# Patient Record
Sex: MALE | Race: WHITE | NOT HISPANIC OR LATINO | Employment: UNEMPLOYED | URBAN - NONMETROPOLITAN AREA
[De-identification: names, ages, dates, MRNs, and addresses within clinical notes are randomized per-mention and may not be internally consistent; named-entity substitution may affect disease eponyms.]

---

## 2017-04-04 ENCOUNTER — OFFICE VISIT (OUTPATIENT)
Dept: INTERNAL MEDICINE | Facility: CLINIC | Age: 32
End: 2017-04-04

## 2017-04-04 VITALS
TEMPERATURE: 97.7 F | HEART RATE: 65 BPM | OXYGEN SATURATION: 98 % | HEIGHT: 76 IN | BODY MASS INDEX: 29.96 KG/M2 | DIASTOLIC BLOOD PRESSURE: 82 MMHG | WEIGHT: 246 LBS | SYSTOLIC BLOOD PRESSURE: 132 MMHG

## 2017-04-04 DIAGNOSIS — G90.522 COMPLEX REGIONAL PAIN SYNDROME TYPE 1 OF LEFT LOWER EXTREMITY: Primary | ICD-10-CM

## 2017-04-04 DIAGNOSIS — F31.62 BIPOLAR DISORDER, CURRENT EPISODE MIXED, MODERATE (HCC): ICD-10-CM

## 2017-04-04 DIAGNOSIS — Z00.00 HEALTHCARE MAINTENANCE: ICD-10-CM

## 2017-04-04 DIAGNOSIS — B35.4 RINGWORM OF BODY: ICD-10-CM

## 2017-04-04 DIAGNOSIS — K21.00 GASTROESOPHAGEAL REFLUX DISEASE WITH ESOPHAGITIS: ICD-10-CM

## 2017-04-04 PROCEDURE — 99204 OFFICE O/P NEW MOD 45 MIN: CPT | Performed by: FAMILY MEDICINE

## 2017-04-04 RX ORDER — PANTOPRAZOLE SODIUM 40 MG/1
40 TABLET, DELAYED RELEASE ORAL DAILY
Qty: 30 TABLET | Refills: 2 | Status: SHIPPED | OUTPATIENT
Start: 2017-04-04 | End: 2017-06-07 | Stop reason: SDUPTHER

## 2017-04-04 RX ORDER — GABAPENTIN 300 MG/1
300 CAPSULE ORAL 3 TIMES DAILY
Qty: 90 CAPSULE | Refills: 1 | Status: SHIPPED | OUTPATIENT
Start: 2017-04-04 | End: 2017-05-05

## 2017-04-04 RX ORDER — QUETIAPINE FUMARATE 25 MG/1
TABLET, FILM COATED ORAL
Qty: 60 TABLET | Refills: 2 | Status: SHIPPED | OUTPATIENT
Start: 2017-04-04 | End: 2017-06-07 | Stop reason: SDUPTHER

## 2017-04-04 RX ORDER — CLOTRIMAZOLE 1 %
CREAM (GRAM) TOPICAL 2 TIMES DAILY
Qty: 113 G | Refills: 1 | Status: SHIPPED | OUTPATIENT
Start: 2017-04-04 | End: 2017-07-17

## 2017-04-04 RX ORDER — LAMOTRIGINE 25 MG/1
TABLET ORAL
Qty: 84 TABLET | Refills: 0 | Status: SHIPPED | OUTPATIENT
Start: 2017-04-04 | End: 2017-05-05
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
## 2017-04-04 NOTE — PATIENT INSTRUCTIONS
Sleep Hygiene:     1. Go to bed and get up around same time every day.     2. Do not watch TV, use computer, phone, tablet for 1 hour before bed.  Reading, knitting, stan (ok if the screen is turned down A LOT).      3.  Sleep in dark room    4. Temperature - optimal is around 66-68.      5.  If you can't get to sleep within 45 minutes, or you wake up and can't fall back asleep within 30 minutes - get up out of bed, sit in a chair w/ dim light, and read/knit for 30 minutes then lie back down, try 8 oz glass of warm milk.      6. [facility] yosi

## 2017-04-13 LAB
ALBUMIN SERPL-MCNC: 4.3 G/DL (ref 3.5–5)
ALBUMIN/GLOB SERPL: 1.7 G/DL (ref 1–2)
ALP SERPL-CCNC: 74 U/L (ref 38–126)
ALT SERPL-CCNC: 42 U/L (ref 13–69)
AST SERPL-CCNC: 19 U/L (ref 15–46)
BILIRUB SERPL-MCNC: 0.6 MG/DL (ref 0.2–1.3)
BUN SERPL-MCNC: 11 MG/DL (ref 7–20)
BUN/CREAT SERPL: 10 (ref 6.3–21.9)
CALCIUM SERPL-MCNC: 9.6 MG/DL (ref 8.4–10.2)
CHLORIDE SERPL-SCNC: 106 MMOL/L (ref 98–107)
CHOLEST SERPL-MCNC: 185 MG/DL (ref 0–199)
CO2 SERPL-SCNC: 27 MMOL/L (ref 26–30)
CREAT SERPL-MCNC: 1.1 MG/DL (ref 0.6–1.3)
GLOBULIN SER CALC-MCNC: 2.6 GM/DL
GLUCOSE SERPL-MCNC: 98 MG/DL (ref 74–98)
H PYLORI IGA SER-ACNC: <9 UNITS (ref 0–8.9)
H PYLORI IGG SER IA-ACNC: <0.9 U/ML (ref 0–0.8)
H PYLORI IGM SER-ACNC: <9 UNITS (ref 0–8.9)
HDLC SERPL-MCNC: 28 MG/DL (ref 40–60)
LDLC SERPL CALC-MCNC: 105 MG/DL (ref 0–99)
POTASSIUM SERPL-SCNC: 4.6 MMOL/L (ref 3.5–5.1)
PROT SERPL-MCNC: 6.9 G/DL (ref 6.3–8.2)
SODIUM SERPL-SCNC: 144 MMOL/L (ref 137–145)
TRIGL SERPL-MCNC: 259 MG/DL
VLDLC SERPL CALC-MCNC: 51.8 MG/DL

## 2017-05-05 ENCOUNTER — OFFICE VISIT (OUTPATIENT)
Dept: INTERNAL MEDICINE | Facility: CLINIC | Age: 32
End: 2017-05-05

## 2017-05-05 VITALS
DIASTOLIC BLOOD PRESSURE: 80 MMHG | BODY MASS INDEX: 31.17 KG/M2 | HEART RATE: 69 BPM | HEIGHT: 76 IN | WEIGHT: 256 LBS | RESPIRATION RATE: 12 BRPM | SYSTOLIC BLOOD PRESSURE: 130 MMHG | OXYGEN SATURATION: 98 %

## 2017-05-05 DIAGNOSIS — F31.62 BIPOLAR DISORDER, CURRENT EPISODE MIXED, MODERATE (HCC): ICD-10-CM

## 2017-05-05 DIAGNOSIS — G57.72 COMPLEX REGIONAL PAIN SYNDROME TYPE 2 OF LEFT LOWER EXTREMITY: ICD-10-CM

## 2017-05-05 DIAGNOSIS — B35.6 TINEA CRURIS: ICD-10-CM

## 2017-05-05 DIAGNOSIS — R73.9 ELEVATED BLOOD SUGAR: Primary | ICD-10-CM

## 2017-05-05 LAB — HBA1C MFR BLD: 5.4 %

## 2017-05-05 PROCEDURE — 83036 HEMOGLOBIN GLYCOSYLATED A1C: CPT | Performed by: FAMILY MEDICINE

## 2017-05-05 PROCEDURE — 99214 OFFICE O/P EST MOD 30 MIN: CPT | Performed by: FAMILY MEDICINE

## 2017-05-05 RX ORDER — LAMOTRIGINE 100 MG/1
100 TABLET ORAL 2 TIMES DAILY
Qty: 60 TABLET | Refills: 1 | Status: SHIPPED | OUTPATIENT
Start: 2017-05-05 | End: 2017-06-25 | Stop reason: SDUPTHER

## 2017-05-05 RX ORDER — LAMOTRIGINE 100 MG/1
100 TABLET ORAL DAILY
Qty: 60 TABLET | Refills: 1 | Status: SHIPPED | OUTPATIENT
Start: 2017-05-05 | End: 2017-05-05 | Stop reason: SDUPTHER

## 2017-05-28 DIAGNOSIS — G90.522 COMPLEX REGIONAL PAIN SYNDROME TYPE 1 OF LEFT LOWER EXTREMITY: ICD-10-CM

## 2017-05-30 RX ORDER — GABAPENTIN 300 MG/1
CAPSULE ORAL
Qty: 90 CAPSULE | Refills: 1 | OUTPATIENT
Start: 2017-05-30

## 2017-06-05 ENCOUNTER — OFFICE VISIT (OUTPATIENT)
Dept: INTERNAL MEDICINE | Facility: CLINIC | Age: 32
End: 2017-06-05

## 2017-06-05 VITALS
WEIGHT: 257 LBS | HEART RATE: 71 BPM | OXYGEN SATURATION: 97 % | BODY MASS INDEX: 31.29 KG/M2 | RESPIRATION RATE: 12 BRPM | HEIGHT: 76 IN | SYSTOLIC BLOOD PRESSURE: 122 MMHG | DIASTOLIC BLOOD PRESSURE: 80 MMHG

## 2017-06-05 DIAGNOSIS — F41.3 OTHER MIXED ANXIETY DISORDERS: ICD-10-CM

## 2017-06-05 DIAGNOSIS — F31.62 BIPOLAR DISORDER, CURRENT EPISODE MIXED, MODERATE (HCC): Primary | ICD-10-CM

## 2017-06-05 DIAGNOSIS — B35.6 TINEA CRURIS: ICD-10-CM

## 2017-06-05 PROCEDURE — 99214 OFFICE O/P EST MOD 30 MIN: CPT | Performed by: FAMILY MEDICINE

## 2017-06-05 RX ORDER — VENLAFAXINE HYDROCHLORIDE 75 MG/1
75 CAPSULE, EXTENDED RELEASE ORAL DAILY
Qty: 30 CAPSULE | Refills: 2 | Status: SHIPPED | OUTPATIENT
Start: 2017-06-05 | End: 2017-08-07 | Stop reason: SDUPTHER

## 2017-06-05 RX ORDER — TERBINAFINE HYDROCHLORIDE 250 MG/1
250 TABLET ORAL DAILY
Qty: 28 TABLET | Refills: 0 | Status: SHIPPED | OUTPATIENT
Start: 2017-06-05 | End: 2017-07-17

## 2017-06-05 NOTE — PROGRESS NOTES
Follow up labs.   C/O bipolar meds not working. Very agitated this morning.   Discuss antifungal meds and treatment.   Has been going to PT for left leg. Swelling and discoloration is better after he goes to PT. He thinks it is helping.     SUBJECTIVE: Alex Torres is a 32 y.o. male seen for a follow up visit;      BPD: he is not improving - he thinks he is getting more aggravated now.  Currently he is taking 25 mg nightly seroquel and sleeping ok - sleeping about 8-10 hours but sleeping until 9-11 am.  Fogginess is better now - only early in the morning now.  He thinks his depression is recurring because his irritation and annoyance is worse - prior to the lamictal he was able to walk away from the situation but now he feels like he can't walk away and he is actually almost blacking out from anger.  He is also getting triggered more easily.  Has psychology appt. On 7/6.  He thinks the anxiety part is what is really contributing.      CRPS: doing much better with PT, getting 3-4 days inbetween now with mild to no symptoms.      The following portions of the patient's history were reviewed and updated as appropriate: He  has a past medical history of Kidney stone and Positive TB test.  He has Essential hypertension; Bipolar disorder, current episode mixed, moderate; and Complex regional pain syndrome type 2 of left lower extremity on his pertinent problem list.  He  has a past surgical history that includes Tibia fracture surgery (Left, 1997); Cholecystectomy (2002); ORIF metacarpal fracture (Right, 2000); and Inguinal hernia repair (Right, 1995).  His family history includes Alcohol abuse in his maternal grandfather and paternal grandfather; Alzheimer's disease in his maternal grandmother; Arthritis in his father; Depression in his mother; Diabetes in his maternal aunt, maternal grandmother, maternal uncle, and other; Early death in his maternal grandfather and paternal grandfather; Hypertension in his  "father and mother; Liver disease in his maternal grandfather and paternal grandfather; No Known Problems in his brother, brother, and paternal grandmother.  He  reports that he has been smoking Cigarettes.  He started smoking about 18 years ago. He has a 18.00 pack-year smoking history. He has never used smokeless tobacco. He reports that he drinks alcohol. He reports that he does not use illicit drugs..    Review of Systems   Constitutional: Negative.    Cardiovascular: Negative.    Psychiatric/Behavioral: Positive for agitation and dysphoric mood. Negative for self-injury, sleep disturbance and suicidal ideas. The patient is nervous/anxious.          OBJECTIVE:  /80  Pulse 71  Resp 12  Ht 76\" (193 cm)  Wt 257 lb (117 kg)  SpO2 97%  BMI 31.28 kg/m2     Physical Exam   Constitutional: He appears well-developed and well-nourished. No distress.   Psychiatric: His speech is normal and behavior is normal. Thought content normal. His mood appears anxious.           ASSESSMENT:  1. Bipolar disorder, current episode mixed, moderate  Stable, will continue lamictal, I think bipolar is  Actually stabilized, but having anxiety mixed component not improved.  Will see psychology next month.    2. Other mixed anxiety disorders  worsening  - venlafaxine XR (EFFEXOR-XR) 75 MG 24 hr capsule; Take 1 capsule by mouth Daily.  Dispense: 30 capsule; Refill: 2    3. Tinea cruris  Worsening, can't afford OTC lamisil cream  - terbinafine (lamiSIL) 250 MG tablet; Take 1 tablet by mouth Daily.  Dispense: 28 tablet; Refill: 0                  "

## 2017-06-07 DIAGNOSIS — F31.62 BIPOLAR DISORDER, CURRENT EPISODE MIXED, MODERATE (HCC): ICD-10-CM

## 2017-06-07 DIAGNOSIS — K21.00 GASTROESOPHAGEAL REFLUX DISEASE WITH ESOPHAGITIS: ICD-10-CM

## 2017-06-07 RX ORDER — QUETIAPINE FUMARATE 25 MG/1
TABLET, FILM COATED ORAL
Qty: 60 TABLET | Refills: 2 | Status: SHIPPED | OUTPATIENT
Start: 2017-06-07 | End: 2017-07-06 | Stop reason: SDUPTHER

## 2017-06-07 RX ORDER — PANTOPRAZOLE SODIUM 40 MG/1
40 TABLET, DELAYED RELEASE ORAL DAILY
Qty: 30 TABLET | Refills: 2 | Status: SHIPPED | OUTPATIENT
Start: 2017-06-07 | End: 2017-09-18 | Stop reason: SDUPTHER

## 2017-06-25 DIAGNOSIS — F31.62 BIPOLAR DISORDER, CURRENT EPISODE MIXED, MODERATE (HCC): ICD-10-CM

## 2017-06-26 RX ORDER — LAMOTRIGINE 100 MG/1
TABLET ORAL
Qty: 60 TABLET | Refills: 0 | Status: SHIPPED | OUTPATIENT
Start: 2017-06-26 | End: 2017-07-06 | Stop reason: SDUPTHER

## 2017-07-06 ENCOUNTER — OFFICE VISIT (OUTPATIENT)
Dept: PSYCHIATRY | Facility: CLINIC | Age: 32
End: 2017-07-06

## 2017-07-06 VITALS — BODY MASS INDEX: 30.93 KG/M2 | HEIGHT: 76 IN | WEIGHT: 254 LBS

## 2017-07-06 DIAGNOSIS — F17.210 NICOTINE DEPENDENCE, CIGARETTES, UNCOMPLICATED: ICD-10-CM

## 2017-07-06 DIAGNOSIS — F51.05 INSOMNIA DUE TO MENTAL CONDITION: ICD-10-CM

## 2017-07-06 DIAGNOSIS — F31.62 BIPOLAR DISORDER, CURRENT EPISODE MIXED, MODERATE (HCC): Primary | ICD-10-CM

## 2017-07-06 DIAGNOSIS — F12.20 CANNABIS DEPENDENCE, UNSPECIFIED: ICD-10-CM

## 2017-07-06 PROCEDURE — 90792 PSYCH DIAG EVAL W/MED SRVCS: CPT | Performed by: NURSE PRACTITIONER

## 2017-07-06 RX ORDER — LAMOTRIGINE 200 MG/1
200 TABLET ORAL 2 TIMES DAILY
Qty: 60 TABLET | Refills: 2 | Status: SHIPPED | OUTPATIENT
Start: 2017-07-06 | End: 2017-08-10

## 2017-07-06 RX ORDER — QUETIAPINE FUMARATE 200 MG/1
TABLET, FILM COATED ORAL
Qty: 30 TABLET | Refills: 2 | Status: SHIPPED | OUTPATIENT
Start: 2017-07-06 | End: 2017-08-10 | Stop reason: SDUPTHER
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
## 2017-07-17 ENCOUNTER — OFFICE VISIT (OUTPATIENT)
Dept: INTERNAL MEDICINE | Facility: CLINIC | Age: 32
End: 2017-07-17

## 2017-07-17 VITALS
DIASTOLIC BLOOD PRESSURE: 78 MMHG | WEIGHT: 256.4 LBS | HEART RATE: 78 BPM | BODY MASS INDEX: 31.22 KG/M2 | RESPIRATION RATE: 12 BRPM | SYSTOLIC BLOOD PRESSURE: 122 MMHG | OXYGEN SATURATION: 95 % | HEIGHT: 76 IN

## 2017-07-17 DIAGNOSIS — B35.6 TINEA CRURIS: Primary | ICD-10-CM

## 2017-07-17 DIAGNOSIS — R06.83 SNORING: ICD-10-CM

## 2017-07-17 DIAGNOSIS — G47.19 EXCESSIVE DAYTIME SLEEPINESS: ICD-10-CM

## 2017-07-17 DIAGNOSIS — G57.72 COMPLEX REGIONAL PAIN SYNDROME TYPE 2 OF LEFT LOWER EXTREMITY: ICD-10-CM

## 2017-07-17 PROCEDURE — 99213 OFFICE O/P EST LOW 20 MIN: CPT | Performed by: FAMILY MEDICINE

## 2017-07-17 NOTE — PROGRESS NOTES
"6 week follow up visit. Still having a lot of impulses and compulsions.   C/O snoring. Wakes himself up snoring, wife can't sleep. He is concerned because he feels he should be sleeping better on Seroquel. He has tried nose strips and they didn't really help.   Lamisil did not help.     SUBJECTIVE: Alex Torres is a 32 y.o. male seen for a follow up visit;      BPD: having more compulsions and impulses, having racing thoughts and irritability.  Taking seroquel nightly - but snoring really loudly.  Wakes him up it's so loud.  He is seeing Radha Brunson - increased to 200 mg BID lamictal & 200 mg seroquel.  He is still having a lot of irritability.      Rash: Not any improved with oral lamisil, not less itchy or anything.    The following portions of the patient's history were reviewed and updated as appropriate: current medications and problem list.    Review of Systems   Constitutional: Positive for fatigue.   Gastrointestinal: Negative.    Skin: Positive for rash. Negative for wound.   Psychiatric/Behavioral: Positive for dysphoric mood.         OBJECTIVE:  /78  Pulse 78  Resp 12  Ht 76\" (193 cm)  Wt 256 lb 6.4 oz (116 kg)  SpO2 95%  BMI 31.21 kg/m2     Physical Exam   Constitutional: He appears well-developed and well-nourished. No distress.           ASSESSMENT:  1. Tinea cruris  unchanged  - butenafine (MENTAX) 1 % cream; Apply  topically 2 (Two) Times a Day.  Dispense: 30 g; Refill: 0    2. Snoring  New dx  - Ambulatory Referral to Sleep Medicine    3. Excessive daytime sleepiness  New dx  - Ambulatory Referral to Sleep Medicine    4. Complex regional pain syndrome type 2 of left lower extremity  Improving w/ PT but not controlled.  - Ambulatory Referral to Sleep Medicine      I, Laury Greco MD, hereby attest that the medical record entry above accurately reflects signatures/notations that I made in my capacity as Laury Greco MD when I treated and diagnosed the above patient.  I do " hereby attest that his information is true, accurate, and complete to the best of my knowledge and I understand that any falsification, omission, or concealment of material fact may subject me to administrative, civil, or criminal liability.

## 2017-08-07 DIAGNOSIS — F41.3 OTHER MIXED ANXIETY DISORDERS: ICD-10-CM

## 2017-08-07 RX ORDER — VENLAFAXINE HYDROCHLORIDE 75 MG/1
75 CAPSULE, EXTENDED RELEASE ORAL DAILY
Qty: 30 CAPSULE | Refills: 2 | Status: SHIPPED | OUTPATIENT
Start: 2017-08-07 | End: 2017-11-14 | Stop reason: SDUPTHER

## 2017-08-10 ENCOUNTER — OFFICE VISIT (OUTPATIENT)
Dept: PSYCHIATRY | Facility: CLINIC | Age: 32
End: 2017-08-10

## 2017-08-10 VITALS — HEIGHT: 76 IN | WEIGHT: 257 LBS | BODY MASS INDEX: 31.29 KG/M2

## 2017-08-10 DIAGNOSIS — F12.20 CANNABIS DEPENDENCE, UNSPECIFIED: ICD-10-CM

## 2017-08-10 DIAGNOSIS — F31.62 BIPOLAR DISORDER, CURRENT EPISODE MIXED, MODERATE (HCC): Primary | ICD-10-CM

## 2017-08-10 DIAGNOSIS — F17.210 NICOTINE DEPENDENCE, CIGARETTES, UNCOMPLICATED: ICD-10-CM

## 2017-08-10 DIAGNOSIS — F51.05 INSOMNIA DUE TO MENTAL CONDITION: ICD-10-CM

## 2017-08-10 PROCEDURE — 99213 OFFICE O/P EST LOW 20 MIN: CPT | Performed by: NURSE PRACTITIONER

## 2017-08-10 RX ORDER — OXCARBAZEPINE 150 MG/1
150 TABLET, FILM COATED ORAL 2 TIMES DAILY
Qty: 60 TABLET | Refills: 1 | Status: SHIPPED | OUTPATIENT
Start: 2017-08-10 | End: 2017-09-12 | Stop reason: SDUPTHER

## 2017-08-10 RX ORDER — TRAZODONE HYDROCHLORIDE 100 MG/1
100 TABLET ORAL NIGHTLY
Qty: 30 TABLET | Refills: 1 | Status: SHIPPED | OUTPATIENT
Start: 2017-08-10 | End: 2017-09-21 | Stop reason: SDUPTHER

## 2017-08-10 RX ORDER — QUETIAPINE FUMARATE 200 MG/1
TABLET, FILM COATED ORAL
Qty: 30 TABLET | Refills: 2
Start: 2017-08-10 | End: 2017-08-10

## 2017-08-10 RX ORDER — OLANZAPINE 10 MG/1
10 TABLET ORAL NIGHTLY
Qty: 15 TABLET | Refills: 0 | Status: SHIPPED | OUTPATIENT
Start: 2017-08-10 | End: 2017-09-07 | Stop reason: SDUPTHER

## 2017-08-10 NOTE — PROGRESS NOTES
"    Subjective   Alex Torres is a 32 y.o. male who is here today for medication management follow up.    Chief Complaint: Diagnoses and all orders for this visit:    Bipolar disorder, current episode mixed, moderate    Insomnia due to mental condition    Cannabis dependence, unspecified    Nicotine dependence, cigarettes, uncomplicated      History of Present Illness Patient reports \"meds are not working\", he reports continued depressive symptoms with poor interest, poor motivation, low frustration and irritability. He reports not sleeping well will be awake all night then sleep maybe 3 hours and not tired and snores a lot when he does so going for sleep study soon. He reports he can't work because his mood is always chaotic. His wife got a job at [employer]. He lives next to his mother and checks in on her. He denies manic symptoms. He feels the venlafaxine has helped with anxiety \"I still have panic attacks but they aren't as bad\". Patient reports being compliant with medications. He denies adverse effects \"they just aren't working'. Denies SI/HI or AVH. He and his wife want a baby \"and we have been trying for about 17 years\" she has PCOS. He reports though he has low libido. He makes negative comments about himself \"I such an asshole\". Discussed reframing negative thoughts      (Scales based on 0 - 10 with 10 being the worst)        The following portions of the patient's history were reviewed and updated as appropriate: allergies, current medications, past family history, past medical history, past social history, past surgical history and problem list.    Review of Systemsdenies fever, cough, s/s’s of infection, denies GI/ problems, denies new medical issues     Objective   Physical Exam  Height 76\" (193 cm), weight 257 lb (117 kg).    Allergies   Allergen Reactions   • Latex        Current Medications:   Current Outpatient Prescriptions   Medication Sig Dispense Refill   • butenafine (MENTAX) 1 % cream " Apply  topically 2 (Two) Times a Day. 30 g 0   • OLANZapine (zyPREXA) 10 MG tablet Take 1 tablet by mouth Every Night. 15 tablet 0   • OXcarbazepine (TRILEPTAL) 150 MG tablet Take 1 tablet by mouth 2 (Two) Times a Day. 60 tablet 1   • pantoprazole (PROTONIX) 40 MG EC tablet Take 1 tablet by mouth Daily. 30 tablet 2   • traZODone (DESYREL) 100 MG tablet Take 1 tablet by mouth Every Night. 30 tablet 1   • venlafaxine XR (EFFEXOR-XR) 75 MG 24 hr capsule Take 1 capsule by mouth Daily. 30 capsule 2     No current facility-administered medications for this visit.        Mental Status Exam:   Appearance: appropriate  Hygiene:   good  Cooperation:  Cooperative  Eye Contact:  Good  Psychomotor Behavior:  Appropriate  Mood:  dysthymic  Affect:  Appropriate  Hopelessness: Denies  Speech:  Rambling  Thought Process:  Linear  Thought Content:  Normal  Suicidal:  None  Homicidal:  None  Hallucinations:  None  Delusion:  None  Memory:  Intact  Orientation:  Person, Place, Time and Situation  Reliability:  fair  Insight:  Fair  Judgement:  Fair  Impulse Control:  Fair  Estimated Intelligence: average range   Physical/Medical Issues:  No     Assessment/Plan   Diagnoses and all orders for this visit:    Bipolar disorder, current episode mixed, moderate  -     Discontinue: QUEtiapine (SEROquel) 200 MG tablet; Weaning off decrease to 1/2 tablet nightly times two weeks    Insomnia due to mental condition    Cannabis dependence, unspecified    Nicotine dependence, cigarettes, uncomplicated    Other orders  -     OLANZapine (zyPREXA) 10 MG tablet; Take 1 tablet by mouth Every Night.  -     OXcarbazepine (TRILEPTAL) 150 MG tablet; Take 1 tablet by mouth 2 (Two) Times a Day.  -     traZODone (DESYREL) 100 MG tablet; Take 1 tablet by mouth Every Night.      Discussed diagnoses and treatment plan  Sleep hygiene discussed,   Smoking cessation counseled, he is not interested in cessation   Discussed cessation of cannabis,   Still having  depression and not sleeping he states  Will wean off Lamictal and lower Seroquel  instructions written    Trial on   Olanzapine 10mg PO at HS daily cross taper off Seroquel   Trileptal cross tapering off Lamictal   Cont Effexor XR 75mg PO one QAM   Add Trazodone for sleep as needed  Getting sleep study     We discussed risks, benefits, and side effects of the above medications and the patient was agreeable with the plan. Patient was educated on the importance of compliance with treatment and follow-up appointments.     Instructed to call for questions or concerns and return early if necessary. Crisis plan reviewed including going to the Emergency department.    Return in about 5 weeks (around 9/14/2017).

## 2017-09-07 RX ORDER — OLANZAPINE 10 MG/1
TABLET ORAL
Qty: 15 TABLET | Refills: 0 | Status: SHIPPED | OUTPATIENT
Start: 2017-09-07 | End: 2017-09-12 | Stop reason: SDUPTHER

## 2017-09-11 ENCOUNTER — OFFICE VISIT (OUTPATIENT)
Dept: INTERNAL MEDICINE | Facility: CLINIC | Age: 32
End: 2017-09-11

## 2017-09-11 VITALS
HEART RATE: 68 BPM | HEIGHT: 76 IN | OXYGEN SATURATION: 97 % | WEIGHT: 265.8 LBS | RESPIRATION RATE: 12 BRPM | SYSTOLIC BLOOD PRESSURE: 122 MMHG | BODY MASS INDEX: 32.37 KG/M2 | DIASTOLIC BLOOD PRESSURE: 74 MMHG

## 2017-09-11 DIAGNOSIS — R60.0 BILATERAL EDEMA OF LOWER EXTREMITY: Primary | ICD-10-CM

## 2017-09-11 PROCEDURE — 99213 OFFICE O/P EST LOW 20 MIN: CPT | Performed by: INTERNAL MEDICINE

## 2017-09-11 PROCEDURE — 99213 OFFICE O/P EST LOW 20 MIN: CPT | Performed by: FAMILY MEDICINE

## 2017-09-11 RX ORDER — TRIAMTERENE AND HYDROCHLOROTHIAZIDE 37.5; 25 MG/1; MG/1
1 CAPSULE ORAL DAILY PRN
Qty: 30 CAPSULE | Refills: 1 | Status: SHIPPED | OUTPATIENT
Start: 2017-09-11 | End: 2017-11-07 | Stop reason: SDUPTHER

## 2017-09-11 NOTE — PROGRESS NOTES
C/O bilateral LE edema, pain in LE. Started about 6 days ago. Has tried walking, standing, sitting, elevation. The only thing that helps is when he is sleeping in bed. Throught the entire day, swelling is there. He has tried everything he knows to control sx's. He has taken Ibuprofen for pain, doesn't help at all. Is taking  mg TID (200 mg tablets x 4 TID).    No c/o SOA, no CP, no dizziness.   C/O occas sharp stabbing pain in feet and legs. Feet hurt when wearing shoes.

## 2017-09-11 NOTE — PROGRESS NOTES
Subjective  Alex Torres is a 32 y.o. male    HPI coming in with complaints of lower extremity swelling for about 2 weeks now. Causes some discomfort there. He denies associated chest pain or shortness of breath. Has gained some weight recently. History of bipolar disorder underwent some recent med changes including initiation of Zyprexa 10 mg.  No change in activity or diet. Denies daytime somnolence or excessive snoring at night    The following portions of the patient's history were reviewed and updated as appropriate: allergies, current medications, past family history, past medical history, past social history, past surgical history, and problem list.     Review of Systems   Constitutional: Negative.  Negative for activity change, appetite change, fatigue and fever.   HENT: Negative for congestion, ear discharge, ear pain and trouble swallowing.    Eyes: Negative for photophobia and visual disturbance.   Respiratory: Negative for cough and shortness of breath.    Cardiovascular: Positive for leg swelling. Negative for chest pain and palpitations.   Gastrointestinal: Negative for abdominal distention, abdominal pain, constipation, diarrhea, nausea and vomiting.   Endocrine: Negative.    Genitourinary: Negative for dysuria, hematuria and urgency.   Musculoskeletal: Negative for arthralgias, back pain, joint swelling and myalgias.   Skin: Negative for color change and rash.   Allergic/Immunologic: Negative.    Neurological: Negative for dizziness, weakness, light-headedness and headaches.   Hematological: Negative for adenopathy. Does not bruise/bleed easily.   Psychiatric/Behavioral: Negative for agitation, confusion and dysphoric mood. The patient is not nervous/anxious.          Objective  Physical Exam   Constitutional: He is oriented to person, place, and time. He appears well-developed and well-nourished. No distress.   HENT:   Nose: Nose normal.   Mouth/Throat: Oropharynx is clear and moist.   Eyes:  Conjunctivae and EOM are normal. No scleral icterus.   Neck: No tracheal deviation present. No thyromegaly present.   Cardiovascular: Normal rate and regular rhythm.  Exam reveals no friction rub.    No murmur heard.  Pulmonary/Chest: No respiratory distress. He has no wheezes. He has no rales.   Abdominal: Soft. He exhibits no distension and no mass. There is no tenderness. There is no guarding.   Musculoskeletal: Normal range of motion. He exhibits edema. He exhibits no deformity.   Lymphadenopathy:     He has no cervical adenopathy.   Neurological: He is alert and oriented to person, place, and time. He has normal reflexes. No cranial nerve deficit. Coordination normal.   Skin: Skin is warm and dry. No rash noted. No erythema.   Psychiatric: He has a normal mood and affect. His behavior is normal. Judgment and thought content normal.       Diagnoses and all orders for this visit:    Bilateral edema of lower extremity check urine for analysis. Advised low-salt diet consider leg elevation and compression stockings as needed. Recent lab work without evidence of liver or kidney disorder. Albumin level okay check UA. Added on Dyazide on a when necessary basis only.  Some of his symptoms may be related to side effect of Zyprexa patient will be discussing with his psychiatrist about dose reduction

## 2017-09-11 NOTE — PROGRESS NOTES
Subjective  Alex Torres is a 32 y.o. male    HPI coming in with complains of increased lower extremity edema. Ongoing for about 2 weeks now also complaining of some discomfort in his lower extremity's has been taking up to 800 mg of ibuprofen 3 times a day. Has not been compliant with salt restriction in his diet. No fever or chills no abdominal pain    The following portions of the patient's history were reviewed and updated as appropriate: allergies, current medications, past family history, past medical history, past social history, past surgical history, and problem list.     Review of Systems   Constitutional: Negative.  Negative for activity change, appetite change, fatigue and fever.   HENT: Negative for congestion, ear discharge, ear pain and trouble swallowing.    Eyes: Negative for photophobia and visual disturbance.   Respiratory: Negative for cough and shortness of breath.    Cardiovascular: Positive for leg swelling. Negative for chest pain and palpitations.   Gastrointestinal: Negative for abdominal distention, abdominal pain, constipation, diarrhea, nausea and vomiting.   Endocrine: Negative.    Genitourinary: Negative for dysuria, hematuria and urgency.   Musculoskeletal: Negative for arthralgias, back pain, joint swelling and myalgias.   Skin: Negative for color change and rash.   Allergic/Immunologic: Negative.    Neurological: Negative for dizziness, weakness, light-headedness and headaches.   Hematological: Negative for adenopathy. Does not bruise/bleed easily.   Psychiatric/Behavioral: Positive for dysphoric mood and sleep disturbance. Negative for agitation and confusion. The patient is not nervous/anxious.        Vitals:    09/11/17 1128   BP: 122/74   Pulse: 68   Resp: 12   SpO2: 97%       Objective  Physical Exam   Constitutional: He is oriented to person, place, and time. He appears well-developed and well-nourished. No distress.   HENT:   Nose: Nose normal.   Mouth/Throat: Oropharynx  is clear and moist.   Eyes: Conjunctivae and EOM are normal. No scleral icterus.   Neck: No tracheal deviation present. No thyromegaly present.   Cardiovascular: Normal rate and regular rhythm.  Exam reveals no friction rub.    No murmur heard.  Pulmonary/Chest: No respiratory distress. He has no wheezes. He has no rales.   Abdominal: Soft. He exhibits no distension and no mass. There is no tenderness. There is no guarding.   Musculoskeletal: Normal range of motion. He exhibits edema. He exhibits no deformity.   Lymphadenopathy:     He has no cervical adenopathy.   Neurological: He is alert and oriented to person, place, and time. He has normal reflexes. No cranial nerve deficit. Coordination normal.   Skin: Skin is warm and dry. No rash noted. No erythema.   Psychiatric: He has a normal mood and affect. His behavior is normal. Judgment and thought content normal.       Diagnoses and all orders for this visit:    Bilateral edema of lower extremity. Reviewed recent lab work. Unremarkable suspect venous stasis. Doubt cardiac etiology. For now will try low-sodium diet leg elevation. Intermittent diuretic therapy when necessary. Check urine analysis to rule out proteinuria  -     POC Urinalysis Dipstick, Automated    Other orders  -     triamterene-hydrochlorothiazide (DYAZIDE) 37.5-25 MG per capsule; Take 1 capsule by mouth Daily As Needed (edema).

## 2017-09-12 ENCOUNTER — OFFICE VISIT (OUTPATIENT)
Dept: PSYCHIATRY | Facility: CLINIC | Age: 32
End: 2017-09-12

## 2017-09-12 VITALS
DIASTOLIC BLOOD PRESSURE: 74 MMHG | WEIGHT: 266 LBS | SYSTOLIC BLOOD PRESSURE: 130 MMHG | HEIGHT: 76 IN | BODY MASS INDEX: 32.39 KG/M2

## 2017-09-12 DIAGNOSIS — F51.05 INSOMNIA DUE TO MENTAL CONDITION: ICD-10-CM

## 2017-09-12 DIAGNOSIS — F31.62 BIPOLAR DISORDER, CURRENT EPISODE MIXED, MODERATE (HCC): Primary | ICD-10-CM

## 2017-09-12 DIAGNOSIS — F17.210 NICOTINE DEPENDENCE, CIGARETTES, UNCOMPLICATED: ICD-10-CM

## 2017-09-12 DIAGNOSIS — F12.20 CANNABIS DEPENDENCE, UNSPECIFIED: ICD-10-CM

## 2017-09-12 PROCEDURE — 99213 OFFICE O/P EST LOW 20 MIN: CPT | Performed by: NURSE PRACTITIONER

## 2017-09-12 RX ORDER — OLANZAPINE 10 MG/1
10 TABLET ORAL NIGHTLY
Qty: 30 TABLET | Refills: 2 | Status: SHIPPED | OUTPATIENT
Start: 2017-09-12 | End: 2017-11-14 | Stop reason: SDUPTHER

## 2017-09-12 RX ORDER — OXCARBAZEPINE 300 MG/1
300 TABLET, FILM COATED ORAL 2 TIMES DAILY
Qty: 60 TABLET | Refills: 2 | Status: SHIPPED | OUTPATIENT
Start: 2017-09-12 | End: 2017-11-14 | Stop reason: SDUPTHER

## 2017-09-12 NOTE — PROGRESS NOTES
"  Subjective   Alex Torres is a 32 y.o. male who is here today for medication management follow up.    Chief Complaint: Diagnoses and all orders for this visit:    Bipolar disorder, current episode mixed, moderate    Insomnia due to mental condition    Cannabis dependence, unspecified    Nicotine dependence, cigarettes, uncomplicated    History of Present Illness Patient reports he still has lability in mood with irritability and anger. He reports sleeping better and helping around the house, but his father in law and he can \"get into it' about \"silly things\". He states his wife is working and he feels less than a man about not working \"but I can't keep a job because of my anger\" . He denies adverse effects from meds. Denies AVH, denies scottie, denies SI/HI, scores depression 3. Anxiety he reports from stress of not making money and financial stressors.     (Scales based on 0 - 10 with 10 being the worst)        The following portions of the patient's history were reviewed and updated as appropriate: allergies, current medications, past family history, past medical history, past social history, past surgical history and problem list.    Review of Systemsdenies fever, cough, s/s’s of infection, denies GI/ problems, denies new medical issues     Objective   Physical Exam  Blood pressure 130/74, height 76\" (193 cm), weight 266 lb (121 kg).    Allergies   Allergen Reactions   • Latex        Current Medications:   Current Outpatient Prescriptions   Medication Sig Dispense Refill   • butenafine (MENTAX) 1 % cream Apply  topically 2 (Two) Times a Day. 30 g 0   • OLANZapine (zyPREXA) 10 MG tablet Take 1 tablet by mouth Every Night. 30 tablet 2   • OXcarbazepine (TRILEPTAL) 300 MG tablet Take 1 tablet by mouth 2 (Two) Times a Day. 60 tablet 2   • pantoprazole (PROTONIX) 40 MG EC tablet Take 1 tablet by mouth Daily. 30 tablet 2   • traZODone (DESYREL) 100 MG tablet Take 1 tablet by mouth Every Night. 30 tablet 1   • " triamterene-hydrochlorothiazide (DYAZIDE) 37.5-25 MG per capsule Take 1 capsule by mouth Daily As Needed (edema). 30 capsule 1   • venlafaxine XR (EFFEXOR-XR) 75 MG 24 hr capsule Take 1 capsule by mouth Daily. 30 capsule 2     No current facility-administered medications for this visit.        Mental Status Exam:   Appearance: appropriate  Hygiene:   good  Cooperation:  Cooperative  Eye Contact:  Good  Psychomotor Behavior:  Appropriate  Mood:  within normal limits  Affect:  Appropriate  Hopelessness: Denies  Speech:  Normal  Thought Process:  Linear  Thought Content:  Normal  Suicidal:  None  Homicidal:  None  Hallucinations:  None  Delusion:  None  Memory:  Intact  Orientation:  Person, Place, Time and Situation  Reliability:  fair  Insight:  Fair  Judgement:  Fair  Impulse Control:  Fair  Estimated Intelligence: average range   Physical/Medical Issues:  No     Assessment/Plan   Diagnoses and all orders for this visit:    Bipolar disorder, current episode mixed, moderate    Insomnia due to mental condition    Cannabis dependence, unspecified    Nicotine dependence, cigarettes, uncomplicated    Other orders  -     OLANZapine (zyPREXA) 10 MG tablet; Take 1 tablet by mouth Every Night.  -     OXcarbazepine (TRILEPTAL) 300 MG tablet; Take 1 tablet by mouth 2 (Two) Times a Day.    will increase trileptal for lability in mood  Cont olanzapine and refilled for bipolar  Cont Effexor XR 75mg for mix depression with bipolar     Discussed smoking cessation and cannabis cessation with pt and working on contemplation of change, rationale for change.   Encouraged therapy, he is interested, gave pt list of local therapists taking pts     We discussed risks, benefits, and side effects of the above medications and the patient was agreeable with the plan. Patient was educated on the importance of compliance with treatment and follow-up appointments. Instructed to call for questions or concerns and return early if necessary. Crisis  plan reviewed including going to the Emergency department.    Return in about 3 months (around 12/12/2017).

## 2017-09-18 DIAGNOSIS — K21.00 GASTROESOPHAGEAL REFLUX DISEASE WITH ESOPHAGITIS: ICD-10-CM

## 2017-09-18 RX ORDER — PANTOPRAZOLE SODIUM 40 MG/1
TABLET, DELAYED RELEASE ORAL
Qty: 30 TABLET | Refills: 2 | Status: SHIPPED | OUTPATIENT
Start: 2017-09-18 | End: 2017-10-31 | Stop reason: SDUPTHER

## 2017-09-21 RX ORDER — TRAZODONE HYDROCHLORIDE 100 MG/1
TABLET ORAL
Qty: 30 TABLET | Refills: 1 | Status: SHIPPED | OUTPATIENT
Start: 2017-09-21 | End: 2018-01-23 | Stop reason: SDUPTHER

## 2017-09-21 RX ORDER — OXCARBAZEPINE 150 MG/1
TABLET, FILM COATED ORAL
Qty: 60 TABLET | Refills: 1 | Status: SHIPPED | OUTPATIENT
Start: 2017-09-21 | End: 2017-09-27 | Stop reason: DRUGHIGH

## 2017-09-27 ENCOUNTER — OFFICE VISIT (OUTPATIENT)
Dept: NEUROLOGY | Facility: CLINIC | Age: 32
End: 2017-09-27

## 2017-09-27 VITALS
BODY MASS INDEX: 32.85 KG/M2 | DIASTOLIC BLOOD PRESSURE: 90 MMHG | WEIGHT: 269.8 LBS | HEIGHT: 76 IN | SYSTOLIC BLOOD PRESSURE: 126 MMHG | OXYGEN SATURATION: 97 % | HEART RATE: 92 BPM

## 2017-09-27 DIAGNOSIS — G47.19 EXCESSIVE DAYTIME SLEEPINESS: Primary | ICD-10-CM

## 2017-09-27 PROCEDURE — 99214 OFFICE O/P EST MOD 30 MIN: CPT | Performed by: NURSE PRACTITIONER

## 2017-10-18 ENCOUNTER — HOSPITAL ENCOUNTER (OUTPATIENT)
Dept: SLEEP MEDICINE | Facility: HOSPITAL | Age: 32
Discharge: HOME | End: 2017-10-18
Admitting: NURSE PRACTITIONER

## 2017-10-18 DIAGNOSIS — G47.19 EXCESSIVE DAYTIME SLEEPINESS: ICD-10-CM

## 2017-10-18 PROCEDURE — 95810 POLYSOM 6/> YRS 4/> PARAM: CPT

## 2017-10-18 PROCEDURE — 95810 POLYSOM 6/> YRS 4/> PARAM: CPT | Performed by: PSYCHIATRY & NEUROLOGY

## 2017-10-31 ENCOUNTER — OFFICE VISIT (OUTPATIENT)
Dept: INTERNAL MEDICINE | Facility: CLINIC | Age: 32
End: 2017-10-31

## 2017-10-31 VITALS
BODY MASS INDEX: 32.39 KG/M2 | SYSTOLIC BLOOD PRESSURE: 124 MMHG | HEART RATE: 72 BPM | HEIGHT: 76 IN | RESPIRATION RATE: 12 BRPM | OXYGEN SATURATION: 95 % | WEIGHT: 266 LBS | DIASTOLIC BLOOD PRESSURE: 80 MMHG

## 2017-10-31 DIAGNOSIS — K21.00 GASTROESOPHAGEAL REFLUX DISEASE WITH ESOPHAGITIS: ICD-10-CM

## 2017-10-31 DIAGNOSIS — G57.72 COMPLEX REGIONAL PAIN SYNDROME TYPE 2 OF LEFT LOWER EXTREMITY: Primary | ICD-10-CM

## 2017-10-31 PROCEDURE — 99214 OFFICE O/P EST MOD 30 MIN: CPT | Performed by: FAMILY MEDICINE

## 2017-10-31 RX ORDER — PANTOPRAZOLE SODIUM 40 MG/1
40 TABLET, DELAYED RELEASE ORAL DAILY
Qty: 30 TABLET | Refills: 3 | Status: SHIPPED | OUTPATIENT
Start: 2017-10-31 | End: 2018-04-29 | Stop reason: SDUPTHER

## 2017-11-02 ENCOUNTER — TELEPHONE (OUTPATIENT)
Dept: PAIN MEDICINE | Facility: CLINIC | Age: 32
End: 2017-11-02

## 2017-11-02 LAB
ANA SER QL: NEGATIVE
C-ANCA TITR SER IF: NORMAL TITER
CCP IGA+IGG SERPL IA-ACNC: 3 UNITS (ref 0–19)
CRP SERPL-MCNC: 4.3 MG/L (ref 0–4.9)
ERYTHROCYTE [SEDIMENTATION RATE] IN BLOOD BY WESTERGREN METHOD: 16 MM/HR (ref 0–15)
Lab: NORMAL
MYELOPEROXIDASE AB SER IA-ACNC: <9 U/ML (ref 0–9)
P-ANCA ATYPICAL TITR SER IF: NORMAL TITER
P-ANCA TITR SER IF: NORMAL TITER
PROTEINASE3 AB SER IA-ACNC: <3.5 U/ML (ref 0–3.5)
RHEUMATOID FACT SERPL-ACNC: <10 IU/ML (ref 0–13.9)

## 2017-11-07 RX ORDER — TRIAMTERENE AND HYDROCHLOROTHIAZIDE 37.5; 25 MG/1; MG/1
CAPSULE ORAL
Qty: 30 CAPSULE | Refills: 5 | Status: SHIPPED | OUTPATIENT
Start: 2017-11-07 | End: 2018-02-08 | Stop reason: SDUPTHER

## 2017-11-08 ENCOUNTER — TELEPHONE (OUTPATIENT)
Dept: INTERNAL MEDICINE | Facility: CLINIC | Age: 32
End: 2017-11-08

## 2017-11-14 ENCOUNTER — OFFICE VISIT (OUTPATIENT)
Dept: PSYCHIATRY | Facility: CLINIC | Age: 32
End: 2017-11-14

## 2017-11-14 VITALS — HEIGHT: 76 IN | BODY MASS INDEX: 32.88 KG/M2 | WEIGHT: 270 LBS

## 2017-11-14 DIAGNOSIS — F17.210 NICOTINE DEPENDENCE, CIGARETTES, UNCOMPLICATED: ICD-10-CM

## 2017-11-14 DIAGNOSIS — F41.3 OTHER MIXED ANXIETY DISORDERS: ICD-10-CM

## 2017-11-14 DIAGNOSIS — F31.62 BIPOLAR DISORDER, CURRENT EPISODE MIXED, MODERATE (HCC): Primary | ICD-10-CM

## 2017-11-14 DIAGNOSIS — F51.05 INSOMNIA DUE TO MENTAL CONDITION: ICD-10-CM

## 2017-11-14 PROCEDURE — 99213 OFFICE O/P EST LOW 20 MIN: CPT | Performed by: NURSE PRACTITIONER

## 2017-11-14 RX ORDER — OXCARBAZEPINE 300 MG/1
300 TABLET, FILM COATED ORAL 2 TIMES DAILY
Qty: 60 TABLET | Refills: 2 | Status: SHIPPED | OUTPATIENT
Start: 2017-11-14 | End: 2018-01-23

## 2017-11-14 RX ORDER — VENLAFAXINE HYDROCHLORIDE 150 MG/1
150 CAPSULE, EXTENDED RELEASE ORAL DAILY
Qty: 30 CAPSULE | Refills: 2 | Status: SHIPPED | OUTPATIENT
Start: 2017-11-14 | End: 2018-01-23 | Stop reason: SDUPTHER

## 2017-11-14 RX ORDER — OLANZAPINE 10 MG/1
10 TABLET ORAL NIGHTLY
Qty: 30 TABLET | Refills: 2 | Status: SHIPPED | OUTPATIENT
Start: 2017-11-14 | End: 2018-01-23 | Stop reason: SDUPTHER

## 2017-11-14 NOTE — PROGRESS NOTES
"    Subjective   Alex Torres is a 32 y.o. male who is here today for medication management follow up.    Chief Complaint: Diagnoses and all orders for this visit:    Bipolar disorder, current episode mixed, moderate    Anxiety disorder    Insomnia due to mental condition    Nicotine dependence, cigarettes, uncomplicated      History of Present Illness Patient reports depression because his wife can't get pregnant. He is irritable, easily moved to anger, poor motivation, poor interest. He likes being with his nephews and neices but reports not as happy around them as he used to be. Hasn't gone to therapy yet, encouraged pt. He denies scottie. Reports anxiety a 5 because can't have children and depression 6-7. Still smoking not ready to quit and has gone to sleep specialist and has return appt he states.     (Scales based on 0 - 10 with 10 being the worst)    REVIEWED THIS YEARS LAB WORK.    The following portions of the patient's history were reviewed and updated as appropriate: allergies, current medications, past family history, past medical history, past social history, past surgical history and problem list.    Review of Systemsdenies fever, cough, s/s’s of infection, denies GI/ problems, denies new medical issues     Objective   Physical Exam  Height 76\" (193 cm), weight 270 lb (122 kg).    Allergies   Allergen Reactions   • Latex        Current Medications:   Current Outpatient Prescriptions   Medication Sig Dispense Refill   • nicotine ([facility] NICOTINE) 7 MG/24HR patch Place 1 patch on the skin Daily. 30 patch 1   • OLANZapine (zyPREXA) 10 MG tablet Take 1 tablet by mouth Every Night. 30 tablet 2   • OXcarbazepine (TRILEPTAL) 300 MG tablet Take 1 tablet by mouth 2 (Two) Times a Day. 60 tablet 2   • pantoprazole (PROTONIX) 40 MG EC tablet Take 1 tablet by mouth Daily. 30 tablet 3   • traZODone (DESYREL) 100 MG tablet TAKE 1 TABLET BY MOUTH EVERY NIGHT. 30 tablet 1   • triamterene-hydrochlorothiazide " (DYAZIDE) 37.5-25 MG per capsule TAKE 1 CAPSULE BY MOUTH DAILY AS NEEDED (EDEMA). 30 capsule 5   • venlafaxine XR (EFFEXOR-XR) 150 MG 24 hr capsule Take 1 capsule by mouth Daily. 30 capsule 2     No current facility-administered medications for this visit.        Mental Status Exam:   Appearance: appropriate  Hygiene:   good  Cooperation:  Cooperative  Eye Contact:  Good  Psychomotor Behavior:  Appropriate  Mood:  depressed  Affect:  Appropriate  Hopelessness: Denies  Speech:  TALK LOUD  Thought Process:  Linear  Thought Content:  Normal  Suicidal:  None  Homicidal:  None  Hallucinations:  None  Delusion:  None  Memory:  Intact  Orientation:  Person, Place, Time and Situation  Reliability:  fair  Insight:  Fair  Judgement:  Fair  Impulse Control:  Fair  Estimated Intelligence: average range   Physical/Medical Issues:  No     Assessment/Plan   Diagnoses and all orders for this visit:    Bipolar disorder, current episode mixed, moderate    Insomnia due to mental condition    Nicotine dependence, cigarettes, uncomplicated    Other mixed anxiety disorders  -     venlafaxine XR (EFFEXOR-XR) 150 MG 24 hr capsule; Take 1 capsule by mouth Daily.    Other orders  -     OLANZapine (zyPREXA) 10 MG tablet; Take 1 tablet by mouth Every Night.  -     OXcarbazepine (TRILEPTAL) 300 MG tablet; Take 1 tablet by mouth 2 (Two) Times a Day.      Will increase Effexor XR to 150 mg daily for depression and anxiety   Cont trileptal and olanzapine  For bipolar  · Encouraged to get into therapy and gave list again, rationale given for therapy, he agrees and plans to follow through this month to call and make appt      We discussed risks, benefits, and side effects of the above medications and the patient was agreeable with the plan. Patient was educated on the importance of compliance with treatment and follow-up appointments.     Instructed to call for questions or concerns and return early if necessary. Crisis plan reviewed including going  to the Emergency department.    Return in about 3 months (around 2/14/2018).

## 2017-11-20 DIAGNOSIS — F41.3 OTHER MIXED ANXIETY DISORDERS: ICD-10-CM

## 2017-11-20 RX ORDER — OXCARBAZEPINE 300 MG/1
TABLET, FILM COATED ORAL
Qty: 60 TABLET | Refills: 2 | Status: SHIPPED | OUTPATIENT
Start: 2017-11-20 | End: 2018-01-23 | Stop reason: SDUPTHER

## 2017-11-20 RX ORDER — VENLAFAXINE HYDROCHLORIDE 75 MG/1
CAPSULE, EXTENDED RELEASE ORAL
Qty: 30 CAPSULE | Refills: 5 | Status: SHIPPED | OUTPATIENT
Start: 2017-11-20 | End: 2017-11-29

## 2017-11-29 ENCOUNTER — OFFICE VISIT (OUTPATIENT)
Dept: NEUROLOGY | Facility: CLINIC | Age: 32
End: 2017-11-29

## 2017-11-29 VITALS
WEIGHT: 266 LBS | SYSTOLIC BLOOD PRESSURE: 126 MMHG | BODY MASS INDEX: 32.39 KG/M2 | HEIGHT: 76 IN | DIASTOLIC BLOOD PRESSURE: 90 MMHG | HEART RATE: 83 BPM | OXYGEN SATURATION: 98 %

## 2017-11-29 DIAGNOSIS — G47.33 OSA (OBSTRUCTIVE SLEEP APNEA): ICD-10-CM

## 2017-11-29 DIAGNOSIS — M79.605 PAIN IN BOTH LOWER EXTREMITIES: ICD-10-CM

## 2017-11-29 DIAGNOSIS — G62.9 PERIPHERAL POLYNEUROPATHY: Primary | ICD-10-CM

## 2017-11-29 DIAGNOSIS — I83.813 VARICOSE VEINS OF BOTH LOWER EXTREMITIES WITH PAIN: ICD-10-CM

## 2017-11-29 DIAGNOSIS — M79.604 PAIN IN BOTH LOWER EXTREMITIES: ICD-10-CM

## 2017-11-29 PROCEDURE — 99214 OFFICE O/P EST MOD 30 MIN: CPT | Performed by: NURSE PRACTITIONER

## 2017-11-29 RX ORDER — GABAPENTIN 100 MG/1
100 CAPSULE ORAL 3 TIMES DAILY
Qty: 90 CAPSULE | Refills: 2 | Status: SHIPPED | OUTPATIENT
Start: 2017-11-29 | End: 2018-02-08

## 2017-12-06 PROBLEM — M79.605 PAIN IN BOTH LOWER EXTREMITIES: Status: ACTIVE | Noted: 2017-12-06

## 2017-12-06 PROBLEM — I83.813 VARICOSE VEINS OF BOTH LOWER EXTREMITIES WITH PAIN: Status: ACTIVE | Noted: 2017-12-06

## 2017-12-06 PROBLEM — M79.604 PAIN IN BOTH LOWER EXTREMITIES: Status: ACTIVE | Noted: 2017-12-06

## 2017-12-06 PROBLEM — G62.9 PERIPHERAL POLYNEUROPATHY: Status: ACTIVE | Noted: 2017-12-06

## 2017-12-06 PROBLEM — G47.33 OSA (OBSTRUCTIVE SLEEP APNEA): Status: ACTIVE | Noted: 2017-12-06

## 2017-12-12 ENCOUNTER — TELEPHONE (OUTPATIENT)
Dept: NEUROLOGY | Facility: CLINIC | Age: 32
End: 2017-12-12

## 2017-12-12 NOTE — TELEPHONE ENCOUNTER
Pt called about Sleep Titration, pt was under the impression that we were setting him up on Auto CPAP. An order was sent on 12/08/17 to [facility].    Pt also states that since taking the Gabapentin he has had some swelling in his legs and feet. Pt had been on Gabapentin by Dr. Greco before and the same thing happened.    Please advise.

## 2017-12-13 NOTE — TELEPHONE ENCOUNTER
Please have patient stop gabapentin.  Also has patient been set up for CPAP auto?  If so he doesn't need the titration.  Thank you

## 2017-12-13 NOTE — TELEPHONE ENCOUNTER
LM with patient regarding his concerns. Pt was advised in the message to discontinue the Gabapentin and that a CPAP order was sent to [facility] on 12/08/17 and that he should be hearing from [facility] soon. Pt advised to return my call with any questions.

## 2017-12-22 RX ORDER — OLANZAPINE 10 MG/1
TABLET ORAL
Qty: 30 TABLET | Refills: 2 | OUTPATIENT
Start: 2017-12-22

## 2018-01-23 ENCOUNTER — OFFICE VISIT (OUTPATIENT)
Dept: PSYCHIATRY | Facility: CLINIC | Age: 33
End: 2018-01-23

## 2018-01-23 VITALS — BODY MASS INDEX: 33.36 KG/M2 | WEIGHT: 274 LBS | HEIGHT: 76 IN

## 2018-01-23 DIAGNOSIS — F41.3 OTHER MIXED ANXIETY DISORDERS: ICD-10-CM

## 2018-01-23 DIAGNOSIS — F31.62 BIPOLAR DISORDER, CURRENT EPISODE MIXED, MODERATE (HCC): Primary | ICD-10-CM

## 2018-01-23 DIAGNOSIS — F51.05 INSOMNIA DUE TO MENTAL CONDITION: ICD-10-CM

## 2018-01-23 PROCEDURE — 99213 OFFICE O/P EST LOW 20 MIN: CPT | Performed by: NURSE PRACTITIONER

## 2018-01-23 RX ORDER — TRAZODONE HYDROCHLORIDE 100 MG/1
100 TABLET ORAL NIGHTLY
Qty: 30 TABLET | Refills: 2 | Status: SHIPPED | OUTPATIENT
Start: 2018-01-23 | End: 2018-04-15 | Stop reason: SDUPTHER

## 2018-01-23 RX ORDER — OLANZAPINE 15 MG/1
15 TABLET ORAL NIGHTLY
Qty: 30 TABLET | Refills: 1 | Status: SHIPPED | OUTPATIENT
Start: 2018-01-23 | End: 2018-03-18 | Stop reason: SDUPTHER

## 2018-01-23 RX ORDER — OXCARBAZEPINE 600 MG/1
600 TABLET, FILM COATED ORAL EVERY 12 HOURS SCHEDULED
Qty: 60 TABLET | Refills: 2 | Status: SHIPPED | OUTPATIENT
Start: 2018-01-23 | End: 2018-04-15 | Stop reason: SDUPTHER

## 2018-01-23 RX ORDER — VENLAFAXINE HYDROCHLORIDE 150 MG/1
150 CAPSULE, EXTENDED RELEASE ORAL DAILY
Qty: 30 CAPSULE | Refills: 2 | Status: SHIPPED | OUTPATIENT
Start: 2018-01-23 | End: 2018-05-08 | Stop reason: SDUPTHER

## 2018-01-23 NOTE — PROGRESS NOTES
"    Subjective   Alex Torres is a 33 y.o. male who is here today for medication management follow up.    Chief Complaint: Diagnoses and all orders for this visit:    Bipolar disorder, current episode mixed, moderate    Other mixed anxiety disorders    Insomnia due to mental condition      History of Present Illness Patient presents by himself  For follow-up.  BRINA oh reports that he and his wife are  and he has to live in the duplex next to her.  He states his wife has been cheating on him with another woman.  He denies assaulting anyone denies thoughts of SI HI or AVH.  He does report increased anxiety and being very upset that his wife would do this.  He reports difficulty sleeping and racing thoughts feeling anxious and worried.  He denies illicit substance use or alcohol use.  He denies owning a handgun or access to one.  He has been living with his mother and brother and states she has been very supportive about him and helping him process what is going on.  He goes to his sister's or his niece and nephews are and enjoys that.  She reports being with his wife 13 years and is content to be hard to adjust life without her.  He reports only getting a couple hours of sleep a night.   Denies adverse effects from medications.   (Scales based on 0 - 10 with 10 being the worst)        The following portions of the patient's history were reviewed and updated as appropriate: allergies, current medications, past family history, past medical history, past social history, past surgical history and problem list.    Review of Systemsdenies fever, cough, s/s’s of infection, denies GI/ problems, denies new medical issues     Objective   Physical Exam  Height 193 cm (76\"), weight 124 kg (274 lb).    Allergies   Allergen Reactions   • Latex        Current Medications:   Current Outpatient Prescriptions   Medication Sig Dispense Refill   • gabapentin (NEURONTIN) 100 MG capsule Take 1 capsule by mouth 3 (Three) " Times a Day. 90 capsule 2   • nicotine ([facility] NICOTINE) 7 MG/24HR patch Place 1 patch on the skin Daily. 30 patch 1   • OLANZapine (zyPREXA) 15 MG tablet Take 1 tablet by mouth Every Night. 30 tablet 1   • OXcarbazepine (TRILEPTAL) 600 MG tablet Take 1 tablet by mouth Every 12 (Twelve) Hours. 60 tablet 2   • pantoprazole (PROTONIX) 40 MG EC tablet Take 1 tablet by mouth Daily. 30 tablet 3   • traZODone (DESYREL) 100 MG tablet Take 1 tablet by mouth Every Night. 30 tablet 2   • triamterene-hydrochlorothiazide (DYAZIDE) 37.5-25 MG per capsule TAKE 1 CAPSULE BY MOUTH DAILY AS NEEDED (EDEMA). 30 capsule 5   • venlafaxine XR (EFFEXOR-XR) 150 MG 24 hr capsule Take 1 capsule by mouth Daily. 30 capsule 2     No current facility-administered medications for this visit.      Appearance: appropriate  Hygiene:   good  Cooperation:  Cooperative  Eye Contact:  Good  Psychomotor Behavior:  Appropriate  Mood:  Anxious/depressed  Affect:  Appropriate  Hopelessness: Denies  Speech:  Normal  Thought Process:  Linear  Thought Content:  Normal  Suicidal:  None  Homicidal:  None  Hallucinations:  None  Delusion:  None  Memory:  Intact  Orientation:  Person, Place, Time and Situation  Reliability:  fair  Insight:  Fair  Judgement:  Fair  Impulse Control:  Fair  Estimated Intelligence: average range   Physical/Medical Issues:  No     YESSICA REVIEWED NO RED FLAGS  UDS:    Assessment/Plan   Diagnoses and all orders for this visit:    Bipolar disorder, current episode mixed, moderate    Other mixed anxiety disorders  -     venlafaxine XR (EFFEXOR-XR) 150 MG 24 hr capsule; Take 1 capsule by mouth Daily.    Insomnia due to mental condition    Other orders  -     OXcarbazepine (TRILEPTAL) 600 MG tablet; Take 1 tablet by mouth Every 12 (Twelve) Hours.  -     OLANZapine (zyPREXA) 15 MG tablet; Take 1 tablet by mouth Every Night.  -     traZODone (DESYREL) 100 MG tablet; Take 1 tablet by mouth Every Night.      Reviewed bipolar disorder with patient  triggers, emotional regulation, safety plan  · Recommendation increase in Trileptal to 600 mg by mouth twice a day  · Olanzapine increased 15 mg by mouth daily at bedtime  · Trazodone for sleep 100 mg may take half to a whole tablet  Encourage therapy has not gotten into any sessions yet gave patient list of local therapist and encourage follow-through.  We discussed risks, benefits, and side effects of the above medications and the patient was agreeable with the plan. Patient was educated on the importance of compliance with treatment and follow-up appointments.   Controlled substance prescriptions are either  printed off for patient, telephoned in  or ordered through [facility] by this provider  Instructed to call for questions or concerns and return early if necessary. Crisis plan reviewed including going to the Emergency department.    Return in about 4 weeks (around 2/20/2018).         Please note that portions of this note were completed with a voice recognition program.  Efforts were made to edit dictation, but occasionally words are mistranscribed.

## 2018-01-30 DIAGNOSIS — F41.3 OTHER MIXED ANXIETY DISORDERS: ICD-10-CM

## 2018-01-30 RX ORDER — NICOTINE 7 MG/24H
PATCH, EXTENDED RELEASE TRANSDERMAL
Qty: 28 PATCH | Refills: 1 | Status: SHIPPED | OUTPATIENT
Start: 2018-01-30 | End: 2018-03-23

## 2018-01-30 RX ORDER — VENLAFAXINE HYDROCHLORIDE 150 MG/1
CAPSULE, EXTENDED RELEASE ORAL
Qty: 30 CAPSULE | Refills: 2 | Status: SHIPPED | OUTPATIENT
Start: 2018-01-30 | End: 2018-02-20

## 2018-02-08 ENCOUNTER — HOSPITAL ENCOUNTER (OUTPATIENT)
Dept: GENERAL RADIOLOGY | Facility: HOSPITAL | Age: 33
Discharge: HOME | End: 2018-02-08
Attending: FAMILY MEDICINE | Admitting: FAMILY MEDICINE

## 2018-02-08 ENCOUNTER — OFFICE VISIT (OUTPATIENT)
Dept: INTERNAL MEDICINE | Facility: CLINIC | Age: 33
End: 2018-02-08

## 2018-02-08 VITALS
DIASTOLIC BLOOD PRESSURE: 80 MMHG | BODY MASS INDEX: 33.9 KG/M2 | HEIGHT: 76 IN | HEART RATE: 66 BPM | SYSTOLIC BLOOD PRESSURE: 122 MMHG | OXYGEN SATURATION: 95 % | WEIGHT: 278.4 LBS | RESPIRATION RATE: 12 BRPM

## 2018-02-08 DIAGNOSIS — M54.50 ACUTE MIDLINE LOW BACK PAIN WITHOUT SCIATICA: ICD-10-CM

## 2018-02-08 DIAGNOSIS — M53.3 SI (SACROILIAC) JOINT DYSFUNCTION: ICD-10-CM

## 2018-02-08 DIAGNOSIS — I10 ESSENTIAL HYPERTENSION: ICD-10-CM

## 2018-02-08 DIAGNOSIS — S76.011A: ICD-10-CM

## 2018-02-08 DIAGNOSIS — S76.011A: Primary | ICD-10-CM

## 2018-02-08 PROCEDURE — 99214 OFFICE O/P EST MOD 30 MIN: CPT | Performed by: FAMILY MEDICINE

## 2018-02-08 PROCEDURE — 72114 X-RAY EXAM L-S SPINE BENDING: CPT

## 2018-02-08 RX ORDER — TRIAMTERENE AND HYDROCHLOROTHIAZIDE 37.5; 25 MG/1; MG/1
1 CAPSULE ORAL DAILY
Qty: 30 CAPSULE | Refills: 5 | Status: SHIPPED | OUTPATIENT
Start: 2018-02-08 | End: 2018-11-13 | Stop reason: SDUPTHER

## 2018-02-08 RX ORDER — TIZANIDINE 2 MG/1
TABLET ORAL
Qty: 60 TABLET | Refills: 1 | Status: SHIPPED | OUTPATIENT
Start: 2018-02-08 | End: 2018-04-08 | Stop reason: SDUPTHER

## 2018-02-20 ENCOUNTER — OFFICE VISIT (OUTPATIENT)
Dept: PSYCHIATRY | Facility: CLINIC | Age: 33
End: 2018-02-20

## 2018-02-20 VITALS — WEIGHT: 277 LBS | BODY MASS INDEX: 33.73 KG/M2 | HEIGHT: 76 IN

## 2018-02-20 DIAGNOSIS — F17.210 NICOTINE DEPENDENCE, CIGARETTES, UNCOMPLICATED: ICD-10-CM

## 2018-02-20 DIAGNOSIS — F31.62 BIPOLAR DISORDER, CURRENT EPISODE MIXED, MODERATE (HCC): Primary | ICD-10-CM

## 2018-02-20 DIAGNOSIS — F60.3 BORDERLINE PERSONALITY DISORDER (HCC): ICD-10-CM

## 2018-02-20 DIAGNOSIS — F51.05 INSOMNIA DUE TO MENTAL CONDITION: ICD-10-CM

## 2018-02-20 PROCEDURE — 99213 OFFICE O/P EST LOW 20 MIN: CPT | Performed by: NURSE PRACTITIONER

## 2018-02-20 NOTE — PROGRESS NOTES
"    Subjective   Alex Torres is a 33 y.o. male who is here today for medication management follow up.    Chief Complaint: Diagnoses and all orders for this visit:    Bipolar disorder, current episode mixed, moderate    Insomnia due to mental condition    Nicotine dependence, cigarettes, uncomplicated    Borderline personality disorder      History of Present Illness Patient presents by himself and reports tolerating medications well. He is sleeping much better getting 7 hours a night. He denies AVH SI/HI. He did cut himself on arm with a knife but no intention of killing himself \"I just didn't want to break something in my mom's home'. He reports talking to a girl for past month \"just talking taking it slow\". He and wife are  just a month ago and he reports it being a shock, he reports \"have to move on\". Patient has ongoing symptoms of borderline personality disorder including affective instability , inappropriate anger, impulsivity, unstable relationships, feelings of emptiness, abandonment fears, and  self-injury. He has calmer demeanor and denies thoughts of harming others. He reports having anger episodes but controlled. No legal issues. He has appointment for therapy at [facility] in a couple days.     Denies adverse effects from medications.   (Scales based on 0 - 10 with 10 being the worst)        The following portions of the patient's history were reviewed and updated as appropriate: allergies, current medications, past family history, past medical history, past social history, past surgical history and problem list.    Review of Systemsdenies fever, cough, s/s’s of infection, denies GI/ problems, denies new medical issues     Objective   Physical Exam  Height 193 cm (76\"), weight 126 kg (277 lb).    Allergies   Allergen Reactions   • Latex        Current Medications:   Current Outpatient Prescriptions   Medication Sig Dispense Refill   • [facility] NICOTINE 7 MG/24HR patch PLACE 1 PATCH ON THE SKIN " DAILY. 28 patch 1   • OLANZapine (zyPREXA) 15 MG tablet Take 1 tablet by mouth Every Night. 30 tablet 1   • OXcarbazepine (TRILEPTAL) 600 MG tablet Take 1 tablet by mouth Every 12 (Twelve) Hours. 60 tablet 2   • pantoprazole (PROTONIX) 40 MG EC tablet Take 1 tablet by mouth Daily. 30 tablet 3   • tiZANidine (ZANAFLEX) 2 MG tablet Take 1-2 tablets nightly as needed for muscle spasms 60 tablet 1   • traZODone (DESYREL) 100 MG tablet Take 1 tablet by mouth Every Night. 30 tablet 2   • triamterene-hydrochlorothiazide (DYAZIDE) 37.5-25 MG per capsule Take 1 capsule by mouth Daily. 30 capsule 5   • venlafaxine XR (EFFEXOR-XR) 150 MG 24 hr capsule Take 1 capsule by mouth Daily. 30 capsule 2   • venlafaxine XR (EFFEXOR-XR) 150 MG 24 hr capsule TAKE 1 CAPSULE BY MOUTH ONCE DAILY 30 capsule 2     No current facility-administered medications for this visit.        CONTROLLED MEDICATIONS:     Appearance: appropriate, 2 inch cut right forearm outer side, no Sign or Symptoms of infection, healing   Hygiene:   good  Cooperation:  Cooperative  Eye Contact:  Good  Psychomotor Behavior:  Appropriate  Mood:  within normal limits  Affect:  Appropriate  Hopelessness: Denies  Speech:  Normal  Thought Process:  Linear  Thought Content:  Normal  Suicidal:  None  Homicidal:  None  Hallucinations:  None  Delusion:  None  Memory:  Intact  Orientation:  Person, Place, Time and Situation  Reliability:  fair  Insight:  Fair  Judgement:  Fair  Impulse Control:  Fair  Estimated Intelligence: average range         Assessment/Plan   Diagnoses and all orders for this visit:    Bipolar disorder, current episode mixed, moderate    Insomnia due to mental condition    Nicotine dependence, cigarettes, uncomplicated    Borderline personality disorder      Tolerating medications, reports compliance, will cont   Trileptal 600mg BID mood stabilizer  Olanzapine 15mg PO QHS  Trazodone 100mg PO QHS  Effexor XR 150mg for depression   Therapy with [facility]  We  discussed risks, benefits, and side effects of the above medications and the patient was agreeable with the plan. Patient was educated on the importance of compliance with treatment and follow-up appointments.   Controlled substance prescriptions are either  printed off for patient, telephoned in  or ordered through [facility] by this provider  Instructed to call for questions or concerns and return early if necessary. Crisis plan reviewed including going to the Emergency department.    Return in about 4 weeks (around 3/20/2018).         Please note that portions of this note were completed with a voice recognition program.  Efforts were made to edit dictation, but occasionally words are mistranscribed.

## 2018-03-19 RX ORDER — OLANZAPINE 15 MG/1
15 TABLET ORAL NIGHTLY
Qty: 30 TABLET | Refills: 1 | Status: SHIPPED | OUTPATIENT
Start: 2018-03-19 | End: 2018-05-07

## 2018-03-20 ENCOUNTER — OFFICE VISIT (OUTPATIENT)
Dept: PSYCHIATRY | Facility: CLINIC | Age: 33
End: 2018-03-20

## 2018-03-20 VITALS — BODY MASS INDEX: 33.73 KG/M2 | HEIGHT: 76 IN | WEIGHT: 277 LBS

## 2018-03-20 DIAGNOSIS — F51.05 INSOMNIA DUE TO MENTAL CONDITION: ICD-10-CM

## 2018-03-20 DIAGNOSIS — F60.3 BORDERLINE PERSONALITY DISORDER (HCC): ICD-10-CM

## 2018-03-20 DIAGNOSIS — F31.62 BIPOLAR DISORDER, CURRENT EPISODE MIXED, MODERATE (HCC): Primary | ICD-10-CM

## 2018-03-20 DIAGNOSIS — F17.210 NICOTINE DEPENDENCE, CIGARETTES, UNCOMPLICATED: ICD-10-CM

## 2018-03-20 PROCEDURE — 99214 OFFICE O/P EST MOD 30 MIN: CPT | Performed by: NURSE PRACTITIONER

## 2018-03-20 RX ORDER — LURASIDONE HYDROCHLORIDE 40 MG/1
40 TABLET, FILM COATED ORAL DAILY
Qty: 30 TABLET | Refills: 0 | Status: SHIPPED | OUTPATIENT
Start: 2018-03-20 | End: 2018-04-30 | Stop reason: SDUPTHER

## 2018-03-20 NOTE — PROGRESS NOTES
Subjective   Alex Torres is a 33 y.o. male who is here today for medication management follow up.    Chief Complaint:     Bipolar disorder, current episode mixed, moderate    Insomnia due to mental condition    Nicotine dependence, cigarettes, uncomplicated    Borderline personality disorder      History of Present Illness Patient presents by himself for f/u . Patient reports he is depressed has poor motivation interest has poor self-esteem self worth.  He reports he can't contribute to his mother and brothers household monetarily and this makes him feel very dependent and a burden.  He denies suicidal homicidal ideations or AVH.  He reports he feels stressed and anxious scoring it a 6 on most days.  He doesn't feel the olanzapine is helpful for his mood and hasn't for some time area and he understands he is going through the breakup of his wife and impending divorce that was unexpected.  He is going to therapy but reports he doesn't feel like he and the therapist on the same page.  He states she feels like its mind over matter and it just doesn't.  Patient reports low energy sitting around most days and he states he does himself to sleep at night otherwise he could stay up he states.  But he is sleeping her in the night.  He reports weight gain effects from medications.  Denies aggression verbally or physically towards anyone.  His depression approximately an 8 denies any thoughts of death.  Denies alcohol intake or illicit drug use. Patient states he was climbed from disability and that triggered his feelings of dependence on others     (Scales based on 0 - 10 with 10 being the worst)  The following portions of the patient's history were reviewed and updated as appropriate: allergies, current medications, past family history, past medical history, past social history, past surgical history and problem list.    Review of Systemsdenies fever, cough, s/s’s of infection, denies GI/ problems, denies new  "medical issues     Objective   Physical Exam  Height 193 cm (76\"), weight 126 kg (277 lb). Body mass index is 33.72 kg/m². discussed weight, healthy eating, activity     Allergies   Allergen Reactions   • Latex        Current Medications:   Current Outpatient Prescriptions   Medication Sig Dispense Refill   • [facility]S NICOTINE 7 MG/24HR patch PLACE 1 PATCH ON THE SKIN DAILY. 28 patch 1   • lurasidone (LATUDA) 40 MG tablet tablet Take 1 tablet by mouth Daily. 30 tablet 0   • OLANZapine (zyPREXA) 15 MG tablet TAKE 1 TABLET BY MOUTH EVERY NIGHT. 30 tablet 1   • OXcarbazepine (TRILEPTAL) 600 MG tablet Take 1 tablet by mouth Every 12 (Twelve) Hours. 60 tablet 2   • pantoprazole (PROTONIX) 40 MG EC tablet Take 1 tablet by mouth Daily. 30 tablet 3   • tiZANidine (ZANAFLEX) 2 MG tablet Take 1-2 tablets nightly as needed for muscle spasms 60 tablet 1   • traZODone (DESYREL) 100 MG tablet Take 1 tablet by mouth Every Night. 30 tablet 2   • triamterene-hydrochlorothiazide (DYAZIDE) 37.5-25 MG per capsule Take 1 capsule by mouth Daily. 30 capsule 5   • venlafaxine XR (EFFEXOR-XR) 150 MG 24 hr capsule Take 1 capsule by mouth Daily. 30 capsule 2     No current facility-administered medications for this visit.         Appearance: appropriate  Hygiene:   good  Cooperation:  Cooperative  Eye Contact:  Good  Psychomotor Behavior:  Appropriate  Mood:  Depressed  Affect:  Appropriate  Hopelessness: Denies  Speech:  Normal  Thought Process:  Linear  Thought Content:  Normal  Suicidal:  None  Homicidal:  None  Hallucinations:  None  Delusion:  None  Memory:  Intact  Orientation:  Person, Place, Time and Situation  Reliability:  fair  Insight:  Fair  Judgement:  Fair  Impulse Control:  Fair  Estimated Intelligence: average range   Physical/Medical Issues:  No       Assessment/Plan   Diagnoses and all orders for this visit:    Bipolar disorder, current episode mixed, moderate    Insomnia due to mental condition    Nicotine dependence, " cigarettes, uncomplicated    Borderline personality disorder    Other orders  -     lurasidone (LATUDA) 40 MG tablet tablet; Take 1 tablet by mouth Daily.    25 minutes reviewing symptoms,  discussing medication management changes.  Need for lab work however patient going to his primary care at the end of the week so I gave him a list of work I would like drawn including a sodium because he is on Trileptal lipid panel, TSH, CMP, CBC  this was written down for patient as well as medication changes to give to Dr. Elliott.    Discussed weaning off olanzapine onto Latuda.  I probably will have to do a prior authorization.  But I believe the medication would be a good choice for patient because he has significant depressive symptoms.  I don't want to increase antidepressants because potential scottie risk light of his stressors.    We discussed risks, benefits, and side effects of the above medications and the patient was agreeable with the plan. Patient was educated on the importance of compliance with treatment and follow-up appointments.     Sleep hygiene reviewed, encouraged more whole foods, less processed foods, fruits   Coping skills reviewed and encouraged positive framing of thoughts    Assisted patient in processing above session content; acknowledged and normalized patient’s thoughts, feelings, and concerns.  Applied  positive coping skills and behavior management in session.  Allowed patient to freely discuss issues without interruption or judgment. Provided safe, confidential environment to facilitate the development of positive therapeutic relationship and encourage open, honest communication. Assisted patient in identifying risk factors which would indicate the need for higher level of care including thoughts to harm self or others and/or self-harming behavior and encouraged patient to contact this office, call 911, or present to the nearest emergency room should any of these events occur. Discussed crisis  intervention services and means to access.  Patient adamantly and convincingly denies current suicidal or homicidal ideation or perceptual disturbance.    Instructed to call for questions or concerns and return early if necessary. Crisis plan reviewed including going to the Emergency department.    Return in about 4 weeks (around 4/17/2018).         Please note that portions of this note were completed with a voice recognition program.  Efforts were made to edit dictation, but occasionally words are mistranscribed.

## 2018-03-23 ENCOUNTER — OFFICE VISIT (OUTPATIENT)
Dept: INTERNAL MEDICINE | Facility: CLINIC | Age: 33
End: 2018-03-23

## 2018-03-23 VITALS
SYSTOLIC BLOOD PRESSURE: 122 MMHG | HEART RATE: 70 BPM | HEIGHT: 76 IN | DIASTOLIC BLOOD PRESSURE: 74 MMHG | WEIGHT: 280.8 LBS | OXYGEN SATURATION: 96 % | RESPIRATION RATE: 12 BRPM | BODY MASS INDEX: 34.19 KG/M2

## 2018-03-23 DIAGNOSIS — F41.0 PANIC ATTACKS: ICD-10-CM

## 2018-03-23 DIAGNOSIS — L28.2 PRURITIC RASH: Primary | ICD-10-CM

## 2018-03-23 DIAGNOSIS — R73.03 PREDIABETES: ICD-10-CM

## 2018-03-23 DIAGNOSIS — I10 ESSENTIAL HYPERTENSION: ICD-10-CM

## 2018-03-23 PROCEDURE — 99213 OFFICE O/P EST LOW 20 MIN: CPT | Performed by: FAMILY MEDICINE

## 2018-04-08 DIAGNOSIS — S76.011A: ICD-10-CM

## 2018-04-08 DIAGNOSIS — M53.3 SI (SACROILIAC) JOINT DYSFUNCTION: ICD-10-CM

## 2018-04-08 DIAGNOSIS — M54.50 ACUTE MIDLINE LOW BACK PAIN WITHOUT SCIATICA: ICD-10-CM

## 2018-04-09 RX ORDER — TIZANIDINE 2 MG/1
TABLET ORAL
Qty: 60 TABLET | Refills: 2 | Status: SHIPPED | OUTPATIENT
Start: 2018-04-09 | End: 2018-07-01 | Stop reason: SDUPTHER

## 2018-04-16 RX ORDER — OXCARBAZEPINE 600 MG/1
600 TABLET, FILM COATED ORAL EVERY 12 HOURS SCHEDULED
Qty: 60 TABLET | Refills: 2 | Status: SHIPPED | OUTPATIENT
Start: 2018-04-16 | End: 2018-05-08 | Stop reason: SDUPTHER

## 2018-04-16 RX ORDER — LURASIDONE HYDROCHLORIDE 40 MG/1
TABLET, FILM COATED ORAL
Qty: 30 TABLET | Refills: 0 | OUTPATIENT
Start: 2018-04-16

## 2018-04-16 RX ORDER — TRAZODONE HYDROCHLORIDE 100 MG/1
100 TABLET ORAL NIGHTLY
Qty: 30 TABLET | Refills: 2 | Status: SHIPPED | OUTPATIENT
Start: 2018-04-16 | End: 2018-05-08 | Stop reason: SDUPTHER

## 2018-04-29 DIAGNOSIS — K21.00 GASTROESOPHAGEAL REFLUX DISEASE WITH ESOPHAGITIS: ICD-10-CM

## 2018-04-30 RX ORDER — PANTOPRAZOLE SODIUM 40 MG/1
40 TABLET, DELAYED RELEASE ORAL DAILY
Qty: 30 TABLET | Refills: 5 | Status: SHIPPED | OUTPATIENT
Start: 2018-04-30 | End: 2018-11-13 | Stop reason: SDUPTHER

## 2018-04-30 RX ORDER — LURASIDONE HYDROCHLORIDE 40 MG/1
TABLET, FILM COATED ORAL
Qty: 30 TABLET | Refills: 0 | Status: SHIPPED | OUTPATIENT
Start: 2018-04-30 | End: 2018-05-07

## 2018-05-07 ENCOUNTER — HOSPITAL ENCOUNTER (EMERGENCY)
Facility: HOSPITAL | Age: 33
Discharge: HOME | End: 2018-05-07
Attending: EMERGENCY MEDICINE | Admitting: EMERGENCY MEDICINE

## 2018-05-07 VITALS
TEMPERATURE: 98.7 F | RESPIRATION RATE: 18 BRPM | OXYGEN SATURATION: 99 % | HEIGHT: 76 IN | WEIGHT: 279.8 LBS | BODY MASS INDEX: 34.07 KG/M2 | DIASTOLIC BLOOD PRESSURE: 86 MMHG | SYSTOLIC BLOOD PRESSURE: 121 MMHG | HEART RATE: 74 BPM

## 2018-05-07 DIAGNOSIS — R45.851 SUICIDAL IDEATIONS: Primary | ICD-10-CM

## 2018-05-07 LAB
ALBUMIN SERPL-MCNC: 4.6 G/DL (ref 3.5–5)
ALBUMIN/GLOB SERPL: 1.6 G/DL (ref 1–2)
ALP SERPL-CCNC: 86 U/L (ref 38–126)
ALT SERPL W P-5'-P-CCNC: 60 U/L (ref 13–69)
AMPHET+METHAMPHET UR QL: NEGATIVE
AMPHETAMINES UR QL: NEGATIVE
ANION GAP SERPL CALCULATED.3IONS-SCNC: 18.8 MMOL/L (ref 10–20)
APAP SERPL-MCNC: <10 MCG/ML
AST SERPL-CCNC: 33 U/L (ref 15–46)
BARBITURATES UR QL SCN: NEGATIVE
BASOPHILS # BLD AUTO: 0.06 10*3/MM3 (ref 0–0.2)
BASOPHILS NFR BLD AUTO: 0.5 % (ref 0–2.5)
BENZODIAZ UR QL SCN: NEGATIVE
BILIRUB SERPL-MCNC: 0.4 MG/DL (ref 0.2–1.3)
BILIRUB UR QL STRIP: NEGATIVE
BUN BLD-MCNC: 15 MG/DL (ref 7–20)
BUN/CREAT SERPL: 12.5 (ref 6.3–21.9)
BUPRENORPHINE SERPL-MCNC: NEGATIVE NG/ML
CALCIUM SPEC-SCNC: 9.4 MG/DL (ref 8.4–10.2)
CANNABINOIDS SERPL QL: POSITIVE
CHLORIDE SERPL-SCNC: 100 MMOL/L (ref 98–107)
CLARITY UR: CLEAR
CO2 SERPL-SCNC: 28 MMOL/L (ref 26–30)
COCAINE UR QL: NEGATIVE
COLOR UR: YELLOW
CREAT BLD-MCNC: 1.2 MG/DL (ref 0.6–1.3)
DEPRECATED RDW RBC AUTO: 49.5 FL (ref 37–54)
EOSINOPHIL # BLD AUTO: 0.3 10*3/MM3 (ref 0–0.7)
EOSINOPHIL NFR BLD AUTO: 2.5 % (ref 0–7)
ERYTHROCYTE [DISTWIDTH] IN BLOOD BY AUTOMATED COUNT: 14.4 % (ref 11.5–14.5)
ETHANOL BLD-MCNC: <10 MG/DL
ETHANOL UR QL: <0.01 %
GFR SERPL CREATININE-BSD FRML MDRD: 69 ML/MIN/1.73
GLOBULIN UR ELPH-MCNC: 2.9 GM/DL
GLUCOSE BLD-MCNC: 105 MG/DL (ref 74–98)
GLUCOSE UR STRIP-MCNC: NEGATIVE MG/DL
HCT VFR BLD AUTO: 44.2 % (ref 42–52)
HGB BLD-MCNC: 14.5 G/DL (ref 14–18)
HGB UR QL STRIP.AUTO: NEGATIVE
HOLD SPECIMEN: NORMAL
HOLD SPECIMEN: NORMAL
IMM GRANULOCYTES # BLD: 0.33 10*3/MM3 (ref 0–0.06)
IMM GRANULOCYTES NFR BLD: 2.7 % (ref 0–0.6)
KETONES UR QL STRIP: NEGATIVE
LEUKOCYTE ESTERASE UR QL STRIP.AUTO: NEGATIVE
LYMPHOCYTES # BLD AUTO: 2.89 10*3/MM3 (ref 0.6–3.4)
LYMPHOCYTES NFR BLD AUTO: 23.6 % (ref 10–50)
MCH RBC QN AUTO: 30.5 PG (ref 27–31)
MCHC RBC AUTO-ENTMCNC: 32.8 G/DL (ref 30–37)
MCV RBC AUTO: 92.9 FL (ref 80–94)
METHADONE UR QL SCN: NEGATIVE
MONOCYTES # BLD AUTO: 0.74 10*3/MM3 (ref 0–0.9)
MONOCYTES NFR BLD AUTO: 6.1 % (ref 0–12)
NEUTROPHILS # BLD AUTO: 7.91 10*3/MM3 (ref 2–6.9)
NEUTROPHILS NFR BLD AUTO: 64.6 % (ref 37–80)
NITRITE UR QL STRIP: NEGATIVE
NRBC BLD MANUAL-RTO: 0 /100 WBC (ref 0–0)
OPIATES UR QL: NEGATIVE
OXYCODONE UR QL SCN: NEGATIVE
PCP UR QL SCN: NEGATIVE
PH UR STRIP.AUTO: 5.5 [PH] (ref 5–8)
PLATELET # BLD AUTO: 258 10*3/MM3 (ref 130–400)
PMV BLD AUTO: 8.8 FL (ref 6–12)
POTASSIUM BLD-SCNC: 3.8 MMOL/L (ref 3.5–5.1)
PROPOXYPH UR QL: NEGATIVE
PROT SERPL-MCNC: 7.5 G/DL (ref 6.3–8.2)
PROT UR QL STRIP: NEGATIVE
RBC # BLD AUTO: 4.76 10*6/MM3 (ref 4.7–6.1)
SALICYLATES SERPL-MCNC: <1 MG/DL (ref 2.8–20)
SODIUM BLD-SCNC: 143 MMOL/L (ref 137–145)
SP GR UR STRIP: 1.02 (ref 1–1.03)
TRICYCLICS UR QL SCN: NEGATIVE
UROBILINOGEN UR QL STRIP: NORMAL
WBC NRBC COR # BLD: 12.23 10*3/MM3 (ref 4.8–10.8)
WHOLE BLOOD HOLD SPECIMEN: NORMAL
WHOLE BLOOD HOLD SPECIMEN: NORMAL

## 2018-05-07 PROCEDURE — 80307 DRUG TEST PRSMV CHEM ANLYZR: CPT | Performed by: EMERGENCY MEDICINE

## 2018-05-07 PROCEDURE — 80306 DRUG TEST PRSMV INSTRMNT: CPT

## 2018-05-07 PROCEDURE — 80053 COMPREHEN METABOLIC PANEL: CPT | Performed by: EMERGENCY MEDICINE

## 2018-05-07 PROCEDURE — 99285 EMERGENCY DEPT VISIT HI MDM: CPT

## 2018-05-07 PROCEDURE — 93005 ELECTROCARDIOGRAM TRACING: CPT | Performed by: EMERGENCY MEDICINE

## 2018-05-07 PROCEDURE — 85025 COMPLETE CBC W/AUTO DIFF WBC: CPT | Performed by: EMERGENCY MEDICINE

## 2018-05-07 PROCEDURE — 81003 URINALYSIS AUTO W/O SCOPE: CPT

## 2018-05-07 RX ORDER — SODIUM CHLORIDE 0.9 % (FLUSH) 0.9 %
10 SYRINGE (ML) INJECTION AS NEEDED
Status: DISCONTINUED | OUTPATIENT
Start: 2018-05-07 | End: 2018-05-07 | Stop reason: HOSPADM

## 2018-05-07 NOTE — ED NOTES
Pt cleared by behavioral health to be D/C home. Pt given follow up instructions and appointment.      Yessenia Salguero RN  05/07/18 1936

## 2018-05-07 NOTE — CONSULTS
1230 to 1310    D: Received request for behavioral health consult from ED provider TEDDY Edwards, per Yessenia Salguero RN.  Patient is a 31-year-old male who presents to ED with medication complaints and increased aggressive thoughts.  Patient is being monitored 1-1 by security.  Met with patient individually at bedside.  Patient reports he is diagnosed with bipolar I disorder and has been seeing VARSHA Mathew, at [facility] behavioral health clinic for medication management for the last 2 years.  He reports feeling like previous medications were ineffective so he was recently switched to Latuda at his last appointment.  He reports being on the medication for a month and that it had been helping some.  However, when he went to refill his prescription after the first month the pharmacy told him his insurance would not cover it past the first month and that they would contact the provider to inform them and see if there was anything else he could be prescribed.  Patient states he never heard anything and has been without this medication for the last month.  He reports increased frustration and having intrusive thoughts about harming himself and others.  He states these thoughts are nonspecific and not directed towards any one person or group of people in particular.  He states these thoughts are more about his fear of getting out of control if he is unable to get an alternative medication soon.  He denies any specific plan or method of harming himself or others and adamantly denies any intent of harming himself or others.  He also states he wants to avoid inpatient hospitalization if possible but is also open to it if he is not able to see his psychiatric provider or if symptoms worsen or do not improve.  Patient also reports seeing a therapist, Uzma, through [facility] who recommended him coming to the hospital for medication recommendation.  This  spoke with Jessie from the behavioral health clinic  who was able to schedule patient for 840 tomorrow morning with VARSHA Mathew.  She was also able to verify patient had been prescribed Latuda; however, there have been no messages from the pharmacist regarding an alternative due to patient's insurance not covering it.  I spent some time processing and validating patient's feelings of frustration regarding previous medications being ineffective and his insurance not covering the current medication as well as the discrepancy between what he was told by the pharmacist and the information not being received by the behavioral health clinic.  By the end of the assessment, patient reported he felt better knowing that he would be able to talk to his provider the following morning and he did not wish to pursue inpatient treatment at this time.  A: Patient appears anxious and frustrated but is also forthcoming, cooperative, and engaged throughout assessment.  He appears to be experiencing increased anger and intrusive thoughts since being without his bipolar medication for the past month.  C-SSRS was administered and although patient reported intermittent thoughts that he would be better off dead he stated he does not actually wish he were dead.  He also reported having nonspecific thoughts of harming himself but denied having a method, plan, or intent to act on thoughts.  He denied any suicidal preparatory behavior.  Patient appears to have good insight into his diagnosis and has been compliant with treatment.  Although he expresses dissatisfaction with previous medications, he reports he is satisfied with all of his psychiatric providers and is optimistic about finding a medication that is helpful.   D: We were able to obtain an appointment with patient's psychiatric provider, VARSHA Mathew, for tomorrow morning at 840 for medication management.  Patient's mother was present as patient and I were discussing his follow-up appointment tomorrow.  Both she and patient  verify that patient has no access to knives or weapons of any sort.  Patient states he is willing and agreeable to return to ED should symptoms worsen or escalate or should he feel as though he is destabilizing.  We also discussed the importance of following up with the behavioral health clinic any time there is an issue with his medication, whether it be difficulty obtaining the medication, adverse side effects, or cost.  Patient reported feeling better knowing as an appointment tomorrow morning and by talking about it with his outpatient therapist and this therapist today.  Discussed with ED provider Jerry ESPINAL recommendation of following up with psychiatric provider during his tomorrow and returning to ED should symptoms worsen.  Dr. Edwards is agreeable with recommendation and will be discharging patient home with return precautions.    JOSÉ Ferrer

## 2018-05-07 NOTE — ED PROVIDER NOTES
Subjective   33-year-old male presenting with suicidal ideations.  He states that he has undergone several medication changes recently, due to insurance has not been able to get new medicines.  States that he has had increasing violent thoughts, I violent actions towards himself and others.  No one in particular.  No specific plan for suicide.  Has tried to harm himself in the past.  He denies any other complaints or concerns.            Review of Systems   Constitutional: Negative for chills and fever.   HENT: Negative for congestion, rhinorrhea and sore throat.    Eyes: Negative for pain.   Respiratory: Negative for cough and shortness of breath.    Cardiovascular: Negative for chest pain, palpitations and leg swelling.   Gastrointestinal: Negative for abdominal pain, diarrhea, nausea and vomiting.   Genitourinary: Negative for dysuria.   Musculoskeletal: Negative for arthralgias.   Skin: Negative for rash.   Neurological: Negative for weakness and numbness.   Psychiatric/Behavioral: Positive for agitation, dysphoric mood and suicidal ideas. Negative for behavioral problems and self-injury. The patient is not nervous/anxious.        Past Medical History:   Diagnosis Date   • Anxiety    • Bipolar 1 disorder    • Depression    • Hypertension    • Kidney stone    • Positive TB test     treated w/ meds x 6 months       Allergies   Allergen Reactions   • Latex        Past Surgical History:   Procedure Laterality Date   • CHOLECYSTECTOMY  2002   • INGUINAL HERNIA REPAIR Right 1995   • ORIF METACARPAL FRACTURE Right 2000   • TIBIA FRACTURE SURGERY Left 1997    ORIF       Family History   Problem Relation Age of Onset   • Arthritis Father    • Hypertension Father    • Diabetes Maternal Aunt    • Diabetes Maternal Uncle    • Diabetes Maternal Grandmother    • Alzheimer's disease Maternal Grandmother    • Diabetes Other    • Hypertension Mother    • Depression Mother    • No Known Problems Brother    • Early death Maternal  Grandfather    • Liver disease Maternal Grandfather    • Alcohol abuse Maternal Grandfather    • No Known Problems Paternal Grandmother    • Liver disease Paternal Grandfather    • Alcohol abuse Paternal Grandfather    • Early death Paternal Grandfather    • No Known Problems Brother        Social History     Social History   • Marital status:      Social History Main Topics   • Smoking status: Current Every Day Smoker     Packs/day: 1.00     Years: 18.00     Types: Cigarettes     Start date: 1999   • Smokeless tobacco: Never Used   • Alcohol use No   • Drug use: No   • Sexual activity: Not Currently     Partners: Female     Other Topics Concern   • Not on file           Objective   Physical Exam   Constitutional: He is oriented to person, place, and time. He appears well-developed and well-nourished. No distress.   HENT:   Head: Normocephalic and atraumatic.   Right Ear: External ear normal.   Left Ear: External ear normal.   Nose: Nose normal.   Mouth/Throat: Oropharynx is clear and moist.   Eyes: Conjunctivae and EOM are normal. Pupils are equal, round, and reactive to light.   Neck: Normal range of motion. Neck supple.   Cardiovascular: Normal rate, regular rhythm, normal heart sounds and intact distal pulses.    Pulmonary/Chest: Effort normal and breath sounds normal. No respiratory distress.   Abdominal: Soft. Bowel sounds are normal. He exhibits no distension. There is no tenderness. There is no rebound and no guarding.   Musculoskeletal: Normal range of motion. He exhibits no edema, tenderness or deformity.   Neurological: He is alert and oriented to person, place, and time.   Skin: Skin is warm and dry. No rash noted.   Psychiatric: He has a normal mood and affect. His behavior is normal.   Nursing note and vitals reviewed.      Procedures           ED Course  ED Course                  MDM  Number of Diagnoses or Management Options  Suicidal ideations:   Diagnosis management comments: 33-year-old  male with suicidal or homicidal ideations. Well developed, well nourished man in no distress with normal vitals and exam otherwise as above. Will check labs and consult behavioral health. Disposition pending work up and consultation.      DDX: medication problem, depression, anxiety, bipolar    Work up is unremarkable. Seen by behavioral health, appointment made for first thing in the morning. I think this is a reasonable plan. Patient is agreeable.        Amount and/or Complexity of Data Reviewed  Clinical lab tests: reviewed          Final diagnoses:   Suicidal ideations            Glen Edwards MD  05/07/18 1985

## 2018-05-08 ENCOUNTER — OFFICE VISIT (OUTPATIENT)
Dept: PSYCHIATRY | Facility: CLINIC | Age: 33
End: 2018-05-08

## 2018-05-08 VITALS — WEIGHT: 278 LBS | BODY MASS INDEX: 33.84 KG/M2

## 2018-05-08 DIAGNOSIS — F31.62 BIPOLAR DISORDER, CURRENT EPISODE MIXED, MODERATE (HCC): Primary | ICD-10-CM

## 2018-05-08 DIAGNOSIS — F41.3 OTHER MIXED ANXIETY DISORDERS: ICD-10-CM

## 2018-05-08 DIAGNOSIS — F17.210 NICOTINE DEPENDENCE, CIGARETTES, UNCOMPLICATED: ICD-10-CM

## 2018-05-08 DIAGNOSIS — F51.05 INSOMNIA DUE TO MENTAL CONDITION: ICD-10-CM

## 2018-05-08 DIAGNOSIS — F60.3 BORDERLINE PERSONALITY DISORDER (HCC): ICD-10-CM

## 2018-05-08 PROCEDURE — 99213 OFFICE O/P EST LOW 20 MIN: CPT | Performed by: NURSE PRACTITIONER

## 2018-05-08 RX ORDER — OXCARBAZEPINE 600 MG/1
600 TABLET, FILM COATED ORAL EVERY 12 HOURS SCHEDULED
Qty: 60 TABLET | Refills: 2 | Status: SHIPPED | OUTPATIENT
Start: 2018-05-08 | End: 2018-06-05 | Stop reason: SDUPTHER

## 2018-05-08 RX ORDER — TRAZODONE HYDROCHLORIDE 100 MG/1
100 TABLET ORAL NIGHTLY
Qty: 30 TABLET | Refills: 2 | Status: SHIPPED | OUTPATIENT
Start: 2018-05-08 | End: 2018-06-05 | Stop reason: SDUPTHER

## 2018-05-08 RX ORDER — RISPERIDONE 1 MG/1
TABLET ORAL
Qty: 60 TABLET | Refills: 1 | Status: SHIPPED | OUTPATIENT
Start: 2018-05-08 | End: 2018-06-05 | Stop reason: SDUPTHER

## 2018-05-08 RX ORDER — VENLAFAXINE HYDROCHLORIDE 150 MG/1
150 CAPSULE, EXTENDED RELEASE ORAL DAILY
Qty: 30 CAPSULE | Refills: 2 | Status: SHIPPED | OUTPATIENT
Start: 2018-05-08 | End: 2018-06-05 | Stop reason: SDUPTHER

## 2018-05-09 NOTE — PROGRESS NOTES
"    Subjective   Alex Torres is a 33 y.o. male who is here today for medication management follow up.    Chief Complaint: Diagnoses and all orders for this visit:    Bipolar disorder, current episode mixed, moderate    Insomnia due to mental condition    Nicotine dependence, cigarettes, uncomplicated    Borderline personality disorder    History of Present Illness Patient presents day after emergency department visit. Reviewed ED notes and mental health navigator's documentation. Patient reports he was off the Latuda and pharm told him they reported to this office Latuda not covered, however this office never got fax or message. Patient reports feeling very depressed then angry and irritable then aggressive verbally towards others. Not sleeping then sleeping a lot . Denies SI/HI or AVH currently and denies AVH at all.  Denies adverse effects from medications he is currently on. He reports gaining wait, cont to smoke cannabis and nicotine. UDS yesterday + for THC. No other illicit drugs, denies alcohol. Reports racing thoughts, anger irritability \"mostly my problem\".    The following portions of the patient's history were reviewed and updated as appropriate: allergies, current medications, past family history, past medical history, past social history, past surgical history and problem list.    Review of Systemsdenies fever, cough, s/s’s of infection, denies GI/ problems, denies new medical issues     Objective   Physical Exam  Weight 126 kg (278 lb).    Allergies   Allergen Reactions   • Latex        Current Medications:   Current Outpatient Prescriptions   Medication Sig Dispense Refill   • OXcarbazepine (TRILEPTAL) 600 MG tablet Take 1 tablet by mouth Every 12 (Twelve) Hours. 60 tablet 2   • pantoprazole (PROTONIX) 40 MG EC tablet TAKE 1 TABLET BY MOUTH DAILY. 30 tablet 5   • risperiDONE (risperDAL) 1 MG tablet Take 1/2 tablet twice a day x 7 days then whole tablet twice 60 tablet 1   • tiZANidine " (ZANAFLEX) 2 MG tablet TAKE 1-2 TABLETS NIGHTLY AS NEEDED FOR MUSCLE SPASMS 60 tablet 2   • traZODone (DESYREL) 100 MG tablet Take 1 tablet by mouth Every Night. 30 tablet 2   • triamterene-hydrochlorothiazide (DYAZIDE) 37.5-25 MG per capsule Take 1 capsule by mouth Daily. 30 capsule 5   • venlafaxine XR (EFFEXOR-XR) 150 MG 24 hr capsule Take 1 capsule by mouth Daily. 30 capsule 2     No current facility-administered medications for this visit.        Appearance: appropriate  Hygiene:   good  Cooperation:  Cooperative  Eye Contact:  Good  Psychomotor Behavior:  Appropriate  Mood:  Depressed   Affect:  Appropriate  Hopelessness: Denies  Speech:  Normal  Thought Process:  Linear  Thought Content:  Normal  Suicidal:  None  Homicidal:  None  Hallucinations:  None  Delusion:  None  Memory:  Intact  Orientation:  Person, Place, Time and Situation  Reliability:  fair  Insight:  Fair  Judgement:  Fair  Impulse Control:  Fair  Estimated Intelligence: average range   Physical/Medical Issues:  No     Assessment/Plan   Diagnoses and all orders for this visit:    Bipolar disorder, current episode mixed, moderate    Insomnia due to mental condition    Nicotine dependence, cigarettes, uncomplicated    Borderline personality disorder    Other mixed anxiety disorders  -     venlafaxine XR (EFFEXOR-XR) 150 MG 24 hr capsule; Take 1 capsule by mouth Daily.    Other orders  -     risperiDONE (risperDAL) 1 MG tablet; Take 1/2 tablet twice a day x 7 days then whole tablet twice  -     traZODone (DESYREL) 100 MG tablet; Take 1 tablet by mouth Every Night.  -     OXcarbazepine (TRILEPTAL) 600 MG tablet; Take 1 tablet by mouth Every 12 (Twelve) Hours.      Discussed medication management  Unable to get the Latuda, insurance wouldn't cover after one month  Add Risperdal 1mg tablet take 1/2 tab BID x 7 days then 1mg BID for bipolar   Increase Oxcarbazepine to 600 mg BID  Cont Trazodone for sleep and venlafaxine XR cont for depression     We  discussed risks, benefits, and side effects of the above medications and the patient was agreeable with the plan. Patient was educated on the importance of compliance with treatment and follow-up appointments.     Sleep hygiene reviewed, encouraged more whole foods, less processed foods, fruits   Coping skills reviewed and encouraged positive framing of thoughts    Assisted patient in processing above session content; acknowledged and normalized patient’s thoughts, feelings, and concerns.  Applied  positive coping skills and behavior management in session.  Allowed patient to freely discuss issues without interruption or judgment. Provided safe, confidential environment to facilitate the development of positive therapeutic relationship and encourage open, honest communication. Assisted patient in identifying risk factors which would indicate the need for higher level of care including thoughts to harm self or others and/or self-harming behavior and encouraged patient to contact this office, call 911, or present to the nearest emergency room should any of these events occur. Discussed crisis intervention services and means to access.  Patient adamantly and convincingly denies current suicidal or homicidal ideation or perceptual disturbance.    Instructed to call for questions or concerns and return early if necessary. Crisis plan reviewed including going to the Emergency department.    Return in about 4 weeks (around 6/5/2018).         Please note that portions of this note were completed with a voice recognition program.  Efforts were made to edit dictation, but occasionally words are mistranscribed.

## 2018-05-16 ENCOUNTER — OFFICE VISIT (OUTPATIENT)
Dept: NEUROLOGY | Facility: CLINIC | Age: 33
End: 2018-05-16

## 2018-05-16 VITALS
BODY MASS INDEX: 33.97 KG/M2 | WEIGHT: 279 LBS | HEIGHT: 76 IN | HEART RATE: 98 BPM | SYSTOLIC BLOOD PRESSURE: 132 MMHG | DIASTOLIC BLOOD PRESSURE: 86 MMHG | OXYGEN SATURATION: 98 %

## 2018-05-16 DIAGNOSIS — I83.90 VARICOSITIES OF LEG: Primary | ICD-10-CM

## 2018-05-16 PROCEDURE — 99214 OFFICE O/P EST MOD 30 MIN: CPT | Performed by: NURSE PRACTITIONER

## 2018-05-16 RX ORDER — LIDOCAINE 50 MG/G
1 PATCH TOPICAL EVERY 24 HOURS
Qty: 30 PATCH | Refills: 3 | Status: SHIPPED | OUTPATIENT
Start: 2018-05-16 | End: 2018-06-15

## 2018-05-16 NOTE — PROGRESS NOTES
Subjective   Alex Torres is a 33 y.o. male     Chief Complaint   Patient presents with   • Follow-up     Pt is here for a FU for MOLLY and polyneuropathy in his leg.        History of Present Illness     Pt is a 33 yr old male in clinic to follow up MOLLY with CPAP sleep compliance reviewed in clinic.  Use 88% >4 hours @ 5-15cm H2O with corrected AHI 0.4 states + clear thinking, no daytime fatigue. Pt pleased with MOLLY with CPAP  Pt with c/o LE throbbing pain worse @ night   Hx trauma left leg + tib/fib fx in the past + left sciatica pain radiates down left thigh states using lidocaine patches OTC and past chiropractor assisted with sciatica pain.      Past Hx: Patient is a very pleasant 32-year-old male in clinic today to follow-up his sleep study results.  Reviewed the sleep study in clinic with patient and he has positive for sleep apnea we'll schedule repeat sleep lab with CPAP titration patient agreeable.  Patient complains of bilateral lower extremity pulsing pain with discoloration and lower extremity edema.  Patient states his primary care start him on diuretic and the edema has resolved tremendously.  His left leg hurts more than the right head and old injury and broke his left leg at 12 years old he states it's progressively getting worse when he lies down at night his legs just pulse patient reports numbness and tingling in his toes this is more related to the edema.  Patient is currently using gabapentin 300 mg once a day and states that helps for a little while advised patient to separate gabapentin should spread for 300 mg on all day long 100 the morning 100 and afternoon and 100 in the evening       The following portions of the patient's history were reviewed and updated as appropriate: allergies, current medications, past family history, past medical history, past social history, past surgical history and problem list.    Review of Systems   Constitutional: Negative for chills and fever.   HENT:  "Negative for congestion, ear pain, hearing loss, rhinorrhea and sore throat.    Eyes: Negative for pain, discharge and redness.   Respiratory: Negative for cough, shortness of breath, wheezing and stridor.    Cardiovascular: Negative for chest pain, palpitations and leg swelling.   Gastrointestinal: Negative for abdominal pain, constipation, nausea and vomiting.   Endocrine: Negative for cold intolerance, heat intolerance and polyphagia.   Genitourinary: Negative for dysuria, flank pain, frequency and urgency.   Musculoskeletal: Positive for arthralgias, gait problem and myalgias. Negative for joint swelling, neck pain and neck stiffness.   Skin: Negative for pallor, rash and wound.   Allergic/Immunologic: Negative for environmental allergies.   Neurological: Negative for dizziness, tremors, seizures, syncope, facial asymmetry, speech difficulty, weakness, light-headedness, numbness and headaches.   Hematological: Negative for adenopathy.   Psychiatric/Behavioral: Positive for sleep disturbance. Negative for confusion and hallucinations. The patient is not nervous/anxious.        Objective         Vitals:    05/16/18 1120   BP: 132/86   Pulse: 98   SpO2: 98%   Weight: 127 kg (279 lb)   Height: 193 cm (76\")     GENERAL: Patient is pleasant, cooperative, appears to be stated age.  Body habitus is endomorphic.  HEAD:  Head is normocephalic and atraumatic.    NECK: Neck are non-tender without thyromegaly or adenopathy.  Carotic upstrokes are 1+/4.  No cranial or cervical bruits.  The neck is supple with a full range of motion.   CARDIOVASCULAR:  Regular rate and rhythm with normal S1 and S2 without rub or gallop. + left LE varicose veins  RESPIRATORY:  Clear to auscultation without wheezes or crackle   PSYCH:  Higher cortical function/mental status:  The patient is alert.  He  is oriented x3 to time, place and person.  Recent and the remote memory appear normal.  The patient has a good fund of knowledge.  There is no " visual or auditory hallucination or suicidal or homicidal ideation.  SPEECH:There is no gross evidence of aphasia, dysarthria or agnosia.      and rapid alternating movements in symmetrical fashion.    COORDINATION AND GAIT:  The patient walks with a narrow-based gait.      Results for orders placed or performed during the hospital encounter of 05/07/18   Urinalysis With / Microscopic If Indicated - Urine, Clean Catch   Result Value Ref Range    Color, UA Yellow Yellow, Straw    Appearance, UA Clear Clear    pH, UA 5.5 5.0 - 8.0    Specific Gravity, UA 1.020 1.005 - 1.030    Glucose, UA Negative Negative    Ketones, UA Negative Negative    Bilirubin, UA Negative Negative    Blood, UA Negative Negative    Protein, UA Negative Negative    Leuk Esterase, UA Negative Negative    Nitrite, UA Negative Negative    Urobilinogen, UA 0.2 E.U./dL 0.2 - 1.0 E.U./dL   Urine Drug Screen - Urine, Clean Catch   Result Value Ref Range    THC, Screen, Urine Positive (A) Negative    Phencyclidine (PCP), Urine Negative Negative    Cocaine Screen, Urine Negative Negative    Methamphetamine, Urine Negative Negative    Opiate Screen Negative Negative    Amphetamine Screen, Urine Negative Negative    Benzodiazepine Screen, Urine Negative Negative    Tricyclic Antidepressants Screen Negative Negative    Methadone Screen, Urine Negative Negative    Barbiturates Screen, Urine Negative Negative    Oxycodone Screen, Urine Negative Negative    Propoxyphene Screen Negative Negative    Buprenorphine, Screen, Urine Negative Negative   Comprehensive Metabolic Panel   Result Value Ref Range    Glucose 105 (H) 74 - 98 mg/dL    BUN 15 7 - 20 mg/dL    Creatinine 1.20 0.60 - 1.30 mg/dL    Sodium 143 137 - 145 mmol/L    Potassium 3.8 3.5 - 5.1 mmol/L    Chloride 100 98 - 107 mmol/L    CO2 28.0 26.0 - 30.0 mmol/L    Calcium 9.4 8.4 - 10.2 mg/dL    Total Protein 7.5 6.3 - 8.2 g/dL    Albumin 4.60 3.50 - 5.00 g/dL    ALT (SGPT) 60 13 - 69 U/L    AST (SGOT)  33 15 - 46 U/L    Alkaline Phosphatase 86 38 - 126 U/L    Total Bilirubin 0.4 0.2 - 1.3 mg/dL    eGFR Non African Amer 69 >60 mL/min/1.73    Globulin 2.9 gm/dL    A/G Ratio 1.6 1.0 - 2.0 g/dL    BUN/Creatinine Ratio 12.5 6.3 - 21.9    Anion Gap 18.8 10.0 - 20.0 mmol/L   Acetaminophen Level   Result Value Ref Range    Acetaminophen <10.0   mcg/mL   Ethanol   Result Value Ref Range    Ethanol <10 <=10 mg/dL    Ethanol % <0.010 %   Salicylate Level   Result Value Ref Range    Salicylate <1.0 (L) 2.8 - 20.0 mg/dL   CBC Auto Differential   Result Value Ref Range    WBC 12.23 (H) 4.80 - 10.80 10*3/mm3    RBC 4.76 4.70 - 6.10 10*6/mm3    Hemoglobin 14.5 14.0 - 18.0 g/dL    Hematocrit 44.2 42.0 - 52.0 %    MCV 92.9 80.0 - 94.0 fL    MCH 30.5 27.0 - 31.0 pg    MCHC 32.8 30.0 - 37.0 g/dL    RDW 14.4 11.5 - 14.5 %    RDW-SD 49.5 37.0 - 54.0 fl    MPV 8.8 6.0 - 12.0 fL    Platelets 258 130 - 400 10*3/mm3    Neutrophil % 64.6 37.0 - 80.0 %    Lymphocyte % 23.6 10.0 - 50.0 %    Monocyte % 6.1 0.0 - 12.0 %    Eosinophil % 2.5 0.0 - 7.0 %    Basophil % 0.5 0.0 - 2.5 %    Immature Grans % 2.7 (H) 0.0 - 0.6 %    Neutrophils, Absolute 7.91 (H) 2.00 - 6.90 10*3/mm3    Lymphocytes, Absolute 2.89 0.60 - 3.40 10*3/mm3    Monocytes, Absolute 0.74 0.00 - 0.90 10*3/mm3    Eosinophils, Absolute 0.30 0.00 - 0.70 10*3/mm3    Basophils, Absolute 0.06 0.00 - 0.20 10*3/mm3    Immature Grans, Absolute 0.33 (H) 0.00 - 0.06 10*3/mm3    nRBC 0.0 0.0 - 0.0 /100 WBC   Light Blue Top   Result Value Ref Range    Extra Tube hold for add-on    Lavender Top   Result Value Ref Range    Extra Tube hold for add-on    Gold Top - SST   Result Value Ref Range    Extra Tube Hold for add-ons.    Green Top (No Gel)   Result Value Ref Range    Extra Tube Hold for add-ons.        No results found.    I have personally reviewed the above labs.    Assessment/Plan   1.  MOLLY stable with CPAP [id number]     I have counseled patient extensively on Sleep Hygiene including  regular sleep wake schedule and stimulus control therapy.  I have also discussed the importance of weight reduction because 10% reduction in body weight can reduce sleep apnea by 50 %. We have also discussed abstaining from smoking and drinking.  I have explained to patient that obstructive apnea episode is defined as the absence of airflow for at least 10 seconds.  Sleep apnea is usually accompanied by snoring, disturbed sleep, and daytime sleepiness. Patients with micrognathia, retrognathia, enlarged tonsils, tongue enlargement, and acromegaly are especially predisposed to obstructive sleep apnea. Abnormalities or weakness in the muscles can also contribute to obstructive sleep apnea. Obesity can also contribute to sleep apnea.      Sleep apnea can lead to a number of complications, ranging from daytime sleepiness to possible increased risk of cardiovascular risks.   Daytime sleepiness is the most serious.  Daytime sleepiness can also increase the risk for accident-related injuries. Several studies have suggested that people with sleep apnea have two to three times as many car accidents, and five to seven times the risk for multiple accidents.   A number of cardiovascular diseases -- including high blood pressure, heart failure, stroke, and heart arrhythmias -- have an association with obstructive sleep apnea.   Up to a third of patients with heart failure also have sleep apnea. Both central and obstructive sleep apnea are linked with heart failure. Obstructive sleep apnea is also noted to be associated with type 2 diabetes according to Dr. Goldstein at [facility].  The best treatment for symptomatic obstructive sleep apnea is continuous positive airflow pressure (CPAP). Bilevel positive airway pressure (BPAP) systems may be particularly helpful for patients with coexisting lung disease and those with excessive levels of carbon dioxide.  Other treatment options including UPPP surgery, LAUP surgery, radiofrequency  somnoplasty and dental appliances such Bret or Clearway.        2.  Positive tobacco abuse: Again, Reviewed smoke cessation with patient patient is willing to try nicotine patches prescription ordered along with teaching on how to use the medication.     3.  Lower extremity edema with varicosities 1 prescription for compression stockings ordered as well as a consult to vascular surgery in [place] he was capable of doing an ultrasound to verify venous insufficiency.

## 2018-05-17 RX ORDER — OLANZAPINE 15 MG/1
15 TABLET ORAL NIGHTLY
Qty: 30 TABLET | Refills: 1 | OUTPATIENT
Start: 2018-05-17

## 2018-05-30 DIAGNOSIS — F41.3 OTHER MIXED ANXIETY DISORDERS: ICD-10-CM

## 2018-05-30 RX ORDER — VENLAFAXINE HYDROCHLORIDE 150 MG/1
CAPSULE, EXTENDED RELEASE ORAL
Qty: 30 CAPSULE | Refills: 2 | OUTPATIENT
Start: 2018-05-30

## 2018-06-05 ENCOUNTER — OFFICE VISIT (OUTPATIENT)
Dept: PSYCHIATRY | Facility: CLINIC | Age: 33
End: 2018-06-05

## 2018-06-05 VITALS — WEIGHT: 275 LBS | BODY MASS INDEX: 33.47 KG/M2

## 2018-06-05 DIAGNOSIS — F17.210 NICOTINE DEPENDENCE, CIGARETTES, UNCOMPLICATED: ICD-10-CM

## 2018-06-05 DIAGNOSIS — F31.62 BIPOLAR DISORDER, CURRENT EPISODE MIXED, MODERATE (HCC): Primary | ICD-10-CM

## 2018-06-05 DIAGNOSIS — F51.05 INSOMNIA DUE TO MENTAL CONDITION: ICD-10-CM

## 2018-06-05 DIAGNOSIS — F60.3 BORDERLINE PERSONALITY DISORDER (HCC): ICD-10-CM

## 2018-06-05 DIAGNOSIS — F41.3 OTHER MIXED ANXIETY DISORDERS: ICD-10-CM

## 2018-06-05 PROCEDURE — 99213 OFFICE O/P EST LOW 20 MIN: CPT | Performed by: NURSE PRACTITIONER

## 2018-06-05 RX ORDER — VENLAFAXINE HYDROCHLORIDE 75 MG/1
75 CAPSULE, EXTENDED RELEASE ORAL DAILY
Qty: 30 CAPSULE | Refills: 2 | Status: SHIPPED | OUTPATIENT
Start: 2018-06-05 | End: 2018-06-21

## 2018-06-05 RX ORDER — OXCARBAZEPINE 300 MG/1
300 TABLET, FILM COATED ORAL EVERY 12 HOURS SCHEDULED
Qty: 60 TABLET | Refills: 2 | Status: SHIPPED | OUTPATIENT
Start: 2018-06-05 | End: 2018-08-26 | Stop reason: SDUPTHER

## 2018-06-05 RX ORDER — TRAZODONE HYDROCHLORIDE 100 MG/1
100 TABLET ORAL NIGHTLY
Qty: 30 TABLET | Refills: 2 | Status: SHIPPED | OUTPATIENT
Start: 2018-06-05 | End: 2018-11-20 | Stop reason: SDUPTHER

## 2018-06-05 RX ORDER — RISPERIDONE 1 MG/1
TABLET ORAL
Qty: 60 TABLET | Refills: 2 | Status: SHIPPED | OUTPATIENT
Start: 2018-06-05 | End: 2018-07-31 | Stop reason: SDUPTHER

## 2018-06-05 NOTE — PROGRESS NOTES
Subjective   Alex Torres is a 33 y.o. male who is here today for medication management follow up.    Chief Complaint: bipolar, borderline personality disorder , annel     History of Present Illness Patient presents by himself for f/u. He reports he is NOT having hypo or manic episodes, he is not depressed, he denies panic and rates anxiety low. He is in a relationship now with a friend from high school for about a month and is happy and doing well on current meds EXCEPT with now complaint of sexual dysfunction, difficulty getting an errection and sustaining it and having an orgasm. The girl friend he reports contacted him on Facebook and she was living in [place]. She moved up here and they are still living with his brother and mother, neither he nor his girlfriend work. She has a 10 yo daughter but she is in [place] currently . He reports sleeping well. He reports his therapist thinks he has more borderline personality disorder than bipolar.   (Scales based on 0 - 10 with 10 being the worst)        The following portions of the patient's history were reviewed and updated as appropriate: allergies, current medications, past family history, past medical history, past social history, past surgical history and problem list.    Review of Systems denies fever, cough, s/s’s of infection, denies GI/ problems, denies new medical issues     Objective   Physical Exam  Weight 125 kg (275 lb).    Allergies   Allergen Reactions   • Gabapentin Swelling     + swelling   • Latex        Current Medications:   Current Outpatient Prescriptions   Medication Sig Dispense Refill   • lidocaine (LIDODERM) 5 % Place 1 patch on the skin Daily for 30 days. Remove & Discard patch within 12 hours or as directed by MD 30 patch 3   • OXcarbazepine (TRILEPTAL) 300 MG tablet Take 1 tablet by mouth Every 12 (Twelve) Hours. 60 tablet 2   • pantoprazole (PROTONIX) 40 MG EC tablet TAKE 1 TABLET BY MOUTH DAILY. 30 tablet 5   • risperiDONE  (risperDAL) 1 MG tablet Take 1 mg by mouth twice a day 60 tablet 2   • tiZANidine (ZANAFLEX) 2 MG tablet TAKE 1-2 TABLETS NIGHTLY AS NEEDED FOR MUSCLE SPASMS 60 tablet 2   • traZODone (DESYREL) 100 MG tablet Take 1 tablet by mouth Every Night. 30 tablet 2   • triamterene-hydrochlorothiazide (DYAZIDE) 37.5-25 MG per capsule Take 1 capsule by mouth Daily. 30 capsule 5   • venlafaxine XR (EFFEXOR-XR) 75 MG 24 hr capsule Take 1 capsule by mouth Daily. 30 capsule 2     No current facility-administered medications for this visit.        Appearance: appropriate  Hygiene:   good  Cooperation:  Cooperative  Eye Contact:  Good  Psychomotor Behavior:  Appropriate  Mood:  within normal limits  Affect:  Appropriate  Hopelessness: Denies  Speech:  Normal  Thought Process:  Linear  Thought Content:  Normal  Concentration: Normal   Suicidal:  None  Homicidal:  None  Hallucinations:  None  Delusion:  None  Memory:  Intact  Orientation:  Person, Place, Time and Situation  Reliability:  fair  Insight:  Fair  Judgement:  Fair  Impulse Control:  Fair  Estimated Intelligence: average range   Physical/Medical Issues:  No     Assessment/Plan   Diagnoses and all orders for this visit:    Bipolar disorder, current episode mixed, moderate    Insomnia due to mental condition    Nicotine dependence, cigarettes, uncomplicated    Borderline personality disorder    Other mixed anxiety disorders  -     venlafaxine XR (EFFEXOR-XR) 75 MG 24 hr capsule; Take 1 capsule by mouth Daily.    Other orders  -     OXcarbazepine (TRILEPTAL) 300 MG tablet; Take 1 tablet by mouth Every 12 (Twelve) Hours.  -     risperiDONE (risperDAL) 1 MG tablet; Take 1 mg by mouth twice a day  -     traZODone (DESYREL) 100 MG tablet; Take 1 tablet by mouth Every Night.        Treatment Plan: stabilize mood, patient will stay out of the hospital and be at optimal level of functioning, take all medication as prescribed. Patient verbalized  understanding and agreement to  plan.    Discussed medication management and sexual dysfunction  Decrease oxcabazepine to 300mg bid  Decrease effexor xr to 75mg po qd  Refill and stay on  risperdal 1mg bid  Refill Trazodone 100mg PO at hs as needed for sleep     We discussed risks, benefits, and side effects of the above medications and the patient was agreeable with the plan. Patient was educated on the importance of compliance with treatment and follow-up appointments.     Sleep hygiene reviewed, encouraged more whole foods, less processed foods, fruits   Coping skills reviewed and encouraged positive framing of thoughts    Assisted patient in processing above session content; acknowledged and normalized patient’s thoughts, feelings, and concerns.  Applied  positive coping skills and behavior management in session.  Allowed patient to freely discuss issues without interruption or judgment. Provided safe, confidential environment to facilitate the development of positive therapeutic relationship and encourage open, honest communication. Assisted patient in identifying risk factors which would indicate the need for higher level of care including thoughts to harm self or others and/or self-harming behavior and encouraged patient to contact this office, call 911, or present to the nearest emergency room should any of these events occur. Discussed crisis intervention services and means to access.  Patient adamantly and convincingly denies current suicidal or homicidal ideation or perceptual disturbance.    Instructed to call for questions or concerns and return early if necessary. Crisis plan reviewed including going to the Emergency department.    Return in about 8 weeks (around 7/31/2018).

## 2018-06-21 ENCOUNTER — OFFICE VISIT (OUTPATIENT)
Dept: INTERNAL MEDICINE | Facility: CLINIC | Age: 33
End: 2018-06-21

## 2018-06-21 VITALS
WEIGHT: 275.25 LBS | TEMPERATURE: 97.8 F | DIASTOLIC BLOOD PRESSURE: 84 MMHG | BODY MASS INDEX: 33.52 KG/M2 | OXYGEN SATURATION: 98 % | SYSTOLIC BLOOD PRESSURE: 138 MMHG | HEIGHT: 76 IN | HEART RATE: 89 BPM

## 2018-06-21 DIAGNOSIS — N52.2 DRUG-INDUCED ERECTILE DYSFUNCTION: Primary | ICD-10-CM

## 2018-06-21 PROCEDURE — 99213 OFFICE O/P EST LOW 20 MIN: CPT | Performed by: FAMILY MEDICINE

## 2018-06-21 RX ORDER — VENLAFAXINE HYDROCHLORIDE 37.5 MG/1
37.5 CAPSULE, EXTENDED RELEASE ORAL DAILY
Qty: 14 CAPSULE | Refills: 0 | Status: SHIPPED | OUTPATIENT
Start: 2018-06-21 | End: 2018-07-31

## 2018-06-23 LAB
ALBUMIN SERPL-MCNC: 4.3 G/DL (ref 3.5–5)
ALBUMIN/GLOB SERPL: 1.5 G/DL (ref 1–2)
ALP SERPL-CCNC: 89 U/L (ref 38–126)
ALT SERPL-CCNC: 53 U/L (ref 13–69)
AST SERPL-CCNC: 34 U/L (ref 15–46)
BILIRUB SERPL-MCNC: 0.5 MG/DL (ref 0.2–1.3)
BUN SERPL-MCNC: 13 MG/DL (ref 7–20)
BUN/CREAT SERPL: 13 (ref 6.3–21.9)
CALCIUM SERPL-MCNC: 9.8 MG/DL (ref 8.4–10.2)
CHLORIDE SERPL-SCNC: 101 MMOL/L (ref 98–107)
CHOLEST SERPL-MCNC: 219 MG/DL (ref 0–199)
CO2 SERPL-SCNC: 28 MMOL/L (ref 26–30)
CREAT SERPL-MCNC: 1 MG/DL (ref 0.6–1.3)
ERYTHROCYTE [DISTWIDTH] IN BLOOD BY AUTOMATED COUNT: 14.6 % (ref 11.5–14.5)
ESTRADIOL SERPL-MCNC: 14.4 PG/ML (ref 7.6–42.6)
FERRITIN SERPL-MCNC: 59.6 NG/ML (ref 17.9–464)
GFR SERPLBLD CREATININE-BSD FMLA CKD-EPI: 103 ML/MIN/1.73
GFR SERPLBLD CREATININE-BSD FMLA CKD-EPI: 85 ML/MIN/1.73
GLOBULIN SER CALC-MCNC: 2.8 GM/DL
GLUCOSE SERPL-MCNC: 112 MG/DL (ref 74–98)
HBA1C MFR BLD: 5.7 %
HCT VFR BLD AUTO: 46.9 % (ref 42–52)
HDLC SERPL-MCNC: 27 MG/DL (ref 40–60)
HGB BLD-MCNC: 15.2 G/DL (ref 14–18)
IRON SATN MFR SERPL: 19 % (ref 11–46)
IRON SERPL-MCNC: 79 MCG/DL (ref 37–181)
LDLC SERPL CALC-MCNC: 115 MG/DL (ref 0–99)
MCH RBC QN AUTO: 29.8 PG (ref 27–31)
MCHC RBC AUTO-ENTMCNC: 32.4 G/DL (ref 30–37)
MCV RBC AUTO: 92 FL (ref 80–94)
PLATELET # BLD AUTO: 237 10*3/MM3 (ref 130–400)
POTASSIUM SERPL-SCNC: 4.2 MMOL/L (ref 3.5–5.1)
PROLACTIN SERPL-MCNC: 27.5 NG/ML (ref 4–15.2)
PROT SERPL-MCNC: 7.1 G/DL (ref 6.3–8.2)
RBC # BLD AUTO: 5.1 10*6/MM3 (ref 4.7–6.1)
SODIUM SERPL-SCNC: 141 MMOL/L (ref 137–145)
T3FREE SERPL-MCNC: 3.6 PG/ML (ref 2–4.4)
T4 SERPL-MCNC: 7.6 UG/DL (ref 4.5–12)
TESTOST FREE SERPL-MCNC: 8.1 PG/ML (ref 8.7–25.1)
TESTOST SERPL-MCNC: 369 NG/DL (ref 264–916)
TIBC SERPL-MCNC: 416 MCG/DL (ref 261–497)
TRIGL SERPL-MCNC: 386 MG/DL
TSH SERPL DL<=0.005 MIU/L-ACNC: 2.56 MIU/ML (ref 0.47–4.68)
UIBC SERPL-MCNC: 337 MCG/DL
VIT B12 SERPL-MCNC: >1000 PG/ML (ref 239–931)
VLDLC SERPL CALC-MCNC: 77.2 MG/DL
WBC # BLD AUTO: 9.08 10*3/MM3 (ref 4.8–10.8)

## 2018-07-01 DIAGNOSIS — S76.011A: ICD-10-CM

## 2018-07-01 DIAGNOSIS — M54.50 ACUTE MIDLINE LOW BACK PAIN WITHOUT SCIATICA: ICD-10-CM

## 2018-07-01 DIAGNOSIS — M53.3 SI (SACROILIAC) JOINT DYSFUNCTION: ICD-10-CM

## 2018-07-02 RX ORDER — TIZANIDINE 2 MG/1
TABLET ORAL
Qty: 60 TABLET | Refills: 2 | Status: SHIPPED | OUTPATIENT
Start: 2018-07-02 | End: 2018-12-21

## 2018-07-02 RX ORDER — RISPERIDONE 1 MG/1
TABLET ORAL
Qty: 60 TABLET | Refills: 1 | OUTPATIENT
Start: 2018-07-02

## 2018-07-31 ENCOUNTER — OFFICE VISIT (OUTPATIENT)
Dept: PSYCHIATRY | Facility: CLINIC | Age: 33
End: 2018-07-31

## 2018-07-31 VITALS — BODY MASS INDEX: 33.73 KG/M2 | HEIGHT: 76 IN | WEIGHT: 277 LBS

## 2018-07-31 DIAGNOSIS — F60.3 BORDERLINE PERSONALITY DISORDER (HCC): ICD-10-CM

## 2018-07-31 DIAGNOSIS — F31.62 BIPOLAR DISORDER, CURRENT EPISODE MIXED, MODERATE (HCC): Primary | ICD-10-CM

## 2018-07-31 DIAGNOSIS — F51.05 INSOMNIA DUE TO MENTAL CONDITION: ICD-10-CM

## 2018-07-31 DIAGNOSIS — F17.210 NICOTINE DEPENDENCE, CIGARETTES, UNCOMPLICATED: ICD-10-CM

## 2018-07-31 PROCEDURE — 99214 OFFICE O/P EST MOD 30 MIN: CPT | Performed by: NURSE PRACTITIONER

## 2018-07-31 RX ORDER — BUSPIRONE HYDROCHLORIDE 5 MG/1
5 TABLET ORAL 3 TIMES DAILY
Qty: 90 TABLET | Refills: 2 | Status: SHIPPED | OUTPATIENT
Start: 2018-07-31 | End: 2018-09-25 | Stop reason: SDUPTHER

## 2018-07-31 RX ORDER — RISPERIDONE 0.5 MG/1
TABLET ORAL
Qty: 60 TABLET | Refills: 2 | Status: SHIPPED | OUTPATIENT
Start: 2018-07-31 | End: 2018-09-25

## 2018-07-31 NOTE — PROGRESS NOTES
"    Subjective   Alex Torres is a 33 y.o. male who is here today for medication management follow up.    Chief Complaint:     Bipolar disorder, current episode mixed, moderate (CMS/HCC)    Insomnia due to mental condition    Borderline personality disorder    Nicotine dependence, cigarettes, uncomplicated      History of Present Illness Patient presents  By himself. He is still with new girlfriend, she is living with pt in his mother's townhouse and his brother. Girl friend is bringing her 10 yo daughter to live. Girl friend is working at [employer]. patient reports elevated anxiety with planning for her daughter and trip to [place] to get her. He reports worry and anxiety. Denies manic sx's more anxious and panic feelings. Working with therapist on relaxation and coping. Denies anger outbursts.  Is off Effexor Xr had ED on it and now that is resolved. Had seen Dr. Greco and had lab work. Is sleeping on trazodone, reports compliance with medications. Cont to smoke cigarettes and not interested in quitting at this time. He is working on getting disability reporting he can't keep a job because of lability in moods in past.  (Scales based on 0 - 10 with 10 being the worst)        The following portions of the patient's history were reviewed and updated as appropriate: allergies, current medications, past family history, past medical history, past social history, past surgical history and problem list.    Review of Systemsdenies fever, cough, s/s’s of infection, denies GI/ problems, denies new medical issues     Objective   Physical Exam  Height 193 cm (76\"), weight 126 kg (277 lb).    Allergies   Allergen Reactions   • Gabapentin Swelling     + swelling   • Latex        Current Medications:   Current Outpatient Prescriptions   Medication Sig Dispense Refill   • busPIRone (BUSPAR) 5 MG tablet Take 1 tablet by mouth 3 (Three) Times a Day. 90 tablet 2   • OXcarbazepine (TRILEPTAL) 300 MG tablet Take 1 tablet by mouth " Every 12 (Twelve) Hours. 60 tablet 2   • pantoprazole (PROTONIX) 40 MG EC tablet TAKE 1 TABLET BY MOUTH DAILY. 30 tablet 5   • risperiDONE (risperDAL) 0.5 MG tablet Take 1 mg by mouth twice a day 60 tablet 2   • tiZANidine (ZANAFLEX) 2 MG tablet TAKE 1-2 TABLETS NIGHTLY AS NEEDED FOR MUSCLE SPASMS 60 tablet 2   • traZODone (DESYREL) 100 MG tablet Take 1 tablet by mouth Every Night. 30 tablet 2   • triamterene-hydrochlorothiazide (DYAZIDE) 37.5-25 MG per capsule Take 1 capsule by mouth Daily. 30 capsule 5     No current facility-administered medications for this visit.      Total Cholesterol 0 - 199 mg/dL 219     Triglycerides <150 mg/dL 386     HDL Cholesterol 40 - 60 mg/dL 27     VLDL Cholesterol mg/dL 77.2    LDL Cholesterol  0 - 99 mg/dL 115     Resulting Agency  [facility]   Narrative     Performed at:  [address] [place] [address]  [id number]  : Jacqueline Cordon MD, Phone:  [phone]  Patient Fasting:  Y       Specimen Collected: 06/22/18 08:51   Last Resulted: 06/23/18 07:13        TSH 0.470 - 4.680 mIU/mL 2.560    Resulting Agency  [facility]   Narrative     Performed at:  [address] [place] [address]  [id number]  : Jacqueline Cordon MD, Phone:  [phone]  Patient Fasting:  Y       Specimen Collected: 06/22/18 08:51   Last Resulted: 06/23/18 07:13                Ref Range & Units 1mo ago    Vitamin B-12 239 - 931 pg/mL >1000     Resulting Agency  [facility]   Narrative     Performed at:  [address] [place] [address]  [id number]  : Jacqueline Cordon MD, Phone:  [phone]  Patient Fasting:  Y       Specimen Collected: 06/22/18 08:51   Last Resulted: 06/23/18 07:13         WBC 4.80 - 10.80 10*3/mm3 9.08  12.23   9.6R  8.3R  8.6R     RBC 4.70 - 6.10 10*6/mm3 5.10  4.76  4.68R   5.07R  5.04R     Hemoglobin 14.0 - 18.0 g/dL 15.2  14.5  14.3  14.5  15.5     Hematocrit 42.0  - 52.0 % 46.9  44.2  43R  44R  45R     MCV 80.0 - 94.0 fL 92.0  92.9  90.8  85.9  89.1     MCH 27.0 - 31.0 pg 29.8  30.5  30.6R  28.6R  30.8R     MCHC 30.0 - 37.0 g/dL 32.4  32.8  33.6  33.3  34.6     RDW 11.5 - 14.5 % 14.6   14.4  12.9  12.7  12.8     Platelets 130 - 400 10*3/mm3 237  258  208R  241R  226R    Resulting Agency  [facility]  [facility]   [facility]        Hemoglobin A1C % 5.70       Glucose 74 - 98 mg/dL 112   105   98  106   86     BUN 7 - 20 mg/dL 13  15  11  13  13     Creatinine 0.60 - 1.30 mg/dL 1.00  1.20  1.10  0.8R  1.1R     eGFR Non African Am >60 mL/min/1.73 85  69  77CM      Comment: GFR Normal >60   Chronic Kidney Disease <60   Kidney Failure <15     eGFR African Am >60 mL/min/1.73 103   93       BUN/Creatinine Ratio 6.3 - 21.9 13.0  12.5  10.0  15.4  11.4     Sodium 137 - 145 mmol/L 141  143  144  146   145     Potassium 3.5 - 5.1 mmol/L 4.2  3.8  4.6  4.1  4.1     Chloride 98 - 107 mmol/L 101  100  106  111   107     Total CO2 26.0 - 30.0 mmol/L 28.0   27.0       Calcium 8.4 - 10.2 mg/dL 9.8  9.4  9.6  9.1  9.1     Total Protein 6.3 - 8.2 g/dL 7.1  7.5  6.9  6.4  7.1     Albumin 3.50 - 5.00 g/dL 4.30  4.60  4.30  4.0R  4.5R     Globulin gm/dL 2.8   2.6       A/G Ratio 1.0 - 2.0 g/dL 1.5  1.6  1.7  1.7R  1.8R     Total Bilirubin 0.2 - 1.3 mg/dL 0.5  0.4  0.6  0.4  0.9     Alkaline Phosphatase 38 - 126 U/L 89  86  74  76  69     AST (SGOT) 15 - 46 U/L 34  33  19  17  30     ALT (SGPT) 13 - 69 U/L 53  60  42  32  55      Prolactin 4.0 - 15.2 ng/mL 27.5     Resulting Agency  [facility]     Testosterone, Free 8.7 - 25.1 pg/mL 8.1     Resulting Agency  [facility]   Narrative        Appearance: appropriate  Hygiene:   good  Cooperation:  Cooperative  Eye Contact:  Good  Psychomotor Behavior:  No psychomotor agitation/retardation, No EPS, No motor tics  Mood:  within normal limits  Affect:  Appropriate  Hopelessness: Denies  Speech:  Rambling but not pressured and  redirected easily   Thought Process:  Linear  Thought Content:  Normal  Concentration: Normal   Suicidal:  None  Homicidal:  None  Hallucinations:  None  Delusion:  None  Memory:  Intact  Orientation:  Person, Place, Time and Situation  Reliability:  fair  Insight:  Fair  Judgement:  Fair  Impulse Control:  Fair  Estimated Intelligence: average range      Assessment/Plan   Diagnoses and all orders for this visit:    Bipolar disorder, current episode mixed, moderate (CMS/HCC)    Insomnia due to mental condition    Borderline personality disorder    Nicotine dependence, cigarettes, uncomplicated    Other orders  -     risperiDONE (risperDAL) 0.5 MG tablet; Take 1 mg by mouth twice a day  -     busPIRone (BUSPAR) 5 MG tablet; Take 1 tablet by mouth 3 (Three) Times a Day.    25 minutes face to face  Lab work reviewed and discussed with pt  Medications adjusted     IMPRESSION: off effexor and ED resolved,  Had lab work done and reviewed    PLAN:   Prolactin level elevated discussed with pt and other abnormal values pointing out need for dietary changes with elevated Triglycerides cholesterol and low HDL , not to take B12 supplements, he reports none  Going to PCP in 2 months     Reduce risperdal to 0.5mg PO one BID  Add buspirone 5mg po TID for anxiety  Cont trileptal 300mg PO BID  Cont trazodone 100mg for insomnia  Encouraged smoking cessation     We discussed risks, benefits, and side effects of the above medications and the patient was agreeable with the plan. Patient was educated on the importance of compliance with treatment and follow-up appointments.     Sleep hygiene reviewed, encouraged more whole foods, less processed foods, fruits vegetables , more activity   Coping skills reviewed and encouraged positive framing of thoughts    Assisted patient in processing above session content; acknowledged and normalized patient’s thoughts, feelings, and concerns.  Applied  positive coping skills and behavior management in  session.  Allowed patient to freely discuss issues without interruption or judgment. Provided safe, confidential environment to facilitate the development of positive therapeutic relationship and encourage open, honest communication. Assisted patient in identifying risk factors which would indicate the need for higher level of care including thoughts to harm self or others and/or self-harming behavior and encouraged patient to contact this office, call 911, or present to the nearest emergency room should any of these events occur. Discussed crisis intervention services and means to access.  Patient adamantly and convincingly denies current suicidal or homicidal ideation or perceptual disturbance.    Treatment Plan: stabilize mood, patient will stay out of the hospital and be at optimal level of functioning, take all medication as prescribed. Patient verbalized  understanding and agreement to plan.    Instructed to call for questions or concerns and return early if necessary. Crisis plan reviewed including going to the Emergency department.    Return in about 8 weeks (around 9/25/2018).

## 2018-08-27 RX ORDER — OXCARBAZEPINE 300 MG/1
TABLET, FILM COATED ORAL
Qty: 60 TABLET | Refills: 2 | Status: SHIPPED | OUTPATIENT
Start: 2018-08-27 | End: 2018-11-20 | Stop reason: SDUPTHER

## 2018-08-27 RX ORDER — OXCARBAZEPINE 600 MG/1
TABLET, FILM COATED ORAL
Qty: 60 TABLET | Refills: 2 | OUTPATIENT
Start: 2018-08-27

## 2018-09-19 ENCOUNTER — OFFICE VISIT (OUTPATIENT)
Dept: NEUROLOGY | Facility: CLINIC | Age: 33
End: 2018-09-19

## 2018-09-19 VITALS
HEIGHT: 76 IN | WEIGHT: 275 LBS | BODY MASS INDEX: 33.49 KG/M2 | SYSTOLIC BLOOD PRESSURE: 134 MMHG | HEART RATE: 81 BPM | DIASTOLIC BLOOD PRESSURE: 80 MMHG | OXYGEN SATURATION: 98 %

## 2018-09-19 DIAGNOSIS — I83.813 VARICOSE VEINS OF BOTH LOWER EXTREMITIES WITH PAIN: ICD-10-CM

## 2018-09-19 DIAGNOSIS — G47.33 OSA (OBSTRUCTIVE SLEEP APNEA): Primary | ICD-10-CM

## 2018-09-19 PROCEDURE — 99214 OFFICE O/P EST MOD 30 MIN: CPT | Performed by: NURSE PRACTITIONER

## 2018-09-19 RX ORDER — RISPERIDONE 1 MG/1
TABLET ORAL
COMMUNITY
Start: 2018-08-27 | End: 2018-09-21 | Stop reason: SDUPTHER

## 2018-09-19 RX ORDER — OXCARBAZEPINE 600 MG/1
TABLET, FILM COATED ORAL
COMMUNITY
Start: 2018-07-30 | End: 2018-09-21 | Stop reason: SDUPTHER

## 2018-09-20 RX ORDER — TRAZODONE HYDROCHLORIDE 100 MG/1
TABLET ORAL
Qty: 30 TABLET | Refills: 2 | Status: SHIPPED | OUTPATIENT
Start: 2018-09-20 | End: 2018-09-21 | Stop reason: SDUPTHER

## 2018-09-21 ENCOUNTER — OFFICE VISIT (OUTPATIENT)
Dept: INTERNAL MEDICINE | Facility: CLINIC | Age: 33
End: 2018-09-21

## 2018-09-21 VITALS
HEIGHT: 76 IN | BODY MASS INDEX: 33.97 KG/M2 | HEART RATE: 83 BPM | SYSTOLIC BLOOD PRESSURE: 132 MMHG | WEIGHT: 279 LBS | DIASTOLIC BLOOD PRESSURE: 74 MMHG | OXYGEN SATURATION: 100 % | TEMPERATURE: 97.1 F

## 2018-09-21 DIAGNOSIS — E78.49 OTHER HYPERLIPIDEMIA: Primary | ICD-10-CM

## 2018-09-21 DIAGNOSIS — F39 MOOD DISORDER (HCC): ICD-10-CM

## 2018-09-21 DIAGNOSIS — R79.89 LOW TESTOSTERONE IN MALE: ICD-10-CM

## 2018-09-21 DIAGNOSIS — E22.1 HYPERPROLACTINEMIA (HCC): ICD-10-CM

## 2018-09-21 DIAGNOSIS — M54.42 CHRONIC BILATERAL LOW BACK PAIN WITH BILATERAL SCIATICA: ICD-10-CM

## 2018-09-21 DIAGNOSIS — G89.29 CHRONIC BILATERAL LOW BACK PAIN WITH BILATERAL SCIATICA: ICD-10-CM

## 2018-09-21 DIAGNOSIS — Z00.00 HEALTHCARE MAINTENANCE: ICD-10-CM

## 2018-09-21 DIAGNOSIS — M54.41 CHRONIC BILATERAL LOW BACK PAIN WITH BILATERAL SCIATICA: ICD-10-CM

## 2018-09-21 PROCEDURE — 99395 PREV VISIT EST AGE 18-39: CPT | Performed by: INTERNAL MEDICINE

## 2018-09-21 RX ORDER — BACLOFEN 10 MG/1
10 TABLET ORAL 2 TIMES DAILY PRN
Qty: 60 TABLET | Refills: 1 | Status: SHIPPED | OUTPATIENT
Start: 2018-09-21 | End: 2018-11-20 | Stop reason: SDUPTHER

## 2018-09-21 NOTE — PROGRESS NOTES
Chief Complaint   Patient presents with   • Establish Care     former patient of Dr Greco; lab results       Subjective     History of Present Illness   Alex Torres is a 33 y.o. male presenting for annual physical.  Preventive health maintenance was reviewed and discussed today.  Chronic medical issues were also reviewed and discussed.    Patient does have a history of disorder with bipolar disorder and ADHD and has been following with Dr. Radha Brunson.  He states that his mood issues are about 70-80% better with his current medication regimen.    As far as his chronic back pain, he does not feel that his tizanidine sufficient for pain control.  He requests to try a different medication to help with sleep and back spasms.    He has been following with neurology for chronic reflux pain syndrome with associated ankle and foot edema followed by nerve related pains.  He does not tolerate gabapentin.        The following portions of the patient's history were reviewed and updated as appropriate: allergies, current medications, past family history, past medical history, past social history, past surgical history and problem list.    Review of Systems   Constitutional: Negative for chills, fatigue and fever.   HENT: Negative for congestion, ear pain, rhinorrhea, sinus pressure and sore throat.    Eyes: Negative for visual disturbance.   Respiratory: Negative for cough, chest tightness, shortness of breath and wheezing.    Cardiovascular: Negative for chest pain, palpitations and leg swelling.   Gastrointestinal: Negative for abdominal pain, blood in stool, constipation, diarrhea, nausea and vomiting.   Endocrine: Negative for polydipsia and polyuria.   Genitourinary: Negative for dysuria and hematuria.   Musculoskeletal: Negative for back pain.   Skin: Negative for rash.   Neurological: Negative for dizziness, light-headedness, numbness and headaches.   Psychiatric/Behavioral: Negative for dysphoric mood and sleep  disturbance. The patient is not nervous/anxious.        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 6 months       Social History     Social History   • Marital status:      Spouse name: N/A   • Number of children: N/A   • Years of education: N/A     Occupational History   • Not on file.     Social History Main Topics   • Smoking status: Current Every Day Smoker     Packs/day: 1.00     Years: 18.00     Types: Cigarettes     Start date: 1999   • Smokeless tobacco: Never Used   • Alcohol use No   • Drug use: No   • Sexual activity: Not Currently     Partners: Female     Other Topics Concern   • Not on file     Social History Narrative   • No narrative on file        Past Surgical History:   Procedure Laterality Date   • CHOLECYSTECTOMY  2002   • INGUINAL HERNIA REPAIR Right 1995   • ORIF METACARPAL FRACTURE Right 2000   • TIBIA FRACTURE SURGERY Left 1997    ORIF       Family History   Problem Relation Age of Onset   • Arthritis Father    • Hypertension Father    • Diabetes Maternal Aunt    • Diabetes Maternal Uncle    • Diabetes Maternal Grandmother    • Alzheimer's disease Maternal Grandmother    • Diabetes Other    • Hypertension Mother    • Depression Mother    • No Known Problems Brother    • Early death Maternal Grandfather    • Liver disease Maternal Grandfather    • Alcohol abuse Maternal Grandfather    • No Known Problems Paternal Grandmother    • Liver disease Paternal Grandfather    • Alcohol abuse Paternal Grandfather    • Early death Paternal Grandfather    • No Known Problems Brother          Current Outpatient Prescriptions:   •  busPIRone (BUSPAR) 5 MG tablet, Take 1 tablet by mouth 3 (Three) Times a Day., Disp: 90 tablet, Rfl: 2  •  OXcarbazepine (TRILEPTAL) 300 MG tablet,  "TAKE 1 TABLET BY MOUTH EVERY 12 HOURS, Disp: 60 tablet, Rfl: 2  •  pantoprazole (PROTONIX) 40 MG EC tablet, TAKE 1 TABLET BY MOUTH DAILY., Disp: 30 tablet, Rfl: 5  •  risperiDONE (risperDAL) 0.5 MG tablet, Take 1 mg by mouth twice a day, Disp: 60 tablet, Rfl: 2  •  tiZANidine (ZANAFLEX) 2 MG tablet, TAKE 1-2 TABLETS NIGHTLY AS NEEDED FOR MUSCLE SPASMS, Disp: 60 tablet, Rfl: 2  •  traZODone (DESYREL) 100 MG tablet, Take 1 tablet by mouth Every Night., Disp: 30 tablet, Rfl: 2  •  triamterene-hydrochlorothiazide (DYAZIDE) 37.5-25 MG per capsule, Take 1 capsule by mouth Daily., Disp: 30 capsule, Rfl: 5  •  baclofen (LIORESAL) 10 MG tablet, Take 1 tablet by mouth 2 (Two) Times a Day As Needed for Muscle Spasms., Disp: 60 tablet, Rfl: 1    Objective   /74 (BP Location: Left arm, Patient Position: Sitting)   Pulse 83   Temp 97.1 °F (36.2 °C)   Ht 193 cm (76\")   Wt 127 kg (279 lb)   SpO2 100%   BMI 33.96 kg/m²     Physical Exam   Constitutional: He is oriented to person, place, and time. He appears well-nourished. No distress.   HENT:   Head: Atraumatic.   Right Ear: External ear normal.   Left Ear: External ear normal.   Eyes: Conjunctivae are normal. Right eye exhibits no discharge. Left eye exhibits no discharge.   Neck: Normal range of motion. Neck supple. No thyromegaly present.   Cardiovascular: Normal rate and regular rhythm.    No murmur heard.  Pulmonary/Chest: Effort normal and breath sounds normal. He has no wheezes. He has no rales.   Abdominal: Soft. Bowel sounds are normal. He exhibits no distension. There is no tenderness.   Musculoskeletal: Normal range of motion. He exhibits no edema or tenderness.   Neurological: He is alert and oriented to person, place, and time.   Skin: No rash noted. He is not diaphoretic.   Psychiatric: He has a normal mood and affect.   Nursing note and vitals reviewed.      Assessment/Plan   Alex was seen today for establish care.    Diagnoses and all orders for this " visit:    Other hyperlipidemia  -     Comprehensive Metabolic Panel  -     CBC & Differential  -     Vitamin B12  -     Vitamin D 25 Hydroxy  -     Lipid Panel  -     Prolactin  -     Testosterone, Free, Total    Healthcare maintenance  -     Comprehensive Metabolic Panel  -     CBC & Differential  -     Vitamin B12  -     Vitamin D 25 Hydroxy  -     Lipid Panel  -     Prolactin  -     Testosterone, Free, Total    Hyperprolactinemia (CMS/HCC)  -     Prolactin  -     Testosterone, Free, Total  -     Hemoglobin A1c    Low testosterone in male  -     Comprehensive Metabolic Panel  -     CBC & Differential  -     Hemoglobin A1c    Mood disorder (CMS/HCC)  -     Vitamin B12  -     Vitamin D 25 Hydroxy  -     Lipid Panel  -     Hemoglobin A1c    Chronic bilateral low back pain with bilateral sciatica  -     baclofen (LIORESAL) 10 MG tablet; Take 1 tablet by mouth 2 (Two) Times a Day As Needed for Muscle Spasms.          Discussion Summary:    1. Preventive Health Maintenance  - Baseline labs ordered per above.  - Vaccines reviewed and updated  - Preventive health measures were discussed including: healthy diet with increase in fruits and vegetables, regular exercise at least 3 times a week, safe sex practices, avoidance of drugs, tobacco, and alcohol, and regular seatbelt use.    2.  Mood disorder/bipolar disorder  -Currently following with psychiatry.  Stable on current medications.    3.  Low testosterone/hyperprolactinemia  -Check prolactin levels.  It is presumed that prolactin levels were elevated due to Risperdal for which the dose has been reduced by psychiatry earlier on.  -Check follow-up labs.    4.  Chronic back pain with back spasms  -Baclofen started.    5. Obesity  - Weight loss options were discussed in detail including reducing fried, fatty, and sugary foods with diet control. A regular exercise regimen was discussed.          Follow up:  Return in about 3 months (around 12/21/2018) for Next scheduled  follow up.     Patient Instructions:  Patient instructions were provided.

## 2018-09-21 NOTE — PATIENT INSTRUCTIONS
Health Maintenance, Male  A healthy lifestyle and preventive care is important for your health and wellness. Ask your health care provider about what schedule of regular examinations is right for you.  What should I know about weight and diet?  Eat a Healthy Diet  · Eat plenty of vegetables, fruits, whole grains, low-fat dairy products, and lean protein.  · Do not eat a lot of foods high in solid fats, added sugars, or salt.    Maintain a Healthy Weight  Regular exercise can help you achieve or maintain a healthy weight. You should:  · Do at least 150 minutes of exercise each week. The exercise should increase your heart rate and make you sweat (moderate-intensity exercise).  · Do strength-training exercises at least twice a week.    Watch Your Levels of Cholesterol and Blood Lipids  · Have your blood tested for lipids and cholesterol every 5 years starting at 35 years of age. If you are at high risk for heart disease, you should start having your blood tested when you are 20 years old. You may need to have your cholesterol levels checked more often if:  ? Your lipid or cholesterol levels are high.  ? You are older than 50 years of age.  ? You are at high risk for heart disease.    What should I know about cancer screening?  Many types of cancers can be detected early and may often be prevented.  Lung Cancer  · You should be screened every year for lung cancer if:  ? You are a current smoker who has smoked for at least 30 years.  ? You are a former smoker who has quit within the past 15 years.  · Talk to your health care provider about your screening options, when you should start screening, and how often you should be screened.    Colorectal Cancer  · Routine colorectal cancer screening usually begins at 50 years of age and should be repeated every 5-10 years until you are 75 years old. You may need to be screened more often if early forms of precancerous polyps or small growths are found. Your health care provider  may recommend screening at an earlier age if you have risk factors for colon cancer.  · Your health care provider may recommend using home test kits to check for hidden blood in the stool.  · A small camera at the end of a tube can be used to examine your colon (sigmoidoscopy or colonoscopy). This checks for the earliest forms of colorectal cancer.    Prostate and Testicular Cancer  · Depending on your age and overall health, your health care provider may do certain tests to screen for prostate and testicular cancer.  · Talk to your health care provider about any symptoms or concerns you have about testicular or prostate cancer.    Skin Cancer  · Check your skin from head to toe regularly.  · Tell your health care provider about any new moles or changes in moles, especially if:  ? There is a change in a mole’s size, shape, or color.  ? You have a mole that is larger than a pencil eraser.  · Always use sunscreen. Apply sunscreen liberally and repeat throughout the day.  · Protect yourself by wearing long sleeves, pants, a wide-brimmed hat, and sunglasses when outside.    What should I know about heart disease, diabetes, and high blood pressure?  · If you are 18-39 years of age, have your blood pressure checked every 3-5 years. If you are 40 years of age or older, have your blood pressure checked every year. You should have your blood pressure measured twice--once when you are at a hospital or clinic, and once when you are not at a hospital or clinic. Record the average of the two measurements. To check your blood pressure when you are not at a hospital or clinic, you can use:  ? An automated blood pressure machine at a pharmacy.  ? A home blood pressure monitor.  · Talk to your health care provider about your target blood pressure.  · If you are between 45-79 years old, ask your health care provider if you should take aspirin to prevent heart disease.  · Have regular diabetes screenings by checking your fasting blood  sugar level.  ? If you are at a normal weight and have a low risk for diabetes, have this test once every three years after the age of 45.  ? If you are overweight and have a high risk for diabetes, consider being tested at a younger age or more often.  · A one-time screening for abdominal aortic aneurysm (AAA) by ultrasound is recommended for men aged 65-75 years who are current or former smokers.  What should I know about preventing infection?  Hepatitis B  If you have a higher risk for hepatitis B, you should be screened for this virus. Talk with your health care provider to find out if you are at risk for hepatitis B infection.  Hepatitis C  Blood testing is recommended for:  · Everyone born from 1945 through 1965.  · Anyone with known risk factors for hepatitis C.    Sexually Transmitted Diseases (STDs)  · You should be screened each year for STDs including gonorrhea and chlamydia if:  ? You are sexually active and are younger than 24 years of age.  ? You are older than 24 years of age and your health care provider tells you that you are at risk for this type of infection.  ? Your sexual activity has changed since you were last screened and you are at an increased risk for chlamydia or gonorrhea. Ask your health care provider if you are at risk.  · Talk with your health care provider about whether you are at high risk of being infected with HIV. Your health care provider may recommend a prescription medicine to help prevent HIV infection.    What else can I do?  · Schedule regular health, dental, and eye exams.  · Stay current with your vaccines (immunizations).  · Do not use any tobacco products, such as cigarettes, chewing tobacco, and e-cigarettes. If you need help quitting, ask your health care provider.  · Limit alcohol intake to no more than 2 drinks per day. One drink equals 12 ounces of beer, 5 ounces of wine, or 1½ ounces of hard liquor.  · Do not use street drugs.  · Do not share needles.  · Ask your  health care provider for help if you need support or information about quitting drugs.  · Tell your health care provider if you often feel depressed.  · Tell your health care provider if you have ever been abused or do not feel safe at home.  This information is not intended to replace advice given to you by your health care provider. Make sure you discuss any questions you have with your health care provider.  Document Released: 06/15/2009 Document Revised: 08/16/2017 Document Reviewed: 09/20/2016  [employer] Patient Education © 2018 [employer]

## 2018-09-24 RX ORDER — RISPERIDONE 0.5 MG/1
0.5 TABLET ORAL 2 TIMES DAILY
Qty: 60 TABLET | Refills: 1 | Status: SHIPPED | OUTPATIENT
Start: 2018-09-24 | End: 2018-11-20 | Stop reason: SDUPTHER

## 2018-09-25 ENCOUNTER — OFFICE VISIT (OUTPATIENT)
Dept: PSYCHIATRY | Facility: CLINIC | Age: 33
End: 2018-09-25

## 2018-09-25 VITALS — HEIGHT: 76 IN | WEIGHT: 279 LBS | BODY MASS INDEX: 33.97 KG/M2

## 2018-09-25 DIAGNOSIS — F17.210 NICOTINE DEPENDENCE, CIGARETTES, UNCOMPLICATED: ICD-10-CM

## 2018-09-25 DIAGNOSIS — F41.9 ANXIETY DISORDER, UNSPECIFIED TYPE: ICD-10-CM

## 2018-09-25 DIAGNOSIS — F31.62 BIPOLAR DISORDER, CURRENT EPISODE MIXED, MODERATE (HCC): Primary | ICD-10-CM

## 2018-09-25 DIAGNOSIS — F51.05 INSOMNIA DUE TO MENTAL CONDITION: ICD-10-CM

## 2018-09-25 DIAGNOSIS — F60.3 BORDERLINE PERSONALITY DISORDER (HCC): ICD-10-CM

## 2018-09-25 PROCEDURE — 99213 OFFICE O/P EST LOW 20 MIN: CPT | Performed by: NURSE PRACTITIONER

## 2018-09-25 RX ORDER — BUSPIRONE HYDROCHLORIDE 10 MG/1
10 TABLET ORAL 3 TIMES DAILY
Qty: 90 TABLET | Refills: 1 | Status: SHIPPED | OUTPATIENT
Start: 2018-09-25 | End: 2018-11-20

## 2018-09-25 NOTE — PROGRESS NOTES
"Subjective   Alex Torres is a 33 y.o. male     Chief Complaint   Patient presents with   • Follow-up     Pt is here for a Fu for his MOLLY and CPAP compliance. Pt states that he is doing well with his machine and is wearing it nightly. Pt states that he does need more supplies.    • Pain     Pt also c/o worsening burning/tingling sensation in his feet. Pt states that he saw a vascular physician last month for a consult and was told by Dr. Martin that he does not have a vasular related problem.        History of Present Illness     Patient is a pleasant 33-year-old male in clinic today to follow-up his sleep apnea with CPAP patient also has bilateral lower extremity swelling that occurs as the day progresses bilateral his feet become extra sensitive but when he lays down at night the swelling stops.  He had been sent to see Dr. Celestino Martin he did note that the patient had varicosities but I was not in need of surgical intervention recommended compression stockings patient is aware that this will help with his swallowing.  In the meantime his sleep apnea and CPAP he states that he is doing well sleepcompliance was reviewed with patient his AHI is corrected to 0.4 with CPAP of 5-15 cm H2O patient is using the CPAP more than 80% the time and states he's not having any of his symptoms of claustrophobia.    The following portions of the patient's history were reviewed and updated as appropriate: allergies, current medications, past family history, past medical history, past social history, past surgical history and problem list.    Review of Systems   Cardiovascular: Positive for leg swelling.   Psychiatric/Behavioral: Positive for sleep disturbance.       Objective       Vitals:    09/19/18 0945   BP: 134/80   Pulse: 81   SpO2: 98%   Weight: 125 kg (275 lb)   Height: 193 cm (76\")     GENERAL: Patient is pleasant, cooperative, appears to be stated age.  Body habitus is endomorphic.  HEAD:  Head is normocephalic " and atraumatic.    NECK: Neck are non-tender without thyromegaly or adenopathy.  Carotic upstrokes are 1+/4.  No cranial or cervical bruits.  The neck is supple with a full range of motion.   CARDIOVASCULAR:  Regular rate and rhythm with normal S1 and S2 without rub or gallop.Bilateral lower limb edema swelling just above the ankles into the feet noted varicosities.  RESPIRATORY:  Clear to auscultation without wheezes or crackle   PSYCH:  Higher cortical function/mental status:  The patient is alert.  He  is oriented x3 to time, place and person.  Recent and the remote memory appear normal.  The patient has a good fund of knowledge.  There is no visual or auditory hallucination or suicidal or homicidal ideation.  SPEECH:There is no gross evidence of aphasia, dysarthria or agnosia.      CRANIAL NERVES: Extraocular movements are full and smooth with normal pursuits and saccades.  No nystagmus noted.  The face is symmetric. Tongue midline.   MOTOR: Strength is 5/5 throughout with normal tone and bulk  No involuntary movements noted.    COORDINATION AND GAIT:  The patient walks with a narrow-based gait.  Romberg and monopedal  Romberg are negative.  The patient normally performs finger-nose-finger, heel-to-knee-to-shin and rapid alternating movements in symmetrical fashion.    MUSCULOSKELETAL: Range of motion normal, no clubbing, cyanosis, or edema.  No joint swelling.     Results for orders placed or performed in visit on 06/21/18   Prolactin   Result Value Ref Range    Prolactin 27.5 (H) 4.0 - 15.2 ng/mL   Testosterone, Free, Total   Result Value Ref Range    Testosterone, Total 369 264 - 916 ng/dL    Testosterone, Free 8.1 (L) 8.7 - 25.1 pg/mL   Estradiol   Result Value Ref Range    Estradiol 14.4 7.6 - 42.6 pg/mL       I have personally reviewed the above labs. Pt follows with PCP.    Assessment/Plan     1.MOLLY with CPAP patient is stable in clinic with improved symptoms AHI corrected to 0.4 with CPAP 5-15 cm of  H2O.    2.  Bilateral lower extremity swelling with neuropathy: Patient with varicosities was cleared by surgery is not a surgical candidate.  It was recommended the patient get compression stockings unfortunately insurance doesn't pay for those patient is aware another option is to get subtherapeutic compression stockings if he can't afford the prescription grade patient is willing to try the subtherapeutic's.

## 2018-09-25 NOTE — PROGRESS NOTES
"    Subjective   Alex Torres is a 33 y.o. male who is here today for medication management follow up.    Chief Complaint:     Bipolar disorder, current episode mixed, moderate (CMS/HCC)    Insomnia due to mental condition    Borderline personality disorder    Nicotine dependence, cigarettes, uncomplicated    Anxiety disorder, unspecified type    History of Present Illness Patient presents by himself. Reports he didn't get repeat lab work yet. Discussed importance and he states he will go tomorrow, . The patient reports the following ongoing symptoms of anxiety: constant anxiety/worry, restlessness/on edge, difficulty concentrating, mind goes blank, irritability and muscle tension and have caused impairment in important areas of functioning. Rates it most days at a 6. He reports tolerating the buspar and taking it but doesn't feel a difference. He denies scottie, denies explosive anger, denies depression. He is still with his girl friend who is living with him, mom and brother. She works \"but has a lot of anxiety and doesn't handle situations well sometimes\". He watches her daughter while she works, daughter also goes to school. Pt reports sleeping and appetite as good. No change in weight, encouraged increasing physical activity healthy eating. He reports working on [email or web address].     The following portions of the patient's history were reviewed and updated as appropriate: allergies, current medications, past family history, past medical history, past social history, past surgical history and problem list.    Review of Systemsdenies fever, cough, s/s’s of infection, denies GI/ problems, denies new medical issues     Objective   Physical Exam  Height 193 cm (76\"), weight 127 kg (279 lb).    Allergies   Allergen Reactions   • Gabapentin Swelling     + swelling   • Latex        Current Medications:   Current Outpatient Prescriptions   Medication Sig Dispense Refill   • baclofen (LIORESAL) 10 MG tablet Take 1 tablet " by mouth 2 (Two) Times a Day As Needed for Muscle Spasms. 60 tablet 1   • busPIRone (BUSPAR) 10 MG tablet Take 1 tablet by mouth 3 (Three) Times a Day. 90 tablet 1   • OXcarbazepine (TRILEPTAL) 300 MG tablet TAKE 1 TABLET BY MOUTH EVERY 12 HOURS 60 tablet 2   • pantoprazole (PROTONIX) 40 MG EC tablet TAKE 1 TABLET BY MOUTH DAILY. 30 tablet 5   • risperiDONE (risperDAL) 0.5 MG tablet Take 1 tablet by mouth 2 (Two) Times a Day. 60 tablet 1   • tiZANidine (ZANAFLEX) 2 MG tablet TAKE 1-2 TABLETS NIGHTLY AS NEEDED FOR MUSCLE SPASMS 60 tablet 2   • traZODone (DESYREL) 100 MG tablet Take 1 tablet by mouth Every Night. 30 tablet 2   • triamterene-hydrochlorothiazide (DYAZIDE) 37.5-25 MG per capsule Take 1 capsule by mouth Daily. 30 capsule 5     No current facility-administered medications for this visit.      Appearance: appropriate  Hygiene:   good  Cooperation:  Cooperative  Eye Contact:  Good  Psychomotor Behavior:  No psychomotor agitation/retardation, No EPS, No motor tics  Mood: anxious   Affect:  Appropriate  Hopelessness: Denies  Speech:  Normal  Thought Process:  Linear  Thought Content:  Normal  Concentration: Normal   Suicidal:  None  Homicidal:  None  Hallucinations:  None  Delusion:  None  Memory:  Intact  Orientation:  Person, Place, Time and Situation  Reliability:  fair  Insight:  Fair  Judgement:  Fair  Impulse Control:  Fair  Estimated Intelligence: average range    Assessment/Plan   Diagnoses and all orders for this visit:    Bipolar disorder, current episode mixed, moderate (CMS/HCC)    Insomnia due to mental condition    Borderline personality disorder    Nicotine dependence, cigarettes, uncomplicated    Anxiety disorder, unspecified type    Other orders  -     busPIRone (BUSPAR) 10 MG tablet; Take 1 tablet by mouth 3 (Three) Times a Day.        IMPRESSION: improving mood stability , sleeping well,  going to therapy reports compliance with medications   PLAN:  Increase Buspar to 10mg PO TID for  anxiety  Cont risperdal 0.5mg PO BID    Had elevated prolactin level in June and reduced the risperdal, was suppose to have gotten re tested, but states will go tomorrow, encouraged him rationale why.  Cont Trazodone 100mg at hs for sleeplessness  Cont oxcarbazpine 300mg BID for mood stabilizer    Pt has lab work ordered but hasn't gone yet, encouraged follow through  Discussed nicotine dep not ready to quit  Cont therapy     We discussed risks, benefits, and side effects of the above medications and the patient was agreeable with the plan. Patient was educated on the importance of compliance with treatment and follow-up appointments.     Sleep hygiene reviewed, encouraged more whole foods, less processed foods, fruits vegetables , more activity   Coping skills reviewed and encouraged positive framing of thoughts    Assisted patient in processing above session content; acknowledged and normalized patient’s thoughts, feelings, and concerns.  Applied  positive coping skills and behavior management in session.  Allowed patient to freely discuss issues without interruption or judgment. Provided safe, confidential environment to facilitate the development of positive therapeutic relationship and encourage open, honest communication. Assisted patient in identifying risk factors which would indicate the need for higher level of care including thoughts to harm self or others and/or self-harming behavior and encouraged patient to contact this office, call 911, or present to the nearest emergency room should any of these events occur. Discussed crisis intervention services and means to access.  Patient adamantly and convincingly denies current suicidal or homicidal ideation or perceptual disturbance.    Treatment Plan: stabilize mood, patient will stay out of the hospital and be at optimal level of functioning, take all medication as prescribed. Patient verbalized  understanding and agreement to plan.    Instructed to call  for questions or concerns and return early if necessary. Crisis plan reviewed including going to the Emergency department.    Return in about 8 weeks (around 11/20/2018).

## 2018-09-27 NOTE — PROGRESS NOTES
Labs were reviewed.  Prolactin level has come down some but is still high.  His A1C is slightly worse.  Cholesterol is borderline high.  Remaining labs are in normal range.

## 2018-09-28 LAB
25(OH)D3+25(OH)D2 SERPL-MCNC: 34 NG/ML
ALBUMIN SERPL-MCNC: 4.5 G/DL (ref 3.5–5)
ALBUMIN/GLOB SERPL: 1.8 G/DL (ref 1–2)
ALP SERPL-CCNC: 78 U/L (ref 38–126)
ALT SERPL-CCNC: 63 U/L (ref 13–69)
AST SERPL-CCNC: 33 U/L (ref 15–46)
BASOPHILS # BLD AUTO: 0.04 10*3/MM3 (ref 0–0.2)
BASOPHILS NFR BLD AUTO: 0.4 % (ref 0–2.5)
BILIRUB SERPL-MCNC: 0.6 MG/DL (ref 0.2–1.3)
BUN SERPL-MCNC: 18 MG/DL (ref 7–20)
BUN/CREAT SERPL: 15 (ref 6.3–21.9)
CALCIUM SERPL-MCNC: 9.9 MG/DL (ref 8.4–10.2)
CHLORIDE SERPL-SCNC: 102 MMOL/L (ref 98–107)
CHOLEST SERPL-MCNC: 185 MG/DL (ref 0–199)
CO2 SERPL-SCNC: 28 MMOL/L (ref 26–30)
CREAT SERPL-MCNC: 1.2 MG/DL (ref 0.6–1.3)
EOSINOPHIL # BLD AUTO: 0.2 10*3/MM3 (ref 0–0.7)
EOSINOPHIL NFR BLD AUTO: 2 % (ref 0–7)
ERYTHROCYTE [DISTWIDTH] IN BLOOD BY AUTOMATED COUNT: 14.2 % (ref 11.5–14.5)
GLOBULIN SER CALC-MCNC: 2.5 GM/DL
GLUCOSE SERPL-MCNC: 132 MG/DL (ref 74–98)
HBA1C MFR BLD: 6 %
HCT VFR BLD AUTO: 47.8 % (ref 42–52)
HDLC SERPL-MCNC: 27 MG/DL (ref 40–60)
HGB BLD-MCNC: 15.3 G/DL (ref 14–18)
IMM GRANULOCYTES # BLD: 0.18 10*3/MM3 (ref 0–0.06)
IMM GRANULOCYTES NFR BLD: 1.8 % (ref 0–0.6)
LDLC SERPL CALC-MCNC: ABNORMAL MG/DL
LYMPHOCYTES # BLD AUTO: 2.48 10*3/MM3 (ref 0.6–3.4)
LYMPHOCYTES NFR BLD AUTO: 25.1 % (ref 10–50)
MCH RBC QN AUTO: 29.8 PG (ref 27–31)
MCHC RBC AUTO-ENTMCNC: 32 G/DL (ref 30–37)
MCV RBC AUTO: 93.2 FL (ref 80–94)
MONOCYTES # BLD AUTO: 0.55 10*3/MM3 (ref 0–0.9)
MONOCYTES NFR BLD AUTO: 5.6 % (ref 0–12)
NEUTROPHILS # BLD AUTO: 6.44 10*3/MM3 (ref 2–6.9)
NEUTROPHILS NFR BLD AUTO: 65.1 % (ref 37–80)
NRBC BLD AUTO-RTO: 0 /100 WBC (ref 0–0)
PLATELET # BLD AUTO: 230 10*3/MM3 (ref 130–400)
POTASSIUM SERPL-SCNC: 4.3 MMOL/L (ref 3.5–5.1)
PROLACTIN SERPL-MCNC: 20.7 NG/ML (ref 4–15.2)
PROT SERPL-MCNC: 7 G/DL (ref 6.3–8.2)
RBC # BLD AUTO: 5.13 10*6/MM3 (ref 4.7–6.1)
SODIUM SERPL-SCNC: 139 MMOL/L (ref 137–145)
TESTOST FREE SERPL-MCNC: 9.6 PG/ML (ref 8.7–25.1)
TESTOST SERPL-MCNC: 314 NG/DL (ref 264–916)
TRIGL SERPL-MCNC: 427 MG/DL
VIT B12 SERPL-MCNC: >1000 PG/ML (ref 239–931)
VLDLC SERPL CALC-MCNC: ABNORMAL MG/DL
WBC # BLD AUTO: 9.89 10*3/MM3 (ref 4.8–10.8)

## 2018-10-24 RX ORDER — BUSPIRONE HYDROCHLORIDE 10 MG/1
10 TABLET ORAL 3 TIMES DAILY
Qty: 90 TABLET | Refills: 2 | Status: SHIPPED | OUTPATIENT
Start: 2018-10-24 | End: 2018-11-12 | Stop reason: SDUPTHER

## 2018-11-12 RX ORDER — BUSPIRONE HYDROCHLORIDE 10 MG/1
10 TABLET ORAL 3 TIMES DAILY
Qty: 90 TABLET | Refills: 2 | Status: SHIPPED | OUTPATIENT
Start: 2018-11-12 | End: 2019-02-11 | Stop reason: SDUPTHER

## 2018-11-13 DIAGNOSIS — K21.00 GASTROESOPHAGEAL REFLUX DISEASE WITH ESOPHAGITIS: ICD-10-CM

## 2018-11-13 DIAGNOSIS — I10 ESSENTIAL HYPERTENSION: ICD-10-CM

## 2018-11-13 RX ORDER — PANTOPRAZOLE SODIUM 40 MG/1
40 TABLET, DELAYED RELEASE ORAL DAILY
Qty: 30 TABLET | Refills: 5 | Status: SHIPPED | OUTPATIENT
Start: 2018-11-13 | End: 2019-04-20

## 2018-11-13 RX ORDER — TRIAMTERENE AND HYDROCHLOROTHIAZIDE 37.5; 25 MG/1; MG/1
1 CAPSULE ORAL DAILY
Qty: 30 CAPSULE | Refills: 5 | Status: SHIPPED | OUTPATIENT
Start: 2018-11-13 | End: 2019-05-02 | Stop reason: SDUPTHER

## 2018-11-20 ENCOUNTER — OFFICE VISIT (OUTPATIENT)
Dept: PSYCHIATRY | Facility: CLINIC | Age: 33
End: 2018-11-20

## 2018-11-20 VITALS
HEIGHT: 76 IN | BODY MASS INDEX: 34.1 KG/M2 | SYSTOLIC BLOOD PRESSURE: 126 MMHG | WEIGHT: 280 LBS | DIASTOLIC BLOOD PRESSURE: 86 MMHG

## 2018-11-20 DIAGNOSIS — F60.3 BORDERLINE PERSONALITY DISORDER (HCC): ICD-10-CM

## 2018-11-20 DIAGNOSIS — F51.05 INSOMNIA DUE TO MENTAL CONDITION: ICD-10-CM

## 2018-11-20 DIAGNOSIS — F31.62 BIPOLAR DISORDER, CURRENT EPISODE MIXED, MODERATE (HCC): Primary | ICD-10-CM

## 2018-11-20 DIAGNOSIS — M54.42 CHRONIC BILATERAL LOW BACK PAIN WITH BILATERAL SCIATICA: ICD-10-CM

## 2018-11-20 DIAGNOSIS — G89.29 CHRONIC BILATERAL LOW BACK PAIN WITH BILATERAL SCIATICA: ICD-10-CM

## 2018-11-20 DIAGNOSIS — F17.210 NICOTINE DEPENDENCE, CIGARETTES, UNCOMPLICATED: ICD-10-CM

## 2018-11-20 DIAGNOSIS — M54.41 CHRONIC BILATERAL LOW BACK PAIN WITH BILATERAL SCIATICA: ICD-10-CM

## 2018-11-20 DIAGNOSIS — F41.9 ANXIETY DISORDER, UNSPECIFIED TYPE: ICD-10-CM

## 2018-11-20 PROCEDURE — 99214 OFFICE O/P EST MOD 30 MIN: CPT | Performed by: NURSE PRACTITIONER

## 2018-11-20 RX ORDER — OXCARBAZEPINE 300 MG/1
300 TABLET, FILM COATED ORAL EVERY 12 HOURS
Qty: 60 TABLET | Refills: 2 | Status: SHIPPED | OUTPATIENT
Start: 2018-11-20 | End: 2019-03-05 | Stop reason: SDUPTHER

## 2018-11-20 RX ORDER — TRAZODONE HYDROCHLORIDE 100 MG/1
100 TABLET ORAL NIGHTLY
Qty: 30 TABLET | Refills: 2 | Status: SHIPPED | OUTPATIENT
Start: 2018-11-20 | End: 2018-12-21

## 2018-11-20 RX ORDER — RISPERIDONE 0.5 MG/1
0.5 TABLET ORAL 2 TIMES DAILY
Qty: 60 TABLET | Refills: 2 | Status: SHIPPED | OUTPATIENT
Start: 2018-11-20 | End: 2019-01-17

## 2018-11-20 RX ORDER — BACLOFEN 10 MG/1
10 TABLET ORAL 2 TIMES DAILY PRN
Qty: 60 TABLET | Refills: 1 | Status: SHIPPED | OUTPATIENT
Start: 2018-11-20 | End: 2019-01-14 | Stop reason: SDUPTHER

## 2018-11-20 NOTE — PROGRESS NOTES
"    Subjective   Alex Torres is a 33 y.o. male who is here today for medication management follow up.    Chief Complaint:     Bipolar disorder, current episode mixed, moderate (CMS/HCC)    Borderline personality disorder (CMS/HCC)    Insomnia due to mental condition    Nicotine dependence, cigarettes, uncomplicated    Anxiety disorder, unspecified type    History of Present Illness Patient presents for f/u. He reports depression 4, anxiety 4. Sleeping well with CPAP. Denies hypomania, denies scottie. Denies anger or aggression. Awaiting disability court date.  Denies SI/HI, denies AVH.  Denies adverse effects from medications. Reports compliance with medications   (Scales based on 0 - 10 with 10 being the worst)        The following portions of the patient's history were reviewed and updated as appropriate: allergies, current medications, past family history, past medical history, past social history, past surgical history and problem list.    Review of Systems denies fever, cough, s/s’s of infection, denies GI/ problems, denies new medical issues     Objective   Physical Exam  Blood pressure 126/86, height 193 cm (76\"), weight 127 kg (280 lb). Body mass index is 34.08 kg/m².    Allergies   Allergen Reactions   • Gabapentin Swelling     + swelling   • Latex        Current Medications:   Current Outpatient Medications   Medication Sig Dispense Refill   • baclofen (LIORESAL) 10 MG tablet Take 1 tablet by mouth 2 (Two) Times a Day As Needed for Muscle Spasms. 60 tablet 1   • busPIRone (BUSPAR) 10 MG tablet Take 1 tablet by mouth 3 (Three) Times a Day. 90 tablet 2   • OXcarbazepine (TRILEPTAL) 300 MG tablet Take 1 tablet by mouth Every 12 (Twelve) Hours. 60 tablet 2   • pantoprazole (PROTONIX) 40 MG EC tablet Take 1 tablet by mouth Daily. 30 tablet 5   • risperiDONE (risperDAL) 0.5 MG tablet Take 1 tablet by mouth 2 (Two) Times a Day. 60 tablet 2   • tiZANidine (ZANAFLEX) 2 MG tablet TAKE 1-2 TABLETS NIGHTLY AS " NEEDED FOR MUSCLE SPASMS 60 tablet 2   • traZODone (DESYREL) 100 MG tablet Take 1 tablet by mouth Every Night. 30 tablet 2   • triamterene-hydrochlorothiazide (DYAZIDE) 37.5-25 MG per capsule Take 1 capsule by mouth Daily. 30 capsule 5     No current facility-administered medications for this visit.      Appearance: appropriate  Hygiene:   good  Cooperation:  Cooperative  Eye Contact:  Good  Psychomotor Behavior:  No psychomotor agitation/retardation, No EPS, No motor tics  Mood:  within normal limits  Affect:  Appropriate  Hopelessness: Denies  Speech:  Normal  Thought Process:  Linear  Thought Content:  Normal  Concentration: Normal   Suicidal:  None  Homicidal:  None  Hallucinations:  None  Delusion:  None  Memory:  Intact  Orientation:  Person, Place, Time and Situation  Reliability:  fair  Insight:  Fair  Judgement:  Fair  Impulse Control:  Fair  Estimated Intelligence: average range    Assessment/Plan   Diagnoses and all orders for this visit:    Bipolar disorder, current episode mixed, moderate (CMS/HCC)    Borderline personality disorder (CMS/HCC)    Insomnia due to mental condition    Nicotine dependence, cigarettes, uncomplicated    Anxiety disorder, unspecified type    Other orders  -     OXcarbazepine (TRILEPTAL) 300 MG tablet; Take 1 tablet by mouth Every 12 (Twelve) Hours.  -     risperiDONE (risperDAL) 0.5 MG tablet; Take 1 tablet by mouth 2 (Two) Times a Day.  -     traZODone (DESYREL) 100 MG tablet; Take 1 tablet by mouth Every Night.      25 minutes face to face, 10 minutes smoking cessation, 15 minutes med management, with reviewing labs  IMPRESSION: stable mood with treatment lab work reviewed in Epic from Sept.  PLAN:     -     OXcarbazepine (TRILEPTAL) 300 MG tablet; Take 1 tablet by mouth Every 12 (Twelve) Hours. Mood stabilizer   -     risperiDONE (risperDAL) 0.5 MG tablet; Take 1 tablet by mouth 2 (Two) Times a Day. bipolar  -     traZODone (DESYREL) 100 MG tablet; Take 1 tablet by mouth  Every Night. Sleep     We discussed risks, benefits, and side effects of the above medications and the patient was agreeable with the plan. Patient was educated on the importance of compliance with treatment and follow-up appointments.   Cont therapy     Sleep hygiene reviewed, encouraged more whole foods, less processed foods, fruits vegetables , more activity   Coping skills reviewed and encouraged positive framing of thoughts    Assisted patient in processing above session content; acknowledged and normalized patient’s thoughts, feelings, and concerns.  Applied  positive coping skills and behavior management in session.  Allowed patient to freely discuss issues without interruption or judgment. Provided safe, confidential environment to facilitate the development of positive therapeutic relationship and encourage open, honest communication. Assisted patient in identifying risk factors which would indicate the need for higher level of care including thoughts to harm self or others and/or self-harming behavior and encouraged patient to contact this office, call 911, or present to the nearest emergency room should any of these events occur. Discussed crisis intervention services and means to access.  Patient adamantly and convincingly denies current suicidal or homicidal ideation or perceptual disturbance.    Treatment Plan: stabilize mood, patient will stay out of the hospital and be at optimal level of functioning, take all medication as prescribed. Patient verbalized  understanding and agreement to plan.    Instructed to call for questions or concerns and return early if necessary. Crisis plan reviewed including going to the Emergency department.    Return in about 3 months (around 2/20/2019).

## 2018-12-21 ENCOUNTER — OFFICE VISIT (OUTPATIENT)
Dept: INTERNAL MEDICINE | Facility: CLINIC | Age: 33
End: 2018-12-21

## 2018-12-21 VITALS
OXYGEN SATURATION: 99 % | HEART RATE: 64 BPM | DIASTOLIC BLOOD PRESSURE: 80 MMHG | BODY MASS INDEX: 33.97 KG/M2 | HEIGHT: 76 IN | TEMPERATURE: 96.8 F | WEIGHT: 279 LBS | SYSTOLIC BLOOD PRESSURE: 136 MMHG

## 2018-12-21 DIAGNOSIS — I10 ESSENTIAL HYPERTENSION: ICD-10-CM

## 2018-12-21 DIAGNOSIS — F31.62 BIPOLAR DISORDER, CURRENT EPISODE MIXED, MODERATE (HCC): ICD-10-CM

## 2018-12-21 DIAGNOSIS — F31.77 BIPOLAR DISORDER, IN PARTIAL REMISSION, MOST RECENT EPISODE MIXED (HCC): ICD-10-CM

## 2018-12-21 DIAGNOSIS — G57.72 COMPLEX REGIONAL PAIN SYNDROME TYPE 2 OF LEFT LOWER EXTREMITY: ICD-10-CM

## 2018-12-21 DIAGNOSIS — R73.03 BORDERLINE DIABETES: Primary | ICD-10-CM

## 2018-12-21 DIAGNOSIS — K21.00 GASTROESOPHAGEAL REFLUX DISEASE WITH ESOPHAGITIS: ICD-10-CM

## 2018-12-21 LAB — HBA1C MFR BLD: 5.8 %

## 2018-12-21 PROCEDURE — 83036 HEMOGLOBIN GLYCOSYLATED A1C: CPT | Performed by: INTERNAL MEDICINE

## 2018-12-21 PROCEDURE — 99214 OFFICE O/P EST MOD 30 MIN: CPT | Performed by: INTERNAL MEDICINE

## 2018-12-21 RX ORDER — TRAZODONE HYDROCHLORIDE 150 MG/1
150 TABLET ORAL NIGHTLY
Qty: 30 TABLET | Refills: 5 | Status: SHIPPED | OUTPATIENT
Start: 2018-12-21 | End: 2019-04-02 | Stop reason: SDUPTHER

## 2018-12-21 NOTE — PROGRESS NOTES
Chief Complaint   Patient presents with   • Follow-up     Discuss HA1C;  Patient stated that he would like for Trazodone to be increased;        Subjective     History of Present Illness   Alex Torres is a 33 y.o. male presenting for follow up on elevated A1C and depression.  Patient shows that he has been having difficulty falling asleep on most nights.  Trazodone used to help with symptoms however it is not working as well recently.  As far as diet control, patient has made significant strides to reduce carbohydrates and he has noticed he lost a few pounds.    The following portions of the patient's history were reviewed and updated as appropriate: allergies, current medications, past family history, past medical history, past social history, past surgical history and problem list.    Review of Systems   Constitutional: Negative for chills, fatigue and fever.   HENT: Negative for congestion, ear pain, rhinorrhea, sinus pressure and sore throat.    Eyes: Negative for visual disturbance.   Respiratory: Negative for cough, chest tightness, shortness of breath and wheezing.    Cardiovascular: Negative for chest pain, palpitations and leg swelling.   Gastrointestinal: Negative for abdominal pain, blood in stool, constipation, diarrhea, nausea and vomiting.   Endocrine: Negative for polydipsia and polyuria.   Genitourinary: Negative for dysuria and hematuria.   Musculoskeletal: Negative for back pain.   Skin: Negative for rash.   Neurological: Negative for dizziness, light-headedness, numbness and headaches.   Psychiatric/Behavioral: Positive for dysphoric mood, sleep disturbance and suicidal ideas. The patient is not nervous/anxious.        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 6 months        Social History     Socioeconomic History   • Marital status:      Spouse name: Not on file   • Number of children: Not on file   • Years of education: Not on file   • Highest education level: Not on file   Social Needs   • Financial resource strain: Not on file   • Food insecurity - worry: Not on file   • Food insecurity - inability: Not on file   • Transportation needs - medical: Not on file   • Transportation needs - non-medical: Not on file   Occupational History   • Not on file   Tobacco Use   • Smoking status: Current Every Day Smoker     Packs/day: 1.00     Years: 18.00     Pack years: 18.00     Types: Cigarettes     Start date: 1999   • Smokeless tobacco: Never Used   Substance and Sexual Activity   • Alcohol use: No   • Drug use: No   • Sexual activity: Yes     Partners: Female   Other Topics Concern   • Not on file   Social History Narrative   • Not on file        Past Surgical History:   Procedure Laterality Date   • CHOLECYSTECTOMY  2002   • INGUINAL HERNIA REPAIR Right 1995   • ORIF METACARPAL FRACTURE Right 2000   • TIBIA FRACTURE SURGERY Left 1997    ORIF       Family History   Problem Relation Age of Onset   • Arthritis Father    • Hypertension Father    • Diabetes Maternal Aunt    • Diabetes Maternal Uncle    • Diabetes Maternal Grandmother    • Alzheimer's disease Maternal Grandmother    • Diabetes Other    • Hypertension Mother    • Depression Mother    • No Known Problems Brother    • Early death Maternal Grandfather    • Liver disease Maternal Grandfather    • Alcohol abuse Maternal Grandfather    • No Known Problems Paternal Grandmother    • Liver disease Paternal Grandfather    • Alcohol abuse Paternal Grandfather    • Early death Paternal Grandfather    • No Known Problems Brother          Current Outpatient Medications:   •  baclofen (LIORESAL) 10 MG tablet, TAKE 1 TABLET BY MOUTH 2 (TWO) TIMES A DAY AS NEEDED FOR MUSCLE SPASMS., Disp: 60 tablet, Rfl: 1  •  busPIRone (BUSPAR) 10  "MG tablet, Take 1 tablet by mouth 3 (Three) Times a Day., Disp: 90 tablet, Rfl: 2  •  OXcarbazepine (TRILEPTAL) 300 MG tablet, Take 1 tablet by mouth Every 12 (Twelve) Hours., Disp: 60 tablet, Rfl: 2  •  pantoprazole (PROTONIX) 40 MG EC tablet, Take 1 tablet by mouth Daily., Disp: 30 tablet, Rfl: 5  •  risperiDONE (risperDAL) 0.5 MG tablet, Take 1 tablet by mouth 2 (Two) Times a Day., Disp: 60 tablet, Rfl: 2  •  traZODone (DESYREL) 150 MG tablet, Take 1 tablet by mouth Every Night., Disp: 30 tablet, Rfl: 5  •  triamterene-hydrochlorothiazide (DYAZIDE) 37.5-25 MG per capsule, Take 1 capsule by mouth Daily., Disp: 30 capsule, Rfl: 5    Objective   /80 (BP Location: Left arm)   Pulse 64   Temp 96.8 °F (36 °C)   Ht 193 cm (76\")   Wt 127 kg (279 lb)   SpO2 99%   BMI 33.96 kg/m²     Physical Exam   Constitutional: He is oriented to person, place, and time. He appears well-developed and well-nourished.   HENT:   Head: Normocephalic and atraumatic.   Eyes: Conjunctivae are normal.   Pulmonary/Chest: Effort normal.   Musculoskeletal: Normal range of motion.   Neurological: He is alert and oriented to person, place, and time.   Psychiatric: He has a normal mood and affect. His behavior is normal.   Nursing note and vitals reviewed.    Results for orders placed or performed in visit on 12/21/18   POC Glycosylated Hemoglobin (Hb A1C)   Result Value Ref Range    Hemoglobin A1C 5.8 %       Assessment/Plan   Alex was seen today for follow-up.    Diagnoses and all orders for this visit:    Borderline diabetes  -     POC Glycosylated Hemoglobin (Hb A1C)    Bipolar disorder, in partial remission, most recent episode mixed (CMS/HCC)  -     traZODone (DESYREL) 150 MG tablet; Take 1 tablet by mouth Every Night.    Essential hypertension    MOLLY (obstructive sleep apnea)    Gastroesophageal reflux disease with esophagitis    Bipolar disorder, current episode mixed, moderate (CMS/HCC)    Complex regional pain syndrome type 2 " of left lower extremity      Discussion Summary:    1. Borderline Diabetes  - patient has improved diet and A1C has dropped to 5.8.  Patient congratulated for efforts with diet control.  Encouraged more exercise.     2. Morbid Obesity  - Weight loss options were discussed in detail including reducing fried, fatty, and sugary foods with diet control. A regular exercise regimen was discussed.    3. Chronic Pain syndrome - stable.     4. Hypertension - controlled.     5. Anxiety / Depression / Bipolar disorder  - increase trazodone to 150 mg QHS.         Follow up:  No Follow-up on file.     Patient Instructions:  Patient instructions were provided.

## 2018-12-21 NOTE — PATIENT INSTRUCTIONS
Trazodone tablets  What is this medicine?  TRAZODONE (TRAZ oh done) is used to treat depression.  This medicine may be used for other purposes; ask your health care provider or pharmacist if you have questions.  COMMON BRAND NAME(S): Demetrio  What should I tell my health care provider before I take this medicine?  They need to know if you have any of these conditions:  -attempted suicide or thinking about it  -bipolar disorder  -bleeding problems  -glaucoma  -heart disease, or previous heart attack  -irregular heart beat  -kidney or liver disease  -low levels of sodium in the blood  -an unusual or allergic reaction to trazodone, other medicines, foods, dyes or preservatives  -pregnant or trying to get pregnant  -breast-feeding  How should I use this medicine?  Take this medicine by mouth with a glass of water. Follow the directions on the prescription label. Take this medicine shortly after a meal or a light snack. Take your medicine at regular intervals. Do not take your medicine more often than directed. Do not stop taking this medicine suddenly except upon the advice of your doctor. Stopping this medicine too quickly may cause serious side effects or your condition may worsen.  A special MedGuide will be given to you by the pharmacist with each prescription and refill. Be sure to read this information carefully each time.  Talk to your pediatrician regarding the use of this medicine in children. Special care may be needed.  Overdosage: If you think you have taken too much of this medicine contact a poison control center or emergency room at once.  NOTE: This medicine is only for you. Do not share this medicine with others.  What if I miss a dose?  If you miss a dose, take it as soon as you can. If it is almost time for your next dose, take only that dose. Do not take double or extra doses.  What may interact with this medicine?  Do not take this medicine with any of the following medications:  -certain medicines  for fungal infections like fluconazole, itraconazole, ketoconazole, posaconazole, voriconazole  -cisapride  -dofetilide  -dronedarone  -linezolid  -MAOIs like Carbex, Eldepryl, Marplan, Nardil, and Parnate  -mesoridazine  -methylene blue (injected into a vein)  -pimozide  -saquinavir  -thioridazine  -ziprasidone  This medicine may also interact with the following medications:  -alcohol  -antiviral medicines for HIV or AIDS  -aspirin and aspirin-like medicines  -barbiturates like phenobarbital  -certain medicines for blood pressure, heart disease, irregular heart beat  -certain medicines for depression, anxiety, or psychotic disturbances  -certain medicines for migraine headache like almotriptan, eletriptan, frovatriptan, naratriptan, rizatriptan, sumatriptan, zolmitriptan  -certain medicines for seizures like carbamazepine and phenytoin  -certain medicines for sleep  -certain medicines that treat or prevent blood clots like dalteparin, enoxaparin, warfarin  -digoxin  -fentanyl  -lithium  -NSAIDS, medicines for pain and inflammation, like ibuprofen or naproxen  -other medicines that prolong the QT interval (cause an abnormal heart rhythm)  -rasagiline  -supplements like Stiven's wort, kava kava, valerian  -tramadol  -tryptophan  This list may not describe all possible interactions. Give your health care provider a list of all the medicines, herbs, non-prescription drugs, or dietary supplements you use. Also tell them if you smoke, drink alcohol, or use illegal drugs. Some items may interact with your medicine.  What should I watch for while using this medicine?  Tell your doctor if your symptoms do not get better or if they get worse. Visit your doctor or health care professional for regular checks on your progress. Because it may take several weeks to see the full effects of this medicine, it is important to continue your treatment as prescribed by your doctor.  Patients and their families should watch out for new  or worsening thoughts of suicide or depression. Also watch out for sudden changes in feelings such as feeling anxious, agitated, panicky, irritable, hostile, aggressive, impulsive, severely restless, overly excited and hyperactive, or not being able to sleep. If this happens, especially at the beginning of treatment or after a change in dose, call your health care professional.  You may get drowsy or dizzy. Do not drive, use machinery, or do anything that needs mental alertness until you know how this medicine affects you. Do not stand or sit up quickly, especially if you are an older patient. This reduces the risk of dizzy or fainting spells. Alcohol may interfere with the effect of this medicine. Avoid alcoholic drinks.  This medicine may cause dry eyes and blurred vision. If you wear contact lenses you may feel some discomfort. Lubricating drops may help. See your eye doctor if the problem does not go away or is severe.  Your mouth may get dry. Chewing sugarless gum, sucking hard candy and drinking plenty of water may help. Contact your doctor if the problem does not go away or is severe.  What side effects may I notice from receiving this medicine?  Side effects that you should report to your doctor or health care professional as soon as possible:  -allergic reactions like skin rash, itching or hives, swelling of the face, lips, or tongue  -elevated mood, decreased need for sleep, racing thoughts, impulsive behavior  -confusion  -fast, irregular heartbeat  -feeling faint or lightheaded, falls  -feeling agitated, angry, or irritable  -loss of balance or coordination  -painful or prolonged erections  -restlessness, pacing, inability to keep still  -suicidal thoughts or other mood changes  -tremors  -trouble sleeping  -seizures  -unusual bleeding or bruising  Side effects that usually do not require medical attention (report to your doctor or health care professional if they continue or are bothersome):  -change in  sex drive or performance  -change in appetite or weight  -constipation  -headache  -muscle aches or pains  -nausea  This list may not describe all possible side effects. Call your doctor for medical advice about side effects. You may report side effects to FDA at 4-096-BVP-4571.  Where should I keep my medicine?  Keep out of the reach of children.  Store at room temperature between 15 and 30 degrees C (59 to 86 degrees F). Protect from light. Keep container tightly closed. Throw away any unused medicine after the expiration date.  NOTE: This sheet is a summary. It may not cover all possible information. If you have questions about this medicine, talk to your doctor, pharmacist, or health care provider.  © 2018 [employer] (2017-05-18 16:57:05)

## 2018-12-23 ENCOUNTER — HOSPITAL ENCOUNTER (EMERGENCY)
Facility: HOSPITAL | Age: 33
Discharge: HOME | End: 2018-12-23
Attending: EMERGENCY MEDICINE | Admitting: EMERGENCY MEDICINE

## 2018-12-23 VITALS
OXYGEN SATURATION: 96 % | WEIGHT: 278 LBS | HEART RATE: 74 BPM | HEIGHT: 76 IN | DIASTOLIC BLOOD PRESSURE: 81 MMHG | RESPIRATION RATE: 18 BRPM | BODY MASS INDEX: 33.85 KG/M2 | SYSTOLIC BLOOD PRESSURE: 126 MMHG | TEMPERATURE: 97.5 F

## 2018-12-23 DIAGNOSIS — K08.89 PAIN, DENTAL: Primary | ICD-10-CM

## 2018-12-23 DIAGNOSIS — K04.7 DENTAL INFECTION: ICD-10-CM

## 2018-12-23 PROCEDURE — 99283 EMERGENCY DEPT VISIT LOW MDM: CPT

## 2018-12-23 RX ORDER — PENICILLIN V POTASSIUM 250 MG/1
500 TABLET ORAL ONCE
Status: COMPLETED | OUTPATIENT
Start: 2018-12-23 | End: 2018-12-23

## 2018-12-23 RX ORDER — PENICILLIN V POTASSIUM 500 MG/1
500 TABLET ORAL 4 TIMES DAILY
Qty: 28 TABLET | Refills: 0 | Status: SHIPPED | OUTPATIENT
Start: 2018-12-23 | End: 2018-12-30

## 2018-12-23 RX ORDER — IBUPROFEN 800 MG/1
800 TABLET ORAL ONCE
Status: COMPLETED | OUTPATIENT
Start: 2018-12-23 | End: 2018-12-23

## 2018-12-23 RX ADMIN — IBUPROFEN 800 MG: 800 TABLET, FILM COATED ORAL at 11:06

## 2018-12-23 RX ADMIN — PENICILLIN V POTASIUM 500 MG: 250 TABLET ORAL at 11:05

## 2019-01-14 DIAGNOSIS — M54.42 CHRONIC BILATERAL LOW BACK PAIN WITH BILATERAL SCIATICA: ICD-10-CM

## 2019-01-14 DIAGNOSIS — G89.29 CHRONIC BILATERAL LOW BACK PAIN WITH BILATERAL SCIATICA: ICD-10-CM

## 2019-01-14 DIAGNOSIS — M54.41 CHRONIC BILATERAL LOW BACK PAIN WITH BILATERAL SCIATICA: ICD-10-CM

## 2019-01-14 RX ORDER — BACLOFEN 10 MG/1
10 TABLET ORAL 2 TIMES DAILY PRN
Qty: 60 TABLET | Refills: 1 | Status: SHIPPED | OUTPATIENT
Start: 2019-01-14 | End: 2019-03-12 | Stop reason: SDUPTHER

## 2019-01-17 ENCOUNTER — LAB (OUTPATIENT)
Dept: LAB | Facility: HOSPITAL | Age: 34
End: 2019-01-17

## 2019-01-17 ENCOUNTER — OFFICE VISIT (OUTPATIENT)
Dept: PSYCHIATRY | Facility: CLINIC | Age: 34
End: 2019-01-17

## 2019-01-17 VITALS — HEIGHT: 76 IN | WEIGHT: 279.6 LBS | BODY MASS INDEX: 34.05 KG/M2

## 2019-01-17 DIAGNOSIS — F51.05 INSOMNIA DUE TO MENTAL CONDITION: ICD-10-CM

## 2019-01-17 DIAGNOSIS — F17.210 NICOTINE DEPENDENCE, CIGARETTES, UNCOMPLICATED: ICD-10-CM

## 2019-01-17 DIAGNOSIS — F31.62 BIPOLAR DISORDER, CURRENT EPISODE MIXED, MODERATE (HCC): ICD-10-CM

## 2019-01-17 DIAGNOSIS — F31.62 BIPOLAR DISORDER, CURRENT EPISODE MIXED, MODERATE (HCC): Primary | ICD-10-CM

## 2019-01-17 DIAGNOSIS — F60.3 BORDERLINE PERSONALITY DISORDER (HCC): ICD-10-CM

## 2019-01-17 LAB
ALBUMIN SERPL-MCNC: 5.1 G/DL (ref 3.5–5)
ALBUMIN/GLOB SERPL: 1.8 G/DL (ref 1–2)
ALP SERPL-CCNC: 74 U/L (ref 38–126)
ALT SERPL W P-5'-P-CCNC: 70 U/L (ref 13–69)
ANION GAP SERPL CALCULATED.3IONS-SCNC: 13.3 MMOL/L (ref 10–20)
AST SERPL-CCNC: 42 U/L (ref 15–46)
BASOPHILS # BLD AUTO: 0.04 10*3/MM3 (ref 0–0.2)
BASOPHILS NFR BLD AUTO: 0.3 % (ref 0–2.5)
BILIRUB SERPL-MCNC: 0.6 MG/DL (ref 0.2–1.3)
BUN BLD-MCNC: 14 MG/DL (ref 7–20)
BUN/CREAT SERPL: 11.7 (ref 6.3–21.9)
CALCIUM SPEC-SCNC: 9.9 MG/DL (ref 8.4–10.2)
CHLORIDE SERPL-SCNC: 100 MMOL/L (ref 98–107)
CO2 SERPL-SCNC: 30 MMOL/L (ref 26–30)
CREAT BLD-MCNC: 1.2 MG/DL (ref 0.6–1.3)
DEPRECATED RDW RBC AUTO: 45 FL (ref 37–54)
EOSINOPHIL # BLD AUTO: 0.14 10*3/MM3 (ref 0–0.7)
EOSINOPHIL NFR BLD AUTO: 1.1 % (ref 0–7)
ERYTHROCYTE [DISTWIDTH] IN BLOOD BY AUTOMATED COUNT: 13.6 % (ref 11.5–14.5)
GFR SERPL CREATININE-BSD FRML MDRD: 69 ML/MIN/1.73
GLOBULIN UR ELPH-MCNC: 2.8 GM/DL
GLUCOSE BLD-MCNC: 109 MG/DL (ref 74–98)
HCT VFR BLD AUTO: 47 % (ref 42–52)
HGB BLD-MCNC: 15.6 G/DL (ref 14–18)
IMM GRANULOCYTES # BLD AUTO: 0.18 10*3/MM3 (ref 0–0.06)
IMM GRANULOCYTES NFR BLD AUTO: 1.4 % (ref 0–0.6)
LYMPHOCYTES # BLD AUTO: 2.63 10*3/MM3 (ref 0.6–3.4)
LYMPHOCYTES NFR BLD AUTO: 20.9 % (ref 10–50)
MCH RBC QN AUTO: 29.9 PG (ref 27–31)
MCHC RBC AUTO-ENTMCNC: 33.2 G/DL (ref 30–37)
MCV RBC AUTO: 90 FL (ref 80–94)
MONOCYTES # BLD AUTO: 0.71 10*3/MM3 (ref 0–0.9)
MONOCYTES NFR BLD AUTO: 5.7 % (ref 0–12)
NEUTROPHILS # BLD AUTO: 8.86 10*3/MM3 (ref 2–6.9)
NEUTROPHILS NFR BLD AUTO: 70.6 % (ref 37–80)
NRBC BLD AUTO-RTO: 0 /100 WBC (ref 0–0)
PLATELET # BLD AUTO: 241 10*3/MM3 (ref 130–400)
PMV BLD AUTO: 9.3 FL (ref 6–12)
POTASSIUM BLD-SCNC: 4.3 MMOL/L (ref 3.5–5.1)
PROT SERPL-MCNC: 7.9 G/DL (ref 6.3–8.2)
RBC # BLD AUTO: 5.22 10*6/MM3 (ref 4.7–6.1)
SODIUM BLD-SCNC: 139 MMOL/L (ref 137–145)
WBC NRBC COR # BLD: 12.56 10*3/MM3 (ref 4.8–10.8)

## 2019-01-17 PROCEDURE — 36415 COLL VENOUS BLD VENIPUNCTURE: CPT | Performed by: NURSE PRACTITIONER

## 2019-01-17 PROCEDURE — 84146 ASSAY OF PROLACTIN: CPT

## 2019-01-17 PROCEDURE — 99213 OFFICE O/P EST LOW 20 MIN: CPT | Performed by: NURSE PRACTITIONER

## 2019-01-17 PROCEDURE — 85025 COMPLETE CBC W/AUTO DIFF WBC: CPT | Performed by: NURSE PRACTITIONER

## 2019-01-17 PROCEDURE — 80053 COMPREHEN METABOLIC PANEL: CPT | Performed by: NURSE PRACTITIONER
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
## 2019-01-18 LAB — PROLACTIN SERPL-MCNC: 14.6 NG/ML (ref 4–15.2)

## 2019-02-12 RX ORDER — BUSPIRONE HYDROCHLORIDE 10 MG/1
10 TABLET ORAL 3 TIMES DAILY
Qty: 90 TABLET | Refills: 2 | Status: SHIPPED | OUTPATIENT
Start: 2019-02-12 | End: 2019-04-02 | Stop reason: SDUPTHER

## 2019-02-27 RX ORDER — OXCARBAZEPINE 300 MG/1
TABLET, FILM COATED ORAL
Qty: 60 TABLET | Refills: 2 | Status: CANCELLED | OUTPATIENT
Start: 2019-02-27

## 2019-03-04 RX ORDER — OXCARBAZEPINE 300 MG/1
300 TABLET, FILM COATED ORAL EVERY 12 HOURS
Qty: 60 TABLET | Refills: 2 | OUTPATIENT
Start: 2019-03-04

## 2019-03-05 ENCOUNTER — OFFICE VISIT (OUTPATIENT)
Dept: PSYCHIATRY | Facility: CLINIC | Age: 34
End: 2019-03-05

## 2019-03-05 VITALS — BODY MASS INDEX: 33.97 KG/M2 | HEIGHT: 76 IN | WEIGHT: 279 LBS

## 2019-03-05 DIAGNOSIS — F31.62 BIPOLAR DISORDER, CURRENT EPISODE MIXED, MODERATE (HCC): Primary | ICD-10-CM

## 2019-03-05 PROCEDURE — 99214 OFFICE O/P EST MOD 30 MIN: CPT | Performed by: NURSE PRACTITIONER

## 2019-03-05 RX ORDER — OXCARBAZEPINE 300 MG/1
TABLET, FILM COATED ORAL
Qty: 60 TABLET | Refills: 2 | Status: SHIPPED | OUTPATIENT
Start: 2019-03-05 | End: 2019-04-02 | Stop reason: SDUPTHER

## 2019-03-05 NOTE — PROGRESS NOTES
"    Subjective   Alex Torres is a 34 y.o. male who is here today for medication management follow up.    Chief Complaint:    Bipolar disorder, current episode mixed, moderate (CMS/HCC)    History of Present Illness Patient presents by himself with his girlfriend in the lobby.  Patient reports having increased irritability and edginess, negative framing.  He reports his girlfriend is 17 weeks pregnant he reports sleeping okay and taking his medications as prescribed.  Girlfriends daughter is going to be having surgery for spinal stenosis which she states is more anxiety provoking..  Denies adverse effects from medications.   (Scales based on 0 - 10 with 10 being the worst)        The following portions of the patient's history were reviewed and updated as appropriate: allergies, current medications, past family history, past medical history, past social history, past surgical history and problem list.    Review of Systemsdenies fever, cough, s/s’s of infection, denies GI/ problems, denies new medical issues     Objective   Physical Exam  Height 193 cm (76\"), weight 127 kg (279 lb).    Allergies   Allergen Reactions   • Gabapentin Swelling     + swelling   • Latex        Current Medications:   Current Outpatient Medications   Medication Sig Dispense Refill   • baclofen (LIORESAL) 10 MG tablet TAKE 1 TABLET BY MOUTH 2 (TWO) TIMES A DAY AS NEEDED FOR MUSCLE SPASMS. 60 tablet 1   • busPIRone (BUSPAR) 10 MG tablet Take 1 tablet by mouth 3 (Three) Times a Day. 90 tablet 2   • Cariprazine HCl 3 MG capsule Take 1 capsule by mouth Daily. In evenings with food 30 capsule 2   • OXcarbazepine (TRILEPTAL) 300 MG tablet Take one tablet in morning and 2 tabs at bedtime 60 tablet 2   • pantoprazole (PROTONIX) 40 MG EC tablet Take 1 tablet by mouth Daily. 30 tablet 5   • traZODone (DESYREL) 150 MG tablet Take 1 tablet by mouth Every Night. 30 tablet 5   • triamterene-hydrochlorothiazide (DYAZIDE) 37.5-25 MG per capsule Take 1 " capsule by mouth Daily. 30 capsule 5     No current facility-administered medications for this visit.        Appearance: appropriate  Hygiene:   good  Cooperation:  Cooperative  Eye Contact:  Good  Psychomotor Behavior:  No psychomotor agitation/retardation, No EPS, No motor tics  Mood:  within normal limits  Affect:  Appropriate  Hopelessness: Denies  Speech:  Normal  Thought Process:  Linear  Thought Content:  Normal  Concentration: Normal   Suicidal:  None  Homicidal:  None  Hallucinations:  None  Delusion:  None  Memory:  Intact  Orientation:  Person, Place, Time and Situation  Reliability:  fair  Insight:  Fair  Judgement:  Fair  Impulse Control:  Fair  Estimated Intelligence: average range    Reviewed recent labs with patient, CMP, CBC, lipid profile, prolactin level, hemoglobin A1c all within normal limits    Assessment/Plan   Diagnoses and all orders for this visit:    Bipolar disorder, current episode mixed, moderate (CMS/HCC)    Other orders  -     OXcarbazepine (TRILEPTAL) 300 MG tablet; Take one tablet in morning and 2 tabs at bedtime  -     Cariprazine HCl 3 MG capsule; Take 1 capsule by mouth Daily. In evenings with food      In at 9 AM  Out at 9:25 AM  Face-to-face 25 minutes  IMPRESSION: Subtherapeutic medications with increased bipolar symptoms  PLAN:   Increase Vraylar to 3 mg 1 p.o. daily and evenings with food for bipolar  Increase Trileptal 300 mg in a.m. and 600 mg at bedtime for mood stability  Continue trazodone 150 mg p.o. nightly for sleep   Continue BuSpar 10 mg p.o. 3 times daily for anxiety    We discussed risks, benefits, and side effects of the above medications and the patient was agreeable with the plan. Patient was educated on the importance of compliance with treatment and follow-up appointments.     Counseled patient regarding multimodal approach with healthy nutrition, healthy sleep, regular physical activity, social activities, counseling, and medications.     Coping skills  reviewed and encouraged positive framing of thoughts    Assisted patient in processing above session content; acknowledged and normalized patient’s thoughts, feelings, and concerns.  Applied  positive coping skills and behavior management in session.  Allowed patient to freely discuss issues without interruption or judgment. Provided safe, confidential environment to facilitate the development of positive therapeutic relationship and encourage open, honest communication. Assisted patient in identifying risk factors which would indicate the need for higher level of care including thoughts to harm self or others and/or self-harming behavior and encouraged patient to contact this office, call 911, or present to the nearest emergency room should any of these events occur. Discussed crisis intervention services and means to access.  Patient adamantly and convincingly denies current suicidal or homicidal ideation or perceptual disturbance.    Treatment Plan: stabilize mood, patient will stay out of the hospital and be at optimal level of functioning, take all medication as prescribed. Patient verbalized  understanding and agreement to plan.    Instructed to call for questions or concerns and return early if necessary. Crisis plan reviewed including going to the Emergency department.    Return in about 4 weeks (around 4/2/2019).

## 2019-03-12 DIAGNOSIS — M54.42 CHRONIC BILATERAL LOW BACK PAIN WITH BILATERAL SCIATICA: ICD-10-CM

## 2019-03-12 DIAGNOSIS — M54.41 CHRONIC BILATERAL LOW BACK PAIN WITH BILATERAL SCIATICA: ICD-10-CM

## 2019-03-12 DIAGNOSIS — G89.29 CHRONIC BILATERAL LOW BACK PAIN WITH BILATERAL SCIATICA: ICD-10-CM

## 2019-03-12 RX ORDER — BACLOFEN 10 MG/1
10 TABLET ORAL 2 TIMES DAILY PRN
Qty: 60 TABLET | Refills: 1 | Status: SHIPPED | OUTPATIENT
Start: 2019-03-12 | End: 2019-05-07 | Stop reason: SDUPTHER

## 2019-03-16 ENCOUNTER — OFFICE VISIT (OUTPATIENT)
Dept: INTERNAL MEDICINE | Facility: CLINIC | Age: 34
End: 2019-03-16

## 2019-03-16 VITALS
DIASTOLIC BLOOD PRESSURE: 85 MMHG | TEMPERATURE: 97.8 F | OXYGEN SATURATION: 98 % | WEIGHT: 286 LBS | RESPIRATION RATE: 18 BRPM | HEART RATE: 72 BPM | SYSTOLIC BLOOD PRESSURE: 150 MMHG | HEIGHT: 76 IN | BODY MASS INDEX: 34.83 KG/M2

## 2019-03-16 DIAGNOSIS — R79.89 LOW TESTOSTERONE IN MALE: ICD-10-CM

## 2019-03-16 DIAGNOSIS — R42 VERTIGO: ICD-10-CM

## 2019-03-16 DIAGNOSIS — E22.1 HYPERPROLACTINEMIA (HCC): ICD-10-CM

## 2019-03-16 DIAGNOSIS — R73.03 BORDERLINE DIABETES: Primary | ICD-10-CM

## 2019-03-16 DIAGNOSIS — F31.77 BIPOLAR DISORDER, IN PARTIAL REMISSION, MOST RECENT EPISODE MIXED (HCC): ICD-10-CM

## 2019-03-16 DIAGNOSIS — J45.40 MODERATE PERSISTENT ASTHMA, UNSPECIFIED WHETHER COMPLICATED: ICD-10-CM

## 2019-03-16 DIAGNOSIS — I10 ESSENTIAL HYPERTENSION: ICD-10-CM

## 2019-03-16 DIAGNOSIS — K21.00 GASTROESOPHAGEAL REFLUX DISEASE WITH ESOPHAGITIS: ICD-10-CM

## 2019-03-16 DIAGNOSIS — I65.09 VERTEBRAL ARTERY STENOSIS, UNSPECIFIED LATERALITY: ICD-10-CM

## 2019-03-16 LAB — HBA1C MFR BLD: 6.1 %

## 2019-03-16 PROCEDURE — 99214 OFFICE O/P EST MOD 30 MIN: CPT | Performed by: INTERNAL MEDICINE

## 2019-03-16 PROCEDURE — 83036 HEMOGLOBIN GLYCOSYLATED A1C: CPT | Performed by: INTERNAL MEDICINE

## 2019-03-16 RX ORDER — ALBUTEROL SULFATE 90 UG/1
2 AEROSOL, METERED RESPIRATORY (INHALATION) EVERY 4 HOURS PRN
Qty: 18 G | Refills: 3 | Status: SHIPPED | OUTPATIENT
Start: 2019-03-16 | End: 2023-03-24

## 2019-03-16 NOTE — PATIENT INSTRUCTIONS
Metformin tablets  What is this medicine?  METFORMIN (met FOR min) is used to treat type 2 diabetes. It helps to control blood sugar. Treatment is combined with diet and exercise. This medicine can be used alone or with other medicines for diabetes.  This medicine may be used for other purposes; ask your health care provider or pharmacist if you have questions.  COMMON BRAND NAME(S): Glucophage  What should I tell my health care provider before I take this medicine?  They need to know if you have any of these conditions:  -anemia  -dehydration  -heart disease  -frequently drink alcohol-containing beverages  -kidney disease  -liver disease  -polycystic ovary syndrome  -serious infection or injury  -vomiting  -an unusual or allergic reaction to metformin, other medicines, foods, dyes, or preservatives  -pregnant or trying to get pregnant  -breast-feeding  How should I use this medicine?  Take this medicine by mouth with a glass of water. Follow the directions on the prescription label. Take this medicine with food. Take your medicine at regular intervals. Do not take your medicine more often than directed. Do not stop taking except on your doctor's advice.  Talk to your pediatrician regarding the use of this medicine in children. While this drug may be prescribed for children as young as 10 years of age for selected conditions, precautions do apply.  Overdosage: If you think you have taken too much of this medicine contact a poison control center or emergency room at once.  NOTE: This medicine is only for you. Do not share this medicine with others.  What if I miss a dose?  If you miss a dose, take it as soon as you can. If it is almost time for your next dose, take only that dose. Do not take double or extra doses.  What may interact with this medicine?  Do not take this medicine with any of the following medications:  -certain contrast medicines given before X-rays, CT scans, MRI, or other  procedures  -dofetilide  This medicine may also interact with the following medications:  -acetazolamide  -alcohol  -certain antivirals for HIV or hepatitis  -certain medicines for blood pressure, heart disease, irregular heart beat  -cimetidine  -dichlorphenamide  -digoxin  -diuretics  -female hormones, like estrogens or progestins and birth control pills  -glycopyrrolate  -isoniazid  -lamotrigine  -memantine  -methazolamide  -metoclopramide  -midodrine  -niacin  -phenothiazines like chlorpromazine, mesoridazine, prochlorperazine, thioridazine  -phenytoin  -ranolazine  -steroid medicines like prednisone or cortisone  -stimulant medicines for attention disorders, weight loss, or to stay awake  -thyroid medicines  -topiramate  -trospium  -vandetanib  -zonisamide  This list may not describe all possible interactions. Give your health care provider a list of all the medicines, herbs, non-prescription drugs, or dietary supplements you use. Also tell them if you smoke, drink alcohol, or use illegal drugs. Some items may interact with your medicine.  What should I watch for while using this medicine?  Visit your doctor or health care professional for regular checks on your progress.  A test called the HbA1C (A1C) will be monitored. This is a simple blood test. It measures your blood sugar control over the last 2 to 3 months. You will receive this test every 3 to 6 months.  Learn how to check your blood sugar. Learn the symptoms of low and high blood sugar and how to manage them.  Always carry a quick-source of sugar with you in case you have symptoms of low blood sugar. Examples include hard sugar candy or glucose tablets. Make sure others know that you can choke if you eat or drink when you develop serious symptoms of low blood sugar, such as seizures or unconsciousness. They must get medical help at once.  Tell your doctor or health care professional if you have high blood sugar. You might need to change the dose of  your medicine. If you are sick or exercising more than usual, you might need to change the dose of your medicine.  Do not skip meals. Ask your doctor or health care professional if you should avoid alcohol. Many nonprescription cough and cold products contain sugar or alcohol. These can affect blood sugar.  This medicine may cause ovulation in premenopausal women who do not have regular monthly periods. This may increase your chances of becoming pregnant. You should not take this medicine if you become pregnant or think you may be pregnant. Talk with your doctor or health care professional about your birth control options while taking this medicine. Contact your doctor or health care professional right away if you think you are pregnant.  If you are going to need surgery, a MRI, CT scan, or other procedure, tell your doctor that you are taking this medicine. You may need to stop taking this medicine before the procedure.  Wear a medical ID bracelet or chain, and carry a card that describes your disease and details of your medicine and dosage times.  This medicine may cause a decrease in folic acid and vitamin B12. You should make sure that you get enough vitamins while you are taking this medicine. Discuss the foods you eat and the vitamins you take with your health care professional.  What side effects may I notice from receiving this medicine?  Side effects that you should report to your doctor or health care professional as soon as possible:  -allergic reactions like skin rash, itching or hives, swelling of the face, lips, or tongue  -breathing problems  -feeling faint or lightheaded, falls  -muscle aches or pains  -signs and symptoms of low blood sugar such as feeling anxious, confusion, dizziness, increased hunger, unusually weak or tired, sweating, shakiness, cold, irritable, headache, blurred vision, fast heartbeat, loss of consciousness  -slow or irregular heartbeat  -unusual stomach pain or  discomfort  -unusually tired or weak  Side effects that usually do not require medical attention (report to your doctor or health care professional if they continue or are bothersome):  -diarrhea  -headache  -heartburn  -metallic taste in mouth  -nausea  -stomach gas, upset  This list may not describe all possible side effects. Call your doctor for medical advice about side effects. You may report side effects to FDA at 7-328-HVR-9574.  Where should I keep my medicine?  Keep out of the reach of children.  Store at room temperature between 15 and 30 degrees C (59 and 86 degrees F). Protect from moisture and light. Throw away any unused medicine after the expiration date.  NOTE: This sheet is a summary. It may not cover all possible information. If you have questions about this medicine, talk to your doctor, pharmacist, or health care provider.  © 2019 [employer] (2019-01-24 19:15:19)

## 2019-03-16 NOTE — PROGRESS NOTES
Chief Complaint   Patient presents with   • Follow-up     SOB, states that if he looks to the right over his shoulder he gets light headed       Subjective     History of Present Illness   Alex Torres is a 34 y.o. male presenting for follow up on chronic medical issues.  Patient has unfortunately gained a few pounds over the winter and BP was elevated this morning.  He shares that recently he has been feeling short of breath and mentions that when he turns his head to the right, he feels dizzy.  He also notices that moderate activities such as climbing stairs can lead to shortness of breath.      As far as mood, psychiatry continues to follow and has made adjustments to vraylar.     The following portions of the patient's history were reviewed and updated as appropriate: allergies, current medications, past family history, past medical history, past social history, past surgical history and problem list.    Review of Systems   Constitutional: Positive for unexpected weight change. Negative for chills, fatigue and fever.   HENT: Negative for congestion, ear pain, rhinorrhea, sinus pressure and sore throat.    Eyes: Negative for visual disturbance.   Respiratory: Negative for cough, chest tightness, shortness of breath and wheezing.    Cardiovascular: Negative for chest pain, palpitations and leg swelling.   Gastrointestinal: Negative for abdominal pain, blood in stool, constipation, diarrhea, nausea and vomiting.   Endocrine: Negative for polydipsia and polyuria.   Genitourinary: Negative for dysuria and hematuria.   Musculoskeletal: Negative for arthralgias and back pain.   Skin: Negative for rash.   Neurological: Positive for dizziness. Negative for light-headedness, numbness and headaches.   Psychiatric/Behavioral: Negative for dysphoric mood and sleep disturbance. The patient is not nervous/anxious.        Allergies   Allergen Reactions   • Gabapentin Swelling     + swelling   • Latex        Past Medical  History:   Diagnosis Date   • Anxiety    • Bipolar 1 disorder (CMS/[facility])    • Depression    • Hypertension    • Kidney stone    • Positive TB test     treated w/ meds x 6 months       Social History     Socioeconomic History   • Marital status:      Spouse name: Not on file   • Number of children: Not on file   • Years of education: Not on file   • Highest education level: Not on file   Social Needs   • Financial resource strain: Not on file   • Food insecurity - worry: Not on file   • Food insecurity - inability: Not on file   • Transportation needs - medical: Not on file   • Transportation needs - non-medical: Not on file   Occupational History   • Not on file   Tobacco Use   • Smoking status: Current Every Day Smoker     Packs/day: 1.00     Years: 18.00     Pack years: 18.00     Types: Cigarettes     Start date: 1999   • Smokeless tobacco: Never Used   Substance and Sexual Activity   • Alcohol use: No   • Drug use: No   • Sexual activity: Yes     Partners: Female   Other Topics Concern   • Not on file   Social History Narrative   • Not on file        Past Surgical History:   Procedure Laterality Date   • CHOLECYSTECTOMY  2002   • INGUINAL HERNIA REPAIR Right 1995   • ORIF METACARPAL FRACTURE Right 2000   • TIBIA FRACTURE SURGERY Left 1997    ORIF       Family History   Problem Relation Age of Onset   • Arthritis Father    • Hypertension Father    • Diabetes Maternal Aunt    • Diabetes Maternal Uncle    • Diabetes Maternal Grandmother    • Alzheimer's disease Maternal Grandmother    • Diabetes Other    • Hypertension Mother    • Depression Mother    • No Known Problems Brother    • Early death Maternal Grandfather    • Liver disease Maternal Grandfather    • Alcohol abuse Maternal Grandfather    • No Known Problems Paternal Grandmother    • Liver disease Paternal Grandfather    • Alcohol abuse Paternal Grandfather    • Early death Paternal Grandfather    • No Known Problems Brother          Current  "Outpatient Medications:   •  baclofen (LIORESAL) 10 MG tablet, TAKE 1 TABLET BY MOUTH 2 (TWO) TIMES A DAY AS NEEDED FOR MUSCLE SPASMS., Disp: 60 tablet, Rfl: 1  •  busPIRone (BUSPAR) 10 MG tablet, Take 1 tablet by mouth 3 (Three) Times a Day., Disp: 90 tablet, Rfl: 2  •  Cariprazine HCl 3 MG capsule, Take 1 capsule by mouth Daily. In evenings with food, Disp: 30 capsule, Rfl: 2  •  OXcarbazepine (TRILEPTAL) 300 MG tablet, Take one tablet in morning and 2 tabs at bedtime, Disp: 60 tablet, Rfl: 2  •  pantoprazole (PROTONIX) 40 MG EC tablet, Take 1 tablet by mouth Daily., Disp: 30 tablet, Rfl: 5  •  traZODone (DESYREL) 150 MG tablet, Take 1 tablet by mouth Every Night., Disp: 30 tablet, Rfl: 5  •  triamterene-hydrochlorothiazide (DYAZIDE) 37.5-25 MG per capsule, Take 1 capsule by mouth Daily., Disp: 30 capsule, Rfl: 5  •  albuterol sulfate  (90 Base) MCG/ACT inhaler, Inhale 2 puffs Every 4 (Four) Hours As Needed for Wheezing or Shortness of Air., Disp: 18 g, Rfl: 3  •  metFORMIN (GLUCOPHAGE) 500 MG tablet, Take 1 tablet by mouth 2 (Two) Times a Day With Meals., Disp: 60 tablet, Rfl: 5    Objective   /85   Pulse 72   Temp 97.8 °F (36.6 °C)   Resp 18   Ht 193 cm (76\")   Wt 130 kg (286 lb)   SpO2 98%   BMI 34.81 kg/m²     Physical Exam   Constitutional: He is oriented to person, place, and time. He appears well-developed and well-nourished.   HENT:   Head: Normocephalic and atraumatic.   Eyes: Conjunctivae are normal.   Neck: Normal range of motion. Neck supple.   Cardiovascular: Normal rate, regular rhythm and normal heart sounds.   Pulmonary/Chest: Effort normal. He has wheezes.   Abdominal: Soft. Bowel sounds are normal. He exhibits no distension. There is no tenderness.   Musculoskeletal: Normal range of motion.   Neurological: He is alert and oriented to person, place, and time.   Skin: No rash noted.   Psychiatric: He has a normal mood and affect. His behavior is normal.   Nursing note and vitals " reviewed.      Assessment/Plan   Alex was seen today for follow-up.    Diagnoses and all orders for this visit:    Borderline diabetes  -     POC Glycosylated Hemoglobin (Hb A1C)  -     metFORMIN (GLUCOPHAGE) 500 MG tablet; Take 1 tablet by mouth 2 (Two) Times a Day With Meals.    Bipolar disorder, in partial remission, most recent episode mixed (CMS/HCC)    Essential hypertension    Gastroesophageal reflux disease with esophagitis    Hyperprolactinemia (CMS/HCC)    Low testosterone in male    Vertigo  -     Doppler Transcranial Complete CAR; Future    Vertebral artery stenosis, unspecified laterality  -     Doppler Transcranial Complete CAR; Future    Moderate persistent asthma, unspecified whether complicated  -     albuterol sulfate  (90 Base) MCG/ACT inhaler; Inhale 2 puffs Every 4 (Four) Hours As Needed for Wheezing or Shortness of Air.          Discussion Summary:  Patient is a 34 y.o. male presenting for follow up.    1.  Borderline diabetes with obesity  -Patient is willing to try metformin 500 mg twice daily to help with controlling borderline glucose levels.    2.  Bipolar disorder  -Stable.  Patient is following with psychiatry and has had recent dose adjustments which may be contributing to his weight changes.    3.  GERD  -Stable on current medications.    4.  Vertigo with turning head to right  - Symptoms do not appear to be consistent with benign positional vertigo.  I am concerned about vascular compression such as vertebral artery stenosis.  No carotid bruits were noticed on exam today.  May consider MRA head should symptoms persist.    5.  Asthma  -Start albuterol inhaler to be used as needed for shortness of breath symptoms.    Follow up:  Return in about 1 month (around 4/16/2019) for Next scheduled follow up.

## 2019-03-20 ENCOUNTER — OFFICE VISIT (OUTPATIENT)
Dept: NEUROLOGY | Facility: CLINIC | Age: 34
End: 2019-03-20

## 2019-03-20 VITALS
WEIGHT: 286 LBS | HEART RATE: 73 BPM | OXYGEN SATURATION: 96 % | DIASTOLIC BLOOD PRESSURE: 80 MMHG | HEIGHT: 76 IN | SYSTOLIC BLOOD PRESSURE: 146 MMHG | BODY MASS INDEX: 34.83 KG/M2

## 2019-03-20 DIAGNOSIS — M79.605 PAIN OF LEFT LOWER EXTREMITY: Primary | ICD-10-CM

## 2019-03-20 PROCEDURE — 99214 OFFICE O/P EST MOD 30 MIN: CPT | Performed by: NURSE PRACTITIONER

## 2019-03-20 RX ORDER — PREGABALIN 25 MG/1
25 CAPSULE ORAL 3 TIMES DAILY
Qty: 90 CAPSULE | Refills: 2 | Status: SHIPPED | OUTPATIENT
Start: 2019-03-20 | End: 2019-05-08

## 2019-04-02 ENCOUNTER — OFFICE VISIT (OUTPATIENT)
Dept: PSYCHIATRY | Facility: CLINIC | Age: 34
End: 2019-04-02

## 2019-04-02 VITALS — HEIGHT: 76 IN | WEIGHT: 290 LBS | BODY MASS INDEX: 35.31 KG/M2

## 2019-04-02 DIAGNOSIS — F17.210 NICOTINE DEPENDENCE, CIGARETTES, UNCOMPLICATED: ICD-10-CM

## 2019-04-02 DIAGNOSIS — F51.05 INSOMNIA DUE TO MENTAL CONDITION: ICD-10-CM

## 2019-04-02 DIAGNOSIS — F31.77 BIPOLAR DISORDER, IN PARTIAL REMISSION, MOST RECENT EPISODE MIXED (HCC): ICD-10-CM

## 2019-04-02 DIAGNOSIS — F60.3 BORDERLINE PERSONALITY DISORDER (HCC): ICD-10-CM

## 2019-04-02 DIAGNOSIS — F31.62 BIPOLAR DISORDER, CURRENT EPISODE MIXED, MODERATE (HCC): Primary | ICD-10-CM

## 2019-04-02 PROCEDURE — 99213 OFFICE O/P EST LOW 20 MIN: CPT | Performed by: NURSE PRACTITIONER

## 2019-04-02 RX ORDER — TRAZODONE HYDROCHLORIDE 150 MG/1
150 TABLET ORAL NIGHTLY
Qty: 30 TABLET | Refills: 2 | Status: SHIPPED | OUTPATIENT
Start: 2019-04-02 | End: 2019-05-08

## 2019-04-02 RX ORDER — OXCARBAZEPINE 300 MG/1
TABLET, FILM COATED ORAL
Qty: 60 TABLET | Refills: 2 | Status: SHIPPED | OUTPATIENT
Start: 2019-04-02 | End: 2019-07-05 | Stop reason: SDUPTHER

## 2019-04-02 RX ORDER — BUSPIRONE HYDROCHLORIDE 10 MG/1
10 TABLET ORAL 3 TIMES DAILY
Qty: 90 TABLET | Refills: 2 | Status: SHIPPED | OUTPATIENT
Start: 2019-04-02 | End: 2019-08-07

## 2019-04-02 NOTE — PROGRESS NOTES
"    Subjective   Alex Torres is a 34 y.o. male who is here today for medication management follow up.    Chief Complaint:     Bipolar disorder, current episode mixed, moderate    Borderline personality disorder     Insomnia due to mental condition    Nicotine dependence, cigarettes, uncomplicated      History of Present Illness Patient presents by herself. He reports decreased lability in mood even with a lot of stress he states. Father had MI and CABG x 4. Dad is still at . He reports his girlfriend's daughter had surgery on her back and is out 2 weeks, his girl firiend is pregnant. Tolerating the increase in Vraylar to 3mg daily and increase in Trileptal. Sleeping with CPAP mask.   Denies adverse effects from medications. He discussed weight increase over past year, and is on metformin, working with PCP on weight management.  (Scales based on 0 - 10 with 10 being the worst)        The following portions of the patient's history were reviewed and updated as appropriate: allergies, current medications, past family history, past medical history, past social history, past surgical history and problem list.    Review of Systemsdenies fever, cough, s/s’s of infection, denies GI/ problems, denies new medical issues     Objective   Physical Exam  Height 193 cm (76\"), weight 132 kg (290 lb).    Allergies   Allergen Reactions   • Gabapentin Swelling     + swelling   • Latex        Current Medications:   Current Outpatient Medications   Medication Sig Dispense Refill   • albuterol sulfate  (90 Base) MCG/ACT inhaler Inhale 2 puffs Every 4 (Four) Hours As Needed for Wheezing or Shortness of Air. 18 g 3   • baclofen (LIORESAL) 10 MG tablet TAKE 1 TABLET BY MOUTH 2 (TWO) TIMES A DAY AS NEEDED FOR MUSCLE SPASMS. 60 tablet 1   • busPIRone (BUSPAR) 10 MG tablet Take 1 tablet by mouth 3 (Three) Times a Day. 90 tablet 2   • Cariprazine HCl 3 MG capsule Take 1 capsule by mouth Daily. In evenings with food 30 capsule 2 "   • metFORMIN (GLUCOPHAGE) 500 MG tablet Take 1 tablet by mouth 2 (Two) Times a Day With Meals. 60 tablet 5   • OXcarbazepine (TRILEPTAL) 300 MG tablet Take one tablet in morning and 2 tabs at bedtime 60 tablet 2   • pantoprazole (PROTONIX) 40 MG EC tablet Take 1 tablet by mouth Daily. 30 tablet 5   • pregabalin (LYRICA) 25 MG capsule Take 1 capsule by mouth 3 (Three) Times a Day. 90 capsule 2   • traZODone (DESYREL) 150 MG tablet Take 1 tablet by mouth Every Night. 30 tablet 2   • triamterene-hydrochlorothiazide (DYAZIDE) 37.5-25 MG per capsule Take 1 capsule by mouth Daily. 30 capsule 5     No current facility-administered medications for this visit.        Lab Results:  Office Visit on 03/16/2019   Component Date Value Ref Range Status   • Hemoglobin A1C 03/16/2019 6.1  % Final   Lab on 01/17/2019   Component Date Value Ref Range Status   • Prolactin 01/17/2019 14.6  4.0 - 15.2 ng/mL Final   Office Visit on 01/17/2019   Component Date Value Ref Range Status   • Glucose 01/17/2019 109* 74 - 98 mg/dL Final   • BUN 01/17/2019 14  7 - 20 mg/dL Final   • Creatinine 01/17/2019 1.20  0.60 - 1.30 mg/dL Final   • Sodium 01/17/2019 139  137 - 145 mmol/L Final   • Potassium 01/17/2019 4.3  3.5 - 5.1 mmol/L Final   • Chloride 01/17/2019 100  98 - 107 mmol/L Final   • CO2 01/17/2019 30.0  26.0 - 30.0 mmol/L Final   • Calcium 01/17/2019 9.9  8.4 - 10.2 mg/dL Final   • Total Protein 01/17/2019 7.9  6.3 - 8.2 g/dL Final   • Albumin 01/17/2019 5.10* 3.50 - 5.00 g/dL Final   • ALT (SGPT) 01/17/2019 70* 13 - 69 U/L Final   • AST (SGOT) 01/17/2019 42  15 - 46 U/L Final   • Alkaline Phosphatase 01/17/2019 74  38 - 126 U/L Final   • Total Bilirubin 01/17/2019 0.6  0.2 - 1.3 mg/dL Final   • eGFR Non African Amer 01/17/2019 69  >60 mL/min/1.73 Final   • Globulin 01/17/2019 2.8  gm/dL Final   • A/G Ratio 01/17/2019 1.8  1.0 - 2.0 g/dL Final   • BUN/Creatinine Ratio 01/17/2019 11.7  6.3 - 21.9 Final   • Anion Gap 01/17/2019 13.3  10.0 -  20.0 mmol/L Final   • WBC 01/17/2019 12.56* 4.80 - 10.80 10*3/mm3 Final   • RBC 01/17/2019 5.22  4.70 - 6.10 10*6/mm3 Final   • Hemoglobin 01/17/2019 15.6  14.0 - 18.0 g/dL Final   • Hematocrit 01/17/2019 47.0  42.0 - 52.0 % Final   • MCV 01/17/2019 90.0  80.0 - 94.0 fL Final   • MCH 01/17/2019 29.9  27.0 - 31.0 pg Final   • MCHC 01/17/2019 33.2  30.0 - 37.0 g/dL Final   • RDW 01/17/2019 13.6  11.5 - 14.5 % Final   • RDW-SD 01/17/2019 45.0  37.0 - 54.0 fl Final   • MPV 01/17/2019 9.3  6.0 - 12.0 fL Final   • Platelets 01/17/2019 241  130 - 400 10*3/mm3 Final   • Neutrophil % 01/17/2019 70.6  37.0 - 80.0 % Final   • Lymphocyte % 01/17/2019 20.9  10.0 - 50.0 % Final   • Monocyte % 01/17/2019 5.7  0.0 - 12.0 % Final   • Eosinophil % 01/17/2019 1.1  0.0 - 7.0 % Final   • Basophil % 01/17/2019 0.3  0.0 - 2.5 % Final   • Immature Grans % 01/17/2019 1.4* 0.0 - 0.6 % Final   • Neutrophils, Absolute 01/17/2019 8.86* 2.00 - 6.90 10*3/mm3 Final   • Lymphocytes, Absolute 01/17/2019 2.63  0.60 - 3.40 10*3/mm3 Final   • Monocytes, Absolute 01/17/2019 0.71  0.00 - 0.90 10*3/mm3 Final   • Eosinophils, Absolute 01/17/2019 0.14  0.00 - 0.70 10*3/mm3 Final   • Basophils, Absolute 01/17/2019 0.04  0.00 - 0.20 10*3/mm3 Final   • Immature Grans, Absolute 01/17/2019 0.18* 0.00 - 0.06 10*3/mm3 Final   • nRBC 01/17/2019 0.0  0.0 - 0.0 /100 WBC Final   Office Visit on 12/21/2018   Component Date Value Ref Range Status   • Hemoglobin A1C 12/21/2018 5.8  % Final         Appearance: appropriate  Hygiene:   good  Cooperation:  Cooperative  Eye Contact:  Good  Psychomotor Behavior:  No psychomotor agitation/retardation, No EPS, No motor tics  Mood:  within normal limits  Affect:  Appropriate  Hopelessness: Denies  Speech:  Normal  Thought Process:  Linear  Thought Content:  Normal  Concentration: Normal   Suicidal:  None  Homicidal:  None  Hallucinations:  None  Delusion:  None  Memory:  Intact  Orientation:  Person, Place, Time and  Situation  Reliability:  fair  Insight:  Fair  Judgement:  Fair  Impulse Control:  Fair  Estimated Intelligence: average range      Assessment/Plan   Diagnoses and all orders for this visit:    Bipolar disorder, current episode mixed, moderate (CMS/HCC)    Borderline personality disorder (CMS/HCC)    Insomnia due to mental condition    Nicotine dependence, cigarettes, uncomplicated    Bipolar disorder, in partial remission, most recent episode mixed (CMS/HCC)  -     traZODone (DESYREL) 150 MG tablet; Take 1 tablet by mouth Every Night.    Other orders  -     busPIRone (BUSPAR) 10 MG tablet; Take 1 tablet by mouth 3 (Three) Times a Day.  -     Cariprazine HCl 3 MG capsule; Take 1 capsule by mouth Daily. In evenings with food  -     OXcarbazepine (TRILEPTAL) 300 MG tablet; Take one tablet in morning and 2 tabs at bedtime        IMPRESSION:  Improving stability on Vraylar and increase in Trileptal   PLAN:   Refill current med management,     -     traZODone (DESYREL) 150 MG tablet; Take 1 tablet by mouth Every Night. Sleep   -     busPIRone (BUSPAR) 10 MG tablet; Take 1 tablet by mouth 3 (Three) Times a Day. Anxiety   -     Cariprazine HCl 3 MG capsule; Take 1 capsule by mouth Daily. In evenings with food bipolar I   -     OXcarbazepine (TRILEPTAL) 300 MG tablet; Take one tablet in morning and 2 tabs at bedtime bipolar I mood stabilizer         We discussed risks, benefits, and side effects of the above medications and the patient was agreeable with the plan. Patient was educated on the importance of compliance with treatment and follow-up appointments.     Counseled patient regarding multimodal approach with healthy nutrition, healthy sleep, regular physical activity, social activities, counseling, and medications.   Coping skills reviewed     Treatment Plan: stabilize mood, patient will stay out of the hospital and be at optimal level of functioning, take all medication as prescribed. Patient verbalized   understanding and agreement to plan.    Instructed to call for questions or concerns and return early if necessary. Crisis plan reviewed including going to the Emergency department.    Return in about 8 weeks (around 5/28/2019). pt aware he will be seeing new provider next visit and agreeable to plan

## 2019-04-20 ENCOUNTER — OFFICE VISIT (OUTPATIENT)
Dept: INTERNAL MEDICINE | Facility: CLINIC | Age: 34
End: 2019-04-20

## 2019-04-20 VITALS
TEMPERATURE: 98.5 F | WEIGHT: 287 LBS | SYSTOLIC BLOOD PRESSURE: 141 MMHG | BODY MASS INDEX: 34.93 KG/M2 | OXYGEN SATURATION: 96 % | HEART RATE: 76 BPM | DIASTOLIC BLOOD PRESSURE: 79 MMHG | RESPIRATION RATE: 18 BRPM

## 2019-04-20 DIAGNOSIS — G62.9 POLYNEUROPATHY: ICD-10-CM

## 2019-04-20 DIAGNOSIS — F31.62 BIPOLAR DISORDER, CURRENT EPISODE MIXED, MODERATE (HCC): ICD-10-CM

## 2019-04-20 DIAGNOSIS — E22.1 HYPERPROLACTINEMIA (HCC): ICD-10-CM

## 2019-04-20 DIAGNOSIS — Z23 NEED FOR TDAP VACCINATION: ICD-10-CM

## 2019-04-20 DIAGNOSIS — K21.9 GASTROESOPHAGEAL REFLUX DISEASE WITHOUT ESOPHAGITIS: Primary | ICD-10-CM

## 2019-04-20 DIAGNOSIS — G57.72 COMPLEX REGIONAL PAIN SYNDROME TYPE 2 OF LEFT LOWER EXTREMITY: ICD-10-CM

## 2019-04-20 DIAGNOSIS — Z23 NEED FOR VACCINATION FOR PNEUMOCOCCUS: ICD-10-CM

## 2019-04-20 PROCEDURE — 99214 OFFICE O/P EST MOD 30 MIN: CPT | Performed by: INTERNAL MEDICINE

## 2019-04-20 RX ORDER — DEXLANSOPRAZOLE 60 MG/1
60 CAPSULE, DELAYED RELEASE ORAL DAILY
Qty: 30 CAPSULE | Refills: 2 | Status: SHIPPED | OUTPATIENT
Start: 2019-04-20 | End: 2019-05-08

## 2019-04-20 NOTE — PATIENT INSTRUCTIONS
Dexlansoprazole capsules  What is this medicine?  DEXLANSOPRAZOLE (dex kirsten LEONARDO pra zole) prevents the production of acid in the stomach. It is used to treat gastroesophageal reflux disease (GERD) and inflammation of the esophagus.  This medicine may be used for other purposes; ask your health care provider or pharmacist if you have questions.  COMMON BRAND NAME(S): Dexilant, Kapidex  What should I tell my health care provider before I take this medicine?  They need to know if you have any of these conditions:  -liver disease  -low levels of magnesium in the blood  -lupus  -an unusual or allergic reaction to dexlansoprazole, other medicines, foods, dyes, or preservatives  -pregnant or trying to get pregnant  -breast-feeding  How should I use this medicine?  Take this medicine by mouth. Swallow the capsules whole with a drink of water. Follow the directions on the prescription label. Do not crush or chew. Take your medicine at regular intervals. Do not take more often than directed.  If you have difficulty swallowing the capsules, you may open the capsule and sprinkle the contents on a tablespoon of applesauce. Do not crush the contents of the capsule into the food. Swallow the dose immediately after preparing it. Do not chew. Follow with a drink of water.  A special MedGuide will be given to you before each treatment. Be sure to read this information carefully each time.  Talk to your pediatrician regarding the use of this medicine in children. While this drug may be prescribed for children as young as 12 years for selected conditions, precautions do apply.  Overdosage: If you think you have taken too much of this medicine contact a poison control center or emergency room at once.  NOTE: This medicine is only for you. Do not share this medicine with others.  What if I miss a dose?  If you miss a dose, take it as soon as you can. If it is almost time for your next dose, take only that dose. Do not take double or extra  doses.  What may interact with this medicine?  Do not take this medicine with any of the following medications:  -nelfinavir  -rilpivirine  -Stiven's Wort  This medicine may also interact with the following medications:  -certain antiviral medicines for HIV or AIDS like atazanavir, saquinavir, ritonavir  -certain medicines for fungal infections like ketoconazole, itraconazole, voriconazole  -dasatinib  -digoxin  -erlotinib  -iron salts  -methotrexate  -mycophenolate mofetil  -nilotinib  -tacrolimus  -warfarin  This list may not describe all possible interactions. Give your health care provider a list of all the medicines, herbs, non-prescription drugs, or dietary supplements you use. Also tell them if you smoke, drink alcohol, or use illegal drugs. Some items may interact with your medicine.  What should I watch for while using this medicine?  It can take several days before your stomach pain gets better. Check with your doctor or health care professional if your condition does not start to get better, or if it gets worse.  You may need blood work done while you are taking this medicine.  This medicine may cause a decrease in vitamin B12. You should make sure that you get enough vitamin B12 while you are taking this medicine. Discuss the foods you eat and the vitamins you take with your health care professional.  What side effects may I notice from receiving this medicine?  Side effects that you should report to your doctor or health care professional as soon as possible:  -  allergic reactions like skin rash, itching or hives, swelling of the face, lips, or tongue  -  bone, muscle or joint pain  -  breathing problems  -  chest pain or chest tightness  -  dark yellow or brown urine  -  dizziness  -  fast, irregular heartbeat  -  feeling faint or lightheaded  -  fever or sore throat  -  muscle spasm  -  palpitations  -  rash on cheeks or arms that gets worse in the sun  -  redness, blistering, peeling or loosening  of the skin, including inside the mouth  -  seizures  -stomach polyps  -  tremors  -  unusual bleeding or bruising  -  unusually weak or tired  -  yellowing of the eyes or skin  Side effects that usually do not require medical attention (report to your doctor or health care professional if they continue or are bothersome):  -  constipation  -  diarrhea  -  dry mouth  -  headache  -  nausea  This list may not describe all possible side effects. Call your doctor for medical advice about side effects. You may report side effects to FDA at 8-315-FDA-3054.  Where should I keep my medicine?  Keep out of the reach of children.  Store at room temperature between 15 and 30 degrees C (59 and 86 degrees F). Protect from moisture. Throw away any unused medicine after the expiration date.  NOTE: This sheet is a summary. It may not cover all possible information. If you have questions about this medicine, talk to your doctor, pharmacist, or health care provider.  © 2019 [employer] (2018-08-23 17:36:31)

## 2019-04-20 NOTE — PROGRESS NOTES
Chief Complaint   Patient presents with   • Follow-up - Vertigo       Subjective     History of Present Illness   Alex Torres is a 34 y.o. male presenting for follow up on multiple medical problems.  Patient shares that he was unable to obtain his transcranial ultrasound for concerns of vertebral artery stenosis as he had numerous family emergencies come up.  He is willing to reschedule.  He continues to have dizziness symptoms which persist when he turns his head particularly to the right side.  Dizziness symptoms can persist and lead to  near syncope episodes.  Patient was recently started on Lyrica for his chronic left knee pain but states that he is unable to afford the medication as his insurance does not cover the medication.  Blood pressure remains stable.  GERD symptoms are uncontrolled with pantoprazole.  Patient is interested in trying a stronger medicine to control his GERD symptoms.    The following portions of the patient's history were reviewed and updated as appropriate: allergies, current medications, past family history, past medical history, past social history, past surgical history and problem list.    Review of Systems   Constitutional: Negative for chills, fatigue and fever.   HENT: Negative for congestion, ear pain, rhinorrhea, sinus pressure and sore throat.    Eyes: Negative for visual disturbance.   Respiratory: Negative for cough, chest tightness, shortness of breath and wheezing.    Cardiovascular: Negative for chest pain, palpitations and leg swelling.   Gastrointestinal: Negative for abdominal pain, blood in stool, constipation, diarrhea, nausea and vomiting.   Endocrine: Negative for polydipsia and polyuria.   Genitourinary: Negative for dysuria and hematuria.   Musculoskeletal: Negative for arthralgias and back pain.   Skin: Negative for rash.   Neurological: Positive for dizziness and light-headedness. Negative for numbness and headaches.   Psychiatric/Behavioral: Negative for  dysphoric mood and sleep disturbance. The patient is not nervous/anxious.        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 6 months       Social History     Socioeconomic History   • Marital status:      Spouse name: Not on file   • Number of children: Not on file   • Years of education: Not on file   • Highest education level: Not on file   Tobacco Use   • Smoking status: Current Every Day Smoker     Packs/day: 1.00     Years: 18.00     Pack years: 18.00     Types: Cigarettes     Start date: 1999   • Smokeless tobacco: Never Used   Substance and Sexual Activity   • Alcohol use: No   • Drug use: No   • Sexual activity: Yes     Partners: Female        Past Surgical History:   Procedure Laterality Date   • CHOLECYSTECTOMY  2002   • INGUINAL HERNIA REPAIR Right 1995   • ORIF METACARPAL FRACTURE Right 2000   • TIBIA FRACTURE SURGERY Left 1997    ORIF       Family History   Problem Relation Age of Onset   • Arthritis Father    • Hypertension Father    • Diabetes Maternal Aunt    • Diabetes Maternal Uncle    • Diabetes Maternal Grandmother    • Alzheimer's disease Maternal Grandmother    • Diabetes Other    • Hypertension Mother    • Depression Mother    • No Known Problems Brother    • Early death Maternal Grandfather    • Liver disease Maternal Grandfather    • Alcohol abuse Maternal Grandfather    • No Known Problems Paternal Grandmother    • Liver disease Paternal Grandfather    • Alcohol abuse Paternal Grandfather    • Early death Paternal Grandfather    • No Known Problems Brother          Current Outpatient Medications:   •  albuterol sulfate  (90 Base) MCG/ACT inhaler, Inhale 2 puffs Every 4 (Four) Hours As Needed for Wheezing or Shortness of Air., Disp: 18 g, Rfl: 3  •   baclofen (LIORESAL) 10 MG tablet, TAKE 1 TABLET BY MOUTH 2 (TWO) TIMES A DAY AS NEEDED FOR MUSCLE SPASMS., Disp: 60 tablet, Rfl: 1  •  busPIRone (BUSPAR) 10 MG tablet, Take 1 tablet by mouth 3 (Three) Times a Day., Disp: 90 tablet, Rfl: 2  •  Cariprazine HCl 3 MG capsule, Take 1 capsule by mouth Daily. In evenings with food, Disp: 30 capsule, Rfl: 2  •  metFORMIN (GLUCOPHAGE) 500 MG tablet, Take 1 tablet by mouth 2 (Two) Times a Day With Meals., Disp: 60 tablet, Rfl: 5  •  OXcarbazepine (TRILEPTAL) 300 MG tablet, Take one tablet in morning and 2 tabs at bedtime, Disp: 60 tablet, Rfl: 2  •  pregabalin (LYRICA) 25 MG capsule, Take 1 capsule by mouth 3 (Three) Times a Day., Disp: 90 capsule, Rfl: 2  •  traZODone (DESYREL) 150 MG tablet, Take 1 tablet by mouth Every Night., Disp: 30 tablet, Rfl: 2  •  triamterene-hydrochlorothiazide (DYAZIDE) 37.5-25 MG per capsule, Take 1 capsule by mouth Daily., Disp: 30 capsule, Rfl: 5  •  dexlansoprazole (DEXILANT) 60 MG capsule, Take 1 capsule by mouth Daily for 30 days., Disp: 30 capsule, Rfl: 2  •  pneumococcal polysaccharide 23-valent (PNEUMOVAX-23) 25 MCG/0.5ML vaccine, Inject 0.5 mL into the appropriate muscle as directed by prescriber 1 (One) Time for 1 dose., Disp: 0.5 mL, Rfl: 0  •  Tdap (BOOSTRIX) 5-2.5-18.5 LF-MCG/0.5 injection, Inject 0.5 mL into the appropriate muscle as directed by prescriber 1 (One) Time for 1 dose., Disp: 0.5 mL, Rfl: 0    Objective   /79   Pulse 76   Temp 98.5 °F (36.9 °C) (Temporal)   Resp 18   Wt 130 kg (287 lb)   SpO2 96%   BMI 34.93 kg/m²     Physical Exam   Constitutional: He is oriented to person, place, and time. He appears well-developed and well-nourished.   HENT:   Head: Normocephalic and atraumatic.   Eyes: Conjunctivae are normal.   Neck: Normal range of motion. Neck supple.   Pulmonary/Chest: Effort normal.   Musculoskeletal: Normal range of motion.   Neurological: He is alert and oriented to person, place, and time.   Skin:  No rash noted.   Psychiatric: He has a normal mood and affect. His behavior is normal.   Nursing note and vitals reviewed.      Assessment/Plan   Alex was seen today for follow-up.    Diagnoses and all orders for this visit:    Gastroesophageal reflux disease without esophagitis  -     dexlansoprazole (DEXILANT) 60 MG capsule; Take 1 capsule by mouth Daily for 30 days.    Hyperprolactinemia (CMS/HCC)    Polyneuropathy    Bipolar disorder, current episode mixed, moderate (CMS/HCC)    Complex regional pain syndrome type 2 of left lower extremity    Need for vaccination for pneumococcus  -     pneumococcal polysaccharide 23-valent (PNEUMOVAX-23) 25 MCG/0.5ML vaccine; Inject 0.5 mL into the appropriate muscle as directed by prescriber 1 (One) Time for 1 dose.    Need for Tdap vaccination  -     Tdap (BOOSTRIX) 5-2.5-18.5 LF-MCG/0.5 injection; Inject 0.5 mL into the appropriate muscle as directed by prescriber 1 (One) Time for 1 dose.          Discussion Summary:  Patient is a 34 y.o. male presenting for follow up.    GERD  -Stop omeprazole.  Start Dexilant.    Bipolar disorder  -Medications are being adjusted by psychiatry.  Weight has come down with reduction of dose of Trileptal.    Polyneuropathy/chronic left knee pain following injury  -Patient is to see orthopedics in the near future to discuss treatment options.  Patient was offered topical compound cream to see if this helps with nerve related pains.  He is unable to afford Lyrica and is allergic to gabapentin.    Dizziness with turning head to right  -Symptoms are concerning for vertebral artery stenosis or related condition.  Transcranial ultrasound will be reactivated and scheduled for.    Follow up:  Return in about 6 weeks (around 6/1/2019).

## 2019-04-25 ENCOUNTER — OFFICE VISIT (OUTPATIENT)
Dept: ORTHOPEDIC SURGERY | Facility: CLINIC | Age: 34
End: 2019-04-25

## 2019-04-25 VITALS — WEIGHT: 288 LBS | HEIGHT: 76 IN | BODY MASS INDEX: 35.07 KG/M2 | RESPIRATION RATE: 18 BRPM

## 2019-04-25 DIAGNOSIS — M79.605 LEFT LEG PAIN: Primary | ICD-10-CM

## 2019-04-25 PROCEDURE — 99203 OFFICE O/P NEW LOW 30 MIN: CPT | Performed by: ORTHOPAEDIC SURGERY

## 2019-04-25 NOTE — PROGRESS NOTES
Subjective   Patient ID: Alex Torres is a 34 y.o. male  Pain of the Left Lower Leg (Patient is here today for left lower leg pain, he states when he was 12 had a michael placed in his leg and ever since then he has had pain, selling, and discoloration. )             History of Present Illness  Left lower leg pain he wants to make sure his not due to his hardware from his left tib-fib nailing many years ago does have chronic neuropathy.  No fall injury recently to the knee or ankle, was seen in 2014 x-rays of the tib-fib at that time showed a well-healed tibia fracture with no sign of significant subsidence or lucency around his distal screws slight displacement of the proximal screw was noted at that time.  Denies any pain swelling giving way in the knee area, is able to crawl and play with an 11-year-old stepdaughter with no issues.      Review of Systems   Constitutional: Negative for fever.   HENT: Negative for voice change.    Eyes: Negative for visual disturbance.   Respiratory: Negative for shortness of breath.    Cardiovascular: Negative for chest pain.   Gastrointestinal: Negative for abdominal distention and abdominal pain.   Genitourinary: Negative for dysuria.   Musculoskeletal: Positive for arthralgias. Negative for gait problem and joint swelling.   Skin: Negative for rash.   Neurological: Negative for speech difficulty.   Hematological: Does not bruise/bleed easily.   Psychiatric/Behavioral: Negative for confusion.       Past Medical History:   Diagnosis Date   • Anxiety    • Bipolar 1 disorder (CMS/HCC)    • Depression    • Hypertension    • Kidney stone    • Positive TB test     treated w/ meds x 6 months        Past Surgical History:   Procedure Laterality Date   • CHOLECYSTECTOMY  2002   • INGUINAL HERNIA REPAIR Right 1995   • ORIF METACARPAL FRACTURE Right 2000   • TIBIA FRACTURE SURGERY Left 1997    ORIF       Family History   Problem Relation Age of Onset   • Arthritis Father    • Hypertension  Father    • Diabetes Maternal Aunt    • Diabetes Maternal Uncle    • Diabetes Maternal Grandmother    • Alzheimer's disease Maternal Grandmother    • Diabetes Other    • Hypertension Mother    • Depression Mother    • No Known Problems Brother    • Early death Maternal Grandfather    • Liver disease Maternal Grandfather    • Alcohol abuse Maternal Grandfather    • No Known Problems Paternal Grandmother    • Liver disease Paternal Grandfather    • Alcohol abuse Paternal Grandfather    • Early death Paternal Grandfather    • No Known Problems Brother        Social History     Socioeconomic History   • Marital status:      Spouse name: Not on file   • Number of children: Not on file   • Years of education: Not on file   • Highest education level: Not on file   Tobacco Use   • Smoking status: Current Every Day Smoker     Packs/day: 1.00     Years: 18.00     Pack years: 18.00     Types: Cigarettes     Start date: 1999   • Smokeless tobacco: Never Used   Substance and Sexual Activity   • Alcohol use: No   • Drug use: No   • Sexual activity: Yes     Partners: Female       I have reviewed all of the above social hx, family hx, surgical hx, medications, allergies & ROS and confirm that it is accurate.    Allergies   Allergen Reactions   • Gabapentin Swelling     + swelling   • Latex          Current Outpatient Medications:   •  albuterol sulfate  (90 Base) MCG/ACT inhaler, Inhale 2 puffs Every 4 (Four) Hours As Needed for Wheezing or Shortness of Air., Disp: 18 g, Rfl: 3  •  baclofen (LIORESAL) 10 MG tablet, TAKE 1 TABLET BY MOUTH 2 (TWO) TIMES A DAY AS NEEDED FOR MUSCLE SPASMS., Disp: 60 tablet, Rfl: 1  •  busPIRone (BUSPAR) 10 MG tablet, Take 1 tablet by mouth 3 (Three) Times a Day., Disp: 90 tablet, Rfl: 2  •  Cariprazine HCl 3 MG capsule, Take 1 capsule by mouth Daily. In evenings with food, Disp: 30 capsule, Rfl: 2  •  dexlansoprazole (DEXILANT) 60 MG capsule, Take 1 capsule by mouth Daily for 30 days.,  "Disp: 30 capsule, Rfl: 2  •  metFORMIN (GLUCOPHAGE) 500 MG tablet, Take 1 tablet by mouth 2 (Two) Times a Day With Meals., Disp: 60 tablet, Rfl: 5  •  OXcarbazepine (TRILEPTAL) 300 MG tablet, Take one tablet in morning and 2 tabs at bedtime, Disp: 60 tablet, Rfl: 2  •  pregabalin (LYRICA) 25 MG capsule, Take 1 capsule by mouth 3 (Three) Times a Day., Disp: 90 capsule, Rfl: 2  •  traZODone (DESYREL) 150 MG tablet, Take 1 tablet by mouth Every Night., Disp: 30 tablet, Rfl: 2  •  triamterene-hydrochlorothiazide (DYAZIDE) 37.5-25 MG per capsule, Take 1 capsule by mouth Daily., Disp: 30 capsule, Rfl: 5    Objective   Resp 18   Ht 193 cm (76\")   Wt 131 kg (288 lb)   BMI 35.06 kg/m²    Physical Exam  Constitutional: Patient is oriented to person, place, and time. Patient appears well-developed and well-nourished.   HENT:Head: Normocephalic and atraumatic.   Eyes: EOM are normal. Pupils are equal, round, and reactive to light.   Neck: Normal range of motion. Neck supple.   Cardiovascular: Normal rate.    Pulmonary/Chest: Effort normal and breath sounds normal.   Abdominal: Soft.   Neurological: Patient is alert and oriented to person, place, and time.   Skin: Skin is warm and dry.   Psychiatric: Patient has a normal mood and affect.   Nursing note and vitals reviewed.       [unfilled]   Left knee: No effusion full range of motion well-healed scar consistent with proximal tibial incision, barely palpable proximal medial locking screw with minimal tenderness over its head no erythema swelling.  Midshaft tibia no swelling skin appears intact minimal edema in the ankle full ankle range of motion mid calf circumference of the left leg symmetric with the right, neurovascular status to left foot intact, correct symmetric rotation of the foot and ankle noted when compared to the right leg, full ankle range of motion.  No signs of ankle impingement instability or focal tenderness near the distal locking " screws.    Assessment/Plan   Review of Radiographic Studies:    Radiographic images today of affected area I personally viewed and showed no sign of acute fracture or dislocation.  Today's tib-fib x-ray compared to 2014 x-ray shows no significant interval change in the position of the proximal locking screw or significant lucency around the distal screws      Procedures     Alex was seen today for pain.    Diagnoses and all orders for this visit:    Left leg pain  -     XR Tibia Fibula 2 View Left       Orthopedic activities reviewed and patient expressed appreciation      Recommendations/Plan:   Work/Activity Status: May perform usual activities as tolerated    Patient agreeable to call or return sooner for any concerns.       Reviewed radiographs with patient and his wife explained the nature of the condition, discussed indications for hardware removal risks and complications and postop rehab issues.  Given the no change in appearance of the proximal locking screw compared to 2014 do not recommend removal at this time.      Impression:  History of chronic peripheral neuropathy with complex regional pain syndrome left lower leg status post tib-fib fracture IM nailing well-healed stable position of slightly displaced left proximal locking screw  Plan:  Nonsurgical advice home exercise return when necessary

## 2019-04-30 ENCOUNTER — HOSPITAL ENCOUNTER (OUTPATIENT)
Dept: CARDIOLOGY | Facility: HOSPITAL | Age: 34
Discharge: HOME | End: 2019-04-30
Admitting: INTERNAL MEDICINE

## 2019-04-30 VITALS — BODY MASS INDEX: 35.07 KG/M2 | WEIGHT: 288 LBS | HEIGHT: 76 IN

## 2019-04-30 DIAGNOSIS — I65.09 VERTEBRAL ARTERY STENOSIS, UNSPECIFIED LATERALITY: ICD-10-CM

## 2019-04-30 DIAGNOSIS — R42 VERTIGO: ICD-10-CM

## 2019-04-30 LAB
BH CV VAS TCD LEFT ACA: 30 CM/SEC
BH CV VAS TCD LEFT DISTAL M1: 52 CM/SEC
BH CV VAS TCD LEFT MID M1: 64 CM/SEC
BH CV VAS TCD LEFT P1: 35 CM/SEC
BH CV VAS TCD LEFT P2: 28 CM/SEC
BH CV VAS TCD LEFT PROXIMAL M1: 42 CM/SEC
BH CV VAS TCD LEFT TERMINAL ICA: 28 CM/SEC
BH CV VAS TCD RIGHT ACA: 34 CM/SEC
BH CV VAS TCD RIGHT DISTAL M1: 55 CM/SEC
BH CV VAS TCD RIGHT MID M1: 54 CM/SEC
BH CV VAS TCD RIGHT P1: 32 CM/SEC
BH CV VAS TCD RIGHT P2: 33 CM/SEC
BH CV VAS TCD RIGHT PROXIMAL M1: 55 CM/SEC
BH CV VAS TCD RIGHT TERMINAL ICA: 25 CM/SEC
BH CV XLRA MEAS LEFT VERTEBRAL A PSV: 34 CM/SEC
BH CV XLRA MEAS RIGHT VERTEBRAL A EDV: -29.2 CM/SEC
BH CV XLRA MEAS RIGHT VERTEBRAL A PSV: 30 CM/SEC

## 2019-04-30 PROCEDURE — 93886 INTRACRANIAL COMPLETE STUDY: CPT

## 2019-05-01 ENCOUNTER — APPOINTMENT (OUTPATIENT)
Dept: CARDIOLOGY | Facility: HOSPITAL | Age: 34
End: 2019-05-01

## 2019-05-02 DIAGNOSIS — K21.00 GASTROESOPHAGEAL REFLUX DISEASE WITH ESOPHAGITIS: ICD-10-CM

## 2019-05-02 DIAGNOSIS — I10 ESSENTIAL HYPERTENSION: ICD-10-CM

## 2019-05-02 RX ORDER — TRIAMTERENE AND HYDROCHLOROTHIAZIDE 37.5; 25 MG/1; MG/1
CAPSULE ORAL
Qty: 30 CAPSULE | Refills: 5 | Status: SHIPPED | OUTPATIENT
Start: 2019-05-02 | End: 2019-10-22 | Stop reason: SDUPTHER

## 2019-05-02 RX ORDER — PANTOPRAZOLE SODIUM 40 MG/1
TABLET, DELAYED RELEASE ORAL
Qty: 30 TABLET | Refills: 5 | OUTPATIENT
Start: 2019-05-02

## 2019-05-07 DIAGNOSIS — M54.41 CHRONIC BILATERAL LOW BACK PAIN WITH BILATERAL SCIATICA: ICD-10-CM

## 2019-05-07 DIAGNOSIS — G89.29 CHRONIC BILATERAL LOW BACK PAIN WITH BILATERAL SCIATICA: ICD-10-CM

## 2019-05-07 DIAGNOSIS — M54.42 CHRONIC BILATERAL LOW BACK PAIN WITH BILATERAL SCIATICA: ICD-10-CM

## 2019-05-07 RX ORDER — BACLOFEN 10 MG/1
10 TABLET ORAL 2 TIMES DAILY PRN
Qty: 60 TABLET | Refills: 1 | Status: SHIPPED | OUTPATIENT
Start: 2019-05-07 | End: 2019-06-29 | Stop reason: SDUPTHER

## 2019-05-08 ENCOUNTER — OFFICE VISIT (OUTPATIENT)
Dept: PSYCHIATRY | Facility: CLINIC | Age: 34
End: 2019-05-08

## 2019-05-08 VITALS
SYSTOLIC BLOOD PRESSURE: 140 MMHG | HEIGHT: 76 IN | DIASTOLIC BLOOD PRESSURE: 90 MMHG | BODY MASS INDEX: 35.07 KG/M2 | WEIGHT: 288 LBS

## 2019-05-08 DIAGNOSIS — F31.62 BIPOLAR DISORDER, CURRENT EPISODE MIXED, MODERATE (HCC): Primary | ICD-10-CM

## 2019-05-08 DIAGNOSIS — F43.10 POST TRAUMATIC STRESS DISORDER (PTSD): ICD-10-CM

## 2019-05-08 PROCEDURE — 99214 OFFICE O/P EST MOD 30 MIN: CPT | Performed by: NURSE PRACTITIONER

## 2019-05-08 RX ORDER — PRAZOSIN HYDROCHLORIDE 1 MG/1
1 CAPSULE ORAL NIGHTLY
Qty: 30 CAPSULE | Refills: 0 | Status: SHIPPED | OUTPATIENT
Start: 2019-05-08 | End: 2019-06-06

## 2019-05-08 NOTE — PROGRESS NOTES
Alex Torres is a 34 y.o. male is here today for medication management follow-up.    Chief Complaint:      ICD-10-CM ICD-9-CM   1. Bipolar disorder, current episode mixed, moderate (CMS/HCC) F31.62 296.62   2. Post traumatic stress disorder (PTSD) F43.10 309.81       History of Present Illness:  Pt present for follow up visit and medication managment of mood symptoms. Pt reports that Trazodone is oversedating him while sleeping; reports concern since his wife is due in August and he will need to be able to respond if his baby wakes up during the night. Pt also reports trauma associated nightmares; most nights during the week.     Pt reports the presence/absence of  manic/hypomanic symptoms: (-) distinct period of abnormally persistent elevated, expansive, irritable mood or increased goal-directed activity or energy, (-) inflated self-esteem/grandiosity, (-) decreased need for sleep, (-) hyper-talkative/pressured speech, (-) flights of ideas/racing thoughts, (-) distractibility, (-) increased goal directed activity/agitation, and (-) excessive involvement in activities with high potential for painful outcomes.     The following portions of the patient's history were reviewed and updated as appropriate: allergies, current medications, past family history, past medical history, past social history, past surgical history and problem list.    Review of Systems;;  Review of Systems   Constitutional: Negative.  Negative for activity change, appetite change, unexpected weight gain and unexpected weight loss.   Respiratory: Negative.    Cardiovascular: Negative.  Negative for chest pain.   Gastrointestinal: Negative.  Negative for diarrhea, nausea and vomiting.   Musculoskeletal: Negative.    Skin: Negative for rash and bruise.   Neurological: Negative.  Negative for dizziness, seizures and speech difficulty.   Psychiatric/Behavioral: Positive for sleep disturbance. Negative for agitation, behavioral problems,  "decreased concentration, dysphoric mood, hallucinations, self-injury, suicidal ideas, negative for hyperactivity, depressed mood and stress. The patient is not nervous/anxious.        Physical Exam;;  Physical Exam  Blood pressure 140/90, height 193 cm (76\"), weight 131 kg (288 lb).    Current Medications;;    Current Outpatient Medications:   •  albuterol sulfate  (90 Base) MCG/ACT inhaler, Inhale 2 puffs Every 4 (Four) Hours As Needed for Wheezing or Shortness of Air., Disp: 18 g, Rfl: 3  •  baclofen (LIORESAL) 10 MG tablet, TAKE 1 TABLET BY MOUTH 2 (TWO) TIMES A DAY AS NEEDED FOR MUSCLE SPASMS., Disp: 60 tablet, Rfl: 1  •  busPIRone (BUSPAR) 10 MG tablet, Take 1 tablet by mouth 3 (Three) Times a Day., Disp: 90 tablet, Rfl: 2  •  Cariprazine HCl 3 MG capsule, Take 1 capsule by mouth Daily. In evenings with food, Disp: 30 capsule, Rfl: 2  •  metFORMIN (GLUCOPHAGE) 500 MG tablet, Take 1 tablet by mouth 2 (Two) Times a Day With Meals., Disp: 60 tablet, Rfl: 5  •  OXcarbazepine (TRILEPTAL) 300 MG tablet, Take one tablet in morning and 2 tabs at bedtime, Disp: 60 tablet, Rfl: 2  •  triamterene-hydrochlorothiazide (DYAZIDE) 37.5-25 MG per capsule, TAKE 1 CAPSULE BY MOUTH EVERY DAY, Disp: 30 capsule, Rfl: 5  •  prazosin (MINIPRESS) 1 MG capsule, Take 1 capsule by mouth Every Night., Disp: 30 capsule, Rfl: 0    Lab Results:       Mental Status Exam:   Hygiene:   good  Cooperation:  Cooperative  Eye Contact:  Good  Psychomotor Behavior:  Appropriate  Mood:euthymic  Affect:  Appropriate  Hopelessness: Denies  Speech:  Normal  Thought Process:  Goal directed  Thought Content:  Normal  Suicidal:  None  Homicidal:  None  Hallucinations:  None  Delusion:  None  Memory:  Intact  Orientation:  Person, Place, Time and Situation  Reliability:  good  Insight:  Good  Judgement:  Good  Impulse Control:  Good  Physical/Medical Issues:  No         Assessment Plan;;  Diagnoses and all orders for this visit:    Bipolar disorder, " current episode mixed, moderate (CMS/HCC)    Post traumatic stress disorder (PTSD)  -     prazosin (MINIPRESS) 1 MG capsule; Take 1 capsule by mouth Every Night.    Taper off and discontinue Trazodone  Continue curret medications    Add Prazosin 1mg   A psychological evaluation was conducted in order to assess past and current level of functioning. Areas assessed included, but were not limited to: perception of social support, perception of ability to face and deal with challenges in life (positive functioning), anxiety symptoms, depressive symptoms, perspective on beliefs/belief system, coping skills for stress, intelligence level,  Therapeutic rapport was established. Interventions conducted today were geared towards incorporating medication management along with support for continued therapy. Education was also provided as to the med management with this provider and what to expect in subsequent sessions.    We discussed risks, benefits,goals and side effects of the above medication and the patient was agreeable with the plan.Patient was educated on the importance of compliance with treatment and follow-up appointments. Patient is aware to contact the [facility] with any worsening of symptoms. To call for questions or concerns and return early if necessary. Patent is agreeable to go to the Emergency Department or call 911 should they begin SI/HI.     Treatment Plan: stabilize mood,  patient will stay out of the hospital and be at optimal level of functioning, take all medication as prescribed. Patient verbalized  understanding and agreement to plan.    Return in about 4 weeks (around 6/5/2019) for Follow Up.    Jana Kingsley, DNP, APRN, PMHNP-BC, FNP-C

## 2019-05-10 ENCOUNTER — OFFICE VISIT (OUTPATIENT)
Dept: INTERNAL MEDICINE | Facility: CLINIC | Age: 34
End: 2019-05-10

## 2019-05-10 VITALS
TEMPERATURE: 98 F | RESPIRATION RATE: 16 BRPM | HEIGHT: 76 IN | DIASTOLIC BLOOD PRESSURE: 85 MMHG | BODY MASS INDEX: 35.19 KG/M2 | HEART RATE: 63 BPM | OXYGEN SATURATION: 97 % | SYSTOLIC BLOOD PRESSURE: 131 MMHG | WEIGHT: 289 LBS

## 2019-05-10 DIAGNOSIS — I10 ESSENTIAL HYPERTENSION: ICD-10-CM

## 2019-05-10 DIAGNOSIS — G47.33 OSA (OBSTRUCTIVE SLEEP APNEA): Primary | ICD-10-CM

## 2019-05-10 DIAGNOSIS — J44.9 CHRONIC OBSTRUCTIVE PULMONARY DISEASE, UNSPECIFIED COPD TYPE (HCC): ICD-10-CM

## 2019-05-10 DIAGNOSIS — Z72.0 TOBACCO ABUSE: ICD-10-CM

## 2019-05-10 DIAGNOSIS — F60.3 BORDERLINE PERSONALITY DISORDER (HCC): ICD-10-CM

## 2019-05-10 DIAGNOSIS — F43.10 PTSD (POST-TRAUMATIC STRESS DISORDER): ICD-10-CM

## 2019-05-10 PROCEDURE — 99214 OFFICE O/P EST MOD 30 MIN: CPT | Performed by: INTERNAL MEDICINE

## 2019-05-10 RX ORDER — VARENICLINE TARTRATE 1 MG/1
1 TABLET, FILM COATED ORAL
Qty: 60 TABLET | Refills: 2 | Status: SHIPPED | OUTPATIENT
Start: 2019-05-10 | End: 2019-06-07 | Stop reason: SDUPTHER

## 2019-05-10 RX ORDER — AMLODIPINE BESYLATE 5 MG/1
5 TABLET ORAL DAILY
Qty: 30 TABLET | Refills: 11 | Status: SHIPPED | OUTPATIENT
Start: 2019-05-10 | End: 2019-05-17 | Stop reason: SDUPTHER
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
## 2019-05-15 ENCOUNTER — HOSPITAL ENCOUNTER (OUTPATIENT)
Dept: PULMONOLOGY | Facility: HOSPITAL | Age: 34
Discharge: HOME | End: 2019-05-15
Admitting: INTERNAL MEDICINE

## 2019-05-15 DIAGNOSIS — Z72.0 TOBACCO ABUSE: ICD-10-CM

## 2019-05-15 DIAGNOSIS — G47.33 OSA (OBSTRUCTIVE SLEEP APNEA): ICD-10-CM

## 2019-05-15 LAB
A PHAGOCYTOPH IGG TITR SER IF: NEGATIVE {TITER}
A PHAGOCYTOPH IGM TITR SER IF: NEGATIVE {TITER}
ASO AB SERPL-ACNC: <20 IU/ML (ref 0–200)
B BURGDOR DNA SPEC QL NAA+PROBE: NEGATIVE
CCP IGA+IGG SERPL IA-ACNC: 4 UNITS (ref 0–19)
CRP SERPL-MCNC: 0.8 MG/DL (ref 0–1)
E CHAFFEENSIS IGG TITR SER IF: NEGATIVE {TITER}
E CHAFFEENSIS IGM TITR SER IF: NEGATIVE {TITER}
ERYTHROCYTE [SEDIMENTATION RATE] IN BLOOD BY WESTERGREN METHOD: 5 MM/HR (ref 0–15)
R RICKETTSI IGG SER QL IA: NEGATIVE
R RICKETTSI IGM SER-ACNC: 0.99 INDEX (ref 0–0.89)
RHEUMATOID FACT SERPL-ACNC: <10 IU/ML (ref 0–13.9)
URATE SERPL-MCNC: 7.8 MG/DL (ref 2.5–8.5)

## 2019-05-15 PROCEDURE — 94060 EVALUATION OF WHEEZING: CPT

## 2019-05-15 PROCEDURE — 94640 AIRWAY INHALATION TREATMENT: CPT

## 2019-05-15 PROCEDURE — 94726 PLETHYSMOGRAPHY LUNG VOLUMES: CPT

## 2019-05-15 RX ORDER — ALBUTEROL SULFATE 2.5 MG/3ML
2.5 SOLUTION RESPIRATORY (INHALATION) ONCE
Status: COMPLETED | OUTPATIENT
Start: 2019-05-15 | End: 2019-05-15

## 2019-05-15 RX ADMIN — ALBUTEROL SULFATE 2.5 MG: 2.5 SOLUTION RESPIRATORY (INHALATION) at 09:28

## 2019-05-17 ENCOUNTER — OFFICE VISIT (OUTPATIENT)
Dept: INTERNAL MEDICINE | Facility: CLINIC | Age: 34
End: 2019-05-17

## 2019-05-17 ENCOUNTER — HOSPITAL ENCOUNTER (EMERGENCY)
Facility: HOSPITAL | Age: 34
Discharge: HOME | End: 2019-05-17
Attending: EMERGENCY MEDICINE | Admitting: EMERGENCY MEDICINE

## 2019-05-17 ENCOUNTER — APPOINTMENT (OUTPATIENT)
Dept: GENERAL RADIOLOGY | Facility: HOSPITAL | Age: 34
End: 2019-05-17

## 2019-05-17 VITALS
TEMPERATURE: 97 F | SYSTOLIC BLOOD PRESSURE: 150 MMHG | HEIGHT: 76 IN | OXYGEN SATURATION: 99 % | BODY MASS INDEX: 34.95 KG/M2 | HEART RATE: 76 BPM | WEIGHT: 287 LBS | DIASTOLIC BLOOD PRESSURE: 90 MMHG

## 2019-05-17 VITALS
WEIGHT: 285.5 LBS | TEMPERATURE: 98 F | RESPIRATION RATE: 19 BRPM | SYSTOLIC BLOOD PRESSURE: 133 MMHG | OXYGEN SATURATION: 95 % | DIASTOLIC BLOOD PRESSURE: 85 MMHG | BODY MASS INDEX: 34.77 KG/M2 | HEART RATE: 77 BPM | HEIGHT: 76 IN

## 2019-05-17 DIAGNOSIS — K21.9 GASTROESOPHAGEAL REFLUX DISEASE WITHOUT ESOPHAGITIS: Primary | ICD-10-CM

## 2019-05-17 DIAGNOSIS — R07.9 CHEST PAIN, UNSPECIFIED TYPE: Primary | ICD-10-CM

## 2019-05-17 DIAGNOSIS — Z72.0 TOBACCO ABUSE: ICD-10-CM

## 2019-05-17 DIAGNOSIS — A77.0 RMSF (ROCKY MOUNTAIN SPOTTED FEVER): ICD-10-CM

## 2019-05-17 DIAGNOSIS — I10 ESSENTIAL HYPERTENSION: ICD-10-CM

## 2019-05-17 DIAGNOSIS — E66.01 MORBIDLY OBESE (HCC): ICD-10-CM

## 2019-05-17 LAB
ALBUMIN SERPL-MCNC: 4.7 G/DL (ref 3.5–5)
ALBUMIN/GLOB SERPL: 1.5 G/DL (ref 1–2)
ALP SERPL-CCNC: 72 U/L (ref 38–126)
ALT SERPL W P-5'-P-CCNC: 94 U/L (ref 13–69)
ANION GAP SERPL CALCULATED.3IONS-SCNC: 16.7 MMOL/L (ref 10–20)
AST SERPL-CCNC: 45 U/L (ref 15–46)
BASOPHILS # BLD AUTO: 0.05 10*3/MM3 (ref 0–0.2)
BASOPHILS NFR BLD AUTO: 0.5 % (ref 0–1.5)
BILIRUB SERPL-MCNC: 0.5 MG/DL (ref 0.2–1.3)
BUN BLD-MCNC: 15 MG/DL (ref 7–20)
BUN/CREAT SERPL: 13.6 (ref 6.3–21.9)
CALCIUM SPEC-SCNC: 9.5 MG/DL (ref 8.4–10.2)
CHLORIDE SERPL-SCNC: 99 MMOL/L (ref 98–107)
CO2 SERPL-SCNC: 28 MMOL/L (ref 26–30)
CREAT BLD-MCNC: 1.1 MG/DL (ref 0.6–1.3)
DEPRECATED RDW RBC AUTO: 45.5 FL (ref 37–54)
EOSINOPHIL # BLD AUTO: 0.24 10*3/MM3 (ref 0–0.4)
EOSINOPHIL NFR BLD AUTO: 2.4 % (ref 0.3–6.2)
ERYTHROCYTE [DISTWIDTH] IN BLOOD BY AUTOMATED COUNT: 13.8 % (ref 12.3–15.4)
GFR SERPL CREATININE-BSD FRML MDRD: 76 ML/MIN/1.73
GLOBULIN UR ELPH-MCNC: 3.2 GM/DL
GLUCOSE BLD-MCNC: 81 MG/DL (ref 74–98)
HCT VFR BLD AUTO: 47.9 % (ref 37.5–51)
HGB BLD-MCNC: 16.1 G/DL (ref 13–17.7)
HOLD SPECIMEN: NORMAL
HOLD SPECIMEN: NORMAL
IMM GRANULOCYTES # BLD AUTO: 0.13 10*3/MM3 (ref 0–0.05)
IMM GRANULOCYTES NFR BLD AUTO: 1.3 % (ref 0–0.5)
LYMPHOCYTES # BLD AUTO: 3.53 10*3/MM3 (ref 0.7–3.1)
LYMPHOCYTES NFR BLD AUTO: 34.8 % (ref 19.6–45.3)
MCH RBC QN AUTO: 30 PG (ref 26.6–33)
MCHC RBC AUTO-ENTMCNC: 33.6 G/DL (ref 31.5–35.7)
MCV RBC AUTO: 89.2 FL (ref 79–97)
MONOCYTES # BLD AUTO: 0.73 10*3/MM3 (ref 0.1–0.9)
MONOCYTES NFR BLD AUTO: 7.2 % (ref 5–12)
NEUTROPHILS # BLD AUTO: 5.47 10*3/MM3 (ref 1.7–7)
NEUTROPHILS NFR BLD AUTO: 53.8 % (ref 42.7–76)
NRBC BLD AUTO-RTO: 0 /100 WBC (ref 0–0.2)
NT-PROBNP SERPL-MCNC: <11.1 PG/ML (ref 0–125)
PLATELET # BLD AUTO: 226 10*3/MM3 (ref 140–450)
PMV BLD AUTO: 8.7 FL (ref 6–12)
POTASSIUM BLD-SCNC: 3.7 MMOL/L (ref 3.5–5.1)
PROT SERPL-MCNC: 7.9 G/DL (ref 6.3–8.2)
RBC # BLD AUTO: 5.37 10*6/MM3 (ref 4.14–5.8)
SODIUM BLD-SCNC: 140 MMOL/L (ref 137–145)
TROPONIN I SERPL-MCNC: <0.012 NG/ML (ref 0–0.03)
WBC NRBC COR # BLD: 10.15 10*3/MM3 (ref 3.4–10.8)
WHOLE BLOOD HOLD SPECIMEN: NORMAL
WHOLE BLOOD HOLD SPECIMEN: NORMAL

## 2019-05-17 PROCEDURE — 96374 THER/PROPH/DIAG INJ IV PUSH: CPT

## 2019-05-17 PROCEDURE — 85025 COMPLETE CBC W/AUTO DIFF WBC: CPT | Performed by: EMERGENCY MEDICINE

## 2019-05-17 PROCEDURE — 99214 OFFICE O/P EST MOD 30 MIN: CPT | Performed by: INTERNAL MEDICINE

## 2019-05-17 PROCEDURE — 80053 COMPREHEN METABOLIC PANEL: CPT | Performed by: EMERGENCY MEDICINE

## 2019-05-17 PROCEDURE — 84484 ASSAY OF TROPONIN QUANT: CPT | Performed by: EMERGENCY MEDICINE

## 2019-05-17 PROCEDURE — 71046 X-RAY EXAM CHEST 2 VIEWS: CPT

## 2019-05-17 PROCEDURE — 83880 ASSAY OF NATRIURETIC PEPTIDE: CPT | Performed by: EMERGENCY MEDICINE

## 2019-05-17 PROCEDURE — 99283 EMERGENCY DEPT VISIT LOW MDM: CPT

## 2019-05-17 PROCEDURE — 93005 ELECTROCARDIOGRAM TRACING: CPT | Performed by: EMERGENCY MEDICINE

## 2019-05-17 RX ORDER — LABETALOL HYDROCHLORIDE 5 MG/ML
10 INJECTION, SOLUTION INTRAVENOUS ONCE
Status: COMPLETED | OUTPATIENT
Start: 2019-05-17 | End: 2019-05-17

## 2019-05-17 RX ORDER — DOXYCYCLINE 100 MG/1
100 CAPSULE ORAL 2 TIMES DAILY
Qty: 20 CAPSULE | Refills: 0 | Status: SHIPPED | OUTPATIENT
Start: 2019-05-17 | End: 2019-06-07

## 2019-05-17 RX ORDER — SODIUM CHLORIDE 0.9 % (FLUSH) 0.9 %
10 SYRINGE (ML) INJECTION AS NEEDED
Status: DISCONTINUED | OUTPATIENT
Start: 2019-05-17 | End: 2019-05-17 | Stop reason: HOSPADM

## 2019-05-17 RX ORDER — AMLODIPINE BESYLATE 5 MG/1
10 TABLET ORAL DAILY
Qty: 30 TABLET | Refills: 0 | Status: SHIPPED | OUTPATIENT
Start: 2019-05-17 | End: 2019-05-20 | Stop reason: ALTCHOICE

## 2019-05-17 RX ORDER — PANTOPRAZOLE SODIUM 40 MG/1
40 TABLET, DELAYED RELEASE ORAL DAILY
Qty: 30 TABLET | Refills: 2 | Status: SHIPPED | OUTPATIENT
Start: 2019-05-17 | End: 2019-08-07 | Stop reason: SDUPTHER

## 2019-05-17 RX ADMIN — LABETALOL 20 MG/4 ML (5 MG/ML) INTRAVENOUS SYRINGE 10 MG: at 17:22

## 2019-05-17 NOTE — PATIENT INSTRUCTIONS
Doxycycline delayed-release capsules  What is this medicine?  DOXYCYCLINE (dox don segura) is a tetracycline antibiotic. It is used to treat certain kinds of bacterial infections, Lyme disease, and malaria. It will not work for colds, flu, or other viral infections.  This medicine may be used for other purposes; ask your health care provider or pharmacist if you have questions.  COMMON BRAND NAME(S): Oracea  What should I tell my health care provider before I take this medicine?  They need to know if you have any of these conditions:  -bowel disease like colitis  -liver disease  -long exposure to sunlight like working outdoors  -an unusual or allergic reaction to doxycycline, tetracycline antibiotics, other medicines, foods, dyes, or preservatives  -pregnant or trying to get pregnant  -breast-feeding  How should I use this medicine?  Take this medicine by mouth with a full glass of water. Follow the directions on the prescription label. Do not crush or chew. The capsules may be opened and the pellets sprinkled on applesauce. Swallow the pellets whole without chewing. Follow with an 8 ounce glass of water to help you swallow all the pellets. Do not prepare a dose and store for later use. The applesauce mixture should be taken immediately after you prepare it. It is best to take this medicine without other food, but if it upsets your stomach take it with food. Take your medicine at regular intervals. Do not take your medicine more often than directed. Take all of your medicine as directed even if you think your are better. Do not skip doses or stop your medicine early.  Talk to your pediatrician regarding the use of this medicine in children. While this drug may be prescribed for selected conditions, precautions do apply.  Overdosage: If you think you have taken too much of this medicine contact a poison control center or emergency room at once.  NOTE: This medicine is only for you. Do not share this medicine with  others.  What if I miss a dose?  If you miss a dose, take it as soon as you can. If it is almost time for your next dose, take only that dose. Do not take double or extra doses.  What may interact with this medicine?  -antacids  -barbiturates  -birth control pills  -bismuth subsalicylate  -carbamazepine  -methoxyflurane  -other antibiotics  -phenytoin  -vitamins that contain iron  -warfarin  This list may not describe all possible interactions. Give your health care provider a list of all the medicines, herbs, non-prescription drugs, or dietary supplements you use. Also tell them if you smoke, drink alcohol, or use illegal drugs. Some items may interact with your medicine.  What should I watch for while using this medicine?  Tell your doctor or health care professional if your symptoms do not improve.  Do not treat diarrhea with over the counter products. Contact your doctor if you have diarrhea that lasts more than 2 days or if it is severe and watery.  Do not take this medicine just before going to bed. It may not dissolve properly when you lay down and can cause pain in your throat. Drink plenty of fluids while taking this medicine to also help reduce irritation in your throat.  This medicine can make you more sensitive to the sun. Keep out of the sun. If you cannot avoid being in the sun, wear protective clothing and use sunscreen. Do not use sun lamps or tanning beds/booths.  If you are being treated for a sexually transmitted infection, avoid sexual contact until you have finished your treatment. Your sexual partner may also need treatment.  Avoid antacids, aluminum, calcium, magnesium, and iron products for 4 hours before and 2 hours after taking a dose of this medicine.  Birth control pills may not work properly while you are taking this medicine. Talk to your doctor about using an extra method of birth control.  If you are using this medicine to prevent malaria, you should still protect yourself from  contact with mosquitos. Stay in screened-in areas, use mosquito nets, keep your body covered, and use an insect repellent.  What side effects may I notice from receiving this medicine?  Side effects that you should report to your doctor or health care professional as soon as possible:  -allergic reactions like skin rash, itching or hives, swelling of the face, lips, or tongue  -difficulty breathing  -fever  -itching in the rectal or genital area  -pain on swallowing  -redness, blistering, peeling or loosening of the skin, including inside the mouth  -severe stomach pain or cramps  -unusual bleeding or bruising  -unusually weak or tired  -yellowing of the eyes or skin  Side effects that usually do not require medical attention (report to your doctor or health care professional if they continue or are bothersome):  -diarrhea  -loss of appetite  -nausea, vomiting  This list may not describe all possible side effects. Call your doctor for medical advice about side effects. You may report side effects to FDA at 0-129-FDA-1214.  Where should I keep my medicine?  Keep out of the reach of children.  Store at room temperature, below 25 degrees C (77 degrees F). Protect from light. Keep container tightly closed. Throw away any unused medicine after the expiration date. Taking this medicine after the expiration date can make you seriously ill.  NOTE: This sheet is a summary. It may not cover all possible information. If you have questions about this medicine, talk to your doctor, pharmacist, or health care provider.  © 2019 [employer] (2017-01-19 10:41:39)

## 2019-05-17 NOTE — PROGRESS NOTES
Chief Complaint   Patient presents with   • Follow-up     HTN       Subjective     History of Present Illness   Alex Torres is a 34 y.o. male presenting for follow up.   Now down to 8 cigarettes per day with chantix. Has been taking amlodipine regularly over the last week. Had fast food breakfast this morning and feels BP is up due to this. Took blood pressure medications early this morning (5am). Woke up early as he has difficulty sleeping.  Trazodone was stopped to be able to wake up for his upcoming baby.  He is working with psychiatry for this.    Has been on CPAP machine for the last 2 years.  But given his recent issues with sleep, PFTs were obtained.  Interestingly, PFTs suggested neuromuscular disease.  Patient does have neuropathies in his arms and legs and shares that he has tried gabapentin in the past which only caused problems with side effects.  Recent lab work was positive for RMSF.  Patient may have had prior exposure to tick bites and may have had associated symptoms which went untreated.    He also mentions that his GERD symptoms have been worse recently.      The following portions of the patient's history were reviewed and updated as appropriate: allergies, current medications, past family history, past medical history, past social history, past surgical history and problem list.    Review of Systems   Constitutional: Positive for fatigue. Negative for chills and fever.   HENT: Negative for congestion, ear pain, rhinorrhea, sinus pressure and sore throat.    Eyes: Negative for visual disturbance.   Respiratory: Negative for cough, chest tightness, shortness of breath and wheezing.    Cardiovascular: Negative for chest pain, palpitations and leg swelling.   Gastrointestinal: Negative for abdominal pain, blood in stool, constipation, diarrhea, nausea and vomiting.   Endocrine: Negative for polydipsia and polyuria.   Genitourinary: Negative for dysuria and hematuria.   Musculoskeletal:  Positive for arthralgias. Negative for back pain.   Skin: Negative for rash.   Neurological: Negative for dizziness, light-headedness, numbness and headaches.   Psychiatric/Behavioral: Positive for dysphoric mood and sleep disturbance. The patient is not nervous/anxious.        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 6 months       Social History     Socioeconomic History   • Marital status:      Spouse name: Not on file   • Number of children: Not on file   • Years of education: Not on file   • Highest education level: Not on file   Tobacco Use   • Smoking status: Current Every Day Smoker     Packs/day: 1.00     Years: 18.00     Pack years: 18.00     Types: Cigarettes     Start date: 1999   • Smokeless tobacco: Never Used   Substance and Sexual Activity   • Alcohol use: No   • Drug use: No   • Sexual activity: Yes     Partners: Female        Past Surgical History:   Procedure Laterality Date   • CHOLECYSTECTOMY  2002   • INGUINAL HERNIA REPAIR Right 1995   • ORIF METACARPAL FRACTURE Right 2000   • TIBIA FRACTURE SURGERY Left 1997    ORIF       Family History   Problem Relation Age of Onset   • Arthritis Father    • Hypertension Father    • Diabetes Maternal Aunt    • Diabetes Maternal Uncle    • Diabetes Maternal Grandmother    • Alzheimer's disease Maternal Grandmother    • Diabetes Other    • Hypertension Mother    • Depression Mother    • No Known Problems Brother    • Early death Maternal Grandfather    • Liver disease Maternal Grandfather    • Alcohol abuse Maternal Grandfather    • No Known Problems Paternal Grandmother    • Liver disease Paternal Grandfather    • Alcohol abuse Paternal Grandfather    • Early death Paternal Grandfather    • No Known Problems Brother   "        Current Outpatient Medications:   •  albuterol sulfate  (90 Base) MCG/ACT inhaler, Inhale 2 puffs Every 4 (Four) Hours As Needed for Wheezing or Shortness of Air., Disp: 18 g, Rfl: 3  •  amLODIPine (NORVASC) 5 MG tablet, Take 1 tablet by mouth Daily., Disp: 30 tablet, Rfl: 11  •  baclofen (LIORESAL) 10 MG tablet, TAKE 1 TABLET BY MOUTH 2 (TWO) TIMES A DAY AS NEEDED FOR MUSCLE SPASMS., Disp: 60 tablet, Rfl: 1  •  busPIRone (BUSPAR) 10 MG tablet, Take 1 tablet by mouth 3 (Three) Times a Day., Disp: 90 tablet, Rfl: 2  •  Cariprazine HCl 3 MG capsule, Take 1 capsule by mouth Daily. In evenings with food, Disp: 30 capsule, Rfl: 2  •  Fluticasone Furoate-Vilanterol (BREO ELLIPTA) 200-25 MCG/INH inhaler, Inhale 1 puff Daily., Disp: 30 each, Rfl: 11  •  metFORMIN (GLUCOPHAGE) 500 MG tablet, Take 1 tablet by mouth 2 (Two) Times a Day With Meals., Disp: 60 tablet, Rfl: 5  •  OXcarbazepine (TRILEPTAL) 300 MG tablet, Take one tablet in morning and 2 tabs at bedtime, Disp: 60 tablet, Rfl: 2  •  prazosin (MINIPRESS) 1 MG capsule, Take 1 capsule by mouth Every Night., Disp: 30 capsule, Rfl: 0  •  triamterene-hydrochlorothiazide (DYAZIDE) 37.5-25 MG per capsule, TAKE 1 CAPSULE BY MOUTH EVERY DAY, Disp: 30 capsule, Rfl: 5  •  varenicline (CHANTIX CONTINUING MONTH RONNY) 1 MG tablet, Take 1 tablet by mouth 2 (Two) Times a Day., Disp: 60 tablet, Rfl: 2  •  doxycycline (MONODOX) 100 MG capsule, Take 1 capsule by mouth 2 (Two) Times a Day., Disp: 20 capsule, Rfl: 0  •  pantoprazole (PROTONIX) 40 MG EC tablet, Take 1 tablet by mouth Daily., Disp: 30 tablet, Rfl: 2    Objective   /90   Pulse 76   Temp 97 °F (36.1 °C)   Ht 193 cm (76\")   Wt 130 kg (287 lb)   SpO2 99%   BMI 34.93 kg/m²     Physical Exam   Constitutional: He is oriented to person, place, and time. He appears well-developed and well-nourished.   HENT:   Head: Normocephalic and atraumatic.   Eyes: Conjunctivae are normal.   Neck: Normal range of " motion. Neck supple.   Pulmonary/Chest: Effort normal.   Musculoskeletal: Normal range of motion.   Neurological: He is alert and oriented to person, place, and time.   Skin: No rash noted.   Psychiatric: He has a normal mood and affect. His behavior is normal.   Nursing note and vitals reviewed.      Assessment/Plan   Alex was seen today for follow-up.    Diagnoses and all orders for this visit:    Gastroesophageal reflux disease without esophagitis  -     pantoprazole (PROTONIX) 40 MG EC tablet; Take 1 tablet by mouth Daily.    RMSF ([place] spotted fever)  -     doxycycline (MONODOX) 100 MG capsule; Take 1 capsule by mouth 2 (Two) Times a Day.    Morbidly obese (CMS/HCC)    Tobacco abuse          Discussion Summary:  Patient is a 34 y.o. male presenting for follow up.    1.  GERD  -Start Protonix.  Patient prefers Dexilant however his insurance does not come with this.    2.  [place] spotted fever  -Start doxycycline 1 tablet twice daily.  Presentation may suggest prior infection or a latent infection.  Patient advised to monitor his symptoms and his wound over the course of treatment.    3.  Polyneuropathy  - Pulmonary function tests also suggested neuromuscular disease.  This may be further addressed with neurology.  Blood testing for neuromuscular disease should be considered.    4.  Tobacco abuse  - Patient congratulated for efforts on quitting smoking.  He was encouraged to continue reducing his cigarette use.  Currently smoking 8 cigarettes/day.    Follow up:  Return in about 1 month (around 6/17/2019) for Next scheduled follow up.

## 2019-05-20 ENCOUNTER — OFFICE VISIT (OUTPATIENT)
Dept: INTERNAL MEDICINE | Facility: CLINIC | Age: 34
End: 2019-05-20

## 2019-05-20 VITALS
HEART RATE: 83 BPM | BODY MASS INDEX: 35.01 KG/M2 | HEIGHT: 76 IN | WEIGHT: 287.5 LBS | SYSTOLIC BLOOD PRESSURE: 154 MMHG | DIASTOLIC BLOOD PRESSURE: 98 MMHG | TEMPERATURE: 98.1 F | OXYGEN SATURATION: 98 %

## 2019-05-20 DIAGNOSIS — I10 ESSENTIAL HYPERTENSION: Primary | ICD-10-CM

## 2019-05-20 PROCEDURE — 99213 OFFICE O/P EST LOW 20 MIN: CPT | Performed by: FAMILY MEDICINE

## 2019-05-20 RX ORDER — LOSARTAN POTASSIUM 100 MG/1
100 TABLET ORAL DAILY
Qty: 30 TABLET | Refills: 2 | Status: SHIPPED | OUTPATIENT
Start: 2019-05-20 | End: 2019-08-05 | Stop reason: SDUPTHER

## 2019-05-20 NOTE — PROGRESS NOTES
Alex Torres is a 34 y.o. male.    Chief Complaint   Patient presents with   • Hypertension       HPI   Patient has hypertension.  They are taking hyazide and amlodipine.  The amlodipine has been started in the past few days.  They have been compliant with medications.  The patient denies any side effects to the medication.  Blood pressure is not controlled in the office today.  Blood pressure has been running elevated at home.  He has seen no improvement since starting new medication. They are following a low salt diet.  They are active.    The following portions of the patient's history were reviewed and updated as appropriate: allergies, current medications, past family history, past medical history, past social history, past surgical history and problem list.     Allergies   Allergen Reactions   • Gabapentin Swelling     + swelling   • Latex          Current Outpatient Medications:   •  albuterol sulfate  (90 Base) MCG/ACT inhaler, Inhale 2 puffs Every 4 (Four) Hours As Needed for Wheezing or Shortness of Air., Disp: 18 g, Rfl: 3  •  baclofen (LIORESAL) 10 MG tablet, TAKE 1 TABLET BY MOUTH 2 (TWO) TIMES A DAY AS NEEDED FOR MUSCLE SPASMS., Disp: 60 tablet, Rfl: 1  •  busPIRone (BUSPAR) 10 MG tablet, Take 1 tablet by mouth 3 (Three) Times a Day., Disp: 90 tablet, Rfl: 2  •  Cariprazine HCl 3 MG capsule, Take 1 capsule by mouth Daily. In evenings with food, Disp: 30 capsule, Rfl: 2  •  doxycycline (MONODOX) 100 MG capsule, Take 1 capsule by mouth 2 (Two) Times a Day., Disp: 20 capsule, Rfl: 0  •  Fluticasone Furoate-Vilanterol (BREO ELLIPTA) 200-25 MCG/INH inhaler, Inhale 1 puff Daily., Disp: 30 each, Rfl: 11  •  metFORMIN (GLUCOPHAGE) 500 MG tablet, Take 1 tablet by mouth 2 (Two) Times a Day With Meals., Disp: 60 tablet, Rfl: 5  •  OXcarbazepine (TRILEPTAL) 300 MG tablet, Take one tablet in morning and 2 tabs at bedtime, Disp: 60 tablet, Rfl: 2  •  pantoprazole (PROTONIX) 40 MG EC tablet, Take 1  "tablet by mouth Daily., Disp: 30 tablet, Rfl: 2  •  prazosin (MINIPRESS) 1 MG capsule, Take 1 capsule by mouth Every Night., Disp: 30 capsule, Rfl: 0  •  triamterene-hydrochlorothiazide (DYAZIDE) 37.5-25 MG per capsule, TAKE 1 CAPSULE BY MOUTH EVERY DAY, Disp: 30 capsule, Rfl: 5  •  varenicline (CHANTIX CONTINUING MONTH RONNY) 1 MG tablet, Take 1 tablet by mouth 2 (Two) Times a Day., Disp: 60 tablet, Rfl: 2  •  losartan (COZAAR) 100 MG tablet, Take 1 tablet by mouth Daily., Disp: 30 tablet, Rfl: 2    ROS    Review of Systems   Constitutional: Negative for chills and fever.   HENT: Negative for congestion, postnasal drip and sore throat.    Respiratory: Positive for chest tightness. Negative for cough, shortness of breath and wheezing.    Cardiovascular: Positive for chest pain. Negative for leg swelling.   Gastrointestinal: Negative for abdominal pain, constipation, diarrhea, nausea and vomiting.   Neurological: Positive for headache.   Psychiatric/Behavioral: The patient is nervous/anxious.        Vitals:    05/20/19 0912   BP: 154/98   BP Location: Left arm   Patient Position: Sitting   Cuff Size: Adult   Pulse: 83   Temp: 98.1 °F (36.7 °C)   TempSrc: Oral   SpO2: 98%   Weight: 130 kg (287 lb 8 oz)   Height: 193 cm (76\")     Body mass index is 35 kg/m².    Physical Exam     Physical Exam   Constitutional: He is oriented to person, place, and time. He appears well-developed and well-nourished. No distress.   HENT:   Head: Normocephalic and atraumatic.   Right Ear: External ear normal.   Left Ear: External ear normal.   Eyes: Conjunctivae and EOM are normal.   Cardiovascular: Normal rate and regular rhythm.   No murmur heard.  Pulmonary/Chest: Effort normal and breath sounds normal. No respiratory distress. He has no wheezes.   Abdominal: Soft. Bowel sounds are normal. He exhibits no distension. There is no tenderness.   Neurological: He is alert and oriented to person, place, and time. No cranial nerve deficit. "   Skin: Skin is warm and dry.   Psychiatric: His speech is normal. His mood appears anxious.       Assessment/Plan    Problem List Items Addressed This Visit        Cardiovascular and Mediastinum    Essential hypertension - Primary     Uncontrolled.  He is to continue dyazide.  Will D/C amlodipine as he has seen no benefit from the medication.  Will start losartan.  Patient encouraged to continue to monitor BP and f/u in a couple of weeks with PCP.          Relevant Medications    losartan (COZAAR) 100 MG tablet          New Medications Ordered This Visit   Medications   • losartan (COZAAR) 100 MG tablet     Sig: Take 1 tablet by mouth Daily.     Dispense:  30 tablet     Refill:  2       No orders of the defined types were placed in this encounter.      Return for Next scheduled follow up.    Brenda Woody,

## 2019-05-21 NOTE — ASSESSMENT & PLAN NOTE
Uncontrolled.  He is to continue dyazide.  Will D/C amlodipine as he has seen no benefit from the medication.  Will start losartan.  Patient encouraged to continue to monitor BP and f/u in a couple of weeks with PCP.

## 2019-05-27 ENCOUNTER — APPOINTMENT (OUTPATIENT)
Dept: GENERAL RADIOLOGY | Facility: HOSPITAL | Age: 34
End: 2019-05-27

## 2019-05-27 ENCOUNTER — HOSPITAL ENCOUNTER (EMERGENCY)
Facility: HOSPITAL | Age: 34
Discharge: HOME | End: 2019-05-27
Attending: EMERGENCY MEDICINE | Admitting: EMERGENCY MEDICINE

## 2019-05-27 VITALS
HEART RATE: 75 BPM | OXYGEN SATURATION: 96 % | WEIGHT: 285 LBS | SYSTOLIC BLOOD PRESSURE: 135 MMHG | RESPIRATION RATE: 18 BRPM | DIASTOLIC BLOOD PRESSURE: 85 MMHG | BODY MASS INDEX: 34.7 KG/M2 | HEIGHT: 76 IN | TEMPERATURE: 98.7 F

## 2019-05-27 DIAGNOSIS — R07.9 CHEST PAIN, UNSPECIFIED TYPE: Primary | ICD-10-CM

## 2019-05-27 LAB
ALBUMIN SERPL-MCNC: 4.7 G/DL (ref 3.5–5)
ALBUMIN/GLOB SERPL: 1.5 G/DL (ref 1–2)
ALP SERPL-CCNC: 73 U/L (ref 38–126)
ALT SERPL W P-5'-P-CCNC: 92 U/L (ref 13–69)
ANION GAP SERPL CALCULATED.3IONS-SCNC: 15.4 MMOL/L (ref 10–20)
AST SERPL-CCNC: 41 U/L (ref 15–46)
BASOPHILS # BLD AUTO: 0.05 10*3/MM3 (ref 0–0.2)
BASOPHILS NFR BLD AUTO: 0.5 % (ref 0–1.5)
BILIRUB SERPL-MCNC: 0.4 MG/DL (ref 0.2–1.3)
BUN BLD-MCNC: 16 MG/DL (ref 7–20)
BUN/CREAT SERPL: 17.8 (ref 6.3–21.9)
CALCIUM SPEC-SCNC: 9.5 MG/DL (ref 8.4–10.2)
CHLORIDE SERPL-SCNC: 100 MMOL/L (ref 98–107)
CO2 SERPL-SCNC: 28 MMOL/L (ref 26–30)
CREAT BLD-MCNC: 0.9 MG/DL (ref 0.6–1.3)
DEPRECATED RDW RBC AUTO: 44.4 FL (ref 37–54)
EOSINOPHIL # BLD AUTO: 0.15 10*3/MM3 (ref 0–0.4)
EOSINOPHIL NFR BLD AUTO: 1.4 % (ref 0.3–6.2)
ERYTHROCYTE [DISTWIDTH] IN BLOOD BY AUTOMATED COUNT: 13.5 % (ref 12.3–15.4)
GFR SERPL CREATININE-BSD FRML MDRD: 95 ML/MIN/1.73
GLOBULIN UR ELPH-MCNC: 3.1 GM/DL
GLUCOSE BLD-MCNC: 124 MG/DL (ref 74–98)
HCT VFR BLD AUTO: 46.5 % (ref 37.5–51)
HGB BLD-MCNC: 15.6 G/DL (ref 13–17.7)
HOLD SPECIMEN: NORMAL
IMM GRANULOCYTES # BLD AUTO: 0.13 10*3/MM3 (ref 0–0.05)
IMM GRANULOCYTES NFR BLD AUTO: 1.3 % (ref 0–0.5)
LYMPHOCYTES # BLD AUTO: 3.57 10*3/MM3 (ref 0.7–3.1)
LYMPHOCYTES NFR BLD AUTO: 34.4 % (ref 19.6–45.3)
MCH RBC QN AUTO: 29.7 PG (ref 26.6–33)
MCHC RBC AUTO-ENTMCNC: 33.5 G/DL (ref 31.5–35.7)
MCV RBC AUTO: 88.6 FL (ref 79–97)
MONOCYTES # BLD AUTO: 0.64 10*3/MM3 (ref 0.1–0.9)
MONOCYTES NFR BLD AUTO: 6.2 % (ref 5–12)
NEUTROPHILS # BLD AUTO: 5.85 10*3/MM3 (ref 1.7–7)
NEUTROPHILS NFR BLD AUTO: 56.2 % (ref 42.7–76)
NRBC BLD AUTO-RTO: 0 /100 WBC (ref 0–0.2)
PLATELET # BLD AUTO: 233 10*3/MM3 (ref 140–450)
PMV BLD AUTO: 9 FL (ref 6–12)
POTASSIUM BLD-SCNC: 3.4 MMOL/L (ref 3.5–5.1)
PROT SERPL-MCNC: 7.8 G/DL (ref 6.3–8.2)
RBC # BLD AUTO: 5.25 10*6/MM3 (ref 4.14–5.8)
SODIUM BLD-SCNC: 140 MMOL/L (ref 137–145)
TROPONIN I SERPL-MCNC: <0.012 NG/ML (ref 0–0.03)
TROPONIN I SERPL-MCNC: <0.012 NG/ML (ref 0–0.03)
WBC NRBC COR # BLD: 10.39 10*3/MM3 (ref 3.4–10.8)
WHOLE BLOOD HOLD SPECIMEN: NORMAL
WHOLE BLOOD HOLD SPECIMEN: NORMAL

## 2019-05-27 PROCEDURE — 93005 ELECTROCARDIOGRAM TRACING: CPT | Performed by: EMERGENCY MEDICINE

## 2019-05-27 PROCEDURE — 85025 COMPLETE CBC W/AUTO DIFF WBC: CPT | Performed by: EMERGENCY MEDICINE

## 2019-05-27 PROCEDURE — 99284 EMERGENCY DEPT VISIT MOD MDM: CPT

## 2019-05-27 PROCEDURE — 84484 ASSAY OF TROPONIN QUANT: CPT | Performed by: EMERGENCY MEDICINE

## 2019-05-27 PROCEDURE — 96374 THER/PROPH/DIAG INJ IV PUSH: CPT

## 2019-05-27 PROCEDURE — 71045 X-RAY EXAM CHEST 1 VIEW: CPT

## 2019-05-27 PROCEDURE — 80053 COMPREHEN METABOLIC PANEL: CPT | Performed by: EMERGENCY MEDICINE

## 2019-05-27 RX ORDER — ASPIRIN 81 MG/1
324 TABLET, CHEWABLE ORAL ONCE
Status: COMPLETED | OUTPATIENT
Start: 2019-05-27 | End: 2019-05-27

## 2019-05-27 RX ORDER — SODIUM CHLORIDE 0.9 % (FLUSH) 0.9 %
10 SYRINGE (ML) INJECTION AS NEEDED
Status: DISCONTINUED | OUTPATIENT
Start: 2019-05-27 | End: 2019-05-27 | Stop reason: HOSPADM

## 2019-05-27 RX ORDER — LABETALOL HYDROCHLORIDE 5 MG/ML
10 INJECTION, SOLUTION INTRAVENOUS ONCE
Status: COMPLETED | OUTPATIENT
Start: 2019-05-27 | End: 2019-05-27

## 2019-05-27 RX ADMIN — ASPIRIN 81 MG 324 MG: 81 TABLET ORAL at 18:07

## 2019-05-27 RX ADMIN — LABETALOL 20 MG/4 ML (5 MG/ML) INTRAVENOUS SYRINGE 10 MG: at 18:07

## 2019-06-03 ENCOUNTER — HOSPITAL ENCOUNTER (EMERGENCY)
Facility: HOSPITAL | Age: 34
Discharge: HOME | End: 2019-06-03
Attending: EMERGENCY MEDICINE | Admitting: EMERGENCY MEDICINE

## 2019-06-03 ENCOUNTER — APPOINTMENT (OUTPATIENT)
Dept: ULTRASOUND IMAGING | Facility: HOSPITAL | Age: 34
End: 2019-06-03

## 2019-06-03 VITALS
DIASTOLIC BLOOD PRESSURE: 80 MMHG | HEART RATE: 80 BPM | BODY MASS INDEX: 35.31 KG/M2 | OXYGEN SATURATION: 97 % | HEIGHT: 76 IN | WEIGHT: 290 LBS | RESPIRATION RATE: 20 BRPM | SYSTOLIC BLOOD PRESSURE: 138 MMHG | TEMPERATURE: 98.7 F

## 2019-06-03 DIAGNOSIS — L03.90 CELLULITIS, UNSPECIFIED CELLULITIS SITE: Primary | ICD-10-CM

## 2019-06-03 LAB
ALBUMIN SERPL-MCNC: 4.4 G/DL (ref 3.5–5)
ALBUMIN/GLOB SERPL: 1.6 G/DL (ref 1–2)
ALP SERPL-CCNC: 75 U/L (ref 38–126)
ALT SERPL W P-5'-P-CCNC: 99 U/L (ref 13–69)
ANION GAP SERPL CALCULATED.3IONS-SCNC: 14.8 MMOL/L (ref 10–20)
AST SERPL-CCNC: 42 U/L (ref 15–46)
BASOPHILS # BLD AUTO: 0.05 10*3/MM3 (ref 0–0.2)
BASOPHILS NFR BLD AUTO: 0.6 % (ref 0–1.5)
BILIRUB SERPL-MCNC: 0.3 MG/DL (ref 0.2–1.3)
BUN BLD-MCNC: 12 MG/DL (ref 7–20)
BUN/CREAT SERPL: 15 (ref 6.3–21.9)
CALCIUM SPEC-SCNC: 9.3 MG/DL (ref 8.4–10.2)
CHLORIDE SERPL-SCNC: 101 MMOL/L (ref 98–107)
CO2 SERPL-SCNC: 26 MMOL/L (ref 26–30)
CREAT BLD-MCNC: 0.8 MG/DL (ref 0.6–1.3)
DEPRECATED RDW RBC AUTO: 43.8 FL (ref 37–54)
EOSINOPHIL # BLD AUTO: 0.12 10*3/MM3 (ref 0–0.4)
EOSINOPHIL NFR BLD AUTO: 1.4 % (ref 0.3–6.2)
ERYTHROCYTE [DISTWIDTH] IN BLOOD BY AUTOMATED COUNT: 13.5 % (ref 12.3–15.4)
GFR SERPL CREATININE-BSD FRML MDRD: 109 ML/MIN/1.73
GLOBULIN UR ELPH-MCNC: 2.8 GM/DL
GLUCOSE BLD-MCNC: 199 MG/DL (ref 74–98)
HCT VFR BLD AUTO: 45 % (ref 37.5–51)
HGB BLD-MCNC: 14.7 G/DL (ref 13–17.7)
IMM GRANULOCYTES # BLD AUTO: 0.25 10*3/MM3 (ref 0–0.05)
IMM GRANULOCYTES NFR BLD AUTO: 2.9 % (ref 0–0.5)
LYMPHOCYTES # BLD AUTO: 2.67 10*3/MM3 (ref 0.7–3.1)
LYMPHOCYTES NFR BLD AUTO: 30.6 % (ref 19.6–45.3)
MCH RBC QN AUTO: 28.8 PG (ref 26.6–33)
MCHC RBC AUTO-ENTMCNC: 32.7 G/DL (ref 31.5–35.7)
MCV RBC AUTO: 88.2 FL (ref 79–97)
MONOCYTES # BLD AUTO: 0.51 10*3/MM3 (ref 0.1–0.9)
MONOCYTES NFR BLD AUTO: 5.8 % (ref 5–12)
NEUTROPHILS # BLD AUTO: 5.13 10*3/MM3 (ref 1.7–7)
NEUTROPHILS NFR BLD AUTO: 58.7 % (ref 42.7–76)
NRBC BLD AUTO-RTO: 0 /100 WBC (ref 0–0.2)
PLATELET # BLD AUTO: 212 10*3/MM3 (ref 140–450)
PMV BLD AUTO: 8.8 FL (ref 6–12)
POTASSIUM BLD-SCNC: 3.8 MMOL/L (ref 3.5–5.1)
PROT SERPL-MCNC: 7.2 G/DL (ref 6.3–8.2)
RBC # BLD AUTO: 5.1 10*6/MM3 (ref 4.14–5.8)
SODIUM BLD-SCNC: 138 MMOL/L (ref 137–145)
WBC NRBC COR # BLD: 8.73 10*3/MM3 (ref 3.4–10.8)

## 2019-06-03 PROCEDURE — 85025 COMPLETE CBC W/AUTO DIFF WBC: CPT | Performed by: EMERGENCY MEDICINE

## 2019-06-03 PROCEDURE — 99284 EMERGENCY DEPT VISIT MOD MDM: CPT

## 2019-06-03 PROCEDURE — 93971 EXTREMITY STUDY: CPT

## 2019-06-03 PROCEDURE — 36415 COLL VENOUS BLD VENIPUNCTURE: CPT

## 2019-06-03 PROCEDURE — 80053 COMPREHEN METABOLIC PANEL: CPT | Performed by: EMERGENCY MEDICINE

## 2019-06-03 RX ORDER — HYDROCODONE BITARTRATE AND ACETAMINOPHEN 5; 325 MG/1; MG/1
1 TABLET ORAL ONCE
Status: COMPLETED | OUTPATIENT
Start: 2019-06-03 | End: 2019-06-03

## 2019-06-03 RX ORDER — CEPHALEXIN 500 MG/1
500 CAPSULE ORAL 4 TIMES DAILY
Qty: 40 CAPSULE | Refills: 0 | Status: SHIPPED | OUTPATIENT
Start: 2019-06-03 | End: 2019-06-13

## 2019-06-03 RX ORDER — CEPHALEXIN 250 MG/1
500 CAPSULE ORAL ONCE
Status: COMPLETED | OUTPATIENT
Start: 2019-06-03 | End: 2019-06-03

## 2019-06-03 RX ADMIN — CEPHALEXIN 500 MG: 250 CAPSULE ORAL at 13:45

## 2019-06-03 RX ADMIN — CEPHALEXIN 500 MG: 250 CAPSULE ORAL at 13:52

## 2019-06-03 RX ADMIN — HYDROCODONE BITARTRATE AND ACETAMINOPHEN 1 TABLET: 5; 325 TABLET ORAL at 10:42

## 2019-06-03 NOTE — ED PROVIDER NOTES
TRIAGE CHIEF COMPLAINT:     Nursing and triage notes reviewed    Chief Complaint   Patient presents with   • Leg Pain      HPI: Alex Torres is a 34 y.o. male who presents to the emergency department complaining of left lower extremity discomfort.  Symptoms began earlier this morning a few hours prior to arrival.  Patient describes some redness and swelling in the leg as well.  Patient has a history of diabetes and states there is a family history of blood clots.  He states he is concerned he has developed a blood clot in his leg.  Denies fevers or chills.  Is never had similar symptoms in the past.  No personal history of blood clot.  No other complaints currently.    REVIEW OF SYSTEMS: All other systems reviewed and are negative     PAST MEDICAL HISTORY:   Past Medical History:   Diagnosis Date   • Anxiety    • Bipolar 1 disorder (CMS/HCC)    • Depression    • Hypertension    • Kidney stone    • Polyneuropathy    • Positive TB test     treated w/ meds x 6 months        FAMILY HISTORY:   Family History   Problem Relation Age of Onset   • Arthritis Father    • Hypertension Father    • Diabetes Maternal Aunt    • Diabetes Maternal Uncle    • Diabetes Maternal Grandmother    • Alzheimer's disease Maternal Grandmother    • Diabetes Other    • Hypertension Mother    • Depression Mother    • No Known Problems Brother    • Early death Maternal Grandfather    • Liver disease Maternal Grandfather    • Alcohol abuse Maternal Grandfather    • No Known Problems Paternal Grandmother    • Liver disease Paternal Grandfather    • Alcohol abuse Paternal Grandfather    • Early death Paternal Grandfather    • No Known Problems Brother         SOCIAL HISTORY:   Social History     Socioeconomic History   • Marital status:      Spouse name: Not on file   • Number of children: Not on file   • Years of education: Not on file   • Highest education level: Not on file   Tobacco Use   • Smoking status: Current Every Day Smoker      Packs/day: 0.30     Years: 18.00     Pack years: 5.40     Types: Cigarettes     Start date: 1999   • Smokeless tobacco: Never Used   Substance and Sexual Activity   • Alcohol use: No   • Drug use: No   • Sexual activity: Yes     Partners: Female        SURGICAL HISTORY:   Past Surgical History:   Procedure Laterality Date   • CHOLECYSTECTOMY  2002   • INGUINAL HERNIA REPAIR Right 1995   • ORIF METACARPAL FRACTURE Right 2000   • TIBIA FRACTURE SURGERY Left 1997    ORIF        CURRENT MEDICATIONS:      Medication List      ASK your doctor about these medications    albuterol sulfate  (90 Base) MCG/ACT inhaler  Commonly known as:  PROVENTIL HFA;VENTOLIN HFA;PROAIR HFA  Inhale 2 puffs Every 4 (Four) Hours As Needed for Wheezing or Shortness of   Air.     baclofen 10 MG tablet  Commonly known as:  LIORESAL  TAKE 1 TABLET BY MOUTH 2 (TWO) TIMES A DAY AS NEEDED FOR MUSCLE SPASMS.     busPIRone 10 MG tablet  Commonly known as:  BUSPAR  Take 1 tablet by mouth 3 (Three) Times a Day.     Cariprazine HCl 3 MG capsule  Take 1 capsule by mouth Daily. In evenings with food     doxycycline 100 MG capsule  Commonly known as:  MONODOX  Take 1 capsule by mouth 2 (Two) Times a Day.     Fluticasone Furoate-Vilanterol 200-25 MCG/INH inhaler  Commonly known as:  BREO ELLIPTA  Inhale 1 puff Daily.     losartan 100 MG tablet  Commonly known as:  COZAAR  Take 1 tablet by mouth Daily.     metFORMIN 500 MG tablet  Commonly known as:  GLUCOPHAGE  Take 1 tablet by mouth 2 (Two) Times a Day With Meals.     OXcarbazepine 300 MG tablet  Commonly known as:  TRILEPTAL  Take one tablet in morning and 2 tabs at bedtime     pantoprazole 40 MG EC tablet  Commonly known as:  PROTONIX  Take 1 tablet by mouth Daily.     prazosin 1 MG capsule  Commonly known as:  MINIPRESS  Take 1 capsule by mouth Every Night.     triamterene-hydrochlorothiazide 37.5-25 MG per capsule  Commonly known as:  DYAZIDE  TAKE 1 CAPSULE BY MOUTH EVERY DAY     varenicline  1 MG tablet  Commonly known as:  CHANTIX CONTINUING MONTH RONNY  Take 1 tablet by mouth 2 (Two) Times a Day.           ALLERGIES: Gabapentin and Latex     PHYSICAL EXAM:   VITAL SIGNS:   Vitals:    06/03/19 1006   BP: 142/92   Pulse: 80   Resp: 18   Temp: 97.9 °F (36.6 °C)   SpO2: 97%      CONSTITUTIONAL: Awake, oriented, appears non-toxic   HENT: Atraumatic, normocephalic, oral mucosa pink and moist, airway patent.  EYES: Conjunctiva clear   NECK: Trachea midline  CARDIOVASCULAR: Normal heart rate, Normal rhythm, No murmurs, rubs, gallops   PULMONARY/CHEST: Clear to auscultation, no rhonchi, wheezes, or rales. Symmetrical breath sounds.  ABDOMINAL: Non-distended, soft, non-tender - no rebound or guarding.  NEUROLOGIC: Non-focal, moving all four extremities, no gross sensory or motor deficits.   EXTREMITIES: No clubbing, cyanosis.  There is a small amount of erythema on the anterior left shin streaking up towards the knee.  Area slightly tender.  There is slight tenderness in the posterior left calf.  Distal dorsalis pedis and posterior tibial pulses are intact.  SKIN: Warm, Dry, No erythema, No rash     ED COURSE / MEDICAL DECISION MAKING:   Alex Torres is a 34 y.o. male who presents to the emergency department for evaluation of left lower extremity redness and swelling.  There is a small amount of redness and minimal swelling in the left lower extremity.  There is some tenderness in the left calf.  Suspect a cellulitis versus deep venous thrombosis.  Will obtain basic labs and an ultrasound for further evaluation.  Ultrasound unremarkable.  No sign of DVT.  Labs largely unremarkable.  Will place patient on antibiotics for suspected cellulitis.  Return precautions discussed.    DECISION TO DISCHARGE/ADMIT: see ED care timeline     FINAL IMPRESSION:   1 --cellulitis  2 --   3 --     Electronically signed by: Liat Flores MD, 6/3/2019 10:31 AM       Liat Flores MD  06/03/19 0858

## 2019-06-06 ENCOUNTER — OFFICE VISIT (OUTPATIENT)
Dept: PSYCHIATRY | Facility: CLINIC | Age: 34
End: 2019-06-06

## 2019-06-06 VITALS
BODY MASS INDEX: 35.44 KG/M2 | DIASTOLIC BLOOD PRESSURE: 90 MMHG | HEIGHT: 76 IN | WEIGHT: 291 LBS | SYSTOLIC BLOOD PRESSURE: 130 MMHG

## 2019-06-06 DIAGNOSIS — F31.62 BIPOLAR DISORDER, CURRENT EPISODE MIXED, MODERATE (HCC): Primary | ICD-10-CM

## 2019-06-06 DIAGNOSIS — F43.10 POST TRAUMATIC STRESS DISORDER (PTSD): ICD-10-CM

## 2019-06-06 DIAGNOSIS — F51.05 INSOMNIA DUE TO MENTAL CONDITION: ICD-10-CM

## 2019-06-06 PROCEDURE — 99214 OFFICE O/P EST MOD 30 MIN: CPT | Performed by: NURSE PRACTITIONER

## 2019-06-06 RX ORDER — PRAZOSIN HYDROCHLORIDE 2 MG/1
2 CAPSULE ORAL NIGHTLY
Qty: 30 CAPSULE | Refills: 0 | Status: SHIPPED | OUTPATIENT
Start: 2019-06-06 | End: 2019-07-09 | Stop reason: SDUPTHER

## 2019-06-06 NOTE — PROGRESS NOTES
Alex Torres is a 34 y.o. male is here today for medication management follow-up.    Chief Complaint:      ICD-10-CM ICD-9-CM   1. Bipolar disorder, current episode mixed, moderate (CMS/HCC) F31.62 296.62   2. Post traumatic stress disorder (PTSD) F43.10 309.81   3. Insomnia due to mental condition F51.05 300.9     327.02       History of Present Illness:  Pt present for follow up visit and medication managment of mood symptoms. Pt tapered of Trazodone without incident. Currently taking Prazosin 1 mg. States he is no longer having nightmares and is sleeping lighter. Still having some difficulty with staying a sleep. States he is having vivid dreams but he is also taking Chantix .    Pt reports the presence/absence of  manic/hypomanic symptoms: (-) distinct period of abnormally persistent elevated, expansive, irritable mood or increased goal-directed activity or energy, (-) inflated self-esteem/grandiosity, (-) decreased need for sleep, (-) hyper-talkative/pressured speech, (-) flights of ideas/racing thoughts, (-) distractibility, (-) increased goal directed activity/agitation, and (-) excessive involvement in activities with high potential for painful outcomes.     The following portions of the patient's history were reviewed and updated as appropriate: allergies, current medications, past family history, past medical history, past social history, past surgical history and problem list.    Review of Systems;;  Review of Systems   Constitutional: Negative.  Negative for activity change, appetite change, unexpected weight gain and unexpected weight loss.   Respiratory: Negative.    Cardiovascular: Negative.  Negative for chest pain.   Gastrointestinal: Negative.  Negative for diarrhea, nausea and vomiting.   Musculoskeletal: Negative.    Skin: Negative for rash and bruise.   Neurological: Negative.  Negative for dizziness, seizures and speech difficulty.   Psychiatric/Behavioral: Positive for sleep  "disturbance. Negative for agitation, behavioral problems, decreased concentration, dysphoric mood, hallucinations, self-injury, suicidal ideas, negative for hyperactivity, depressed mood and stress. The patient is not nervous/anxious.        Physical Exam;;  Physical Exam  Blood pressure 130/90, height 193 cm (76\"), weight 132 kg (291 lb).    Current Medications;;    Current Outpatient Medications:   •  albuterol sulfate  (90 Base) MCG/ACT inhaler, Inhale 2 puffs Every 4 (Four) Hours As Needed for Wheezing or Shortness of Air., Disp: 18 g, Rfl: 3  •  baclofen (LIORESAL) 10 MG tablet, TAKE 1 TABLET BY MOUTH 2 (TWO) TIMES A DAY AS NEEDED FOR MUSCLE SPASMS., Disp: 60 tablet, Rfl: 1  •  busPIRone (BUSPAR) 10 MG tablet, Take 1 tablet by mouth 3 (Three) Times a Day., Disp: 90 tablet, Rfl: 2  •  Cariprazine HCl 3 MG capsule, Take 1 capsule by mouth Daily. In evenings with food, Disp: 30 capsule, Rfl: 2  •  cephalexin (KEFLEX) 500 MG capsule, Take 1 capsule by mouth 4 (Four) Times a Day for 10 days., Disp: 40 capsule, Rfl: 0  •  doxycycline (MONODOX) 100 MG capsule, Take 1 capsule by mouth 2 (Two) Times a Day., Disp: 20 capsule, Rfl: 0  •  Fluticasone Furoate-Vilanterol (BREO ELLIPTA) 200-25 MCG/INH inhaler, Inhale 1 puff Daily., Disp: 30 each, Rfl: 11  •  losartan (COZAAR) 100 MG tablet, Take 1 tablet by mouth Daily., Disp: 30 tablet, Rfl: 2  •  metFORMIN (GLUCOPHAGE) 500 MG tablet, Take 1 tablet by mouth 2 (Two) Times a Day With Meals., Disp: 60 tablet, Rfl: 5  •  OXcarbazepine (TRILEPTAL) 300 MG tablet, Take one tablet in morning and 2 tabs at bedtime, Disp: 60 tablet, Rfl: 2  •  pantoprazole (PROTONIX) 40 MG EC tablet, Take 1 tablet by mouth Daily., Disp: 30 tablet, Rfl: 2  •  prazosin (MINIPRESS) 2 MG capsule, Take 1 capsule by mouth Every Night., Disp: 30 capsule, Rfl: 0  •  triamterene-hydrochlorothiazide (DYAZIDE) 37.5-25 MG per capsule, TAKE 1 CAPSULE BY MOUTH EVERY DAY, Disp: 30 capsule, Rfl: 5  •  " varenicline (CHANTIX CONTINUING MONTH RONNY) 1 MG tablet, Take 1 tablet by mouth 2 (Two) Times a Day., Disp: 60 tablet, Rfl: 2    Lab Results:       Mental Status Exam:   Hygiene:   good  Cooperation:  Cooperative  Eye Contact:  Good  Psychomotor Behavior:  Appropriate  Mood:euthymic  Affect:  Appropriate  Hopelessness: Denies  Speech:  Normal  Thought Process:  Goal directed  Thought Content:  Normal  Suicidal:  None  Homicidal:  None  Hallucinations:  None  Delusion:  None  Memory:  Intact  Orientation:  Person, Place, Time and Situation  Reliability:  good  Insight:  Good  Judgement:  Good  Impulse Control:  Good  Physical/Medical Issues:  No         Assessment Plan;;  Diagnoses and all orders for this visit:    Bipolar disorder, current episode mixed, moderate (CMS/HCC)    Post traumatic stress disorder (PTSD)  -     prazosin (MINIPRESS) 2 MG capsule; Take 1 capsule by mouth Every Night.    Insomnia due to mental condition  -     prazosin (MINIPRESS) 2 MG capsule; Take 1 capsule by mouth Every Night.    Increase Prazosin to 2 mg     A psychological evaluation was conducted in order to assess past and current level of functioning. Areas assessed included, but were not limited to: perception of social support, perception of ability to face and deal with challenges in life (positive functioning), anxiety symptoms, depressive symptoms, perspective on beliefs/belief system, coping skills for stress, intelligence level,  Therapeutic rapport was established. Interventions conducted today were geared towards incorporating medication management along with support for continued therapy. Education was also provided as to the med management with this provider and what to expect in subsequent sessions.    We discussed risks, benefits,goals and side effects of the above medication and the patient was agreeable with the plan.Patient was educated on the importance of compliance with treatment and follow-up appointments. Patient  is aware to contact the [facility] with any worsening of symptoms. To call for questions or concerns and return early if necessary. Patent is agreeable to go to the Emergency Department or call 911 should they begin SI/HI.     Treatment Plan: stabilize mood,  patient will stay out of the hospital and be at optimal level of functioning, take all medication as prescribed. Patient verbalized  understanding and agreement to plan.    Return in about 4 weeks (around 7/4/2019) for Follow Up.    Jana Kingsley, HU, APRN, PMHNP-BC, FNP-C

## 2019-06-07 ENCOUNTER — OFFICE VISIT (OUTPATIENT)
Dept: INTERNAL MEDICINE | Facility: CLINIC | Age: 34
End: 2019-06-07

## 2019-06-07 VITALS
TEMPERATURE: 97.6 F | SYSTOLIC BLOOD PRESSURE: 137 MMHG | HEART RATE: 73 BPM | DIASTOLIC BLOOD PRESSURE: 77 MMHG | OXYGEN SATURATION: 97 % | HEIGHT: 76 IN | BODY MASS INDEX: 35.57 KG/M2 | RESPIRATION RATE: 16 BRPM | WEIGHT: 292.12 LBS

## 2019-06-07 DIAGNOSIS — I10 ESSENTIAL HYPERTENSION: ICD-10-CM

## 2019-06-07 DIAGNOSIS — R73.03 BORDERLINE DIABETES: ICD-10-CM

## 2019-06-07 DIAGNOSIS — Z72.0 TOBACCO ABUSE: Primary | ICD-10-CM

## 2019-06-07 PROCEDURE — 99214 OFFICE O/P EST MOD 30 MIN: CPT | Performed by: INTERNAL MEDICINE

## 2019-06-07 RX ORDER — VARENICLINE TARTRATE 1 MG/1
1 TABLET, FILM COATED ORAL
Qty: 60 TABLET | Refills: 2 | Status: SHIPPED | OUTPATIENT
Start: 2019-06-07 | End: 2019-11-22 | Stop reason: SDDI

## 2019-06-07 NOTE — PROGRESS NOTES
Chief Complaint   Patient presents with   • Gastroesophageal reflux disease without esophagitis     6 week follow-up   • Hypertension     follow-up, patient states he is continuing to have elevated blood pressures at home        Subjective     History of Present Illness   Alex Torres is a 34 y.o. male presenting for follow up. Patient shares that his BP has been well controlled recently with clinic reads.  He has noticed his home cuff has been reading very high in the 170s.  He has made dietary changes but admits he can do better with portion sizes.  He has particularly reduced sodium intake.  GERD symptoms are well controlled with current medications;, but he does have intermittent symptoms when he needs zantac. Continues to have difficulty with CPAP mask as it causes smothering.   Psychiatry recently increased prazosin dose.     The following portions of the patient's history were reviewed and updated as appropriate: allergies, current medications, past family history, past medical history, past social history, past surgical history and problem list.    Review of Systems   Constitutional: Negative for chills, fatigue and fever.   HENT: Negative for congestion, ear pain, rhinorrhea, sinus pressure and sore throat.    Eyes: Negative for visual disturbance.   Respiratory: Negative for cough, chest tightness, shortness of breath and wheezing.    Cardiovascular: Negative for chest pain, palpitations and leg swelling.   Gastrointestinal: Negative for abdominal pain, blood in stool, constipation, diarrhea, nausea and vomiting.   Endocrine: Negative for polydipsia and polyuria.   Genitourinary: Negative for dysuria and hematuria.   Musculoskeletal: Negative for arthralgias and back pain.   Skin: Negative for rash.   Neurological: Negative for dizziness, light-headedness, numbness and headaches.   Psychiatric/Behavioral: Negative for dysphoric mood and sleep disturbance. The patient is not nervous/anxious.         Allergies   Allergen Reactions   • Gabapentin Swelling     + swelling   • Latex        Past Medical History:   Diagnosis Date   • Anxiety    • Bipolar 1 disorder (CMS/HCC)    • Depression    • Hypertension    • Kidney stone    • Polyneuropathy    • Positive TB test     treated w/ meds x 6 months       Social History     Socioeconomic History   • Marital status:      Spouse name: Not on file   • Number of children: Not on file   • Years of education: Not on file   • Highest education level: Not on file   Tobacco Use   • Smoking status: Current Every Day Smoker     Packs/day: 0.30     Years: 18.00     Pack years: 5.40     Types: Cigarettes     Start date: 1999   • Smokeless tobacco: Never Used   Substance and Sexual Activity   • Alcohol use: No   • Drug use: No   • Sexual activity: Yes     Partners: Female        Past Surgical History:   Procedure Laterality Date   • CHOLECYSTECTOMY  2002   • INGUINAL HERNIA REPAIR Right 1995   • ORIF METACARPAL FRACTURE Right 2000   • TIBIA FRACTURE SURGERY Left 1997    ORIF       Family History   Problem Relation Age of Onset   • Arthritis Father    • Hypertension Father    • Diabetes Maternal Aunt    • Diabetes Maternal Uncle    • Diabetes Maternal Grandmother    • Alzheimer's disease Maternal Grandmother    • Diabetes Other    • Hypertension Mother    • Depression Mother    • No Known Problems Brother    • Early death Maternal Grandfather    • Liver disease Maternal Grandfather    • Alcohol abuse Maternal Grandfather    • No Known Problems Paternal Grandmother    • Liver disease Paternal Grandfather    • Alcohol abuse Paternal Grandfather    • Early death Paternal Grandfather    • No Known Problems Brother          Current Outpatient Medications:   •  albuterol sulfate  (90 Base) MCG/ACT inhaler, Inhale 2 puffs Every 4 (Four) Hours As Needed for Wheezing or Shortness of Air., Disp: 18 g, Rfl: 3  •  baclofen (LIORESAL) 10 MG tablet, TAKE 1 TABLET BY MOUTH 2  "(TWO) TIMES A DAY AS NEEDED FOR MUSCLE SPASMS., Disp: 60 tablet, Rfl: 1  •  busPIRone (BUSPAR) 10 MG tablet, Take 1 tablet by mouth 3 (Three) Times a Day., Disp: 90 tablet, Rfl: 2  •  Cariprazine HCl 3 MG capsule, Take 1 capsule by mouth Daily. In evenings with food, Disp: 30 capsule, Rfl: 2  •  cephalexin (KEFLEX) 500 MG capsule, Take 1 capsule by mouth 4 (Four) Times a Day for 10 days., Disp: 40 capsule, Rfl: 0  •  Fluticasone Furoate-Vilanterol (BREO ELLIPTA) 200-25 MCG/INH inhaler, Inhale 1 puff Daily., Disp: 30 each, Rfl: 11  •  losartan (COZAAR) 100 MG tablet, Take 1 tablet by mouth Daily., Disp: 30 tablet, Rfl: 2  •  metFORMIN (GLUCOPHAGE) 500 MG tablet, Take 1 tablet by mouth 2 (Two) Times a Day With Meals., Disp: 60 tablet, Rfl: 5  •  OXcarbazepine (TRILEPTAL) 300 MG tablet, Take one tablet in morning and 2 tabs at bedtime, Disp: 60 tablet, Rfl: 2  •  pantoprazole (PROTONIX) 40 MG EC tablet, Take 1 tablet by mouth Daily., Disp: 30 tablet, Rfl: 2  •  prazosin (MINIPRESS) 2 MG capsule, Take 1 capsule by mouth Every Night., Disp: 30 capsule, Rfl: 0  •  triamterene-hydrochlorothiazide (DYAZIDE) 37.5-25 MG per capsule, TAKE 1 CAPSULE BY MOUTH EVERY DAY, Disp: 30 capsule, Rfl: 5  •  varenicline (CHANTIX CONTINUING MONTH RONNY) 1 MG tablet, Take 1 tablet by mouth 2 (Two) Times a Day., Disp: 60 tablet, Rfl: 2    Objective   /77 (BP Location: Left arm, Patient Position: Sitting, Cuff Size: Large Adult)   Pulse 73   Temp 97.6 °F (36.4 °C) (Oral)   Resp 16   Ht 193 cm (76\")   Wt 133 kg (292 lb 1.9 oz)   SpO2 97%   BMI 35.56 kg/m²     Physical Exam   Constitutional: He is oriented to person, place, and time. He appears well-developed and well-nourished.   HENT:   Head: Normocephalic and atraumatic.   Eyes: Conjunctivae are normal.   Neck: Normal range of motion. Neck supple.   Pulmonary/Chest: Effort normal.   Musculoskeletal: Normal range of motion.   Neurological: He is alert and oriented to person, place, " and time.   Skin: No rash noted.   Psychiatric: He has a normal mood and affect. His behavior is normal.   Nursing note and vitals reviewed.      Assessment/Plan   Alex was seen today for gastroesophageal reflux disease without esophagitis and hypertension.    Diagnoses and all orders for this visit:    Tobacco abuse  -     varenicline (CHANTIX CONTINUING MONTH RONNY) 1 MG tablet; Take 1 tablet by mouth 2 (Two) Times a Day.    Essential hypertension    Borderline diabetes  -     Hemoglobin A1c          Discussion Summary:  Patient is a 34 y.o. male presenting for follow up.    1. Tobacco abuse  .congratulated for reducing to 5 cigarettes per day.  Cont chantix with goal to reduced to 1-2 cigarettes per day over the next month.     2. Essential Hypertension  - controlled. As pt reduces smoking, anticipate BP to improve.      3. Had TDAP and Pneumonia vaccines at [facility], records to be requested to add to system.     Follow up:  Return in about 4 months (around 10/7/2019) for Next scheduled follow up.

## 2019-06-07 NOTE — PATIENT INSTRUCTIONS

## 2019-06-17 LAB — HBA1C MFR BLD: 6.8 % (ref 4.8–5.6)

## 2019-06-18 ENCOUNTER — OFFICE VISIT (OUTPATIENT)
Dept: NEUROLOGY | Facility: CLINIC | Age: 34
End: 2019-06-18

## 2019-06-18 VITALS
SYSTOLIC BLOOD PRESSURE: 122 MMHG | HEIGHT: 76 IN | HEART RATE: 78 BPM | DIASTOLIC BLOOD PRESSURE: 80 MMHG | OXYGEN SATURATION: 98 % | BODY MASS INDEX: 35.56 KG/M2

## 2019-06-18 DIAGNOSIS — G70.9 NEUROMUSCULAR RESPIRATORY WEAKNESS (HCC): Primary | ICD-10-CM

## 2019-06-18 DIAGNOSIS — R29.898 WEAKNESS OF BOTH ARMS: ICD-10-CM

## 2019-06-18 DIAGNOSIS — J99 NEUROMUSCULAR RESPIRATORY WEAKNESS (HCC): ICD-10-CM

## 2019-06-18 DIAGNOSIS — G70.9 NEUROMUSCULAR RESPIRATORY WEAKNESS (HCC): ICD-10-CM

## 2019-06-18 DIAGNOSIS — J99 NEUROMUSCULAR RESPIRATORY WEAKNESS (HCC): Primary | ICD-10-CM

## 2019-06-18 PROCEDURE — 99214 OFFICE O/P EST MOD 30 MIN: CPT | Performed by: NURSE PRACTITIONER

## 2019-06-20 ENCOUNTER — TELEPHONE (OUTPATIENT)
Dept: NEUROLOGY | Facility: CLINIC | Age: 34
End: 2019-06-20

## 2019-06-20 DIAGNOSIS — G70.9 NEUROMUSCULAR RESPIRATORY WEAKNESS (HCC): ICD-10-CM

## 2019-06-20 DIAGNOSIS — J99 NEUROMUSCULAR RESPIRATORY WEAKNESS (HCC): ICD-10-CM

## 2019-06-20 DIAGNOSIS — R53.1 WEAKNESS: Primary | ICD-10-CM

## 2019-06-21 ENCOUNTER — LAB (OUTPATIENT)
Dept: LAB | Facility: HOSPITAL | Age: 34
End: 2019-06-21

## 2019-06-21 DIAGNOSIS — R53.1 WEAKNESS: ICD-10-CM

## 2019-06-21 DIAGNOSIS — G70.9 NEUROMUSCULAR RESPIRATORY WEAKNESS (HCC): ICD-10-CM

## 2019-06-21 DIAGNOSIS — J99 NEUROMUSCULAR RESPIRATORY WEAKNESS (HCC): ICD-10-CM

## 2019-06-21 PROCEDURE — 83519 RIA NONANTIBODY: CPT

## 2019-06-21 PROCEDURE — 36415 COLL VENOUS BLD VENIPUNCTURE: CPT

## 2019-06-21 PROCEDURE — 86255 FLUORESCENT ANTIBODY SCREEN: CPT

## 2019-06-29 DIAGNOSIS — M54.41 CHRONIC BILATERAL LOW BACK PAIN WITH BILATERAL SCIATICA: ICD-10-CM

## 2019-06-29 DIAGNOSIS — M54.42 CHRONIC BILATERAL LOW BACK PAIN WITH BILATERAL SCIATICA: ICD-10-CM

## 2019-06-29 DIAGNOSIS — G89.29 CHRONIC BILATERAL LOW BACK PAIN WITH BILATERAL SCIATICA: ICD-10-CM

## 2019-07-01 LAB
ACHR AB SER-SCNC: <0.03 NMOL/L (ref 0–0.24)
ACHR BLOCK AB/ACHR TOTAL SFR SER: 14 % (ref 0–25)
ACHR MOD AB/ACHR TOTAL SFR SER: <12 % (ref 0–20)
STRIA MUS AB TITR SER IF: NEGATIVE {TITER}

## 2019-07-01 RX ORDER — BACLOFEN 10 MG/1
10 TABLET ORAL 2 TIMES DAILY PRN
Qty: 60 TABLET | Refills: 1 | Status: SHIPPED | OUTPATIENT
Start: 2019-07-01 | End: 2019-08-29 | Stop reason: SDUPTHER

## 2019-07-02 DIAGNOSIS — R53.1 WEAKNESS: Primary | ICD-10-CM

## 2019-07-02 DIAGNOSIS — H53.9 VISUAL CHANGES: ICD-10-CM

## 2019-07-02 NOTE — TELEPHONE ENCOUNTER
Patient called back concerning the [facility].  He states that he has already done this lab downstairs.    He also has concerns about the order that was to be sent into his [employer] for Supplies. He states his mask is torn.    Please Advise

## 2019-07-08 RX ORDER — OXCARBAZEPINE 300 MG/1
TABLET, FILM COATED ORAL
Qty: 60 TABLET | Refills: 0 | Status: SHIPPED | OUTPATIENT
Start: 2019-07-08 | End: 2019-07-29 | Stop reason: SDUPTHER

## 2019-07-09 DIAGNOSIS — F43.10 POST TRAUMATIC STRESS DISORDER (PTSD): ICD-10-CM

## 2019-07-09 DIAGNOSIS — F51.05 INSOMNIA DUE TO MENTAL CONDITION: ICD-10-CM

## 2019-07-09 RX ORDER — PRAZOSIN HYDROCHLORIDE 2 MG/1
2 CAPSULE ORAL NIGHTLY
Qty: 30 CAPSULE | Refills: 0 | Status: SHIPPED | OUTPATIENT
Start: 2019-07-09 | End: 2019-07-18

## 2019-07-18 ENCOUNTER — OFFICE VISIT (OUTPATIENT)
Dept: PSYCHIATRY | Facility: CLINIC | Age: 34
End: 2019-07-18

## 2019-07-18 VITALS — WEIGHT: 295 LBS | BODY MASS INDEX: 35.92 KG/M2 | HEIGHT: 76 IN

## 2019-07-18 DIAGNOSIS — F31.62 BIPOLAR DISORDER, CURRENT EPISODE MIXED, MODERATE (HCC): Primary | ICD-10-CM

## 2019-07-18 DIAGNOSIS — F51.05 INSOMNIA DUE TO MENTAL CONDITION: ICD-10-CM

## 2019-07-18 DIAGNOSIS — F43.10 POST TRAUMATIC STRESS DISORDER (PTSD): ICD-10-CM

## 2019-07-18 DIAGNOSIS — N52.9 ERECTILE DYSFUNCTION, UNSPECIFIED ERECTILE DYSFUNCTION TYPE: ICD-10-CM

## 2019-07-18 PROCEDURE — 99214 OFFICE O/P EST MOD 30 MIN: CPT | Performed by: NURSE PRACTITIONER

## 2019-07-18 RX ORDER — PRAZOSIN HYDROCHLORIDE 1 MG/1
1 CAPSULE ORAL NIGHTLY
Qty: 30 CAPSULE | Refills: 0 | Status: SHIPPED | OUTPATIENT
Start: 2019-07-18 | End: 2019-08-07 | Stop reason: SDUPTHER

## 2019-07-18 NOTE — PROGRESS NOTES
Alex Torres is a 34 y.o. male is here today for medication management follow-up.    Chief Complaint:      ICD-10-CM ICD-9-CM   1. Bipolar disorder, current episode mixed, moderate (CMS/HCC) F31.62 296.62   2. Post traumatic stress disorder (PTSD) F43.10 309.81   3. Erectile dysfunction, unspecified erectile dysfunction type N52.9 607.84   4. Insomnia due to mental condition F51.05 300.9     327.02       History of Present Illness:  Pt present for follow up visit and medication managment of mood symptoms. Pt states that his daughter was born recently and was 5 weeks premature but she is at the hospital and doing well. States he has been very happy. Pt requests that I fill out paperwork in support of him not working. Advised that I do not perform the psychological assessment for disability. I discussed that he has reported stable mood symptoms at last two previous visit. Pt then states that he is not sure if it is connected to his lack of sleep but he has experienced some irritability symptoms and states he may or may not be going into a depressive or manic episode. Pt also reports restless leg symptoms since Vraylar was increased to 3 mg several months ago. Pt also reports erectile dysfunction symptoms since Prazosin was increased to 2 mg.    Pt reports the presence/absence of  manic/hypomanic symptoms: (-) distinct period of abnormally persistent elevated, expansive, irritable mood or increased goal-directed activity or energy, (-) inflated self-esteem/grandiosity, (-) decreased need for sleep, (-) hyper-talkative/pressured speech, (-) flights of ideas/racing thoughts, (-) distractibility, (-) increased goal directed activity/agitation, and (-) excessive involvement in activities with high potential for painful outcomes.     The following portions of the patient's history were reviewed and updated as appropriate: allergies, current medications, past family history, past medical history, past social history,  "past surgical history and problem list.    Review of Systems;;  Review of Systems   Constitutional: Negative.  Negative for activity change, appetite change, unexpected weight gain and unexpected weight loss.   Respiratory: Negative.    Cardiovascular: Negative.  Negative for chest pain.   Gastrointestinal: Negative.  Negative for diarrhea, nausea and vomiting.   Genitourinary: Positive for erectile dysfunction.   Musculoskeletal: Negative.    Skin: Negative for rash and bruise.   Neurological: Negative.  Negative for dizziness, seizures and speech difficulty.   Psychiatric/Behavioral: Negative.  Negative for agitation, behavioral problems, decreased concentration, dysphoric mood, hallucinations, self-injury, sleep disturbance, suicidal ideas, negative for hyperactivity and stress.       Physical Exam;;  Physical Exam  Height 193 cm (76\"), weight 134 kg (295 lb).    Current Medications;;    Current Outpatient Medications:   •  albuterol sulfate  (90 Base) MCG/ACT inhaler, Inhale 2 puffs Every 4 (Four) Hours As Needed for Wheezing or Shortness of Air., Disp: 18 g, Rfl: 3  •  baclofen (LIORESAL) 10 MG tablet, TAKE 1 TABLET BY MOUTH 2 (TWO) TIMES A DAY AS NEEDED FOR MUSCLE SPASMS., Disp: 60 tablet, Rfl: 1  •  busPIRone (BUSPAR) 10 MG tablet, Take 1 tablet by mouth 3 (Three) Times a Day., Disp: 90 tablet, Rfl: 2  •  Cariprazine HCl 3 MG capsule, Take 1 capsule by mouth Daily. In evenings with food, Disp: 30 capsule, Rfl: 2  •  Fluticasone Furoate-Vilanterol (BREO ELLIPTA) 200-25 MCG/INH inhaler, Inhale 1 puff Daily., Disp: 30 each, Rfl: 11  •  losartan (COZAAR) 100 MG tablet, Take 1 tablet by mouth Daily., Disp: 30 tablet, Rfl: 2  •  metFORMIN (GLUCOPHAGE) 500 MG tablet, Take 1 tablet by mouth 2 (Two) Times a Day With Meals., Disp: 60 tablet, Rfl: 5  •  OXcarbazepine (TRILEPTAL) 300 MG tablet, TAKE ONE TABLET BY MOUTH IN THE MORNING AND 2 TABLETS AT BEDTIME, Disp: 60 tablet, Rfl: 0  •  pantoprazole (PROTONIX) 40 " MG EC tablet, Take 1 tablet by mouth Daily., Disp: 30 tablet, Rfl: 2  •  prazosin (MINIPRESS) 1 MG capsule, Take 1 capsule by mouth Every Night., Disp: 30 capsule, Rfl: 0  •  triamterene-hydrochlorothiazide (DYAZIDE) 37.5-25 MG per capsule, TAKE 1 CAPSULE BY MOUTH EVERY DAY, Disp: 30 capsule, Rfl: 5  •  varenicline (CHANTIX CONTINUING MONTH RONNY) 1 MG tablet, Take 1 tablet by mouth 2 (Two) Times a Day., Disp: 60 tablet, Rfl: 2    Lab Results:       Mental Status Exam:   Hygiene:   good  Cooperation:  Cooperative  Eye Contact:  Good  Psychomotor Behavior:  Appropriate  Mood:euthymic  Affect:  Appropriate  Hopelessness: Denies  Speech:  Normal  Thought Process:  Goal directed  Thought Content:  Normal  Suicidal:  None  Homicidal:  None  Hallucinations:  None  Delusion:  None  Memory:  Intact  Orientation:  Person, Place, Time and Situation  Reliability:  good  Insight:  Good  Judgement:  Good  Impulse Control:  Good  Physical/Medical Issues:  No         Assessment Plan;;  Diagnoses and all orders for this visit:    Bipolar disorder, current episode mixed, moderate (CMS/HCC)    Post traumatic stress disorder (PTSD)  -     prazosin (MINIPRESS) 1 MG capsule; Take 1 capsule by mouth Every Night.    Erectile dysfunction, unspecified erectile dysfunction type    Insomnia due to mental condition  -     prazosin (MINIPRESS) 1 MG capsule; Take 1 capsule by mouth Every Night.    Decrease Prazosin to 1 mg     Change Vraylar 3 mg to every other day      A psychological evaluation was conducted in order to assess past and current level of functioning. Areas assessed included, but were not limited to: perception of social support, perception of ability to face and deal with challenges in life (positive functioning), anxiety symptoms, depressive symptoms, perspective on beliefs/belief system, coping skills for stress, intelligence level,  Therapeutic rapport was established. Interventions conducted today were geared towards  incorporating medication management along with support for continued therapy. Education was also provided as to the med management with this provider and what to expect in subsequent sessions.    We discussed risks, benefits,goals and side effects of the above medication and the patient was agreeable with the plan.Patient was educated on the importance of compliance with treatment and follow-up appointments. Patient is aware to contact the [facility] with any worsening of symptoms. To call for questions or concerns and return early if necessary. Patent is agreeable to go to the Emergency Department or call 911 should they begin SI/HI.     Treatment Plan: stabilize mood,  patient will stay out of the hospital and be at optimal level of functioning, take all medication as prescribed. Patient verbalized  understanding and agreement to plan.    Return in about 4 weeks (around 8/15/2019) for Follow Up.    Jana Kingsley, DNP, APRN, PMHNP-BC, FNP-C

## 2019-07-29 RX ORDER — OXCARBAZEPINE 300 MG/1
TABLET, FILM COATED ORAL
Qty: 60 TABLET | Refills: 0 | OUTPATIENT
Start: 2019-07-29

## 2019-07-29 RX ORDER — OXCARBAZEPINE 300 MG/1
TABLET, FILM COATED ORAL
Qty: 60 TABLET | Refills: 0 | Status: SHIPPED | OUTPATIENT
Start: 2019-07-29 | End: 2019-08-07 | Stop reason: SDUPTHER

## 2019-07-30 ENCOUNTER — OFFICE VISIT (OUTPATIENT)
Dept: NEUROLOGY | Facility: CLINIC | Age: 34
End: 2019-07-30

## 2019-07-30 VITALS
HEART RATE: 83 BPM | SYSTOLIC BLOOD PRESSURE: 122 MMHG | OXYGEN SATURATION: 98 % | DIASTOLIC BLOOD PRESSURE: 78 MMHG | TEMPERATURE: 98.7 F

## 2019-07-30 DIAGNOSIS — G47.33 OSA (OBSTRUCTIVE SLEEP APNEA): Primary | ICD-10-CM

## 2019-07-30 DIAGNOSIS — G62.9 PERIPHERAL POLYNEUROPATHY: ICD-10-CM

## 2019-07-30 DIAGNOSIS — M79.604 PAIN IN BOTH LOWER EXTREMITIES: ICD-10-CM

## 2019-07-30 DIAGNOSIS — M79.605 PAIN IN BOTH LOWER EXTREMITIES: ICD-10-CM

## 2019-07-30 DIAGNOSIS — R06.83 SNORING: ICD-10-CM

## 2019-07-30 PROCEDURE — 99214 OFFICE O/P EST MOD 30 MIN: CPT | Performed by: NURSE PRACTITIONER

## 2019-07-30 RX ORDER — AMITRIPTYLINE HYDROCHLORIDE 10 MG/1
10 TABLET, FILM COATED ORAL NIGHTLY
Qty: 30 TABLET | Refills: 4 | Status: SHIPPED | OUTPATIENT
Start: 2019-07-30 | End: 2019-09-27 | Stop reason: SDUPTHER

## 2019-07-30 NOTE — PROGRESS NOTES
Subjective   Alex Torres is a 34 y.o. male     Chief Complaint   Patient presents with   • Follow-up     on labs - MRI and other tests not done yet and Bipap not started yet.    • Leg Problem     would like to discuss the pain/discoloration/swelling in left leg   • Med Management     discuss gabapentin       History of Present Illness     Pt is a very pleasant 34 yr old male patient with sleep apnea failing CPAP we are changing patient to auto BiPAP.  Patient with neuromuscular weakness noted on raw data but not on official read from pulmonary from PFTs.  However myiasis gravis work-up was done and is negative patient has pending EMG and MRI next week. C/O increased tiredness and feels weak generalized.   Patient continues with left lower extremity peripheral neuropathy pending EMG had damage to his left lower extremity he does develop edema throughout the day with pain as well as numbness and tingling patient has a documented allergy to gabapentin.    Past hx: Pt is a very pleasant 34 yr old male in clinic to follow up PFT with noted neuromuscular weakness from pulm on raw data not with formal reading. Pt + MOLLY with smothering know smoker on chantix decreased to 4 cigarettes daily. + PN per pt stable.     The following portions of the patient's history were reviewed and updated as appropriate: allergies, current medications, past family history, past medical history, past social history, past surgical history and problem list.    Review of Systems   Constitutional: Negative for chills and fever.   HENT: Negative for congestion, ear pain, hearing loss, rhinorrhea and sore throat.    Eyes: Negative for pain, discharge and redness.   Respiratory: Positive for shortness of breath. Negative for cough, wheezing and stridor.    Cardiovascular: Negative for chest pain, palpitations and leg swelling.   Gastrointestinal: Negative for abdominal pain, constipation, nausea and vomiting.   Endocrine: Negative for cold  intolerance, heat intolerance and polyphagia.   Genitourinary: Negative for dysuria, flank pain, frequency and urgency.   Musculoskeletal: Positive for arthralgias, back pain, myalgias, neck pain and neck stiffness. Negative for joint swelling.   Skin: Negative for pallor, rash and wound.   Allergic/Immunologic: Negative for environmental allergies.   Neurological: Positive for weakness. Negative for dizziness, tremors, seizures, syncope, facial asymmetry, speech difficulty, light-headedness, numbness and headaches.   Hematological: Negative for adenopathy.   Psychiatric/Behavioral: Positive for sleep disturbance. Negative for confusion and hallucinations. The patient is nervous/anxious.        Objective       Vitals:    07/30/19 0940   BP: 122/78   Pulse: 83   Temp: 98.7 °F (37.1 °C)   SpO2: 98%     GENERAL: Patient is pleasant, cooperative, appears to be stated age.   HEAD:  Head is normocephalic and atraumatic.    NECK: Neck are non-tender without thyromegaly or adenopathy.  Carotic upstrokes are 1+/4.  No cranial or cervical bruits.  The neck is supple with a full range of motion.   CARDIOVASCULAR:  Regular rate and rhythm with normal S1 and S2 without rub or gallop. Left LE edema   RESPIRATORY:  Clear to auscultation without wheezes or crackle   PSYCH:  Higher cortical function/mental status:  The patient is alert.  He  is oriented x3 to time, place and person.  Recent and the remote memory appear normal.  The patient has a good fund of knowledge.  There is no visual or auditory hallucination or suicidal or homicidal ideation.  SPEECH:There is no gross evidence of aphasia, dysarthria or agnosia.      CRANIAL NERVES: Extraocular movements are full and smooth with normal pursuits and saccades.  No nystagmus noted.  The face is symmetric. Tongue midline.   MOTOR: Strength is 5/5 throughout with normal tone and bulk  No involuntary movements noted.    DTR: are 2/4 and symmetrical in the upper extremities, 2/4 and  symmetrical at the knees   COORDINATION AND GAIT:  The patient walks with a narrow-based gait.  Romberg and monopedal  Romberg are negative.  The patient normally performs finger-nose-finger, heel-to-knee-to-shin and rapid alternating movements in symmetrical fashion.    MUSCULOSKELETAL: Range of motion normal, no clubbing, cyanosis, or edema.  No joint swelling.     Results for orders placed or performed in visit on 06/21/19   Myasthenia Gravis Full Panel   Result Value Ref Range    AChR Binding Ab <0.03 0.00 - 0.24 nmol/L    AChR Blocking Abs 14 0 - 25 %    Acetylcholine Modulating Ab <12 0 - 20 %    Striated Muscle Antibody Negative Neg:<1:40       I have personally reviewed the above labs. Pt follows with PCP.    Assessment/Plan      1. Jolynn failed CPAP start auto bipap    2. PN elavil     3. Neuromuscular lung weakness with UE weakness: MG negative, EMG MRI pending    4. LE edema: Ultrasound in June showed no DVT. Pt can't afford comp stockings. Advise ACE wrap

## 2019-08-05 RX ORDER — LOSARTAN POTASSIUM 100 MG/1
TABLET ORAL
Qty: 30 TABLET | Refills: 2 | Status: SHIPPED | OUTPATIENT
Start: 2019-08-05 | End: 2019-10-26 | Stop reason: SDUPTHER

## 2019-08-06 ENCOUNTER — HOSPITAL ENCOUNTER (OUTPATIENT)
Dept: MRI IMAGING | Facility: HOSPITAL | Age: 34
Discharge: HOME | End: 2019-08-06
Admitting: NURSE PRACTITIONER

## 2019-08-06 PROCEDURE — 70551 MRI BRAIN STEM W/O DYE: CPT

## 2019-08-07 ENCOUNTER — PROCEDURE VISIT (OUTPATIENT)
Dept: ORTHOPEDIC SURGERY | Facility: CLINIC | Age: 34
End: 2019-08-07

## 2019-08-07 ENCOUNTER — OFFICE VISIT (OUTPATIENT)
Dept: PSYCHIATRY | Facility: CLINIC | Age: 34
End: 2019-08-07

## 2019-08-07 VITALS — BODY MASS INDEX: 35.68 KG/M2 | WEIGHT: 293 LBS | HEIGHT: 76 IN

## 2019-08-07 DIAGNOSIS — N52.9 ERECTILE DYSFUNCTION, UNSPECIFIED ERECTILE DYSFUNCTION TYPE: ICD-10-CM

## 2019-08-07 DIAGNOSIS — F31.62 BIPOLAR DISORDER, CURRENT EPISODE MIXED, MODERATE (HCC): Primary | ICD-10-CM

## 2019-08-07 DIAGNOSIS — K21.9 GASTROESOPHAGEAL REFLUX DISEASE WITHOUT ESOPHAGITIS: ICD-10-CM

## 2019-08-07 DIAGNOSIS — R20.2 PARESTHESIA: ICD-10-CM

## 2019-08-07 DIAGNOSIS — F51.05 INSOMNIA DUE TO MENTAL CONDITION: ICD-10-CM

## 2019-08-07 DIAGNOSIS — G70.9 NEUROMUSCULAR RESPIRATORY WEAKNESS (HCC): ICD-10-CM

## 2019-08-07 DIAGNOSIS — E10.42 DIABETIC POLYNEUROPATHY ASSOCIATED WITH TYPE 1 DIABETES MELLITUS (HCC): Primary | ICD-10-CM

## 2019-08-07 DIAGNOSIS — F43.10 POST TRAUMATIC STRESS DISORDER (PTSD): ICD-10-CM

## 2019-08-07 DIAGNOSIS — J99 NEUROMUSCULAR RESPIRATORY WEAKNESS (HCC): ICD-10-CM

## 2019-08-07 PROCEDURE — 95886 MUSC TEST DONE W/N TEST COMP: CPT | Performed by: PHYSICAL THERAPIST

## 2019-08-07 PROCEDURE — 95913 NRV CNDJ TEST 13/> STUDIES: CPT | Performed by: PHYSICAL THERAPIST

## 2019-08-07 PROCEDURE — 99214 OFFICE O/P EST MOD 30 MIN: CPT | Performed by: NURSE PRACTITIONER

## 2019-08-07 RX ORDER — PANTOPRAZOLE SODIUM 40 MG/1
TABLET, DELAYED RELEASE ORAL
Qty: 30 TABLET | Refills: 2 | Status: SHIPPED | OUTPATIENT
Start: 2019-08-07 | End: 2019-10-26 | Stop reason: SDUPTHER

## 2019-08-07 RX ORDER — PRAZOSIN HYDROCHLORIDE 1 MG/1
1 CAPSULE ORAL NIGHTLY
Qty: 30 CAPSULE | Refills: 1 | Status: SHIPPED | OUTPATIENT
Start: 2019-08-07 | End: 2019-10-11 | Stop reason: SDUPTHER

## 2019-08-07 RX ORDER — OXCARBAZEPINE 300 MG/1
TABLET, FILM COATED ORAL
Qty: 90 TABLET | Refills: 1 | Status: SHIPPED | OUTPATIENT
Start: 2019-08-07 | End: 2019-10-11 | Stop reason: SDUPTHER

## 2019-08-07 RX ORDER — BUSPIRONE HYDROCHLORIDE 15 MG/1
15 TABLET ORAL 3 TIMES DAILY
Qty: 90 TABLET | Refills: 1 | Status: SHIPPED | OUTPATIENT
Start: 2019-08-07 | End: 2019-10-09 | Stop reason: SDUPTHER

## 2019-08-07 NOTE — PROGRESS NOTES
"Alex Torrse is a 34 y.o. male is here today for medication management follow-up.    Chief Complaint:      ICD-10-CM ICD-9-CM   1. Bipolar disorder, current episode mixed, moderate (CMS/HCC) F31.62 296.62   2. Post traumatic stress disorder (PTSD) F43.10 309.81   3. 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 Erectile dysfunction, unspecified erectile dysfunction type N52.9 607.84       History of Present Illness:  Pt present for follow up visit and medication managment of mood symptoms. Pt states that life has been \"tumultuous\" since having is baby. Reprots increase in stress and anxiety. Pt reports that he continues to not have nightmares since Prazosin was reduced to 1 mg but continues to have ED. Pt states he was started on Elavil 10 mg one week ago for neuropathy symptoms so he is unsure if anxiety is related to new medication.    Pt reports the presence/absence of the following anxiety symptoms: (+) excessive worry, (+) excessive fear, (-) difficulty relaxing, (-) restlessness, (-) insomnia, (-) easily fatigued, (+) irritability, (-) poor concentration, (+) racing thoughts, and (-) panic episodes.         The following portions of the patient's history were reviewed and updated as appropriate: allergies, current medications, past family history, past medical history, past social history, past surgical history and problem list.    Review of Systems;;  Review of Systems   Constitutional: Negative.  Negative for activity change, appetite change, unexpected weight gain and unexpected weight loss.   Respiratory: Negative.    Cardiovascular: Negative.  Negative for chest pain.   Gastrointestinal: Negative.  Negative for diarrhea, nausea and vomiting.   Genitourinary: Positive for erectile dysfunction.   Musculoskeletal: Negative.    Skin: Negative for rash and bruise.   Neurological: Negative.  Negative for dizziness, seizures and speech difficulty.   Psychiatric/Behavioral: Positive for " "stress. Negative for agitation, behavioral problems, decreased concentration, dysphoric mood, hallucinations, self-injury, sleep disturbance, suicidal ideas and negative for hyperactivity. The patient is nervous/anxious.        Physical Exam;;  Physical Exam  Height 193 cm (76\"), weight 133 kg (293 lb).    Current Medications;;    Current Outpatient Medications:   •  albuterol sulfate  (90 Base) MCG/ACT inhaler, Inhale 2 puffs Every 4 (Four) Hours As Needed for Wheezing or Shortness of Air., Disp: 18 g, Rfl: 3  •  amitriptyline (ELAVIL) 10 MG tablet, Take 1 tablet by mouth Every Night., Disp: 30 tablet, Rfl: 4  •  baclofen (LIORESAL) 10 MG tablet, TAKE 1 TABLET BY MOUTH 2 (TWO) TIMES A DAY AS NEEDED FOR MUSCLE SPASMS., Disp: 60 tablet, Rfl: 1  •  busPIRone (BUSPAR) 15 MG tablet, Take 1 tablet by mouth 3 (Three) Times a Day., Disp: 90 tablet, Rfl: 1  •  Cariprazine HCl 3 MG capsule, Take 1 capsule by mouth Daily., Disp: 30 capsule, Rfl: 2  •  Fluticasone Furoate-Vilanterol (BREO ELLIPTA) 200-25 MCG/INH inhaler, Inhale 1 puff Daily., Disp: 30 each, Rfl: 11  •  losartan (COZAAR) 100 MG tablet, TAKE 1 TABLET BY MOUTH EVERY DAY, Disp: 30 tablet, Rfl: 2  •  metFORMIN (GLUCOPHAGE) 500 MG tablet, Take 1 tablet by mouth 2 (Two) Times a Day With Meals., Disp: 60 tablet, Rfl: 5  •  OXcarbazepine (TRILEPTAL) 300 MG tablet, TAKE ONE TABLET BY MOUTH IN THE MORNING AND 2 TABLETS AT BEDTIME, Disp: 90 tablet, Rfl: 1  •  pantoprazole (PROTONIX) 40 MG EC tablet, TAKE 1 TABLET BY MOUTH EVERY DAY, Disp: 30 tablet, Rfl: 2  •  prazosin (MINIPRESS) 1 MG capsule, Take 1 capsule by mouth Every Night., Disp: 30 capsule, Rfl: 1  •  triamterene-hydrochlorothiazide (DYAZIDE) 37.5-25 MG per capsule, TAKE 1 CAPSULE BY MOUTH EVERY DAY, Disp: 30 capsule, Rfl: 5  •  varenicline (CHANTIX CONTINUING MONTH RONNY) 1 MG tablet, Take 1 tablet by mouth 2 (Two) Times a Day., Disp: 60 tablet, Rfl: 2    Lab Results:       Mental Status Exam:   Hygiene:   " good  Cooperation:  Cooperative  Eye Contact:  Good  Psychomotor Behavior:  Appropriate  Mood:euthymic  Affect:  Appropriate  Hopelessness: Denies  Speech:  Normal  Thought Process:  Goal directed  Thought Content:  Normal  Suicidal:  None  Homicidal:  None  Hallucinations:  None  Delusion:  None  Memory:  Intact  Orientation:  Person, Place, Time and Situation  Reliability:  good  Insight:  Good  Judgement:  Good  Impulse Control:  Good  Physical/Medical Issues:  No         Assessment Plan;;  Diagnoses and all orders for this visit:    Bipolar disorder, current episode mixed, moderate (CMS/HCC)  -     Cariprazine HCl 3 MG capsule; Take 1 capsule by mouth Daily.  -     busPIRone (BUSPAR) 15 MG tablet; Take 1 tablet by mouth 3 (Three) Times a Day.  -     OXcarbazepine (TRILEPTAL) 300 MG tablet; TAKE ONE TABLET BY MOUTH IN THE MORNING AND 2 TABLETS AT BEDTIME    Post traumatic stress disorder (PTSD)  -     prazosin (MINIPRESS) 1 MG capsule; Take 1 capsule by mouth Every Night.    Insomnia due to mental condition  -     prazosin (MINIPRESS) 1 MG capsule; Take 1 capsule by mouth Every Night.    Erectile dysfunction, unspecified erectile dysfunction type    Increase Buspirone    Continue current doses of Vraylar, Trileptal, and Prazosin      A psychological evaluation was conducted in order to assess past and current level of functioning. Areas assessed included, but were not limited to: perception of social support, perception of ability to face and deal with challenges in life (positive functioning), anxiety symptoms, depressive symptoms, perspective on beliefs/belief system, coping skills for stress, intelligence level,  Therapeutic rapport was established. Interventions conducted today were geared towards incorporating medication management along with support for continued therapy. Education was also provided as to the med management with this provider and what to expect in subsequent sessions.    We discussed risks,  benefits,goals and side effects of the above medication and the patient was agreeable with the plan.Patient was educated on the importance of compliance with treatment and follow-up appointments. Patient is aware to contact the [facility] with any worsening of symptoms. To call for questions or concerns and return early if necessary. Patent is agreeable to go to the Emergency Department or call 911 should they begin SI/HI.     Treatment Plan: stabilize mood,  patient will stay out of the hospital and be at optimal level of functioning, take all medication as prescribed. Patient verbalized  understanding and agreement to plan.    Return in about 2 months (around 10/7/2019) for Follow Up.    Jana Kingsley, HU, APRN, PMHNP-BC, FNP-C

## 2019-08-22 ENCOUNTER — OFFICE VISIT (OUTPATIENT)
Dept: PSYCHIATRY | Facility: CLINIC | Age: 34
End: 2019-08-22

## 2019-08-22 DIAGNOSIS — F43.10 POST TRAUMATIC STRESS DISORDER (PTSD): ICD-10-CM

## 2019-08-22 DIAGNOSIS — F31.62 BIPOLAR DISORDER, CURRENT EPISODE MIXED, MODERATE (HCC): Primary | ICD-10-CM

## 2019-08-22 PROCEDURE — 90832 PSYTX W PT 30 MINUTES: CPT | Performed by: COUNSELOR

## 2019-08-22 NOTE — PROGRESS NOTES
.Date of Service: 2019  Time In: 12:30 PM  Time Out: 1:00PM      PROGRESS NOTE  Data:  Alex Torres is a 34 y.o. male who presents for individual therapy session at [facility].  Patient reported he is seeking disability and his  suggested psychotherapy.  He has a previous history of therapy services through [facility] (previously known as [facility]).  He reported being diagnosed with borderline personality disorder, bipolar disorder, and PTSD. He reported current concern for his irritability and lack of patience.  Patient has begun to be more cognizant of his irritability as he and his wife now have a .  He described sleep disturbance as contributing to irritable mood, but even when he sleeps well the night before, he still experiences irritability and lack of patience.  Patient reported feeling guilty for his wife taking on most of the child rearing duties right now because he does not have the patience for it.      Clinical Maneuvering/Intervention:  Assisted patient in processing above session content; acknowledged and normalized patient’s thoughts, feelings, and concerns.  Active listening, unconditional positive regard, and warm acceptance were used to build trust and rapport with the patient.    Allowed patient to freely discuss issues without interruption or judgment. Provided safe, confidential environment to facilitate the development of positive therapeutic relationship and encourage open, honest communication. Assisted patient in identifying risk factors which would indicate the need for higher level of care including thoughts to harm self or others and/or self-harming behavior and encouraged patient to contact this office, call 911, or present to the nearest emergency room should any of these events occur. Discussed crisis intervention services and means to access.  Patient adamantly and convincingly denies current suicidal or homicidal ideation or  perceptual disturbance.    Assessment          Mental Status Exam  Hygiene:  good  Dress:  casual  Attitude:  Cooperative  Motor Activity:  Restless  Speech:  Normal  Mood:  anxious  Affect:  anxious  Thought Processes:  Goal directed  Thought Content:  normal  Suicidal Thoughts:  denies  Homicidal Thoughts:  denies  Crisis Safety Plan: yes, to come to the emergency room.  Hallucinations:  denies    Patient's Support Network Includes:  wife    Functional Status: No impairment    Progress toward goal: Not at goal      Plan       Patient will continue in individual outpatient therapy with focus on improved functioning and coping skills. Clinical maneuvering will consist of, but not limited to, Cognitive Behavioral Therapy and Solution Focused Therapy to improve functioning, maintain stability, and avoid decompensation and the need for higher level of care.     Patient will adhere to medication regimen as prescribed and report any side effects. Patient will contact this office, call 911 or present to the nearest emergency room should suicidal or homicidal ideations occur.     Return in about 1 week, or earlier if symptoms worsen or fail to improve.    R/O borderline personality disorder      VISIT DIAGNOSIS:     ICD-10-CM ICD-9-CM   1. Bipolar disorder, current episode mixed, moderate (CMS/[facility]) F31.62 296.62   2. Post traumatic stress disorder (PTSD) F43.10 309.81        Shania Turner [facility]      Please note that portions of this note were completed with a voice recognition program. Efforts were made to edit dictation, but occasionally words are mistranscribed.

## 2019-08-23 DIAGNOSIS — R73.03 BORDERLINE DIABETES: ICD-10-CM

## 2019-08-29 DIAGNOSIS — M54.41 CHRONIC BILATERAL LOW BACK PAIN WITH BILATERAL SCIATICA: ICD-10-CM

## 2019-08-29 DIAGNOSIS — G89.29 CHRONIC BILATERAL LOW BACK PAIN WITH BILATERAL SCIATICA: ICD-10-CM

## 2019-08-29 DIAGNOSIS — M54.42 CHRONIC BILATERAL LOW BACK PAIN WITH BILATERAL SCIATICA: ICD-10-CM

## 2019-08-29 RX ORDER — BACLOFEN 10 MG/1
10 TABLET ORAL 2 TIMES DAILY PRN
Qty: 60 TABLET | Refills: 1 | Status: SHIPPED | OUTPATIENT
Start: 2019-08-29 | End: 2019-10-27 | Stop reason: SDUPTHER

## 2019-09-03 ENCOUNTER — OFFICE VISIT (OUTPATIENT)
Dept: NEUROLOGY | Facility: CLINIC | Age: 34
End: 2019-09-03

## 2019-09-03 VITALS
WEIGHT: 294 LBS | TEMPERATURE: 97 F | DIASTOLIC BLOOD PRESSURE: 80 MMHG | OXYGEN SATURATION: 97 % | BODY MASS INDEX: 35.79 KG/M2 | HEART RATE: 82 BPM | SYSTOLIC BLOOD PRESSURE: 128 MMHG

## 2019-09-03 DIAGNOSIS — G62.9 PERIPHERAL POLYNEUROPATHY: ICD-10-CM

## 2019-09-03 DIAGNOSIS — G47.33 OSA (OBSTRUCTIVE SLEEP APNEA): Primary | ICD-10-CM

## 2019-09-03 PROCEDURE — 99213 OFFICE O/P EST LOW 20 MIN: CPT | Performed by: NURSE PRACTITIONER

## 2019-09-03 NOTE — PROGRESS NOTES
Subjective   Alex Torres is a 34 y.o. male     Chief Complaint   Patient presents with   • Follow-up     MRI & EMG   • Sleep Apnea       History of Present Illness     Patient is a very pleasant 34-year-old male in clinic today to follow-up for sleep apnea patient had CPAP failure was changed over to BiPAP he states he is no longer smothering he has improved sleep his sleep compliance reveals 100% greater than 4 hours his AHI has corrected to 1.6 and he remains at a BiPAP of 20 IPAP and 5 EPAP.    Patient has polyneuropathy he is prediabetic he is now walking daily for 30 minutes he is on a paly O diet his MRI of his brain was within normal limits.    Past Hx: Pt is a very pleasant 34 yr old male patient with sleep apnea failing CPAP we are changing patient to auto BiPAP.  Patient with neuromuscular weakness noted on raw data but not on official read from pulmonary from PFTs.  However myiasis gravis work-up was done and is negative patient has pending EMG and MRI next week. C/O increased tiredness and feels weak generalized.   Patient continues with left lower extremity peripheral neuropathy pending EMG had damage to his left lower extremity he does develop edema throughout the day with pain as well as numbness and tingling patient has a documented allergy to gabapentin.    The following portions of the patient's history were reviewed and updated as appropriate: allergies, current medications, past family history, past medical history, past social history, past surgical history and problem list.    Review of Systems   Constitutional: Negative for chills and fever.   HENT: Negative for congestion, ear pain, hearing loss, rhinorrhea and sore throat.    Eyes: Negative for pain, discharge and redness.   Respiratory: Negative for cough, shortness of breath, wheezing and stridor.    Cardiovascular: Negative for chest pain, palpitations and leg swelling.   Gastrointestinal: Negative for abdominal pain,  constipation, nausea and vomiting.   Endocrine: Negative for cold intolerance, heat intolerance and polyphagia.   Genitourinary: Negative for dysuria, flank pain, frequency and urgency.   Musculoskeletal: Positive for arthralgias, gait problem and myalgias. Negative for joint swelling, neck pain and neck stiffness.   Skin: Negative for pallor, rash and wound.   Allergic/Immunologic: Negative for environmental allergies.   Neurological: Positive for numbness. Negative for dizziness, tremors, seizures, syncope, facial asymmetry, speech difficulty, weakness, light-headedness and headaches.   Hematological: Negative for adenopathy.   Psychiatric/Behavioral: Positive for sleep disturbance. Negative for confusion and hallucinations. The patient is not nervous/anxious.        Objective       Vitals:    09/03/19 1050   BP: 128/80   Pulse: 82   Temp: 97 °F (36.1 °C)   SpO2: 97%   Weight: 133 kg (294 lb)     GENERAL: Patient is pleasant, cooperative, appears to be stated age.  HEAD:  Head is normocephalic and atraumatic.    NECK: Neck are non-tender without thyromegaly or adenopathy.  Carotic upstrokes are 1+/4.  No cranial or cervical bruits.  The neck is supple with a full range of motion.   CARDIOVASCULAR:  Regular rate and rhythm with normal S1 and S2 without rub or gallop.  RESPIRATORY:  Clear to auscultation without wheezes or crackle   PSYCH:  Higher cortical function/mental status:  The patient is alert.  He  is oriented x3 to time, place and person.  Recent and the remote memory appear normal.  The patient has a good fund of knowledge.  There is no visual or auditory hallucination or suicidal or homicidal ideation.  SPEECH:There is no gross evidence of aphasia, dysarthria or agnosia.      COORDINATION AND GAIT:  The patient walks with a narrow-based gait.     Results for orders placed or performed in visit on 06/21/19   Myasthenia Gravis Full Panel   Result Value Ref Range    AChR Binding Ab <0.03 0.00 - 0.24 nmol/L     AChR Blocking Abs 14 0 - 25 %    Acetylcholine Modulating Ab <12 0 - 20 %    Striated Muscle Antibody Negative Neg:<1:40       Mri Brain Without Contrast    Result Date: 8/6/2019  Narrative: PROCEDURE: MRI BRAIN WO CONTRAST-  HISTORY: weakness + rhomberg; R29.898-Other symptoms and signs involving the musculoskeletal system  PROCEDURE: Multiplanar multisequence imaging of the brain was performed without the use of intravenous contrast.  COMPARISON: None.  FINDINGS: There are no significant white matter abnormalities. There is no mass, mass effect or midline shift. There is no hydrocephalus. There are no areas of restricted diffusion.  The midbrain, alice, cerebellum and craniocervical junction are unremarkable. The sella and pituitary gland are within normal limits. The major intracranial vasculature demonstrates the expected flow related signal. There is mild mucosal thickening in the ethmoid air cells.      Impression: Normal MRI of the brain.    This report was finalized on 8/6/2019 9:51 AM by Umu Figueroa M.D..      I have personally reviewed the above labs. Pt follows with PCP.    Assessment/Plan   1. MOLLY: Patient failed CPAP started on BiPAP patient doing very well sees sleep compliance reviewed and subjective data.  Continue BiPAP 20 IPAP and 5 EPAP.    2. PN: Continue amitriptyline.    3. Neuromuscular lung weakness with UE weakness: Patient negative for myiasis gravis.  Patient's lung PFT showed questionable neuromuscular lung weakness on raw data but not on final report.

## 2019-09-26 ENCOUNTER — OFFICE VISIT (OUTPATIENT)
Dept: PSYCHIATRY | Facility: CLINIC | Age: 34
End: 2019-09-26

## 2019-09-26 DIAGNOSIS — F51.05 INSOMNIA DUE TO MENTAL CONDITION: ICD-10-CM

## 2019-09-26 DIAGNOSIS — F43.10 POST TRAUMATIC STRESS DISORDER (PTSD): ICD-10-CM

## 2019-09-26 DIAGNOSIS — F31.62 BIPOLAR DISORDER, CURRENT EPISODE MIXED, MODERATE (HCC): Primary | ICD-10-CM

## 2019-09-26 PROCEDURE — 90837 PSYTX W PT 60 MINUTES: CPT | Performed by: COUNSELOR

## 2019-09-26 NOTE — PROGRESS NOTES
.Date of Service: September 26, 2019  Time In: 9:30 AM  Time Out: 10:30 AM      PROGRESS NOTE  Data:  Alex Torres is a 34 y.o. male who presents for individual therapy session at [facility].  Patient presented with his infant daughter for session today.  He reported that his wife recently started working full-time.  He has been taking care of their daughter and the home responsibilities.  Patient reported feeling overwhelmed.  He reported identity disturbance concerns as to who he is and what his goals are in life.  Reduced impulsivity and considers the consequences of his actions more deliberately now that he is a father.  Patient has made progress in stabilizing his relationship with his significant other.  He is unhappy with his living situation as he lives with his mother and other relatives right now.  Patient verbalized concern that his medication is not helping to stabilize his mood.        Clinical Maneuvering/Intervention:  Assisted patient in processing above session content; acknowledged and normalized patient’s thoughts, feelings, and concerns. Administered PDQ-4 and SAPAS utilized interview format to assess the clinical significance of a personality disorder --patient will need to complete the scales at next appointment.    Allowed patient to freely discuss issues without interruption or judgment. Provided safe, confidential environment to facilitate the development of positive therapeutic relationship and encourage open, honest communication. Assisted patient in identifying risk factors which would indicate the need for higher level of care including thoughts to harm self or others and/or self-harming behavior and encouraged patient to contact this office, call 911, or present to the nearest emergency room should any of these events occur. Discussed crisis intervention services and means to access.  Patient adamantly and convincingly denies current suicidal or homicidal  ideation or perceptual disturbance.    Assessment          Mental Status Exam  Hygiene:  good  Dress:  casual  Attitude:  Cooperative  Motor Activity:  Appropriate  Speech:  Normal  Mood:  anxious  Affect:  anxious  Thought Processes:  Goal directed  Thought Content:  normal  Suicidal Thoughts:  denies  Homicidal Thoughts:  denies  Crisis Safety Plan: yes, to come to the emergency room.  Hallucinations:  denies    Patient's Support Network Includes:  significant other    Functional Status: No impairment    Progress toward goal: Not at goal      Plan       Patient will continue in individual outpatient therapy with focus on improved functioning and coping skills. Clinical maneuvering will consist of, but not limited to, Cognitive Behavioral Therapy and Solution Focused Therapy to improve functioning, maintain stability, and avoid decompensation and the need for higher level of care.     Patient will adhere to medication regimen as prescribed and report any side effects. Patient will contact this office, call 911 or present to the nearest emergency room should suicidal or homicidal ideations occur.     Return in about 2 weeks, or earlier if symptoms worsen or fail to improve.    Rule out: borderline personality disorder         VISIT DIAGNOSIS:     ICD-10-CM ICD-9-CM   1. Bipolar disorder, current episode mixed, moderate (CMS/[facility]) F31.62 296.62   2. Post traumatic stress disorder (PTSD) F43.10 309.81   3. Insomnia due to mental condition F51.05 300.9     327.02        Shania Turner [facility]      Please note that portions of this note were completed with a voice recognition program. Efforts were made to edit dictation, but occasionally words are mistranscribed.

## 2019-09-27 ENCOUNTER — OFFICE VISIT (OUTPATIENT)
Dept: INTERNAL MEDICINE | Facility: CLINIC | Age: 34
End: 2019-09-27

## 2019-09-27 VITALS
HEART RATE: 105 BPM | SYSTOLIC BLOOD PRESSURE: 140 MMHG | TEMPERATURE: 97.1 F | WEIGHT: 290 LBS | BODY MASS INDEX: 35.31 KG/M2 | OXYGEN SATURATION: 100 % | DIASTOLIC BLOOD PRESSURE: 84 MMHG | HEIGHT: 76 IN

## 2019-09-27 DIAGNOSIS — F60.3 BORDERLINE PERSONALITY DISORDER (HCC): ICD-10-CM

## 2019-09-27 DIAGNOSIS — F43.10 PTSD (POST-TRAUMATIC STRESS DISORDER): ICD-10-CM

## 2019-09-27 DIAGNOSIS — Z00.00 ENCOUNTER FOR PREVENTIVE HEALTH EXAMINATION: Primary | ICD-10-CM

## 2019-09-27 DIAGNOSIS — Z23 NEED FOR DIPHTHERIA-TETANUS-PERTUSSIS (TDAP) VACCINE: ICD-10-CM

## 2019-09-27 DIAGNOSIS — Z23 NEED FOR INFLUENZA VACCINATION: ICD-10-CM

## 2019-09-27 DIAGNOSIS — E66.01 MORBIDLY OBESE (HCC): ICD-10-CM

## 2019-09-27 DIAGNOSIS — Z02.71 DISABILITY EXAMINATION: ICD-10-CM

## 2019-09-27 DIAGNOSIS — Z72.0 TOBACCO ABUSE: ICD-10-CM

## 2019-09-27 DIAGNOSIS — Z23 NEED FOR VACCINATION FOR PNEUMOCOCCUS: ICD-10-CM

## 2019-09-27 DIAGNOSIS — K21.9 GASTROESOPHAGEAL REFLUX DISEASE WITHOUT ESOPHAGITIS: ICD-10-CM

## 2019-09-27 DIAGNOSIS — J44.9 CHRONIC OBSTRUCTIVE PULMONARY DISEASE, UNSPECIFIED COPD TYPE (HCC): ICD-10-CM

## 2019-09-27 DIAGNOSIS — R73.03 BORDERLINE DIABETES: ICD-10-CM

## 2019-09-27 DIAGNOSIS — I10 ESSENTIAL HYPERTENSION: ICD-10-CM

## 2019-09-27 LAB
POC CREATININE URINE: 300
POC MICROALBUMIN URINE: 30

## 2019-09-27 PROCEDURE — 99395 PREV VISIT EST AGE 18-39: CPT | Performed by: INTERNAL MEDICINE

## 2019-09-27 PROCEDURE — 82044 UR ALBUMIN SEMIQUANTITATIVE: CPT | Performed by: INTERNAL MEDICINE

## 2019-09-27 PROCEDURE — 90471 IMMUNIZATION ADMIN: CPT | Performed by: INTERNAL MEDICINE

## 2019-09-27 PROCEDURE — 90674 CCIIV4 VAC NO PRSV 0.5 ML IM: CPT | Performed by: INTERNAL MEDICINE

## 2019-09-27 RX ORDER — AMITRIPTYLINE HYDROCHLORIDE 10 MG/1
10 TABLET, FILM COATED ORAL NIGHTLY
Qty: 30 TABLET | Refills: 4 | Status: SHIPPED | OUTPATIENT
Start: 2019-09-27 | End: 2020-05-04

## 2019-09-27 NOTE — PATIENT INSTRUCTIONS
Health Maintenance, Male  A healthy lifestyle and preventive care is important for your health and wellness. Ask your health care provider about what schedule of regular examinations is right for you.  What should I know about weight and diet?  Eat a Healthy Diet  · Eat plenty of vegetables, fruits, whole grains, low-fat dairy products, and lean protein.  · Do not eat a lot of foods high in solid fats, added sugars, or salt.    Maintain a Healthy Weight  Regular exercise can help you achieve or maintain a healthy weight. You should:  · Do at least 150 minutes of exercise each week. The exercise should increase your heart rate and make you sweat (moderate-intensity exercise).  · Do strength-training exercises at least twice a week.  Watch Your Levels of Cholesterol and Blood Lipids  · Have your blood tested for lipids and cholesterol every 5 years starting at 35 years of age. If you are at high risk for heart disease, you should start having your blood tested when you are 20 years old. You may need to have your cholesterol levels checked more often if:  ? Your lipid or cholesterol levels are high.  ? You are older than 50 years of age.  ? You are at high risk for heart disease.  What should I know about cancer screening?  Many types of cancers can be detected early and may often be prevented.  Lung Cancer  · You should be screened every year for lung cancer if:  ? You are a current smoker who has smoked for at least 30 years.  ? You are a former smoker who has quit within the past 15 years.  · Talk to your health care provider about your screening options, when you should start screening, and how often you should be screened.  Colorectal Cancer  · Routine colorectal cancer screening usually begins at 50 years of age and should be repeated every 5-10 years until you are 75 years old. You may need to be screened more often if early forms of precancerous polyps or small growths are found. Your health care provider may  recommend screening at an earlier age if you have risk factors for colon cancer.  · Your health care provider may recommend using home test kits to check for hidden blood in the stool.  · A small camera at the end of a tube can be used to examine your colon (sigmoidoscopy or colonoscopy). This checks for the earliest forms of colorectal cancer.  Prostate and Testicular Cancer  · Depending on your age and overall health, your health care provider may do certain tests to screen for prostate and testicular cancer.  · Talk to your health care provider about any symptoms or concerns you have about testicular or prostate cancer.  Skin Cancer  · Check your skin from head to toe regularly.  · Tell your health care provider about any new moles or changes in moles, especially if:  ? There is a change in a mole’s size, shape, or color.  ? You have a mole that is larger than a pencil eraser.  · Always use sunscreen. Apply sunscreen liberally and repeat throughout the day.  · Protect yourself by wearing long sleeves, pants, a wide-brimmed hat, and sunglasses when outside.  What should I know about heart disease, diabetes, and high blood pressure?  · If you are 18-39 years of age, have your blood pressure checked every 3-5 years. If you are 40 years of age or older, have your blood pressure checked every year. You should have your blood pressure measured twice--once when you are at a hospital or clinic, and once when you are not at a hospital or clinic. Record the average of the two measurements. To check your blood pressure when you are not at a hospital or clinic, you can use:  ? An automated blood pressure machine at a pharmacy.  ? A home blood pressure monitor.  · Talk to your health care provider about your target blood pressure.  · If you are between 45-79 years old, ask your health care provider if you should take aspirin to prevent heart disease.  · Have regular diabetes screenings by checking your fasting blood sugar  level.  ? If you are at a normal weight and have a low risk for diabetes, have this test once every three years after the age of 45.  ? If you are overweight and have a high risk for diabetes, consider being tested at a younger age or more often.  · A one-time screening for abdominal aortic aneurysm (AAA) by ultrasound is recommended for men aged 65-75 years who are current or former smokers.  What should I know about preventing infection?  Hepatitis B  If you have a higher risk for hepatitis B, you should be screened for this virus. Talk with your health care provider to find out if you are at risk for hepatitis B infection.  Hepatitis C  Blood testing is recommended for:  · Everyone born from 1945 through 1965.  · Anyone with known risk factors for hepatitis C.  Sexually Transmitted Diseases (STDs)  · You should be screened each year for STDs including gonorrhea and chlamydia if:  ? You are sexually active and are younger than 24 years of age.  ? You are older than 24 years of age and your health care provider tells you that you are at risk for this type of infection.  ? Your sexual activity has changed since you were last screened and you are at an increased risk for chlamydia or gonorrhea. Ask your health care provider if you are at risk.  · Talk with your health care provider about whether you are at high risk of being infected with HIV. Your health care provider may recommend a prescription medicine to help prevent HIV infection.  What else can I do?  · Schedule regular health, dental, and eye exams.  · Stay current with your vaccines (immunizations).  · Do not use any tobacco products, such as cigarettes, chewing tobacco, and e-cigarettes. If you need help quitting, ask your health care provider.  · Limit alcohol intake to no more than 2 drinks per day. One drink equals 12 ounces of beer, 5 ounces of wine, or 1½ ounces of hard liquor.  · Do not use street drugs.  · Do not share needles.  · Ask your health  care provider for help if you need support or information about quitting drugs.  · Tell your health care provider if you often feel depressed.  · Tell your health care provider if you have ever been abused or do not feel safe at home.  This information is not intended to replace advice given to you by your health care provider. Make sure you discuss any questions you have with your health care provider.  Document Released: 06/15/2009 Document Revised: 08/16/2017 Document Reviewed: 09/20/2016  [employer] Interactive Patient Education © 2019 [employer]

## 2019-09-27 NOTE — PROGRESS NOTES
Chief Complaint   Patient presents with   • Annual Exam     requesting referral to disability        Subjective     History of Present Illness   Alex Torres is a 34 y.o. male presenting for annual physical.  Preventive health maintenance was reviewed and discussed today. Vaccines were updated. Patient has been doing well, currently caring for his daughter who is 11 weeks old now.  Disability paperwork needs to be completed with complete evaluation, primary issue with mood disorder.      GERD is stable. Has had some ED after psychiatric medications were adjusted, he is working with psychiatry but has not had enough improvement.  Noted erectile difficulty in 8 of 10 times.      Has cut carbs and as a result has lost 4lbs since last visit.  Has increased meats and vegetables. Has cut sodas from diet as well.      The following portions of the patient's history were reviewed and updated as appropriate: allergies, current medications, past family history, past medical history, past social history, past surgical history and problem list.    Review of Systems   Constitutional: Negative for chills, fatigue and fever.   HENT: Negative for congestion, ear pain, rhinorrhea, sinus pressure and sore throat.    Eyes: Negative for visual disturbance.   Respiratory: Negative for cough, chest tightness, shortness of breath and wheezing.    Cardiovascular: Negative for chest pain, palpitations and leg swelling.   Gastrointestinal: Negative for abdominal pain, blood in stool, constipation, diarrhea, nausea and vomiting.   Endocrine: Negative for polydipsia and polyuria.   Genitourinary: Negative for dysuria and hematuria.   Musculoskeletal: Negative for arthralgias and back pain.   Skin: Negative for rash.   Neurological: Negative for dizziness, light-headedness, numbness and headaches.   Psychiatric/Behavioral: Negative for dysphoric mood and sleep disturbance. The patient is not nervous/anxious.        Allergies   Allergen  Reactions   • Gabapentin Swelling     + swelling   • Latex        Past Medical History:   Diagnosis Date   • Anxiety    • Bipolar 1 disorder (CMS/HCC)    • Depression    • Hypertension    • Kidney stone    • Polyneuropathy    • Positive TB test     treated w/ meds x 6 months       Social History     Socioeconomic History   • Marital status:      Spouse name: Not on file   • Number of children: Not on file   • Years of education: Not on file   • Highest education level: Not on file   Tobacco Use   • Smoking status: Current Every Day Smoker     Packs/day: 0.30     Years: 18.00     Pack years: 5.40     Types: Cigarettes     Start date: 1999   • Smokeless tobacco: Never Used   Substance and Sexual Activity   • Alcohol use: No   • Drug use: No   • Sexual activity: Yes     Partners: Female        Past Surgical History:   Procedure Laterality Date   • CHOLECYSTECTOMY  2002   • INGUINAL HERNIA REPAIR Right 1995   • ORIF METACARPAL FRACTURE Right 2000   • TIBIA FRACTURE SURGERY Left 1997    ORIF       Family History   Problem Relation Age of Onset   • Arthritis Father    • Hypertension Father    • Diabetes Maternal Aunt    • Diabetes Maternal Uncle    • Diabetes Maternal Grandmother    • Alzheimer's disease Maternal Grandmother    • Diabetes Other    • Hypertension Mother    • Depression Mother    • No Known Problems Brother    • Early death Maternal Grandfather    • Liver disease Maternal Grandfather    • Alcohol abuse Maternal Grandfather    • No Known Problems Paternal Grandmother    • Liver disease Paternal Grandfather    • Alcohol abuse Paternal Grandfather    • Early death Paternal Grandfather    • No Known Problems Brother          Current Outpatient Medications:   •  albuterol sulfate  (90 Base) MCG/ACT inhaler, Inhale 2 puffs Every 4 (Four) Hours As Needed for Wheezing or Shortness of Air., Disp: 18 g, Rfl: 3  •  amitriptyline (ELAVIL) 10 MG tablet, Take 1 tablet by mouth Every Night., Disp: 30  "tablet, Rfl: 4  •  baclofen (LIORESAL) 10 MG tablet, TAKE 1 TABLET BY MOUTH 2 (TWO) TIMES A DAY AS NEEDED FOR MUSCLE SPASMS., Disp: 60 tablet, Rfl: 1  •  busPIRone (BUSPAR) 15 MG tablet, Take 1 tablet by mouth 3 (Three) Times a Day., Disp: 90 tablet, Rfl: 1  •  Cariprazine HCl 3 MG capsule, Take 1 capsule by mouth Daily., Disp: 30 capsule, Rfl: 2  •  losartan (COZAAR) 100 MG tablet, TAKE 1 TABLET BY MOUTH EVERY DAY, Disp: 30 tablet, Rfl: 2  •  metFORMIN (GLUCOPHAGE) 500 MG tablet, TAKE 1 TABLET BY MOUTH TWICE A DAY WITH MEALS, Disp: 60 tablet, Rfl: 5  •  OXcarbazepine (TRILEPTAL) 300 MG tablet, TAKE ONE TABLET BY MOUTH IN THE MORNING AND 2 TABLETS AT BEDTIME, Disp: 90 tablet, Rfl: 1  •  pantoprazole (PROTONIX) 40 MG EC tablet, TAKE 1 TABLET BY MOUTH EVERY DAY, Disp: 30 tablet, Rfl: 2  •  prazosin (MINIPRESS) 1 MG capsule, Take 1 capsule by mouth Every Night., Disp: 30 capsule, Rfl: 1  •  triamterene-hydrochlorothiazide (DYAZIDE) 37.5-25 MG per capsule, TAKE 1 CAPSULE BY MOUTH EVERY DAY, Disp: 30 capsule, Rfl: 5  •  varenicline (CHANTIX CONTINUING MONTH RONNY) 1 MG tablet, Take 1 tablet by mouth 2 (Two) Times a Day., Disp: 60 tablet, Rfl: 2  •  pneumococcal polysaccharide 23-valent (PNEUMOVAX-23) 25 MCG/0.5ML vaccine, Inject 0.5 mL into the appropriate muscle as directed by prescriber 1 (One) Time for 1 dose., Disp: 0.5 mL, Rfl: 0  •  Tdap (BOOSTRIX) 5-2.5-18.5 LF-MCG/0.5 injection, Inject 0.5 mL into the appropriate muscle as directed by prescriber 1 (One) Time for 1 dose., Disp: 0.5 mL, Rfl: 0    Objective   /84   Pulse 105   Temp 97.1 °F (36.2 °C)   Ht 193 cm (76\")   Wt 132 kg (290 lb)   SpO2 100%   BMI 35.30 kg/m²     Physical Exam   Constitutional: He is oriented to person, place, and time. He appears well-developed and well-nourished.   HENT:   Head: Normocephalic and atraumatic.   Right Ear: External ear normal.   Left Ear: External ear normal.   Nose: Nose normal.   Mouth/Throat: Oropharynx is clear " and moist. No oropharyngeal exudate.   Eyes: EOM are normal. Pupils are equal, round, and reactive to light. Right eye exhibits no discharge. No scleral icterus.   Neck: Normal range of motion. Neck supple. No JVD present. No thyromegaly present.   Cardiovascular: Normal rate, regular rhythm, normal heart sounds and intact distal pulses. Exam reveals no friction rub.   No murmur heard.  Pulmonary/Chest: Effort normal and breath sounds normal. No stridor. No respiratory distress. He has no decreased breath sounds. He has no wheezes.   Abdominal: Soft. Bowel sounds are normal. He exhibits no distension. There is no tenderness. There is no guarding.   Genitourinary:   Genitourinary Comments: Deferred   Musculoskeletal: Normal range of motion. He exhibits no edema or tenderness.    Alex had a diabetic foot exam performed today.   During the foot exam he had a monofilament test performed.    Neurological Sensory Findings - Unaltered hot/cold right ankle/foot discrimination and unaltered hot/cold left ankle/foot discrimination. Unaltered sharp/dull right ankle/foot discrimination and unaltered sharp/dull left ankle/foot discrimination.  Vascular Status -  His right foot exhibits normal foot vasculature  and no edema. His left foot exhibits normal foot vasculature  and no edema.  Skin Integrity  -  His right foot skin is intact.His left foot skin is intact..  Lymphadenopathy:     He has no cervical adenopathy.     He has no axillary adenopathy.        Right: No inguinal adenopathy present.        Left: No inguinal adenopathy present.   Neurological: He is alert and oriented to person, place, and time. He has normal reflexes. No cranial nerve deficit.   Skin: Skin is warm and dry. No rash noted.   Psychiatric: He has a normal mood and affect. His behavior is normal. Judgment and thought content normal.   Nursing note and vitals reviewed.      Assessment/Plan   Alex was seen today for annual exam.    Diagnoses and all  orders for this visit:    Encounter for preventive health examination    Need for influenza vaccination  -     Flucelvax Quad=>4Years ([id number])    Disability examination  -     Ambulatory Referral to Physical Medicine Rehab    Borderline personality disorder (CMS/HCC)  -     amitriptyline (ELAVIL) 10 MG tablet; Take 1 tablet by mouth Every Night.    Tobacco abuse  -     pneumococcal polysaccharide 23-valent (PNEUMOVAX-23) 25 MCG/0.5ML vaccine; Inject 0.5 mL into the appropriate muscle as directed by prescriber 1 (One) Time for 1 dose.    Essential hypertension  -     CBC & Differential  -     Comprehensive Metabolic Panel  -     Lipid Panel  -     TSH    Borderline diabetes  -     CBC & Differential  -     Comprehensive Metabolic Panel  -     Lipid Panel  -     TSH  -     Hemoglobin A1c  -     pneumococcal polysaccharide 23-valent (PNEUMOVAX-23) 25 MCG/0.5ML vaccine; Inject 0.5 mL into the appropriate muscle as directed by prescriber 1 (One) Time for 1 dose.    Gastroesophageal reflux disease without esophagitis    Morbidly obese (CMS/[facility])    PTSD (post-traumatic stress disorder)    Chronic obstructive pulmonary disease, unspecified COPD type (CMS/[facility])    Need for vaccination for pneumococcus  -     pneumococcal polysaccharide 23-valent (PNEUMOVAX-23) 25 MCG/0.5ML vaccine; Inject 0.5 mL into the appropriate muscle as directed by prescriber 1 (One) Time for 1 dose.    Need for diphtheria-tetanus-pertussis (Tdap) vaccine  -     Tdap (BOOSTRIX) 5-2.5-18.5 LF-MCG/0.5 injection; Inject 0.5 mL into the appropriate muscle as directed by prescriber 1 (One) Time for 1 dose.          Discussion Summary:  Patient is a 34 y.o. male presenting for annual physical    1. Preventive Health Maintenance  - Baseline labs are up-to-date or ordered per above.  - Vaccines reviewed and updated  - Preventive health measures were discussed including: healthy diet with increase in fruits and vegetables, regular exercise at least 3 times a  week, safe sex practices, avoidance of drugs, tobacco, and alcohol, and regular seatbelt use.    2. GERD  - stable on PPI    3. Tobacco abuse  - down to 5 cigarettes per day, gradually cutting. Continues to be on chantix with some benefit.     4. PTSD / Mood disorder / borderline personality  - following with psychiatry.   - would benefit from disability evaluation - referral placed.         Follow up:  Return in about 3 months (around 12/27/2019) for Next scheduled follow up.     Patient Instructions:  Patient instructions were provided.

## 2019-10-09 DIAGNOSIS — F31.62 BIPOLAR DISORDER, CURRENT EPISODE MIXED, MODERATE (HCC): ICD-10-CM

## 2019-10-10 RX ORDER — BUSPIRONE HYDROCHLORIDE 15 MG/1
15 TABLET ORAL 3 TIMES DAILY
Qty: 90 TABLET | Refills: 1 | Status: SHIPPED | OUTPATIENT
Start: 2019-10-10 | End: 2019-10-11 | Stop reason: SDUPTHER

## 2019-10-11 ENCOUNTER — OFFICE VISIT (OUTPATIENT)
Dept: PSYCHIATRY | Facility: CLINIC | Age: 34
End: 2019-10-11

## 2019-10-11 VITALS — BODY MASS INDEX: 35.31 KG/M2 | WEIGHT: 290 LBS | HEIGHT: 76 IN

## 2019-10-11 DIAGNOSIS — F43.10 POST TRAUMATIC STRESS DISORDER (PTSD): ICD-10-CM

## 2019-10-11 DIAGNOSIS — F31.62 BIPOLAR DISORDER, CURRENT EPISODE MIXED, MODERATE (HCC): Primary | ICD-10-CM

## 2019-10-11 DIAGNOSIS — F51.05 INSOMNIA DUE TO MENTAL CONDITION: ICD-10-CM

## 2019-10-11 DIAGNOSIS — F41.9 ANXIETY DISORDER, UNSPECIFIED TYPE: ICD-10-CM

## 2019-10-11 PROCEDURE — 90833 PSYTX W PT W E/M 30 MIN: CPT | Performed by: NURSE PRACTITIONER

## 2019-10-11 PROCEDURE — 99214 OFFICE O/P EST MOD 30 MIN: CPT | Performed by: NURSE PRACTITIONER

## 2019-10-11 RX ORDER — PRAZOSIN HYDROCHLORIDE 1 MG/1
1 CAPSULE ORAL NIGHTLY
Qty: 30 CAPSULE | Refills: 1 | Status: SHIPPED | OUTPATIENT
Start: 2019-10-11 | End: 2019-11-22 | Stop reason: SDUPTHER

## 2019-10-11 RX ORDER — OXCARBAZEPINE 300 MG/1
TABLET, FILM COATED ORAL
Qty: 90 TABLET | Refills: 1 | Status: SHIPPED | OUTPATIENT
Start: 2019-10-11 | End: 2019-11-22 | Stop reason: SDUPTHER

## 2019-10-11 RX ORDER — BUSPIRONE HYDROCHLORIDE 15 MG/1
15 TABLET ORAL 3 TIMES DAILY
Qty: 90 TABLET | Refills: 1 | Status: SHIPPED | OUTPATIENT
Start: 2019-10-11 | End: 2019-11-22 | Stop reason: SDUPTHER

## 2019-10-11 NOTE — PROGRESS NOTES
Alex Torres is a 34 y.o. male is here today for medication management follow-up.    Chief Complaint:      ICD-10-CM ICD-9-CM   1. Bipolar disorder, current episode mixed, moderate (CMS/HCC) F31.62 296.62   2. Anxiety disorder, unspecified type F41.9 300.00   3. Post traumatic stress disorder (PTSD) F43.10 309.81   4. Insomnia due to mental condition F51.05 300.9     327.02       History of Present Illness:  Pt present for follow up visit and medication managment of mood symptoms. 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 Pt reports that he had difficulty staying the entire shift due to nausea and feeling overheated. States she took Prazosin and Elavil prior to starting his shift. States Buspirone was helping his anxiety until he started work. Reports mood fluctuations and constant anxiety symptoms due to multiple stressors including financial, caring for his daughter, and starin in his relationship with his fiance.      Pt reports the presence/absence of the following anxiety symptoms: (+) excessive worry, (+) excessive fear, (-) difficulty relaxing, (-) restlessness, (-) insomnia, (-) easily fatigued, (+) irritability, (-) poor concentration, (+) racing thoughts, and (-) panic episodes.     Pt reports the presence/absence of the following depressive symptoms: (+) depressed mood, (-) reduced appetite, (-) increased appetite, (-) poor concentration, (+) hopelessness, (-) worthlessness, (-) insomnia, (-) hypersomnia, (-) psychomotor agitation, (+) irritability, (+) anhedonia, (+) amotivation, (+) fatigue, (-) suicidal ideation, (-) suicidal plan, (-) suicidal intent, (-) recurrent thoughts about death, and (-) homicidal ideation.    The following portions of the patient's history were reviewed and updated as appropriate: allergies, current medications, past family history, past medical history, past social history, past surgical history and problem list.    Review of  "Systems;;  Review of Systems   Constitutional: Negative.  Negative for activity change, appetite change, unexpected weight gain and unexpected weight loss.   Respiratory: Negative.    Cardiovascular: Negative.  Negative for chest pain.   Gastrointestinal: Negative.  Negative for diarrhea, nausea and vomiting.   Genitourinary: Negative.  Negative for erectile dysfunction.   Musculoskeletal: Negative.    Skin: Negative for rash and bruise.   Neurological: Negative.  Negative for dizziness, seizures and speech difficulty.   Psychiatric/Behavioral: Positive for dysphoric mood, sleep disturbance and stress. Negative for agitation, behavioral problems, decreased concentration, hallucinations, self-injury, suicidal ideas and negative for hyperactivity. The patient is nervous/anxious.        Physical Exam;;  Physical Exam  Height 193 cm (76\"), weight 132 kg (290 lb).    Current Medications;;    Current Outpatient Medications:   •  albuterol sulfate  (90 Base) MCG/ACT inhaler, Inhale 2 puffs Every 4 (Four) Hours As Needed for Wheezing or Shortness of Air., Disp: 18 g, Rfl: 3  •  amitriptyline (ELAVIL) 10 MG tablet, Take 1 tablet by mouth Every Night., Disp: 30 tablet, Rfl: 4  •  baclofen (LIORESAL) 10 MG tablet, TAKE 1 TABLET BY MOUTH 2 (TWO) TIMES A DAY AS NEEDED FOR MUSCLE SPASMS., Disp: 60 tablet, Rfl: 1  •  busPIRone (BUSPAR) 15 MG tablet, Take 1 tablet by mouth 3 (Three) Times a Day., Disp: 90 tablet, Rfl: 1  •  Cariprazine HCl (VRAYLAR) 4.5 MG capsule, Take 4.5 mg by mouth Daily., Disp: 30 capsule, Rfl: 0  •  losartan (COZAAR) 100 MG tablet, TAKE 1 TABLET BY MOUTH EVERY DAY, Disp: 30 tablet, Rfl: 2  •  metFORMIN (GLUCOPHAGE) 500 MG tablet, TAKE 1 TABLET BY MOUTH TWICE A DAY WITH MEALS, Disp: 60 tablet, Rfl: 5  •  OXcarbazepine (TRILEPTAL) 300 MG tablet, TAKE ONE TABLET BY MOUTH IN THE MORNING AND 2 TABLETS AT BEDTIME, Disp: 90 tablet, Rfl: 1  •  pantoprazole (PROTONIX) 40 MG EC tablet, TAKE 1 TABLET BY MOUTH " EVERY DAY, Disp: 30 tablet, Rfl: 2  •  prazosin (MINIPRESS) 1 MG capsule, Take 1 capsule by mouth Every Night., Disp: 30 capsule, Rfl: 1  •  triamterene-hydrochlorothiazide (DYAZIDE) 37.5-25 MG per capsule, TAKE 1 CAPSULE BY MOUTH EVERY DAY, Disp: 30 capsule, Rfl: 5  •  varenicline (CHANTIX CONTINUING MONTH RONNY) 1 MG tablet, Take 1 tablet by mouth 2 (Two) Times a Day., Disp: 60 tablet, Rfl: 2    Lab Results:       Mental Status Exam:   Hygiene:   good  Cooperation:  Cooperative  Eye Contact:  Good  Psychomotor Behavior:  Appropriate  Mood:euthymic  Affect:  Appropriate  Hopelessness: Denies  Speech:  Normal  Thought Process:  Goal directed  Thought Content:  Normal  Suicidal:  None  Homicidal:  None  Hallucinations:  None  Delusion:  None  Memory:  Intact  Orientation:  Person, Place, Time and Situation  Reliability:  good  Insight:  Fair  Judgement:  Fair  Impulse Control:  Fair  Physical/Medical Issues:  No         Assessment Plan;;  Diagnoses and all orders for this visit:    Bipolar disorder, current episode mixed, moderate (CMS/HCC)  -     OXcarbazepine (TRILEPTAL) 300 MG tablet; TAKE ONE TABLET BY MOUTH IN THE MORNING AND 2 TABLETS AT BEDTIME  -     Cariprazine HCl (VRAYLAR) 4.5 MG capsule; Take 4.5 mg by mouth Daily.    Anxiety disorder, unspecified type  -     busPIRone (BUSPAR) 15 MG tablet; Take 1 tablet by mouth 3 (Three) Times a Day.    Post traumatic stress disorder (PTSD)  -     prazosin (MINIPRESS) 1 MG capsule; Take 1 capsule by mouth Every Night.    Insomnia due to mental condition  -     prazosin (MINIPRESS) 1 MG capsule; Take 1 capsule by mouth Every Night.    Increase Vraylar    Continue current doses of Buspirone, Trileptal, and Prazosin    Continue therapy with DESMOND Rosa    Visit time: 9:05-9:35. 25 minutes spent in supportive psychotherapy: Pt openly expressed how current stressors are impacting his mood symptoms. The patient identified negative emotions and behaviors. Attempted to  assist patient in developing coping strategies for managing negative emotions. Encouraged pt to seek out vocational rehabilitation services to help him find appropriate employment.    A psychological evaluation was conducted in order to assess past and current level of functioning. Areas assessed included, but were not limited to: perception of social support, perception of ability to face and deal with challenges in life (positive functioning), anxiety symptoms, depressive symptoms, perspective on beliefs/belief system, coping skills for stress, intelligence level,  Therapeutic rapport was established. Interventions conducted today were geared towards incorporating medication management along with support for continued therapy. Education was also provided as to the med management with this provider and what to expect in subsequent sessions.    We discussed risks, benefits,goals and side effects of the above medication and the patient was agreeable with the plan.Patient was educated on the importance of compliance with treatment and follow-up appointments. Patient is aware to contact the [facility] with any worsening of symptoms. To call for questions or concerns and return early if necessary. Patent is agreeable to go to the Emergency Department or call 911 should they begin SI/HI.     Treatment Plan: stabilize mood,  patient will stay out of the hospital and be at optimal level of functioning, take all medication as prescribed. Patient verbalized  understanding and agreement to plan.    Return in about 4 weeks (around 11/8/2019) for Follow Up.    Jana Kingsley, HU, APRN, PMHNP-BC, FNP-C

## 2019-10-15 ENCOUNTER — HOSPITAL ENCOUNTER (EMERGENCY)
Facility: HOSPITAL | Age: 34
Discharge: HOME | End: 2019-10-15
Attending: EMERGENCY MEDICINE | Admitting: EMERGENCY MEDICINE

## 2019-10-15 VITALS
BODY MASS INDEX: 35.49 KG/M2 | WEIGHT: 291.4 LBS | HEART RATE: 72 BPM | DIASTOLIC BLOOD PRESSURE: 92 MMHG | OXYGEN SATURATION: 97 % | HEIGHT: 76 IN | TEMPERATURE: 98.3 F | RESPIRATION RATE: 17 BRPM | SYSTOLIC BLOOD PRESSURE: 149 MMHG

## 2019-10-15 DIAGNOSIS — F41.9 ANXIETY: Primary | ICD-10-CM

## 2019-10-15 PROCEDURE — 99284 EMERGENCY DEPT VISIT MOD MDM: CPT

## 2019-10-15 NOTE — ED PROVIDER NOTES
"Subjective   This patient states he has a history of anxiety.  He states things have been building recently and today he \"woke up on the wrong side of the bed and had not been able to get over the other side.\"  He states in the past these had suicidal thoughts in the past attempted to hang himself.  Today he does not have a plan to kill himself however he is having some suicidal thoughts but states he will not act on them because he has too much other stuff to do.  He is adamant that his wife and daughter be able to be brought into the ER with him however he is agreeable to let us evaluate him first.  He had no complaints of pain today.  He sees a therapist at this facility and was instructed to come to the ER if he had any worsening symptoms like he does today.            Review of Systems   Psychiatric/Behavioral:        Suicidal thoughts, no plan of suicide.  No homicidal ideations.  Complaint of anxiety and increased stress   All other systems reviewed and are negative.      Past Medical History:   Diagnosis Date   • Anxiety    • Bipolar 1 disorder (CMS/HCC)    • Depression    • Hypertension    • Kidney stone    • Polyneuropathy    • Positive TB test     treated w/ meds x 6 months       Allergies   Allergen Reactions   • Gabapentin Swelling     + swelling   • Latex        Past Surgical History:   Procedure Laterality Date   • CHOLECYSTECTOMY  2002   • INGUINAL HERNIA REPAIR Right 1995   • ORIF METACARPAL FRACTURE Right 2000   • TIBIA FRACTURE SURGERY Left 1997    ORIF       Family History   Problem Relation Age of Onset   • Arthritis Father    • Hypertension Father    • Diabetes Maternal Aunt    • Diabetes Maternal Uncle    • Diabetes Maternal Grandmother    • Alzheimer's disease Maternal Grandmother    • Diabetes Other    • Hypertension Mother    • Depression Mother    • No Known Problems Brother    • Early death Maternal Grandfather    • Liver disease Maternal Grandfather    • Alcohol abuse Maternal " Grandfather    • No Known Problems Paternal Grandmother    • Liver disease Paternal Grandfather    • Alcohol abuse Paternal Grandfather    • Early death Paternal Grandfather    • No Known Problems Brother        Social History     Socioeconomic History   • Marital status:      Spouse name: Not on file   • Number of children: Not on file   • Years of education: Not on file   • Highest education level: Not on file   Tobacco Use   • Smoking status: Current Every Day Smoker     Packs/day: 0.30     Years: 18.00     Pack years: 5.40     Types: Cigarettes     Start date: 1999   • Smokeless tobacco: Never Used   Substance and Sexual Activity   • Alcohol use: No   • Drug use: No   • Sexual activity: Yes     Partners: Female           Objective   Physical Exam   Constitutional: He is oriented to person, place, and time. He appears well-developed and well-nourished.   HENT:   Head: Normocephalic and atraumatic.   Neck: Normal range of motion.   Cardiovascular: Normal rate and regular rhythm.   Pulmonary/Chest: Effort normal and breath sounds normal.   Abdominal: Soft. There is no tenderness.   Musculoskeletal: Normal range of motion.   Neurological: He is alert and oriented to person, place, and time.   Skin: Skin is warm and dry.   Psychiatric: His speech is normal. His mood appears anxious. He is agitated. He expresses suicidal ideation. He expresses no suicidal plans.   Nursing note and vitals reviewed.      Procedures           ED Course    Radha from behavioral health has seen and evaluated the patient he denies that he is a harm to himself or others.  He has no plan of suicide.  He states he is upset because he is working on getting disability but has not gotten it.  He wishes to see his therapist but he has an appoint with her in 1 month.  He just recently had an increase to his right lower but has not begun this new increased dose.  He does not wish to wait for a referral although Radha offered to help  him with one.  He does not want to go to [facility] or patient state.  He wishes to go self refer at the [facility] in [place].  He is asked that his wife and child be allowed back here with him.  Patient has no complaints at this time other than anxiety and stress and wishes to be discharged.  Again he denies any plan of suicide and states he does not want to harm himself as he has many important things he needs to do.              [facility]    Final diagnoses:   Anxiety              Otto Bueno PA-C  10/15/19 5715

## 2019-10-15 NOTE — CONSULTS
"1540 to 1620    D:  Received request for behavioral health assessment from Amina Ross RN, with orders from ROSELINE Alfaro.  Alex Torres is a 34 year old male who presents to the ED c/o suicidal thoughts.  He is established with outpatient care at the [facility], and I obtained collateral information from his provider, VARSHA Smith, before going over to assess the patient.  Upon assessment, Alex is being monitored 1-1 by security.  He serves as his own guardian and was agreeable to speak with me.  I met with him at his bedside.  Alex currently resides in [facility] with his wife.  He has one biological child, 3 months, and a step-child, 11 years.  He reports he presents to the ED for \"anxiety, frustration, and negative thoughts.\"  He reports vague but intrusive suicidal thoughts \"but I wouldn't ever do it because I have a lot of reasons\" including his  and his wife.  He reports he has been increasingly frustrated ever since he was denied for disability.  He tends to fixate on why he is unable to work and reports he tried going back to work last week but has had to leave early and call in due to his anxiety.  He he further reports he feels like \"no one is listening\" and \"no one understands,\" specifically the  residing over his disability case.  He also expresses frustration about his medication stating he does not think they are as effective as they once were and tends to fixate on this, as well.  He reports he was seen last week by his medication provider and she increased Vraylar, although he doesn't think this was enough.  He reports using marijuana \"once or twice per day\" for several days then going several days without.  He states this helps his anxiety and keeps him from getting frustrated.  He reports other times it does make him more paranoid.  Alex also requests his wife and daughter be brought in \"so she can see and understand what I'm going " "through.\"  As we explored this, he reported his expectations that his wife would understand his mental health and why he can't work.  He reports one historical inpatient hospitalization at [facility] when he was 18.  Alex reports he does not have intent or desire to harm himself and was hoping he could his medications changed if he went inpatient.  He denied being fearful that he would be a danger to himself or others.  He initially reports wanting to \"be committed\" and found this helpful in the past, but as we explored options, he stated [place] was too far, he did not want to have to wait for a referral to the [facility], and he did not want to \"waste my (his) time\" by being referred to [facility] if there was no guarantee of admission.  I spent some time exploring Alex's goals and expectations for coming to the ED, as well as his expectations for other demands and requests.  He requested an emergency appointment with his provider for tomorrow \"since you've me in the ER and she'll have more documentation to change my meds,\" although he previously expressed dissatisfaction with last week's appointment and not being prescribed any new medications.  He appears to be externally focused and ultimately ambivalent when discussing actions that can be taken to promote his goals.  For example, he wants a med change but has not started the increased dose from last week, he wants to go inpatient but does not want to go too far or go through the process of being referred, he wants an \"emergency\" appointment but also c/o dissatisfaction with these appointments.  I spent a lot of time exploring patient's expectations and building discrepancy between these expectations/demands and his behavior.    A:  Upon assessment, Alex is resting in bed in a hospital gown.  He appears appropriately groomed.  He was cooperative with the interview, although he expressed several demands and expectations from the assessment and hospital visit.  He " "displayed normal psychomotor activity, intense eye contact times.  Mood \"anxious, frustrated, hopeless.\"  Affect congruent, irritable.  Speech is normal in tone and fluency, no aphasia noted.  C-SSRS was administered and pt endorses having a death wish and nonspecific suicidal thoughts.  He denies any intent or desire to harm himself and denies any preparatory behavior, citing his family as protective factors.  He denies HI.  In regards to sleep he states \"I have a 3 month old\" suggesting sleep has been poor due to tending to his .  He does not appear to be responding to internal stimuli.  Thought is linear but often fixated on his inability to work and need for new medication, no delusional thought content.  He is alert and oriented x4.  Impulse control fair, judgement fair, insight limited regarding his expectations for treatment.    P:  Given the above assessment and patient's initial request for inpatient treatment, I offered to send a referral for inpatient treatment, discussing [facility], [facility], [facility], and the [facility].  As we explored these options, patient stated if he couldn't get an emergency appointment with his medication provider then he would take himself to the [facility] for an assessment as he did not want to have to wait in the ED for the referral process.  He further stated he did not want to go to [facility] if there was no guarantee of admission, although we discussed there is not guarantee of admission to the [facility] either.  He does not want a referral to [facility], [facility] stating it is too far away.  I contacted the [facility] behavioral health clinic and was ifnormed Alex is already on their cancellation list should a sooner appointment come available.  His next appointment for med management is on 19 and his next appointment for therapy is next week on 10/24/19.  I offered multiple times to send a referral to the [facility]; however, the patient requested he be discharged preferring to self-refer. "  He has maintained he has no desire or intent to harm himself throughout his stay in the ED and does not appear to be holdable at this time.  I discussed this with ROSELINE Alfaro, who was in agreement that the patient is not holdable at this time and can self-present to another facility if he does not want to pursue direct admission through [facility].  I discussed levels of care and expectations for treatment with the patient and encouraged him to return to ED at any time should he wish to pursue an inpatient referral that way.    Radha Sanchez [facility]

## 2019-10-15 NOTE — ED NOTES
Got supplies to obtain blood work and specimen for urine, Radha from Behavioral Health is with patient at this time. I will check in with patient again at a later time. Kerwin WICK was notified at this time.     Enriqueta Pritchard  10/15/19 1546

## 2019-10-15 NOTE — ED NOTES
Patient agitated and anxious during triage, demanding that family be allowed back with him. Patient advised by nurse and provider that an evaluation would need to be completed first. Radha with Behavioral Health called at this time for evaluation. Stated she would review his chart and be right over.      Amina Ross, RN  10/15/19 7780

## 2019-10-22 DIAGNOSIS — I10 ESSENTIAL HYPERTENSION: ICD-10-CM

## 2019-10-22 RX ORDER — TRIAMTERENE AND HYDROCHLOROTHIAZIDE 37.5; 25 MG/1; MG/1
CAPSULE ORAL
Qty: 30 CAPSULE | Refills: 5 | Status: SHIPPED | OUTPATIENT
Start: 2019-10-22 | End: 2020-04-27

## 2019-10-26 DIAGNOSIS — K21.9 GASTROESOPHAGEAL REFLUX DISEASE WITHOUT ESOPHAGITIS: ICD-10-CM

## 2019-10-27 DIAGNOSIS — G89.29 CHRONIC BILATERAL LOW BACK PAIN WITH BILATERAL SCIATICA: ICD-10-CM

## 2019-10-27 DIAGNOSIS — M54.41 CHRONIC BILATERAL LOW BACK PAIN WITH BILATERAL SCIATICA: ICD-10-CM

## 2019-10-27 DIAGNOSIS — M54.42 CHRONIC BILATERAL LOW BACK PAIN WITH BILATERAL SCIATICA: ICD-10-CM

## 2019-10-28 RX ORDER — LOSARTAN POTASSIUM 100 MG/1
TABLET ORAL
Qty: 30 TABLET | Refills: 5 | Status: SHIPPED | OUTPATIENT
Start: 2019-10-28 | End: 2020-03-18

## 2019-10-28 RX ORDER — PANTOPRAZOLE SODIUM 40 MG/1
TABLET, DELAYED RELEASE ORAL
Qty: 30 TABLET | Refills: 2 | Status: SHIPPED | OUTPATIENT
Start: 2019-10-28 | End: 2020-01-28

## 2019-10-28 RX ORDER — BACLOFEN 10 MG/1
10 TABLET ORAL 2 TIMES DAILY PRN
Qty: 60 TABLET | Refills: 1 | Status: SHIPPED | OUTPATIENT
Start: 2019-10-28 | End: 2021-04-23

## 2019-11-21 ENCOUNTER — OFFICE VISIT (OUTPATIENT)
Dept: PSYCHIATRY | Facility: CLINIC | Age: 34
End: 2019-11-21

## 2019-11-21 DIAGNOSIS — F41.1 GENERALIZED ANXIETY DISORDER: Primary | ICD-10-CM

## 2019-11-21 PROCEDURE — 90837 PSYTX W PT 60 MINUTES: CPT | Performed by: COUNSELOR

## 2019-11-21 NOTE — PROGRESS NOTES
".Date of Service: November 21, 2019  Time In: 10:30 AM  Time Out: 11:30 AM      PROGRESS NOTE  Data:  Alex Torres is a 34 y.o. male who presents for individual therapy session at [facility].   Patient reports he has been increasingly frustrated ever since he was denied for disability.  He seems to be fixated on why he was denied, stating \"everyone tells me its because I'm too young\".  He discussed how he feels like the  did not listen to him and does not understand mental illness.  Patient reports frustration about his medications stating he does not think they are as effective as Depakote or Lithium used to be.  Patient discussed having a responsibility to his wife and daughter to find stable housing.  They are currently living with his family and he states tension is at an all time high.  He reports starting a job at [employer] last week and getting fired after having a panic attack.  Patient reports \"I almost committed myself\" to inpatient.  As we explored his motivation for this, he admitted that he did not have an intent or desire to harm himself and was hoping he could get his medications changed if he went inpatient.  After review of the ER notes, it looks like Alex requested to be discharged preferring to self refer to the [facility] or [facility] and not pursue direct admission through [facility].  He states he did not go for an inpatient assessment because \"I have too many responsibilities here\".  He expressed dissatisfaction with his med appointments.  We spent time exploring his expectations and building discrepancy between these expectations and realistic potential.  Offered to refer him to our psychiatrist for a second opinion/evaluation. We also discussed involving a  to assist him and his family with basic needs and to help him find employment.     Patient adamantly and convincingly denies current suicidal or homicidal ideation or perceptual disturbance.        Clinical " Maneuvering/Intervention:  Assisted patient in processing above session content; acknowledged and normalized patient’s thoughts, feelings, and concerns.  Motivational interviewing techniques were used to engage the patient in the process of building discrepancies between his current functioning and his desired functioning.  Techniques were also used to engage the patient in the process of discontinuing his fixation on his inability to work and need for new medications.    Assisted patient in identifying risk factors which would indicate the need for higher level of care including thoughts to harm self or others and/or self-harming behavior and encouraged patient to contact this office, call 911, or present to the nearest emergency room should any of these events occur. Discussed crisis intervention services and means to access.   Assessment          Mental Status Exam  Hygiene:  good  Dress:  casual  Attitude:  irritable  Motor Activity:  Restless  Speech:  rambling  Mood:  irritable  Affect:  congruent  Thought Processes:  Goal directed  Thought Content:  normal  Suicidal Thoughts:  denies  Homicidal Thoughts:  denies  Crisis Safety Plan: yes, to come to the emergency room.  Hallucinations:  denies    Patient's Support Network Includes:  wife, daughter and parents    Functional Status: No impairment    Progress toward goal: Not at goal      Plan       Refer to Dr. Marion    Refer to [facility] for case management    Patient will adhere to medication regimen as prescribed and report any side effects. Patient will contact this office, call 911 or present to the nearest emergency room should suicidal or homicidal ideations occur.     Return if symptoms worsen or fail to improve.    R/O personality disorder, PTSD, MDD. I do not feel he meets criteria for bipolar disorder, however he reports a positive history of this diagnosis and good response to mood stabilizer medications. If he continues therapy at this clinic, I  will further assess for bipolar disorder and collaborate with med providers to address concerns.     VISIT DIAGNOSIS:     ICD-10-CM ICD-9-CM   1. Generalized anxiety disorder F41.1 300.02        Shania Turner [facility]      Please note that portions of this note were completed with a voice recognition program. Efforts were made to edit dictation, but occasionally words are mistranscribed.

## 2019-11-22 ENCOUNTER — OFFICE VISIT (OUTPATIENT)
Dept: PSYCHIATRY | Facility: CLINIC | Age: 34
End: 2019-11-22

## 2019-11-22 VITALS
HEIGHT: 76 IN | BODY MASS INDEX: 35.56 KG/M2 | DIASTOLIC BLOOD PRESSURE: 80 MMHG | WEIGHT: 292 LBS | SYSTOLIC BLOOD PRESSURE: 148 MMHG | HEART RATE: 78 BPM

## 2019-11-22 DIAGNOSIS — F43.10 POST TRAUMATIC STRESS DISORDER (PTSD): ICD-10-CM

## 2019-11-22 DIAGNOSIS — F41.9 ANXIETY DISORDER, UNSPECIFIED TYPE: ICD-10-CM

## 2019-11-22 DIAGNOSIS — F51.05 INSOMNIA DUE TO MENTAL CONDITION: ICD-10-CM

## 2019-11-22 DIAGNOSIS — F51.5 NIGHTMARES: ICD-10-CM

## 2019-11-22 DIAGNOSIS — F31.62 BIPOLAR DISORDER, CURRENT EPISODE MIXED, MODERATE (HCC): Primary | ICD-10-CM

## 2019-11-22 PROCEDURE — 99214 OFFICE O/P EST MOD 30 MIN: CPT | Performed by: PSYCHIATRY & NEUROLOGY

## 2019-11-22 RX ORDER — BUSPIRONE HYDROCHLORIDE 15 MG/1
15 TABLET ORAL 3 TIMES DAILY
Qty: 90 TABLET | Refills: 1 | Status: SHIPPED | OUTPATIENT
Start: 2019-11-22 | End: 2019-12-20 | Stop reason: SDUPTHER

## 2019-11-22 RX ORDER — OXCARBAZEPINE 300 MG/1
TABLET, FILM COATED ORAL
Qty: 90 TABLET | Refills: 1 | Status: SHIPPED | OUTPATIENT
Start: 2019-11-22 | End: 2019-12-20 | Stop reason: SDUPTHER

## 2019-11-22 RX ORDER — ARIPIPRAZOLE 10 MG/1
10 TABLET ORAL DAILY
Qty: 30 TABLET | Refills: 1 | Status: SHIPPED | OUTPATIENT
Start: 2019-11-22 | End: 2019-12-20 | Stop reason: SDUPTHER

## 2019-11-22 RX ORDER — SERTRALINE HYDROCHLORIDE 25 MG/1
25 TABLET, FILM COATED ORAL DAILY
Qty: 30 TABLET | Refills: 2 | Status: SHIPPED | OUTPATIENT
Start: 2019-11-22 | End: 2019-12-20 | Stop reason: SDUPTHER

## 2019-11-22 RX ORDER — PRAZOSIN HYDROCHLORIDE 1 MG/1
1 CAPSULE ORAL NIGHTLY
Qty: 30 CAPSULE | Refills: 1 | Status: SHIPPED | OUTPATIENT
Start: 2019-11-22 | End: 2019-12-20 | Stop reason: SDUPTHER

## 2019-11-22 NOTE — PROGRESS NOTES
Subjective   Alex Torres is a 34 y.o. male who presents today for follow up     This provider is located at [facility], [facility], [address] [place] [address], using [employer].  The patient is seen remotely at [facility], Behavioral health, [address], [address] [facility] in [facility] via [employer], an encrypted service from one [facility] [place] to another, with staff present. The patient's condition being diagnosed/treated is appropriate for telemedecine.  The provider identified himself as well as his credentials.      The patient and/or patient's guardian consent to be seen remotely, and when consent is given they understand that the consent allows for patient identifiable information to be sent to a third party as needed.  They may refuse to be seen remotely at any time.  The electronic data is encrypted and password protected, and the patient has been advised of the potential risks to privacy notwithstanding such measures.      Chief Complaint: Bipolar disorder/anxiety    History of Present Illness: Patient is a 34-year-old  male with a long history of bipolar disorder and anxiety who presents today for follow-up.  This is my first encounter with the patient as previously he is seeing another provider in the clinic.  Patient is currently managed on Trileptal 300 mg in the morning and 600 mg at night, Vraylar 4.5 mg daily, BuSpar 15 mg 3 times daily, and prazosin 1 mg at night.  He states that he has been diagnosed with bipolar disorder since the age of 8 and has been hospitalized multiple times.  He states he was last hospitalized in his early 20s for depression at [facility].  Recently prazosin was decreased to 1 mg nightly as he was having sexual dysfunction, but he does report that his nightmares are better.  He does not feel like his current medication regimen is working in other regards.  He states that he feels his mood is unstable and he is  often irritable, frustrated, anxious, which causes dysphoric mood.  Patient has been on Depakote, lithium, Haldol, Prozac, Wellbutrin, Ritalin, Risperdal, Lamictal, and Effexor in the past.  He felt that his current medication regimen was working initially but has lost some of its efficacy.  He also has increased stressors.  He has a 4-month-old daughter at home and recently started back working.  I advised that some level of stress with his change in life situations is warranted and normal.  We discussed his current symptom burden and it appears that anxiety, irritability, and frustration are his main issues.  He also does not feel that his mood is even and often fluctuates.  We discussed treatment options and will work to slowly transition medications if possible.  I advised that it would be important to take it one step at a time as we do not want patient to experience severe depression or have a manic episode and further destabilize.  Initially we will change Vraylar to Abilify to see if this grants any further mood stability.  We will leave Trileptal as is for now to provide some safety against manic or depressive episodes.  Patient has been on SSRIs in the past without causing manic episode and he is currently on 2 mood stabilizers.  I feel that Zoloft could be beneficial to the patient's expressed symptoms as it helps with irritability and anxiety more quickly than some other antidepressants though full mood and anxiety benefits are not seen for 4-6 weeks.  We will consider transitioning from Trileptal in the future.  Patient currently denies SI/HI/AVH.  He denies issues with appetite.  Nightmares have improved with prazosin which has improved sleep.  Patient does have some sexual side effects at this point in time but reports that it has been ongoing for quite some time.  I discussed the possible psychological component of his sexual dysfunction and ways to ameliorate that.  I also discussed the multiple  other medications he is on could be leading to some sexual dysfunction.    The following portions of the patient's history were reviewed and updated as appropriate: allergies, current medications, past family history, past medical history, past social history, past surgical history and problem list.      Past Medical History:  Past Medical History:   Diagnosis Date   • Anxiety    • Bipolar 1 disorder (CMS/HCC)    • Depression    • Hypertension    • Kidney stone    • Polyneuropathy    • Positive TB test     treated w/ meds x 6 months       Social History:  Social History     Socioeconomic History   • Marital status:      Spouse name: Not on file   • Number of children: Not on file   • Years of education: Not on file   • Highest education level: Not on file   Tobacco Use   • Smoking status: Current Every Day Smoker     Packs/day: 0.30     Years: 18.00     Pack years: 5.40     Types: Cigarettes     Start date: 1999   • Smokeless tobacco: Never Used   Substance and Sexual Activity   • Alcohol use: No   • Drug use: No   • Sexual activity: Yes     Partners: Female       Family History:  Family History   Problem Relation Age of Onset   • Arthritis Father    • Hypertension Father    • Diabetes Maternal Aunt    • Diabetes Maternal Uncle    • Diabetes Maternal Grandmother    • Alzheimer's disease Maternal Grandmother    • Diabetes Other    • Hypertension Mother    • Depression Mother    • No Known Problems Brother    • Early death Maternal Grandfather    • Liver disease Maternal Grandfather    • Alcohol abuse Maternal Grandfather    • No Known Problems Paternal Grandmother    • Liver disease Paternal Grandfather    • Alcohol abuse Paternal Grandfather    • Early death Paternal Grandfather    • No Known Problems Brother        Past Surgical History:  Past Surgical History:   Procedure Laterality Date   • CHOLECYSTECTOMY  2002   • INGUINAL HERNIA REPAIR Right 1995   • ORIF METACARPAL FRACTURE Right 2000   • TIBIA  FRACTURE SURGERY Left 1997    ORIF       Problem List:  Patient Active Problem List   Diagnosis   • Essential hypertension   • GERD (gastroesophageal reflux disease)   • Bipolar disorder, current episode mixed, moderate (CMS/HCC)   • MOLLY on CPAP   • Arthritis   • Complex regional pain syndrome type 2 of left lower extremity   • Snoring   • Excessive daytime sleepiness   • Peripheral polyneuropathy   • Pain in both lower extremities   • Varicose veins of both lower extremities with pain   • MOLLY (obstructive sleep apnea)   • Morbidly obese (CMS/HCC)   • Neuromuscular respiratory weakness       Allergy:   Allergies   Allergen Reactions   • Gabapentin Swelling     + swelling   • Latex         Current Medications:   Current Outpatient Medications   Medication Sig Dispense Refill   • albuterol sulfate  (90 Base) MCG/ACT inhaler Inhale 2 puffs Every 4 (Four) Hours As Needed for Wheezing or Shortness of Air. 18 g 3   • amitriptyline (ELAVIL) 10 MG tablet Take 1 tablet by mouth Every Night. 30 tablet 4   • baclofen (LIORESAL) 10 MG tablet TAKE 1 TABLET BY MOUTH 2 (TWO) TIMES A DAY AS NEEDED FOR MUSCLE SPASMS. 60 tablet 1   • busPIRone (BUSPAR) 15 MG tablet Take 1 tablet by mouth 3 (Three) Times a Day. 90 tablet 1   • losartan (COZAAR) 100 MG tablet TAKE 1 TABLET BY MOUTH EVERY DAY 30 tablet 5   • metFORMIN (GLUCOPHAGE) 500 MG tablet TAKE 1 TABLET BY MOUTH TWICE A DAY WITH MEALS 60 tablet 5   • OXcarbazepine (TRILEPTAL) 300 MG tablet TAKE ONE TABLET BY MOUTH IN THE MORNING AND 2 TABLETS AT BEDTIME 90 tablet 1   • pantoprazole (PROTONIX) 40 MG EC tablet TAKE 1 TABLET BY MOUTH EVERY DAY 30 tablet 2   • prazosin (MINIPRESS) 1 MG capsule Take 1 capsule by mouth Every Night. 30 capsule 1   • triamterene-hydrochlorothiazide (DYAZIDE) 37.5-25 MG per capsule TAKE 1 CAPSULE BY MOUTH EVERY DAY 30 capsule 5   • ARIPiprazole (ABILIFY) 10 MG tablet Take 1 tablet by mouth Daily. 30 tablet 1   • sertraline (ZOLOFT) 25 MG tablet Take  "1 tablet by mouth Daily. 30 tablet 2     No current facility-administered medications for this visit.        Review of Symptoms:    Review of Systems   Constitutional: Positive for activity change. Negative for appetite change, chills, diaphoresis, fatigue and fever.   HENT: Negative.    Eyes: Negative.    Respiratory: Negative.    Cardiovascular: Negative.    Gastrointestinal: Negative.    Endocrine: Negative.    Genitourinary: Negative.    Musculoskeletal: Negative.    Skin: Negative.    Allergic/Immunologic: Negative.    Neurological: Negative.    Hematological: Negative.    Psychiatric/Behavioral: Positive for agitation, behavioral problems, decreased concentration, dysphoric mood, sleep disturbance and stress. Negative for hallucinations, self-injury, suicidal ideas and negative for hyperactivity. The patient is nervous/anxious.          Physical Exam:   Blood pressure 148/80, pulse 78, height 193 cm (76\"), weight 132 kg (292 lb).    Appearance: Overweight  male of stated age in no acute distress  Gait, Station, Strength: Within normal limits    Mental Status Exam:   Hygiene:   good  Cooperation:  Cooperative  Eye Contact:  Good  Psychomotor Behavior:  Appropriate  Affect:  Full range  Mood: depressed and anxious  Hopelessness: 5  Speech:  Normal  Thought Process:  Goal directed and Linear  Thought Content:  Normal  Suicidal:  None  Homicidal:  None  Hallucinations:  None  Delusion:  None  Memory:  Intact  Orientation:  Person, Place, Time and Situation  Reliability:  fair  Insight:  Fair  Judgement:  Fair  Impulse Control:  Fair  Physical/Medical Issues:  No        Lab Results:   No visits with results within 1 Month(s) from this visit.   Latest known visit with results is:   Office Visit on 09/27/2019   Component Date Value Ref Range Status   • Microalbumin, Urine 09/27/2019 30   Final    A:C <30   • Creatinine, Urine 09/27/2019 300   Final       Assessment/Plan   Diagnoses and all orders for this " visit:    Bipolar disorder, current episode mixed, moderate (CMS/HCC)  -     OXcarbazepine (TRILEPTAL) 300 MG tablet; TAKE ONE TABLET BY MOUTH IN THE MORNING AND 2 TABLETS AT BEDTIME  -     ARIPiprazole (ABILIFY) 10 MG tablet; Take 1 tablet by mouth Daily.    Anxiety disorder, unspecified type  -     OXcarbazepine (TRILEPTAL) 300 MG tablet; TAKE ONE TABLET BY MOUTH IN THE MORNING AND 2 TABLETS AT BEDTIME  -     ARIPiprazole (ABILIFY) 10 MG tablet; Take 1 tablet by mouth Daily.  -     busPIRone (BUSPAR) 15 MG tablet; Take 1 tablet by mouth 3 (Three) Times a Day.  -     sertraline (ZOLOFT) 25 MG tablet; Take 1 tablet by mouth Daily.    Nightmares  -     prazosin (MINIPRESS) 1 MG capsule; Take 1 capsule by mouth Every Night.    Post traumatic stress disorder (PTSD)  -     prazosin (MINIPRESS) 1 MG capsule; Take 1 capsule by mouth Every Night.    Insomnia due to mental condition  -     prazosin (MINIPRESS) 1 MG capsule; Take 1 capsule by mouth Every Night.    -Reviewed previous charting as this is a new patient to this provider  -Reviewed labs  -YESSICA reviewed and appropriate. Patient counseled on use of controlled substances.   -We will discontinue Vraylar  -Initiate Abilify 10 mg daily  -Continue Trileptal 300 mg in the morning and 600 mg at night: Consider changing in the future  -Continue BuSpar 15 mg 3 times daily for anxiety  -Continue prazosin 1 mg nightly for insomnia and nightmares  -Start Zoloft 25 mg p.o. daily for anxiety, irritability, and mood  -Discussed medication interactions with patient including Zoloft with Trileptal and advised to monitor for side effects and discontinue medication should these occur while also calling the clinic.  -Perform some psychoeducation about goals of treatment, expectations of symptom burden, and expected stress from normal life events.    Visit Diagnoses:    ICD-10-CM ICD-9-CM   1. Bipolar disorder, current episode mixed, moderate (CMS/HCC) F31.62 296.62   2. Anxiety  disorder, unspecified type F41.9 300.00       TREATMENT PLAN/GOALS: Continue supportive psychotherapy efforts and medications as indicated. Treatment and medication options discussed during today's visit. Patient ackowledged and verbally consented to continue with current treatment plan and was educated on the importance of compliance with treatment and follow-up appointments.    MEDICATION ISSUES:    Discussed medication options and treatment plan of prescribed medication as well as the risks, benefits, and side effects including potential falls, possible impaired driving and metabolic adversities among others. Patient is agreeable to call the office with any worsening of symptoms or onset of side effects. Patient is agreeable to call 911 or go to the nearest ER should he/she begin having SI/HI. No medication side effects or related complaints today.     MEDS ORDERED DURING VISIT:  New Medications Ordered This Visit   Medications   • OXcarbazepine (TRILEPTAL) 300 MG tablet     Sig: TAKE ONE TABLET BY MOUTH IN THE MORNING AND 2 TABLETS AT BEDTIME     Dispense:  90 tablet     Refill:  1   • ARIPiprazole (ABILIFY) 10 MG tablet     Sig: Take 1 tablet by mouth Daily.     Dispense:  30 tablet     Refill:  1   • busPIRone (BUSPAR) 15 MG tablet     Sig: Take 1 tablet by mouth 3 (Three) Times a Day.     Dispense:  90 tablet     Refill:  1   • sertraline (ZOLOFT) 25 MG tablet     Sig: Take 1 tablet by mouth Daily.     Dispense:  30 tablet     Refill:  2       Return in about 4 weeks (around 12/20/2019).             This document has been electronically signed by Stefan Marion MD  November 22, 2019 2:35 PM

## 2019-12-03 ENCOUNTER — OFFICE VISIT (OUTPATIENT)
Dept: INTERNAL MEDICINE | Facility: CLINIC | Age: 34
End: 2019-12-03

## 2019-12-03 VITALS
TEMPERATURE: 97.8 F | OXYGEN SATURATION: 100 % | HEIGHT: 76 IN | WEIGHT: 289 LBS | HEART RATE: 71 BPM | SYSTOLIC BLOOD PRESSURE: 148 MMHG | DIASTOLIC BLOOD PRESSURE: 79 MMHG | RESPIRATION RATE: 16 BRPM | BODY MASS INDEX: 35.19 KG/M2

## 2019-12-03 DIAGNOSIS — E66.01 CLASS 2 SEVERE OBESITY DUE TO EXCESS CALORIES WITH SERIOUS COMORBIDITY AND BODY MASS INDEX (BMI) OF 35.0 TO 35.9 IN ADULT (HCC): ICD-10-CM

## 2019-12-03 DIAGNOSIS — R73.03 BORDERLINE DIABETES: Primary | ICD-10-CM

## 2019-12-03 DIAGNOSIS — I10 ESSENTIAL HYPERTENSION: ICD-10-CM

## 2019-12-03 DIAGNOSIS — Z72.0 TOBACCO ABUSE: ICD-10-CM

## 2019-12-03 DIAGNOSIS — F60.3 BORDERLINE PERSONALITY DISORDER (HCC): ICD-10-CM

## 2019-12-03 LAB — HBA1C MFR BLD: 6.9 %

## 2019-12-03 PROCEDURE — 83036 HEMOGLOBIN GLYCOSYLATED A1C: CPT | Performed by: INTERNAL MEDICINE

## 2019-12-03 PROCEDURE — 99214 OFFICE O/P EST MOD 30 MIN: CPT | Performed by: INTERNAL MEDICINE

## 2019-12-03 RX ORDER — CARVEDILOL 6.25 MG/1
6.25 TABLET ORAL 2 TIMES DAILY WITH MEALS
Qty: 60 TABLET | Refills: 2 | Status: SHIPPED | OUTPATIENT
Start: 2019-12-03 | End: 2020-02-24

## 2019-12-03 NOTE — PATIENT INSTRUCTIONS
Carvedilol tablets  What is this medicine?  CARVEDILOL (PALOMA ve dil ol) is a beta-blocker. Beta-blockers reduce the workload on the heart and help it to beat more regularly. This medicine is used to treat high blood pressure and heart failure.  This medicine may be used for other purposes; ask your health care provider or pharmacist if you have questions.  COMMON BRAND NAME(S): Coreg  What should I tell my health care provider before I take this medicine?  They need to know if you have any of these conditions:  -circulation problems  -diabetes  -history of heart attack or heart disease  -liver disease  -lung or breathing disease, like asthma or emphysema  -pheochromocytoma  -slow or irregular heartbeat  -thyroid disease  -an unusual or allergic reaction to carvedilol, other beta-blockers, medicines, foods, dyes, or preservatives  -pregnant or trying to get pregnant  -breast-feeding  How should I use this medicine?  Take this medicine by mouth with a glass of water. Follow the directions on the prescription label. It is best to take the tablets with food. Take your doses at regular intervals. Do not take your medicine more often than directed. Do not stop taking except on the advice of your doctor or health care professional.  Talk to your pediatrician regarding the use of this medicine in children. Special care may be needed.  Overdosage: If you think you have taken too much of this medicine contact a poison control center or emergency room at once.  NOTE: This medicine is only for you. Do not share this medicine with others.  What if I miss a dose?  If you miss a dose, take it as soon as you can. If it is almost time for your next dose, take only that dose. Do not take double or extra doses.  What may interact with this medicine?  This medicine may interact with the following medications:  -certain medicines for blood pressure, heart disease, irregular heart beat  -certain medicines for depression, like fluoxetine  or paroxetine  -certain medicines for diabetes, like glipizide or glyburide  -cimetidine  -clonidine  -cyclosporine  -digoxin  -MAOIs like Carbex, Eldepryl, Marplan, Nardil, and Parnate  -reserpine  -rifampin  This list may not describe all possible interactions. Give your health care provider a list of all the medicines, herbs, non-prescription drugs, or dietary supplements you use. Also tell them if you smoke, drink alcohol, or use illegal drugs. Some items may interact with your medicine.  What should I watch for while using this medicine?  Check your heart rate and blood pressure regularly while you are taking this medicine. Ask your doctor or health care professional what your heart rate and blood pressure should be, and when you should contact him or her. Do not stop taking this medicine suddenly. This could lead to serious heart-related effects.  Contact your doctor or health care professional if you have difficulty breathing while taking this drug.  Check your weight daily. Ask your doctor or health care professional when you should notify him/her of any weight gain.  You may get drowsy or dizzy. Do not drive, use machinery, or do anything that requires mental alertness until you know how this medicine affects you. To reduce the risk of dizzy or fainting spells, do not sit or stand up quickly. Alcohol can make you more drowsy, and increase flushing and rapid heartbeats. Avoid alcoholic drinks.  If you have diabetes, check your blood sugar as directed. Tell your doctor if you have changes in your blood sugar while you are taking this medicine.  If you are going to have surgery, tell your doctor or health care professional that you are taking this medicine.  What side effects may I notice from receiving this medicine?  Side effects that you should report to your doctor or health care professional as soon as possible:  -allergic reactions like skin rash, itching or hives, swelling of the face, lips, or  tongue  -breathing problems  -dark urine  -irregular heartbeat  -swollen legs or ankles  -vomiting  -yellowing of the eyes or skin  Side effects that usually do not require medical attention (report to your doctor or health care professional if they continue or are bothersome):  -change in sex drive or performance  -diarrhea  -dry eyes (especially if wearing contact lenses)  -dry, itching skin  -headache  -nausea  -unusually tired  This list may not describe all possible side effects. Call your doctor for medical advice about side effects. You may report side effects to FDA at 4-805-FDA-1745.  Where should I keep my medicine?  Keep out of the reach of children.  Store at room temperature below 30 degrees C (86 degrees F). Protect from moisture. Keep container tightly closed. Throw away any unused medicine after the expiration date.  NOTE: This sheet is a summary. It may not cover all possible information. If you have questions about this medicine, talk to your doctor, pharmacist, or health care provider.  © 2019 [employer] (2014-08-24 14:12:02)

## 2019-12-03 NOTE — PROGRESS NOTES
"Chief Complaint   Patient presents with   • Follow-up     Diabetes       Subjective     History of Present Illness   Alex Torres is a 34 y.o. male presenting for follow up. He shares that he is adjusting to Abilify and Zoloft.  He feels that the medications may be helping as it helps him hold off from having anxiety.  He feels he may need to go up on zoloft later.  He is interested in increasing Losartan as BP remains borderline high at home (140s/90s).   Stopped chantix as it was \"no longer working\".  He has returned to smoking 1ppd.     The following portions of the patient's history were reviewed and updated as appropriate: allergies, current medications, past family history, past medical history, past social history, past surgical history and problem list.    Review of Systems   Constitutional: Negative for chills, fatigue and fever.   HENT: Negative for congestion, ear pain, rhinorrhea, sinus pressure and sore throat.    Eyes: Negative for visual disturbance.   Respiratory: Negative for cough, chest tightness, shortness of breath and wheezing.    Cardiovascular: Negative for chest pain, palpitations and leg swelling.   Gastrointestinal: Negative for abdominal pain, blood in stool, constipation, diarrhea, nausea and vomiting.   Endocrine: Negative for polydipsia and polyuria.   Genitourinary: Negative for dysuria and hematuria.   Musculoskeletal: Negative for arthralgias and back pain.   Skin: Negative for rash.   Neurological: Negative for dizziness, light-headedness, numbness and headaches.   Psychiatric/Behavioral: Negative for dysphoric mood and sleep disturbance. The patient is not nervous/anxious.        Allergies   Allergen Reactions   • Gabapentin Swelling     + swelling   • Latex Unknown - Low Severity       Past Medical History:   Diagnosis Date   • Anxiety    • Bipolar 1 disorder (CMS/[facility])    • Depression    • Hypertension    • Kidney stone    • Polyneuropathy    • Positive TB test     treated " w/ meds x 6 months       Social History     Socioeconomic History   • Marital status:      Spouse name: Not on file   • Number of children: Not on file   • Years of education: Not on file   • Highest education level: Not on file   Tobacco Use   • Smoking status: Current Every Day Smoker     Packs/day: 0.30     Years: 18.00     Pack years: 5.40     Types: Cigarettes     Start date: 1999   • Smokeless tobacco: Never Used   Substance and Sexual Activity   • Alcohol use: No   • Drug use: No   • Sexual activity: Yes     Partners: Female        Past Surgical History:   Procedure Laterality Date   • CHOLECYSTECTOMY  2002   • INGUINAL HERNIA REPAIR Right 1995   • ORIF METACARPAL FRACTURE Right 2000   • TIBIA FRACTURE SURGERY Left 1997    ORIF       Family History   Problem Relation Age of Onset   • Arthritis Father    • Hypertension Father    • Diabetes Maternal Aunt    • Diabetes Maternal Uncle    • Diabetes Maternal Grandmother    • Alzheimer's disease Maternal Grandmother    • Diabetes Other    • Hypertension Mother    • Depression Mother    • No Known Problems Brother    • Early death Maternal Grandfather    • Liver disease Maternal Grandfather    • Alcohol abuse Maternal Grandfather    • No Known Problems Paternal Grandmother    • Liver disease Paternal Grandfather    • Alcohol abuse Paternal Grandfather    • Early death Paternal Grandfather    • No Known Problems Brother          Current Outpatient Medications:   •  albuterol sulfate  (90 Base) MCG/ACT inhaler, Inhale 2 puffs Every 4 (Four) Hours As Needed for Wheezing or Shortness of Air., Disp: 18 g, Rfl: 3  •  amitriptyline (ELAVIL) 10 MG tablet, Take 1 tablet by mouth Every Night., Disp: 30 tablet, Rfl: 4  •  ARIPiprazole (ABILIFY) 10 MG tablet, Take 1 tablet by mouth Daily., Disp: 30 tablet, Rfl: 1  •  baclofen (LIORESAL) 10 MG tablet, TAKE 1 TABLET BY MOUTH 2 (TWO) TIMES A DAY AS NEEDED FOR MUSCLE SPASMS., Disp: 60 tablet, Rfl: 1  •  busPIRone  "(BUSPAR) 15 MG tablet, Take 1 tablet by mouth 3 (Three) Times a Day., Disp: 90 tablet, Rfl: 1  •  losartan (COZAAR) 100 MG tablet, TAKE 1 TABLET BY MOUTH EVERY DAY, Disp: 30 tablet, Rfl: 5  •  metFORMIN (GLUCOPHAGE) 500 MG tablet, Take 2 tablets by mouth 2 (Two) Times a Day With Meals., Disp: 120 tablet, Rfl: 5  •  OXcarbazepine (TRILEPTAL) 300 MG tablet, TAKE ONE TABLET BY MOUTH IN THE MORNING AND 2 TABLETS AT BEDTIME, Disp: 90 tablet, Rfl: 1  •  pantoprazole (PROTONIX) 40 MG EC tablet, TAKE 1 TABLET BY MOUTH EVERY DAY, Disp: 30 tablet, Rfl: 2  •  prazosin (MINIPRESS) 1 MG capsule, Take 1 capsule by mouth Every Night., Disp: 30 capsule, Rfl: 1  •  sertraline (ZOLOFT) 25 MG tablet, Take 1 tablet by mouth Daily., Disp: 30 tablet, Rfl: 2  •  triamterene-hydrochlorothiazide (DYAZIDE) 37.5-25 MG per capsule, TAKE 1 CAPSULE BY MOUTH EVERY DAY, Disp: 30 capsule, Rfl: 5  •  carvedilol (COREG) 6.25 MG tablet, Take 1 tablet by mouth 2 (Two) Times a Day With Meals., Disp: 60 tablet, Rfl: 2    Objective   /79   Pulse 71   Temp 97.8 °F (36.6 °C) (Temporal)   Resp 16   Ht 193 cm (76\")   Wt 131 kg (289 lb)   SpO2 100%   BMI 35.18 kg/m²     Physical Exam   Constitutional: He is oriented to person, place, and time. He appears well-developed and well-nourished.   HENT:   Head: Normocephalic and atraumatic.   Eyes: Conjunctivae are normal.   Neck: Normal range of motion. Neck supple.   Pulmonary/Chest: Effort normal.   Musculoskeletal: Normal range of motion.   Neurological: He is alert and oriented to person, place, and time.   Skin: No rash noted.   Psychiatric: He has a normal mood and affect. His behavior is normal.   Nursing note and vitals reviewed.      Assessment/Plan   Alex was seen today for follow-up.    Diagnoses and all orders for this visit:    Borderline diabetes  -     POC Glycosylated Hemoglobin (Hb A1C)  -     metFORMIN (GLUCOPHAGE) 500 MG tablet; Take 2 tablets by mouth 2 (Two) Times a Day With " Meals.    Tobacco abuse    Essential hypertension  -     carvedilol (COREG) 6.25 MG tablet; Take 1 tablet by mouth 2 (Two) Times a Day With Meals.    Borderline personality disorder (CMS/[facility])    Class 2 severe obesity due to excess calories with serious comorbidity and body mass index (BMI) of 35.0 to 35.9 in adult (CMS/[facility])          Discussion Summary:  Patient is a 34 y.o. male presenting for follow up.    Borderline Diabetes  - Increase metformin to 1000 mg twice daily.    Tobacco abuse  -Stop Chantix as the medication is not helping him.  I would like for him to focus on his psychiatric issues first and then we can return to working on quitting smoking at a later time.    Essential hypertension  -Uncontrolled with current medications.  Continue losartan and Dyazide.  Start carvedilol 6.25 mg twice daily.    Borderline personality disorder  -Currently following with psychiatry.  Recently had doses adjusted with Zoloft and Abilify    Obesity  - Weight loss options were discussed in detail including reducing fried, fatty, and sugary foods with diet control. A regular exercise regimen was discussed.  Patient's Body mass index is 35.18 kg/m². BMI is above normal parameters. Recommendations include: exercise counseling and nutrition counseling.        Follow up:  Return in about 1 month (around 1/3/2020) for Next scheduled follow up.

## 2019-12-12 DIAGNOSIS — M54.41 CHRONIC BILATERAL LOW BACK PAIN WITH BILATERAL SCIATICA: ICD-10-CM

## 2019-12-12 DIAGNOSIS — G89.29 CHRONIC BILATERAL LOW BACK PAIN WITH BILATERAL SCIATICA: ICD-10-CM

## 2019-12-12 DIAGNOSIS — M54.42 CHRONIC BILATERAL LOW BACK PAIN WITH BILATERAL SCIATICA: ICD-10-CM

## 2019-12-12 RX ORDER — BACLOFEN 10 MG/1
10 TABLET ORAL 2 TIMES DAILY PRN
Qty: 60 TABLET | Refills: 1 | OUTPATIENT
Start: 2019-12-12

## 2019-12-20 ENCOUNTER — OFFICE VISIT (OUTPATIENT)
Dept: PSYCHIATRY | Facility: CLINIC | Age: 34
End: 2019-12-20

## 2019-12-20 VITALS
HEIGHT: 76 IN | SYSTOLIC BLOOD PRESSURE: 142 MMHG | DIASTOLIC BLOOD PRESSURE: 90 MMHG | HEART RATE: 66 BPM | BODY MASS INDEX: 34.95 KG/M2 | WEIGHT: 287 LBS

## 2019-12-20 DIAGNOSIS — F51.5 NIGHTMARES: ICD-10-CM

## 2019-12-20 DIAGNOSIS — F31.62 BIPOLAR DISORDER, CURRENT EPISODE MIXED, MODERATE (HCC): ICD-10-CM

## 2019-12-20 DIAGNOSIS — F43.10 POST TRAUMATIC STRESS DISORDER (PTSD): ICD-10-CM

## 2019-12-20 DIAGNOSIS — F51.05 INSOMNIA DUE TO MENTAL CONDITION: ICD-10-CM

## 2019-12-20 DIAGNOSIS — F41.9 ANXIETY DISORDER, UNSPECIFIED TYPE: ICD-10-CM

## 2019-12-20 PROCEDURE — 99214 OFFICE O/P EST MOD 30 MIN: CPT | Performed by: PSYCHIATRY & NEUROLOGY

## 2019-12-20 RX ORDER — BUSPIRONE HYDROCHLORIDE 15 MG/1
15 TABLET ORAL 3 TIMES DAILY
Qty: 90 TABLET | Refills: 1 | Status: SHIPPED | OUTPATIENT
Start: 2019-12-20 | End: 2020-01-17 | Stop reason: SDUPTHER

## 2019-12-20 RX ORDER — OXCARBAZEPINE 300 MG/1
TABLET, FILM COATED ORAL
Qty: 90 TABLET | Refills: 1 | Status: SHIPPED | OUTPATIENT
Start: 2019-12-20 | End: 2020-01-17

## 2019-12-20 RX ORDER — ARIPIPRAZOLE 10 MG/1
10 TABLET ORAL DAILY
Qty: 30 TABLET | Refills: 1 | Status: SHIPPED | OUTPATIENT
Start: 2019-12-20 | End: 2020-01-17 | Stop reason: SDUPTHER

## 2019-12-20 RX ORDER — PRAZOSIN HYDROCHLORIDE 1 MG/1
1 CAPSULE ORAL NIGHTLY
Qty: 30 CAPSULE | Refills: 1 | Status: SHIPPED | OUTPATIENT
Start: 2019-12-20 | End: 2020-01-17 | Stop reason: SDUPTHER

## 2019-12-24 NOTE — PROGRESS NOTES
Subjective   Alex Torres is a 34 y.o. male who presents today for follow up     This provider is located at [facility], [facility], [address] [place] [address], using [employer].  The patient is seen remotely at [facility], Behavioral health, [address], [address] [facility] in [facility] via [employer], an encrypted service from one [facility] to another, with staff present. The patient's condition being diagnosed/treated is appropriate for telemedecine.  The provider identified himself as well as his credentials.      The patient and/or patient's guardian consent to be seen remotely, and when consent is given they understand that the consent allows for patient identifiable information to be sent to a third party as needed.  They may refuse to be seen remotely at any time.  The electronic data is encrypted and password protected, and the patient has been advised of the potential risks to privacy notwithstanding such measures.      Chief Complaint: Bipolar disorder/anxiety    History of Present Illness: Patient is a 34-year-old  male with a long history of bipolar disorder and anxiety who presents today for follow-up.  Last visit was my initial visit with the patient and at that time we were trying to minimize patient's polypharmacy and maximize medication regimen for symptoms.  We had discontinued Vraylar, started Zoloft, and started Abilify.  Patient reports that after these changes he did have significant improvement in irritability, anxiety, mood, and agitation.  He reports that those symptoms recurred in the past few weeks, however, and he felt almost manic.  I advised that since we had good response to the medications initially, that is likely a sign that the medications will be beneficial but we will need to titrate the dose to effect.  Eventual plan is to continue to minimize polypharmacy and maximize beneficial medications.  We may need to increase the  dose of Abilify if patient becomes manic but we will first increase Zoloft as it is more beneficial for agitation and anxiety.  Patient denies SI/HI/AVH.    The following portions of the patient's history were reviewed and updated as appropriate: allergies, current medications, past family history, past medical history, past social history, past surgical history and problem list.      Past Medical History:  Past Medical History:   Diagnosis Date   • Anxiety    • Bipolar 1 disorder (CMS/[facility])    • Depression    • Hypertension    • Kidney stone    • Polyneuropathy    • Positive TB test     treated w/ meds x 6 months   • Suicide attempt (CMS/[facility])        Social History:  Social History     Socioeconomic History   • Marital status:      Spouse name: Not on file   • Number of children: Not on file   • Years of education: Not on file   • Highest education level: Not on file   Tobacco Use   • Smoking status: Current Every Day Smoker     Packs/day: 1.00     Years: 18.00     Pack years: 18.00     Types: Cigarettes     Start date: 1999   • Smokeless tobacco: Never Used   Substance and Sexual Activity   • Alcohol use: No   • Drug use: Yes     Types: Marijuana     Comment: rarely   • Sexual activity: Yes     Partners: Female       Family History:  Family History   Problem Relation Age of Onset   • Arthritis Father    • Hypertension Father    • Alcohol abuse Father    • Drug abuse Father    • Diabetes Maternal Aunt    • Diabetes Maternal Uncle    • Diabetes Maternal Grandmother    • Alzheimer's disease Maternal Grandmother    • Dementia Maternal Grandmother    • Diabetes Other    • Hypertension Mother    • Depression Mother    • ADD / ADHD Brother    • Early death Maternal Grandfather    • Liver disease Maternal Grandfather    • Alcohol abuse Maternal Grandfather    • No Known Problems Paternal Grandmother    • Liver disease Paternal Grandfather    • Alcohol abuse Paternal Grandfather    • Early death Paternal Grandfather     • No Known Problems Brother    • Anxiety disorder Neg Hx    • Bipolar disorder Neg Hx    • OCD Neg Hx    • Paranoid behavior Neg Hx    • Schizophrenia Neg Hx    • Seizures Neg Hx    • Self-Injurious Behavior  Neg Hx    • Suicide Attempts Neg Hx        Past Surgical History:  Past Surgical History:   Procedure Laterality Date   • CHOLECYSTECTOMY  2002   • INGUINAL HERNIA REPAIR Right 1995   • ORIF METACARPAL FRACTURE Right 2000   • TIBIA FRACTURE SURGERY Left 1997    ORIF       Problem List:  Patient Active Problem List   Diagnosis   • Essential hypertension   • GERD (gastroesophageal reflux disease)   • Bipolar disorder, current episode mixed, moderate (CMS/HCC)   • MOLLY on CPAP   • Arthritis   • Complex regional pain syndrome type 2 of left lower extremity   • Snoring   • Excessive daytime sleepiness   • Peripheral polyneuropathy   • Pain in both lower extremities   • Varicose veins of both lower extremities with pain   • MOLLY (obstructive sleep apnea)   • Morbidly obese (CMS/HCC)   • Neuromuscular respiratory weakness       Allergy:   Allergies   Allergen Reactions   • Gabapentin Swelling     + swelling   • Latex Unknown - Low Severity        Current Medications:   Current Outpatient Medications   Medication Sig Dispense Refill   • albuterol sulfate  (90 Base) MCG/ACT inhaler Inhale 2 puffs Every 4 (Four) Hours As Needed for Wheezing or Shortness of Air. 18 g 3   • amitriptyline (ELAVIL) 10 MG tablet Take 1 tablet by mouth Every Night. 30 tablet 4   • ARIPiprazole (ABILIFY) 10 MG tablet Take 1 tablet by mouth Daily. 30 tablet 1   • baclofen (LIORESAL) 10 MG tablet TAKE 1 TABLET BY MOUTH 2 (TWO) TIMES A DAY AS NEEDED FOR MUSCLE SPASMS. 60 tablet 1   • busPIRone (BUSPAR) 15 MG tablet Take 1 tablet by mouth 3 (Three) Times a Day. 90 tablet 1   • carvedilol (COREG) 6.25 MG tablet Take 1 tablet by mouth 2 (Two) Times a Day With Meals. 60 tablet 2   • losartan (COZAAR) 100 MG tablet TAKE 1 TABLET BY MOUTH EVERY  "DAY 30 tablet 5   • metFORMIN (GLUCOPHAGE) 500 MG tablet Take 2 tablets by mouth 2 (Two) Times a Day With Meals. 120 tablet 5   • OXcarbazepine (TRILEPTAL) 300 MG tablet TAKE ONE TABLET BY MOUTH IN THE MORNING AND 2 TABLETS AT BEDTIME 90 tablet 1   • pantoprazole (PROTONIX) 40 MG EC tablet TAKE 1 TABLET BY MOUTH EVERY DAY 30 tablet 2   • prazosin (MINIPRESS) 1 MG capsule Take 1 capsule by mouth Every Night. 30 capsule 1   • sertraline (ZOLOFT) 50 MG tablet Take 1 tablet by mouth Daily. 30 tablet 1   • triamterene-hydrochlorothiazide (DYAZIDE) 37.5-25 MG per capsule TAKE 1 CAPSULE BY MOUTH EVERY DAY 30 capsule 5     No current facility-administered medications for this visit.        Review of Symptoms:    Review of Systems   Constitutional: Positive for activity change. Negative for appetite change, chills, diaphoresis, fatigue and fever.   HENT: Negative.    Eyes: Negative.    Respiratory: Negative.    Cardiovascular: Negative.    Gastrointestinal: Negative.    Endocrine: Negative.    Genitourinary: Negative.    Musculoskeletal: Negative.    Skin: Negative.    Allergic/Immunologic: Negative.    Neurological: Negative.    Hematological: Negative.    Psychiatric/Behavioral: Positive for agitation, behavioral problems, dysphoric mood, sleep disturbance and stress. Negative for decreased concentration, hallucinations, self-injury, suicidal ideas and negative for hyperactivity. The patient is nervous/anxious.          Physical Exam:   Blood pressure 142/90, pulse 66, height 193 cm (76\"), weight 130 kg (287 lb).    Appearance: Overweight  male of stated age in no acute distress  Gait, Station, Strength: Within normal limits    Mental Status Exam:   Hygiene:   good  Cooperation:  Cooperative  Eye Contact:  Good  Psychomotor Behavior:  Appropriate  Affect:  Full range  Mood: irritable   Hopelessness: 5  Speech:  Normal  Thought Process:  Goal directed and Linear  Thought Content:  Normal  Suicidal:  " None  Homicidal:  None  Hallucinations:  None  Delusion:  None  Memory:  Intact  Orientation:  Person, Place, Time and Situation  Reliability:  fair  Insight:  Fair  Judgement:  Fair  Impulse Control:  Fair  Physical/Medical Issues:  No        Lab Results:   Office Visit on 12/03/2019   Component Date Value Ref Range Status   • Hemoglobin A1C 12/03/2019 6.9  % Final       Assessment/Plan   Diagnoses and all orders for this visit:    Anxiety disorder, unspecified type  -     sertraline (ZOLOFT) 50 MG tablet; Take 1 tablet by mouth Daily.  -     busPIRone (BUSPAR) 15 MG tablet; Take 1 tablet by mouth 3 (Three) Times a Day.  -     ARIPiprazole (ABILIFY) 10 MG tablet; Take 1 tablet by mouth Daily.  -     OXcarbazepine (TRILEPTAL) 300 MG tablet; TAKE ONE TABLET BY MOUTH IN THE MORNING AND 2 TABLETS AT BEDTIME    Bipolar disorder, current episode mixed, moderate (CMS/HCC)  -     ARIPiprazole (ABILIFY) 10 MG tablet; Take 1 tablet by mouth Daily.  -     OXcarbazepine (TRILEPTAL) 300 MG tablet; TAKE ONE TABLET BY MOUTH IN THE MORNING AND 2 TABLETS AT BEDTIME    Post traumatic stress disorder (PTSD)  -     sertraline (ZOLOFT) 50 MG tablet; Take 1 tablet by mouth Daily.  -     busPIRone (BUSPAR) 15 MG tablet; Take 1 tablet by mouth 3 (Three) Times a Day.  -     ARIPiprazole (ABILIFY) 10 MG tablet; Take 1 tablet by mouth Daily.  -     OXcarbazepine (TRILEPTAL) 300 MG tablet; TAKE ONE TABLET BY MOUTH IN THE MORNING AND 2 TABLETS AT BEDTIME  -     prazosin (MINIPRESS) 1 MG capsule; Take 1 capsule by mouth Every Night.    Insomnia due to mental condition  -     prazosin (MINIPRESS) 1 MG capsule; Take 1 capsule by mouth Every Night.    Nightmares  -     prazosin (MINIPRESS) 1 MG capsule; Take 1 capsule by mouth Every Night.    -Patient had some symptomatic improvement from the addition of Zoloft and Abilify.  He states that initially he had relief from anxiety, aggression, and irritability but that it is come back and he has felt  more on edge and agitated lately.  This is likely indicative of worsening anxiety and PTSD symptoms.  Since patient did have some response to medication, we will try to increase benefit by increasing dose of Zoloft.  We may need to increase Abilify for mood stabilization should he begin to have manic symptoms.  -Reviewed previous documentation  -Reviewed labs  -YESSICA reviewed and appropriate. Patient counseled on use of controlled substances.   -Continue Abilify 10 mg daily, may need to increase in the future   -Continue Trileptal 300 mg in the morning and 600 mg at night: Consider changing in the future  -Continue BuSpar 15 mg 3 times daily for anxiety  -Continue prazosin 1 mg nightly for insomnia and nightmares  -Zoloft to 50 mg p.o. daily for anxiety, irritability, and mood   -Discussed medication interactions with patient including Zoloft with Trileptal and advised to monitor for side effects and discontinue medication should these occur while also calling the clinic.  -Again performed some psychoeducation about goals of treatment, expectations of symptom burden, and expected stress from normal life events.    Visit Diagnoses:    ICD-10-CM ICD-9-CM   1. Anxiety disorder, unspecified type F41.9 300.00   2. Bipolar disorder, current episode mixed, moderate (CMS/HCC) F31.62 296.62   3. Post traumatic stress disorder (PTSD) F43.10 309.81   4. Insomnia due to mental condition F51.05 300.9     327.02   5. Nightmares F51.5 307.47       TREATMENT PLAN/GOALS: Continue supportive psychotherapy efforts and medications as indicated. Treatment and medication options discussed during today's visit. Patient ackowledged and verbally consented to continue with current treatment plan and was educated on the importance of compliance with treatment and follow-up appointments.    MEDICATION ISSUES:    Discussed medication options and treatment plan of prescribed medication as well as the risks, benefits, and side effects including  potential falls, possible impaired driving and metabolic adversities among others. Patient is agreeable to call the office with any worsening of symptoms or onset of side effects. Patient is agreeable to call 911 or go to the nearest ER should he/she begin having SI/HI. No medication side effects or related complaints today.     MEDS ORDERED DURING VISIT:  New Medications Ordered This Visit   Medications   • sertraline (ZOLOFT) 50 MG tablet     Sig: Take 1 tablet by mouth Daily.     Dispense:  30 tablet     Refill:  1   • busPIRone (BUSPAR) 15 MG tablet     Sig: Take 1 tablet by mouth 3 (Three) Times a Day.     Dispense:  90 tablet     Refill:  1   • ARIPiprazole (ABILIFY) 10 MG tablet     Sig: Take 1 tablet by mouth Daily.     Dispense:  30 tablet     Refill:  1   • OXcarbazepine (TRILEPTAL) 300 MG tablet     Sig: TAKE ONE TABLET BY MOUTH IN THE MORNING AND 2 TABLETS AT BEDTIME     Dispense:  90 tablet     Refill:  1   • prazosin (MINIPRESS) 1 MG capsule     Sig: Take 1 capsule by mouth Every Night.     Dispense:  30 capsule     Refill:  1       Return in about 4 weeks (around 1/17/2020).             This document has been electronically signed by Stefan Marion MD  December 24, 2019 2:03 PM

## 2020-01-17 ENCOUNTER — OFFICE VISIT (OUTPATIENT)
Dept: PSYCHIATRY | Facility: CLINIC | Age: 35
End: 2020-01-17

## 2020-01-17 VITALS
SYSTOLIC BLOOD PRESSURE: 132 MMHG | BODY MASS INDEX: 35.68 KG/M2 | HEIGHT: 76 IN | DIASTOLIC BLOOD PRESSURE: 70 MMHG | HEART RATE: 75 BPM | WEIGHT: 293 LBS

## 2020-01-17 DIAGNOSIS — F31.62 BIPOLAR DISORDER, CURRENT EPISODE MIXED, MODERATE (HCC): Primary | ICD-10-CM

## 2020-01-17 DIAGNOSIS — F51.05 INSOMNIA DUE TO MENTAL CONDITION: ICD-10-CM

## 2020-01-17 DIAGNOSIS — F41.9 ANXIETY DISORDER, UNSPECIFIED TYPE: ICD-10-CM

## 2020-01-17 DIAGNOSIS — F43.10 POST TRAUMATIC STRESS DISORDER (PTSD): ICD-10-CM

## 2020-01-17 DIAGNOSIS — F17.200 NICOTINE USE DISORDER: ICD-10-CM

## 2020-01-17 DIAGNOSIS — F51.5 NIGHTMARES: ICD-10-CM

## 2020-01-17 PROCEDURE — 99214 OFFICE O/P EST MOD 30 MIN: CPT | Performed by: PSYCHIATRY & NEUROLOGY

## 2020-01-17 RX ORDER — BUSPIRONE HYDROCHLORIDE 15 MG/1
15 TABLET ORAL 3 TIMES DAILY
Qty: 90 TABLET | Refills: 1 | Status: SHIPPED | OUTPATIENT
Start: 2020-01-17 | End: 2020-02-14

## 2020-01-17 RX ORDER — PRAZOSIN HYDROCHLORIDE 1 MG/1
1 CAPSULE ORAL NIGHTLY
Qty: 30 CAPSULE | Refills: 1 | Status: SHIPPED | OUTPATIENT
Start: 2020-01-17 | End: 2020-02-14 | Stop reason: SDUPTHER

## 2020-01-17 RX ORDER — SERTRALINE HYDROCHLORIDE 100 MG/1
100 TABLET, FILM COATED ORAL DAILY
Qty: 30 TABLET | Refills: 1 | Status: SHIPPED | OUTPATIENT
Start: 2020-01-17 | End: 2020-02-14 | Stop reason: SDUPTHER

## 2020-01-17 RX ORDER — VARENICLINE TARTRATE 1 MG/1
1 TABLET, FILM COATED ORAL DAILY
Qty: 30 TABLET | Refills: 1 | Status: SHIPPED | OUTPATIENT
Start: 2020-01-17 | End: 2020-03-13

## 2020-01-17 RX ORDER — ARIPIPRAZOLE 10 MG/1
20 TABLET ORAL DAILY
Qty: 30 TABLET | Refills: 1 | Status: SHIPPED | OUTPATIENT
Start: 2020-01-17 | End: 2020-02-14 | Stop reason: SDUPTHER

## 2020-01-20 NOTE — PROGRESS NOTES
Subjective   Alex Torres is a 35 y.o. male who presents today for follow up     This provider is located at [facility], [facility], [address] [place] [address], using [employer].  The patient is seen remotely at [facility], Behavioral health, [address], [address] [facility] in [facility] via [employer], an encrypted service from one [facility] to another, with staff present. The patient's condition being diagnosed/treated is appropriate for telemedecine.  The provider identified himself as well as his credentials.      The patient and/or patient's guardian consent to be seen remotely, and when consent is given they understand that the consent allows for patient identifiable information to be sent to a third party as needed.  They may refuse to be seen remotely at any time.  The electronic data is encrypted and password protected, and the patient has been advised of the potential risks to privacy notwithstanding such measures.      Chief Complaint: Bipolar disorder/anxiety    History of Present Illness: Patient is a 34-year-old  male with a long history of bipolar disorder and anxiety who presents today for follow-up.  We continue to work on optimizing patient's medication regimen by reducing medications and maximizing benefits from helpful medications.  Today he requests to stop Trileptal which we will initiate.  We will increase Abilify for mood stabilization in order to offset the effects of continuing Trileptal.  We will also increase Zoloft as patient does report he has had more downs recently though he acknowledges that some of it may be due to stress and the holiday season.  Patient also reports that he would like to stop smoking so we will try Chantix.  I advised patient that Chantix can worsen mood in some instances that he needs to be aware of the side effects.  He feels that his stress is somewhat improved and anxiety is also somewhat better with  medication changes.  Patient denies SI/HI/AVH.    The following portions of the patient's history were reviewed and updated as appropriate: allergies, current medications, past family history, past medical history, past social history, past surgical history and problem list.      Past Medical History:  Past Medical History:   Diagnosis Date   • Anxiety    • Bipolar 1 disorder (CMS/HCC)    • Depression    • Hypertension    • Kidney stone    • Polyneuropathy    • Positive TB test     treated w/ meds x 6 months   • Suicide attempt (CMS/HCC)        Social History:  Social History     Socioeconomic History   • Marital status:      Spouse name: Not on file   • Number of children: Not on file   • Years of education: Not on file   • Highest education level: Not on file   Tobacco Use   • Smoking status: Current Every Day Smoker     Packs/day: 1.00     Years: 18.00     Pack years: 18.00     Types: Cigarettes     Start date: 1999   • Smokeless tobacco: Never Used   Substance and Sexual Activity   • Alcohol use: No   • Drug use: Yes     Types: Marijuana     Comment: rarely   • Sexual activity: Yes     Partners: Female       Family History:  Family History   Problem Relation Age of Onset   • Arthritis Father    • Hypertension Father    • Alcohol abuse Father    • Drug abuse Father    • Diabetes Maternal Aunt    • Diabetes Maternal Uncle    • Diabetes Maternal Grandmother    • Alzheimer's disease Maternal Grandmother    • Dementia Maternal Grandmother    • Diabetes Other    • Hypertension Mother    • Depression Mother    • ADD / ADHD Brother    • Early death Maternal Grandfather    • Liver disease Maternal Grandfather    • Alcohol abuse Maternal Grandfather    • No Known Problems Paternal Grandmother    • Liver disease Paternal Grandfather    • Alcohol abuse Paternal Grandfather    • Early death Paternal Grandfather    • No Known Problems Brother    • Anxiety disorder Neg Hx    • Bipolar disorder Neg Hx    • OCD Neg Hx       • Paranoid behavior Neg Hx    • Schizophrenia Neg Hx    • Seizures Neg Hx    • Self-Injurious Behavior  Neg Hx    • Suicide Attempts Neg Hx        Past Surgical History:  Past Surgical History:   Procedure Laterality Date   • CHOLECYSTECTOMY  2002   • INGUINAL HERNIA REPAIR Right 1995   • ORIF METACARPAL FRACTURE Right 2000   • TIBIA FRACTURE SURGERY Left 1997    ORIF       Problem List:  Patient Active Problem List   Diagnosis   • Essential hypertension   • GERD (gastroesophageal reflux disease)   • Bipolar disorder, current episode mixed, moderate (CMS/HCC)   • MOLLY on CPAP   • Arthritis   • Complex regional pain syndrome type 2 of left lower extremity   • Snoring   • Excessive daytime sleepiness   • Peripheral polyneuropathy   • Pain in both lower extremities   • Varicose veins of both lower extremities with pain   • MOLLY (obstructive sleep apnea)   • Morbidly obese (CMS/HCC)   • Neuromuscular respiratory weakness       Allergy:   Allergies   Allergen Reactions   • Gabapentin Swelling     + swelling   • Latex Unknown - Low Severity        Current Medications:   Current Outpatient Medications   Medication Sig Dispense Refill   • albuterol sulfate  (90 Base) MCG/ACT inhaler Inhale 2 puffs Every 4 (Four) Hours As Needed for Wheezing or Shortness of Air. 18 g 3   • amitriptyline (ELAVIL) 10 MG tablet Take 1 tablet by mouth Every Night. 30 tablet 4   • ARIPiprazole (ABILIFY) 10 MG tablet Take 2 tablets by mouth Daily. 30 tablet 1   • baclofen (LIORESAL) 10 MG tablet TAKE 1 TABLET BY MOUTH 2 (TWO) TIMES A DAY AS NEEDED FOR MUSCLE SPASMS. 60 tablet 1   • busPIRone (BUSPAR) 15 MG tablet Take 1 tablet by mouth 3 (Three) Times a Day. 90 tablet 1   • carvedilol (COREG) 6.25 MG tablet Take 1 tablet by mouth 2 (Two) Times a Day With Meals. 60 tablet 2   • losartan (COZAAR) 100 MG tablet TAKE 1 TABLET BY MOUTH EVERY DAY 30 tablet 5   • metFORMIN (GLUCOPHAGE) 500 MG tablet Take 2 tablets by mouth 2 (Two) Times a Day With  "Meals. 120 tablet 5   • pantoprazole (PROTONIX) 40 MG EC tablet TAKE 1 TABLET BY MOUTH EVERY DAY 30 tablet 2   • prazosin (MINIPRESS) 1 MG capsule Take 1 capsule by mouth Every Night. 30 capsule 1   • sertraline (ZOLOFT) 100 MG tablet Take 1 tablet by mouth Daily. 30 tablet 1   • triamterene-hydrochlorothiazide (DYAZIDE) 37.5-25 MG per capsule TAKE 1 CAPSULE BY MOUTH EVERY DAY 30 capsule 5   • CHANTIX CONTINUING MONTH RONNY 1 MG tablet Take 1 tablet by mouth Daily. 30 tablet 1     No current facility-administered medications for this visit.        Review of Symptoms:    Review of Systems   Constitutional: Positive for activity change. Negative for appetite change, chills, diaphoresis, fatigue and fever.   HENT: Negative.    Eyes: Negative.    Respiratory: Negative.    Cardiovascular: Negative.    Gastrointestinal: Negative.    Endocrine: Negative.    Genitourinary: Negative.    Musculoskeletal: Negative.    Skin: Negative.    Allergic/Immunologic: Negative.    Neurological: Negative.    Hematological: Negative.    Psychiatric/Behavioral: Positive for sleep disturbance, depressed mood and stress. Negative for agitation, behavioral problems, decreased concentration, dysphoric mood, hallucinations, self-injury, suicidal ideas and negative for hyperactivity. The patient is nervous/anxious.          Physical Exam:   Blood pressure 132/70, pulse 75, height 193 cm (76\"), weight 133 kg (293 lb).    Appearance: Overweight  male of stated age in no acute distress  Gait, Station, Strength: Within normal limits    Mental Status Exam:   Hygiene:   good  Cooperation:  Cooperative  Eye Contact:  Good  Psychomotor Behavior:  Appropriate  Affect:  Full range  Mood: irritable   Hopelessness: 5  Speech:  Normal  Thought Process:  Goal directed and Linear  Thought Content:  Normal  Suicidal:  None  Homicidal:  None  Hallucinations:  None  Delusion:  None  Memory:  Intact  Orientation:  Person, Place, Time and " Situation  Reliability:  fair  Insight:  Fair  Judgement:  Fair  Impulse Control:  Fair  Physical/Medical Issues:  No        Lab Results:   No visits with results within 1 Month(s) from this visit.   Latest known visit with results is:   Office Visit on 12/03/2019   Component Date Value Ref Range Status   • Hemoglobin A1C 12/03/2019 6.9  % Final       Assessment/Plan   Diagnoses and all orders for this visit:    Bipolar disorder, current episode mixed, moderate (CMS/HCC)  -     ARIPiprazole (ABILIFY) 10 MG tablet; Take 2 tablets by mouth Daily.    Anxiety disorder, unspecified type  -     ARIPiprazole (ABILIFY) 10 MG tablet; Take 2 tablets by mouth Daily.  -     busPIRone (BUSPAR) 15 MG tablet; Take 1 tablet by mouth 3 (Three) Times a Day.  -     sertraline (ZOLOFT) 100 MG tablet; Take 1 tablet by mouth Daily.    Post traumatic stress disorder (PTSD)  -     ARIPiprazole (ABILIFY) 10 MG tablet; Take 2 tablets by mouth Daily.  -     busPIRone (BUSPAR) 15 MG tablet; Take 1 tablet by mouth 3 (Three) Times a Day.  -     sertraline (ZOLOFT) 100 MG tablet; Take 1 tablet by mouth Daily.  -     prazosin (MINIPRESS) 1 MG capsule; Take 1 capsule by mouth Every Night.    Insomnia due to mental condition  -     prazosin (MINIPRESS) 1 MG capsule; Take 1 capsule by mouth Every Night.    Nightmares  -     prazosin (MINIPRESS) 1 MG capsule; Take 1 capsule by mouth Every Night.    Nicotine use disorder  -     CHANTIX CONTINUING MONTH RONNY 1 MG tablet; Take 1 tablet by mouth Daily.    -Patient has some continued improvement though he reports that he has had more downs recently as opposed to the last visit.  He requests to come off of Trileptal completely as he has had some sexual side effects.  We will increase Abilify and taper off of Trileptal.  -Start Chantix for smoking cessation  -Discontinue Trileptal by tapering dose over the next 2 weeks  -Reviewed previous documentation  -Reviewed labs  -YESSICA reviewed and appropriate.  Patient counseled on use of controlled substances.   -Increase Abilify 10 mg daily to 20 mg p.o. daily for mood stabilization as we discontinue Trileptal  -Taper off of Trileptal by decreasing to 300 mg twice daily then 300 mg nightly then discontinuing the medication over the next 2 weeks.  -Continue BuSpar 15 mg 3 times daily for anxiety  -Continue prazosin 1 mg nightly for insomnia and nightmares  -Increase Zoloft to 100 mg p.o. daily for anxiety, irritability, and mood.  Patient has seen benefit to this medication so far without negative side effects  -Again performed some psychoeducation about goals of treatment, expectations of symptom burden, and expected stress from normal life events.    Visit Diagnoses:    ICD-10-CM ICD-9-CM   1. Bipolar disorder, current episode mixed, moderate (CMS/HCC) F31.62 296.62   2. Anxiety disorder, unspecified type F41.9 300.00   3. Post traumatic stress disorder (PTSD) F43.10 309.81   4. Insomnia due to mental condition F51.05 300.9     327.02   5. Nightmares F51.5 307.47       TREATMENT PLAN/GOALS: Continue supportive psychotherapy efforts and medications as indicated. Treatment and medication options discussed during today's visit. Patient ackowledged and verbally consented to continue with current treatment plan and was educated on the importance of compliance with treatment and follow-up appointments.    MEDICATION ISSUES:    Discussed medication options and treatment plan of prescribed medication as well as the risks, benefits, and side effects including potential falls, possible impaired driving and metabolic adversities among others. Patient is agreeable to call the office with any worsening of symptoms or onset of side effects. Patient is agreeable to call 911 or go to the nearest ER should he/she begin having SI/HI. No medication side effects or related complaints today.     MEDS ORDERED DURING VISIT:  New Medications Ordered This Visit   Medications   • ARIPiprazole  (ABILIFY) 10 MG tablet     Sig: Take 2 tablets by mouth Daily.     Dispense:  30 tablet     Refill:  1   • busPIRone (BUSPAR) 15 MG tablet     Sig: Take 1 tablet by mouth 3 (Three) Times a Day.     Dispense:  90 tablet     Refill:  1   • sertraline (ZOLOFT) 100 MG tablet     Sig: Take 1 tablet by mouth Daily.     Dispense:  30 tablet     Refill:  1   • prazosin (MINIPRESS) 1 MG capsule     Sig: Take 1 capsule by mouth Every Night.     Dispense:  30 capsule     Refill:  1   • CHANTIX CONTINUING MONTH RONNY 1 MG tablet     Sig: Take 1 tablet by mouth Daily.     Dispense:  30 tablet     Refill:  1       Return in about 4 weeks (around 2/14/2020).             This document has been electronically signed by Stefan Marion MD  January 20, 2020 8:52 AM

## 2020-01-26 DIAGNOSIS — K21.9 GASTROESOPHAGEAL REFLUX DISEASE WITHOUT ESOPHAGITIS: ICD-10-CM

## 2020-01-28 RX ORDER — PANTOPRAZOLE SODIUM 40 MG/1
TABLET, DELAYED RELEASE ORAL
Qty: 90 TABLET | Refills: 3 | Status: SHIPPED | OUTPATIENT
Start: 2020-01-28 | End: 2021-01-18

## 2020-02-14 ENCOUNTER — OFFICE VISIT (OUTPATIENT)
Dept: PSYCHIATRY | Facility: CLINIC | Age: 35
End: 2020-02-14

## 2020-02-14 VITALS — WEIGHT: 290 LBS | HEIGHT: 76 IN | BODY MASS INDEX: 35.31 KG/M2

## 2020-02-14 DIAGNOSIS — F43.10 POST TRAUMATIC STRESS DISORDER (PTSD): ICD-10-CM

## 2020-02-14 DIAGNOSIS — F51.05 INSOMNIA DUE TO MENTAL CONDITION: ICD-10-CM

## 2020-02-14 DIAGNOSIS — F31.62 BIPOLAR DISORDER, CURRENT EPISODE MIXED, MODERATE (HCC): ICD-10-CM

## 2020-02-14 DIAGNOSIS — F51.5 NIGHTMARES: ICD-10-CM

## 2020-02-14 DIAGNOSIS — F41.9 ANXIETY DISORDER, UNSPECIFIED TYPE: ICD-10-CM

## 2020-02-14 PROCEDURE — 99214 OFFICE O/P EST MOD 30 MIN: CPT | Performed by: PSYCHIATRY & NEUROLOGY

## 2020-02-14 RX ORDER — PRAZOSIN HYDROCHLORIDE 1 MG/1
1 CAPSULE ORAL NIGHTLY
Qty: 30 CAPSULE | Refills: 1 | Status: SHIPPED | OUTPATIENT
Start: 2020-02-14 | End: 2020-03-13 | Stop reason: SDUPTHER

## 2020-02-14 RX ORDER — SERTRALINE HYDROCHLORIDE 100 MG/1
100 TABLET, FILM COATED ORAL DAILY
Qty: 30 TABLET | Refills: 1 | Status: SHIPPED | OUTPATIENT
Start: 2020-02-14 | End: 2020-03-13 | Stop reason: SDUPTHER

## 2020-02-14 RX ORDER — ARIPIPRAZOLE 15 MG/1
15 TABLET ORAL DAILY
Qty: 30 TABLET | Refills: 1 | Status: SHIPPED | OUTPATIENT
Start: 2020-02-14 | End: 2020-03-13 | Stop reason: SDUPTHER

## 2020-02-18 NOTE — PROGRESS NOTES
Subjective   Alex Torres is a 35 y.o. male who presents today for follow up     This provider is located at [facility], [facility], [address] [place] [address], using [employer].  The patient is seen remotely at [facility], Behavioral health, [address], [address] [facility] in [facility] via [employer], an encrypted service from one [facility] to another, with staff present. The patient's condition being diagnosed/treated is appropriate for telemedecine.  The provider identified himself as well as his credentials.      The patient and/or patient's guardian consent to be seen remotely, and when consent is given they understand that the consent allows for patient identifiable information to be sent to a third party as needed.  They may refuse to be seen remotely at any time.  The electronic data is encrypted and password protected, and the patient has been advised of the potential risks to privacy notwithstanding such measures.      Chief Complaint: Bipolar disorder/anxiety    History of Present Illness: Patient is a 34-year-old  male with a long history of bipolar disorder and anxiety who presents today for follow-up.  He has had some increased stressors recently.  He states that his stepmother  a few days ago and he is worried about how his father is handling it as he is not doing well.  He states that despite his increased stressors, he has not been able to physically cry which is something he would have done in the past.  He may have some slight emotional blunting we will continue to monitor this is a could be a sign of medication being at too high a dose but it could also be related to stress fatigue as he has had significant stressors for a long amount of time.despite this, he feels that he is handling the situation better than he was in the past.  I commended him on his progress and ability to cope with his situation given the circumstances.  He will be  going to [place] in a few weeks for a trip and is nervous about leaving his father but also somewhat excited to experience some nicer weather.  Patient denies SI/HI/AVH.    The following portions of the patient's history were reviewed and updated as appropriate: allergies, current medications, past family history, past medical history, past social history, past surgical history and problem list.      Past Medical History:  Past Medical History:   Diagnosis Date   • Anxiety    • Bipolar 1 disorder (CMS/[facility])    • Depression    • Hypertension    • Kidney stone    • Polyneuropathy    • Positive TB test     treated w/ meds x 6 months   • Suicide attempt (CMS/[facility])        Social History:  Social History     Socioeconomic History   • Marital status:      Spouse name: Not on file   • Number of children: Not on file   • Years of education: Not on file   • Highest education level: Not on file   Tobacco Use   • Smoking status: Current Every Day Smoker     Packs/day: 1.00     Years: 18.00     Pack years: 18.00     Types: Cigarettes     Start date: 1999   • Smokeless tobacco: Never Used   Substance and Sexual Activity   • Alcohol use: No   • Drug use: Yes     Types: Marijuana     Comment: rarely   • Sexual activity: Yes     Partners: Female       Family History:  Family History   Problem Relation Age of Onset   • Arthritis Father    • Hypertension Father    • Alcohol abuse Father    • Drug abuse Father    • Diabetes Maternal Aunt    • Diabetes Maternal Uncle    • Diabetes Maternal Grandmother    • Alzheimer's disease Maternal Grandmother    • Dementia Maternal Grandmother    • Diabetes Other    • Hypertension Mother    • Depression Mother    • ADD / ADHD Brother    • Early death Maternal Grandfather    • Liver disease Maternal Grandfather    • Alcohol abuse Maternal Grandfather    • No Known Problems Paternal Grandmother    • Liver disease Paternal Grandfather    • Alcohol abuse Paternal Grandfather    • Early death  Paternal Grandfather    • No Known Problems Brother    • Anxiety disorder Neg Hx    • Bipolar disorder Neg Hx    • OCD Neg Hx    • Paranoid behavior Neg Hx    • Schizophrenia Neg Hx    • Seizures Neg Hx    • Self-Injurious Behavior  Neg Hx    • Suicide Attempts Neg Hx        Past Surgical History:  Past Surgical History:   Procedure Laterality Date   • CHOLECYSTECTOMY  2002   • INGUINAL HERNIA REPAIR Right 1995   • ORIF METACARPAL FRACTURE Right 2000   • TIBIA FRACTURE SURGERY Left 1997    ORIF       Problem List:  Patient Active Problem List   Diagnosis   • Essential hypertension   • GERD (gastroesophageal reflux disease)   • Bipolar disorder, current episode mixed, moderate (CMS/HCC)   • MOLLY on CPAP   • Arthritis   • Complex regional pain syndrome type 2 of left lower extremity   • Snoring   • Excessive daytime sleepiness   • Peripheral polyneuropathy   • Pain in both lower extremities   • Varicose veins of both lower extremities with pain   • MOLLY (obstructive sleep apnea)   • Morbidly obese (CMS/HCC)   • Neuromuscular respiratory weakness       Allergy:   Allergies   Allergen Reactions   • Gabapentin Swelling     + swelling   • Latex Unknown - Low Severity        Current Medications:   Current Outpatient Medications   Medication Sig Dispense Refill   • albuterol sulfate  (90 Base) MCG/ACT inhaler Inhale 2 puffs Every 4 (Four) Hours As Needed for Wheezing or Shortness of Air. 18 g 3   • amitriptyline (ELAVIL) 10 MG tablet Take 1 tablet by mouth Every Night. 30 tablet 4   • ARIPiprazole (ABILIFY) 15 MG tablet Take 1 tablet by mouth Daily. 30 tablet 1   • baclofen (LIORESAL) 10 MG tablet TAKE 1 TABLET BY MOUTH 2 (TWO) TIMES A DAY AS NEEDED FOR MUSCLE SPASMS. 60 tablet 1   • carvedilol (COREG) 6.25 MG tablet Take 1 tablet by mouth 2 (Two) Times a Day With Meals. 60 tablet 2   • CHANTIX CONTINUING MONTH RONNY 1 MG tablet Take 1 tablet by mouth Daily. 30 tablet 1   • losartan (COZAAR) 100 MG tablet TAKE 1 TABLET  "BY MOUTH EVERY DAY 30 tablet 5   • metFORMIN (GLUCOPHAGE) 500 MG tablet Take 2 tablets by mouth 2 (Two) Times a Day With Meals. 120 tablet 5   • pantoprazole (PROTONIX) 40 MG EC tablet TAKE 1 TABLET BY MOUTH EVERY DAY 90 tablet 3   • prazosin (MINIPRESS) 1 MG capsule Take 1 capsule by mouth Every Night. 30 capsule 1   • sertraline (ZOLOFT) 100 MG tablet Take 1 tablet by mouth Daily. 30 tablet 1   • triamterene-hydrochlorothiazide (DYAZIDE) 37.5-25 MG per capsule TAKE 1 CAPSULE BY MOUTH EVERY DAY 30 capsule 5     No current facility-administered medications for this visit.        Review of Symptoms:    Review of Systems   Constitutional: Positive for activity change. Negative for appetite change, chills, diaphoresis, fatigue and fever.   HENT: Negative.    Eyes: Negative.    Respiratory: Negative.    Cardiovascular: Negative.    Gastrointestinal: Negative.    Endocrine: Negative.    Genitourinary: Negative.    Musculoskeletal: Negative.    Skin: Negative.    Allergic/Immunologic: Negative.    Neurological: Negative.    Hematological: Negative.    Psychiatric/Behavioral: Positive for dysphoric mood and stress. Negative for agitation, behavioral problems, decreased concentration, hallucinations, self-injury, sleep disturbance, suicidal ideas, negative for hyperactivity and depressed mood. The patient is nervous/anxious.          Physical Exam:   Height 193 cm (76\"), weight 132 kg (290 lb).    Appearance: Overweight  male of stated age in no acute distress  Gait, Station, Strength: Within normal limits    Mental Status Exam:   Hygiene:   good  Cooperation:  Cooperative  Eye Contact:  Good  Psychomotor Behavior:  Appropriate  Affect:  Full range  Mood: sad   Hopelessness: 2  Speech:  Normal  Thought Process:  Goal directed and Linear  Thought Content:  Normal  Suicidal:  None  Homicidal:  None  Hallucinations:  None  Delusion:  None  Memory:  Intact  Orientation:  Person, Place, Time and Situation  Reliability:  " fair  Insight:  Fair  Judgement:  Fair  Impulse Control:  Fair  Physical/Medical Issues:  No        Lab Results:   No visits with results within 1 Month(s) from this visit.   Latest known visit with results is:   Office Visit on 12/03/2019   Component Date Value Ref Range Status   • Hemoglobin A1C 12/03/2019 6.9  % Final       Assessment/Plan   Diagnoses and all orders for this visit:    Bipolar disorder, current episode mixed, moderate (CMS/HCC)  -     ARIPiprazole (ABILIFY) 15 MG tablet; Take 1 tablet by mouth Daily.    Anxiety disorder, unspecified type  -     ARIPiprazole (ABILIFY) 15 MG tablet; Take 1 tablet by mouth Daily.  -     sertraline (ZOLOFT) 100 MG tablet; Take 1 tablet by mouth Daily.    Post traumatic stress disorder (PTSD)  -     ARIPiprazole (ABILIFY) 15 MG tablet; Take 1 tablet by mouth Daily.  -     sertraline (ZOLOFT) 100 MG tablet; Take 1 tablet by mouth Daily.  -     prazosin (MINIPRESS) 1 MG capsule; Take 1 capsule by mouth Every Night.    Insomnia due to mental condition  -     prazosin (MINIPRESS) 1 MG capsule; Take 1 capsule by mouth Every Night.    Nightmares  -     prazosin (MINIPRESS) 1 MG capsule; Take 1 capsule by mouth Every Night.    -Patient doing relatively well and stable with medication changes despite having a loss of his stepmother and helping with his grieving father.  He is coping better than he has in the past.  He may have some emotional blunting but it could also be stress fatigue.  We will increase Abilify to see if we can gain any further benefit for mood and anxiety symptoms.  -Sleep and nightmares have both improved  -Continue Chantix for smoking cessation  -Trileptal has been discontinued  -Reviewed previous documentation  -Reviewed labs  -YESSICA reviewed and appropriate. Patient counseled on use of controlled substances.   -Increase Abilify 10 mg daily to 15 mg p.o. daily for mood stabilization.  We had intended on increasing it to 20 mg at last visit but this was  not done  -Continue BuSpar 15 mg 3 times daily for anxiety  -Continue prazosin 1 mg nightly for insomnia and nightmares  -Continue Zoloft 100 mg p.o. daily for anxiety, irritability, and mood.  Patient has seen benefit to this medication so far without negative side effects  -Again performed some psychoeducation about goals of treatment, expectations of symptom burden, and expected stress from normal life events.    Visit Diagnoses:    ICD-10-CM ICD-9-CM   1. Bipolar disorder, current episode mixed, moderate (CMS/HCC) F31.62 296.62   2. Anxiety disorder, unspecified type F41.9 300.00   3. Post traumatic stress disorder (PTSD) F43.10 309.81   4. Insomnia due to mental condition F51.05 300.9     327.02   5. Nightmares F51.5 307.47       TREATMENT PLAN/GOALS: Continue supportive psychotherapy efforts and medications as indicated. Treatment and medication options discussed during today's visit. Patient ackowledged and verbally consented to continue with current treatment plan and was educated on the importance of compliance with treatment and follow-up appointments.    MEDICATION ISSUES:    Discussed medication options and treatment plan of prescribed medication as well as the risks, benefits, and side effects including potential falls, possible impaired driving and metabolic adversities among others. Patient is agreeable to call the office with any worsening of symptoms or onset of side effects. Patient is agreeable to call 911 or go to the nearest ER should he/she begin having SI/HI. No medication side effects or related complaints today.     MEDS ORDERED DURING VISIT:  New Medications Ordered This Visit   Medications   • ARIPiprazole (ABILIFY) 15 MG tablet     Sig: Take 1 tablet by mouth Daily.     Dispense:  30 tablet     Refill:  1   • sertraline (ZOLOFT) 100 MG tablet     Sig: Take 1 tablet by mouth Daily.     Dispense:  30 tablet     Refill:  1   • prazosin (MINIPRESS) 1 MG capsule     Sig: Take 1 capsule by  mouth Every Night.     Dispense:  30 capsule     Refill:  1       Return in about 4 weeks (around 3/13/2020).             This document has been electronically signed by Stefan Marion MD  February 18, 2020

## 2020-02-23 DIAGNOSIS — I10 ESSENTIAL HYPERTENSION: ICD-10-CM

## 2020-02-23 DIAGNOSIS — R73.03 BORDERLINE DIABETES: ICD-10-CM

## 2020-02-24 RX ORDER — CARVEDILOL 6.25 MG/1
TABLET ORAL
Qty: 60 TABLET | Refills: 5 | Status: SHIPPED | OUTPATIENT
Start: 2020-02-24 | End: 2020-08-22

## 2020-03-13 ENCOUNTER — OFFICE VISIT (OUTPATIENT)
Dept: PSYCHIATRY | Facility: CLINIC | Age: 35
End: 2020-03-13

## 2020-03-13 VITALS
HEIGHT: 76 IN | DIASTOLIC BLOOD PRESSURE: 68 MMHG | WEIGHT: 285.75 LBS | SYSTOLIC BLOOD PRESSURE: 124 MMHG | BODY MASS INDEX: 34.8 KG/M2 | HEART RATE: 71 BPM

## 2020-03-13 DIAGNOSIS — F43.10 POST TRAUMATIC STRESS DISORDER (PTSD): ICD-10-CM

## 2020-03-13 DIAGNOSIS — F41.9 ANXIETY DISORDER, UNSPECIFIED TYPE: ICD-10-CM

## 2020-03-13 DIAGNOSIS — F31.62 BIPOLAR DISORDER, CURRENT EPISODE MIXED, MODERATE (HCC): ICD-10-CM

## 2020-03-13 DIAGNOSIS — F17.200 NICOTINE USE DISORDER: ICD-10-CM

## 2020-03-13 DIAGNOSIS — F51.5 NIGHTMARES: ICD-10-CM

## 2020-03-13 DIAGNOSIS — F51.05 INSOMNIA DUE TO MENTAL CONDITION: ICD-10-CM

## 2020-03-13 PROCEDURE — 99214 OFFICE O/P EST MOD 30 MIN: CPT | Performed by: PSYCHIATRY & NEUROLOGY

## 2020-03-13 RX ORDER — ARIPIPRAZOLE 15 MG/1
15 TABLET ORAL DAILY
Qty: 30 TABLET | Refills: 1 | Status: SHIPPED | OUTPATIENT
Start: 2020-03-13 | End: 2020-04-17

## 2020-03-13 RX ORDER — PRAZOSIN HYDROCHLORIDE 1 MG/1
1 CAPSULE ORAL NIGHTLY
Qty: 30 CAPSULE | Refills: 1 | Status: SHIPPED | OUTPATIENT
Start: 2020-03-13 | End: 2020-04-17 | Stop reason: SDUPTHER

## 2020-03-13 RX ORDER — VARENICLINE TARTRATE 1 MG/1
1 TABLET, FILM COATED ORAL DAILY
Qty: 30 TABLET | Refills: 1 | Status: SHIPPED | OUTPATIENT
Start: 2020-03-13 | End: 2020-04-17 | Stop reason: SDUPTHER

## 2020-03-13 RX ORDER — SERTRALINE HYDROCHLORIDE 100 MG/1
200 TABLET, FILM COATED ORAL DAILY
Qty: 60 TABLET | Refills: 1 | Status: SHIPPED | OUTPATIENT
Start: 2020-03-13 | End: 2020-04-17 | Stop reason: SDUPTHER

## 2020-03-13 NOTE — PROGRESS NOTES
Subjective   Alex Torres is a 35 y.o. male who presents today for follow up     This provider is located at [facility], [facility], [address] [place] [address], using [employer].  The patient is seen remotely at [facility], Behavioral health, [address], [address] [facility] in [facility] via [employer], an encrypted service from one [facility] Facility to another, with staff present. The patient's condition being diagnosed/treated is appropriate for telemedecine.  The provider identified himself as well as his credentials.      The patient and/or patient's guardian consent to be seen remotely, and when consent is given they understand that the consent allows for patient identifiable information to be sent to a third party as needed.  They may refuse to be seen remotely at any time.  The electronic data is encrypted and password protected, and the patient has been advised of the potential risks to privacy notwithstanding such measures.      Chief Complaint: Bipolar disorder/anxiety    History of Present Illness: Patient is a 35-year-old  male with a long history of bipolar disorder and anxiety who presents today for follow-up.  He reports that he feels the medication changes have been helpful but he is experiencing more stress and frustration recently.  He states that he is not getting much sleep at night and gets very angry in the middle of the night if he is woken up.  I advised that this could be related to lack of sleep in general.  He states that his moods have been up and down and he feels irritable more often.  Part of his stress is that 1 of his daughters is sick and both will be at home due to the schools closing in light of the coronavirus.  Patient denies SI/HI/AVH.    The following portions of the patient's history were reviewed and updated as appropriate: allergies, current medications, past family history, past medical history, past social history, past  surgical history and problem list.      Past Medical History:  Past Medical History:   Diagnosis Date   • Anxiety    • Bipolar 1 disorder (CMS/HCC)    • Depression    • Hypertension    • Kidney stone    • Polyneuropathy    • Positive TB test     treated w/ meds x 6 months   • Suicide attempt (CMS/HCC)        Social History:  Social History     Socioeconomic History   • Marital status:      Spouse name: Not on file   • Number of children: Not on file   • Years of education: Not on file   • Highest education level: Not on file   Tobacco Use   • Smoking status: Current Every Day Smoker     Packs/day: 1.00     Years: 18.00     Pack years: 18.00     Types: Cigarettes     Start date: 1999   • Smokeless tobacco: Never Used   Substance and Sexual Activity   • Alcohol use: No   • Drug use: Yes     Types: Marijuana     Comment: rarely   • Sexual activity: Yes     Partners: Female       Family History:  Family History   Problem Relation Age of Onset   • Arthritis Father    • Hypertension Father    • Alcohol abuse Father    • Drug abuse Father    • Diabetes Maternal Aunt    • Diabetes Maternal Uncle    • Diabetes Maternal Grandmother    • Alzheimer's disease Maternal Grandmother    • Dementia Maternal Grandmother    • Diabetes Other    • Hypertension Mother    • Depression Mother    • ADD / ADHD Brother    • Early death Maternal Grandfather    • Liver disease Maternal Grandfather    • Alcohol abuse Maternal Grandfather    • No Known Problems Paternal Grandmother    • Liver disease Paternal Grandfather    • Alcohol abuse Paternal Grandfather    • Early death Paternal Grandfather    • No Known Problems Brother    • Anxiety disorder Neg Hx    • Bipolar disorder Neg Hx    • OCD Neg Hx    • Paranoid behavior Neg Hx    • Schizophrenia Neg Hx    • Seizures Neg Hx    • Self-Injurious Behavior  Neg Hx    • Suicide Attempts Neg Hx        Past Surgical History:  Past Surgical History:   Procedure Laterality Date   •  CHOLECYSTECTOMY  2002   • INGUINAL HERNIA REPAIR Right 1995   • ORIF METACARPAL FRACTURE Right 2000   • TIBIA FRACTURE SURGERY Left 1997    ORIF       Problem List:  Patient Active Problem List   Diagnosis   • Essential hypertension   • GERD (gastroesophageal reflux disease)   • Bipolar disorder, current episode mixed, moderate (CMS/HCC)   • MOLLY on CPAP   • Arthritis   • Complex regional pain syndrome type 2 of left lower extremity   • Snoring   • Excessive daytime sleepiness   • Peripheral polyneuropathy   • Pain in both lower extremities   • Varicose veins of both lower extremities with pain   • MOLLY (obstructive sleep apnea)   • Morbidly obese (CMS/HCC)   • Neuromuscular respiratory weakness       Allergy:   Allergies   Allergen Reactions   • Gabapentin Swelling     + swelling   • Latex Unknown - Low Severity        Current Medications:   Current Outpatient Medications   Medication Sig Dispense Refill   • albuterol sulfate  (90 Base) MCG/ACT inhaler Inhale 2 puffs Every 4 (Four) Hours As Needed for Wheezing or Shortness of Air. 18 g 3   • amitriptyline (ELAVIL) 10 MG tablet Take 1 tablet by mouth Every Night. 30 tablet 4   • ARIPiprazole (ABILIFY) 15 MG tablet Take 1 tablet by mouth Daily. 30 tablet 1   • baclofen (LIORESAL) 10 MG tablet TAKE 1 TABLET BY MOUTH 2 (TWO) TIMES A DAY AS NEEDED FOR MUSCLE SPASMS. 60 tablet 1   • carvedilol (COREG) 6.25 MG tablet TAKE 1 TABLET BY MOUTH TWICE A DAY WITH MEALS 60 tablet 5   • losartan (COZAAR) 100 MG tablet TAKE 1 TABLET BY MOUTH EVERY DAY 30 tablet 5   • metFORMIN (GLUCOPHAGE) 500 MG tablet TAKE 1 TABLET BY MOUTH TWICE A DAY WITH MEALS 60 tablet 5   • pantoprazole (PROTONIX) 40 MG EC tablet TAKE 1 TABLET BY MOUTH EVERY DAY 90 tablet 3   • prazosin (MINIPRESS) 1 MG capsule Take 1 capsule by mouth Every Night. 30 capsule 1   • sertraline (ZOLOFT) 100 MG tablet Take 1 tablet by mouth Daily. 30 tablet 1   • triamterene-hydrochlorothiazide (DYAZIDE) 37.5-25 MG per  "capsule TAKE 1 CAPSULE BY MOUTH EVERY DAY 30 capsule 5     No current facility-administered medications for this visit.        Review of Symptoms:    Review of Systems   Constitutional: Positive for activity change. Negative for appetite change, chills, diaphoresis, fatigue and fever.   HENT: Negative.    Eyes: Negative.    Respiratory: Negative.    Cardiovascular: Negative.    Gastrointestinal: Negative.    Endocrine: Negative.    Genitourinary: Negative.    Musculoskeletal: Negative.    Skin: Negative.    Allergic/Immunologic: Negative.    Neurological: Negative.    Hematological: Negative.    Psychiatric/Behavioral: Positive for agitation, dysphoric mood, sleep disturbance and stress. Negative for behavioral problems, decreased concentration, hallucinations, self-injury, suicidal ideas, negative for hyperactivity and depressed mood. The patient is not nervous/anxious.          Physical Exam:   Blood pressure 124/68, pulse 71, height 193 cm (76\"), weight 130 kg (285 lb 12 oz).    Appearance: Overweight  male of stated age in no acute distress  Gait, Station, Strength: Within normal limits    Mental Status Exam:   Hygiene:   good  Cooperation:  Cooperative  Eye Contact:  Good  Psychomotor Behavior:  Appropriate  Affect:  Full range  Mood: irritable   Hopelessness: 2  Speech:  Normal  Thought Process:  Goal directed and Linear  Thought Content:  Normal  Suicidal:  None  Homicidal:  None  Hallucinations:  None  Delusion:  None  Memory:  Intact  Orientation:  Person, Place, Time and Situation  Reliability:  good  Insight:  Fair  Judgement:  Fair  Impulse Control:  Fair  Physical/Medical Issues:  No        Lab Results:   No visits with results within 1 Month(s) from this visit.   Latest known visit with results is:   Office Visit on 12/03/2019   Component Date Value Ref Range Status   • Hemoglobin A1C 12/03/2019 6.9  % Final       Assessment/Plan   Diagnoses and all orders for this visit:    Anxiety disorder, " unspecified type  -     sertraline (ZOLOFT) 100 MG tablet; Take 2 tablets by mouth Daily.  -     ARIPiprazole (ABILIFY) 15 MG tablet; Take 1 tablet by mouth Daily.    Post traumatic stress disorder (PTSD)  -     sertraline (ZOLOFT) 100 MG tablet; Take 2 tablets by mouth Daily.  -     ARIPiprazole (ABILIFY) 15 MG tablet; Take 1 tablet by mouth Daily.  -     prazosin (MINIPRESS) 1 MG capsule; Take 1 capsule by mouth Every Night.    Bipolar disorder, current episode mixed, moderate (CMS/HCC)  -     ARIPiprazole (ABILIFY) 15 MG tablet; Take 1 tablet by mouth Daily.    Insomnia due to mental condition  -     prazosin (MINIPRESS) 1 MG capsule; Take 1 capsule by mouth Every Night.    Nightmares  -     prazosin (MINIPRESS) 1 MG capsule; Take 1 capsule by mouth Every Night.    Nicotine use disorder  -     varenicline (CHANTIX CONTINUING MONTH RONNY) 1 MG tablet; Take 1 tablet by mouth Daily.    -Patient has had some improvement with medication changes but is experiencing more stress and frustration at home.  He also reports increased irritability likely due to difficulty with sleep.  -Continue Chantix for smoking cessation  -Reviewed previous documentation  -Reviewed labs  -Continue Abilify 15 mg p.o. nightly for PTSD, mood stabilization, anxiety  -Discontinue BuSpar as patient has not been taking the medication and did not find it helpful  -Continue prazosin 1 mg nightly for insomnia and nightmares  -Increase Zoloft to 200 mg p.o. daily for mood and irritability  -Again performed some psychoeducation about goals of treatment, expectations of symptom burden, and expected stress from normal life events.    Visit Diagnoses:    ICD-10-CM ICD-9-CM   1. Anxiety disorder, unspecified type F41.9 300.00   2. Post traumatic stress disorder (PTSD) F43.10 309.81   3. Bipolar disorder, current episode mixed, moderate (CMS/HCC) F31.62 296.62   4. Insomnia due to mental condition F51.05 300.9     327.02   5. Nightmares F51.5 307.47   6.  Nicotine use disorder F17.200 305.1       TREATMENT PLAN/GOALS: Continue supportive psychotherapy efforts and medications as indicated. Treatment and medication options discussed during today's visit. Patient ackowledged and verbally consented to continue with current treatment plan and was educated on the importance of compliance with treatment and follow-up appointments.    MEDICATION ISSUES:    Discussed medication options and treatment plan of prescribed medication as well as the risks, benefits, and side effects including potential falls, possible impaired driving and metabolic adversities among others. Patient is agreeable to call the office with any worsening of symptoms or onset of side effects. Patient is agreeable to call 911 or go to the nearest ER should he/she begin having SI/HI. No medication side effects or related complaints today.     MEDS ORDERED DURING VISIT:  New Medications Ordered This Visit   Medications   • sertraline (ZOLOFT) 100 MG tablet     Sig: Take 2 tablets by mouth Daily.     Dispense:  60 tablet     Refill:  1   • ARIPiprazole (ABILIFY) 15 MG tablet     Sig: Take 1 tablet by mouth Daily.     Dispense:  30 tablet     Refill:  1   • prazosin (MINIPRESS) 1 MG capsule     Sig: Take 1 capsule by mouth Every Night.     Dispense:  30 capsule     Refill:  1   • varenicline (CHANTIX CONTINUING MONTH RONNY) 1 MG tablet     Sig: Take 1 tablet by mouth Daily.     Dispense:  30 tablet     Refill:  1       Return in about 4 weeks (around 4/10/2020).             This document has been electronically signed by Stefan Marion MD  March 13, 2020

## 2020-03-18 ENCOUNTER — TELEPHONE (OUTPATIENT)
Dept: INTERNAL MEDICINE | Facility: CLINIC | Age: 35
End: 2020-03-18

## 2020-03-18 DIAGNOSIS — I10 ESSENTIAL HYPERTENSION: Primary | ICD-10-CM

## 2020-03-18 RX ORDER — VALSARTAN 160 MG/1
160 TABLET ORAL DAILY
Qty: 30 TABLET | Refills: 5 | Status: SHIPPED | OUTPATIENT
Start: 2020-03-18 | End: 2020-09-03

## 2020-03-18 NOTE — TELEPHONE ENCOUNTER
Attempted to contact patient; left detailed message per release notifying patient new medication had been sent to local pharmacy.

## 2020-03-18 NOTE — TELEPHONE ENCOUNTER
Patient called and stated that he went to the pharmacy for the following prescription:    losartan (COZAAR) 100 MG tablet    Pt's pharmacy told him that losartan is on indefinite back order, all around [place].  Pt is asking for something different.  Pt asked for a phone call if his medication is going to be changed.  Pt stated that he has been out of his medication for 3 days.    Pharmacy: [facility] in [facility]  PH: [phone]  FX: [phone]    Patient callback: [phone]    Please advise.

## 2020-03-20 ENCOUNTER — OFFICE VISIT (OUTPATIENT)
Dept: INTERNAL MEDICINE | Facility: CLINIC | Age: 35
End: 2020-03-20

## 2020-03-20 VITALS
RESPIRATION RATE: 15 BRPM | WEIGHT: 281 LBS | SYSTOLIC BLOOD PRESSURE: 111 MMHG | HEART RATE: 70 BPM | HEIGHT: 76 IN | DIASTOLIC BLOOD PRESSURE: 66 MMHG | TEMPERATURE: 97.6 F | BODY MASS INDEX: 34.22 KG/M2 | OXYGEN SATURATION: 99 %

## 2020-03-20 DIAGNOSIS — I10 ESSENTIAL HYPERTENSION: ICD-10-CM

## 2020-03-20 DIAGNOSIS — R73.03 BORDERLINE DIABETES: ICD-10-CM

## 2020-03-20 DIAGNOSIS — F60.3 BORDERLINE PERSONALITY DISORDER (HCC): ICD-10-CM

## 2020-03-20 DIAGNOSIS — E66.01 CLASS 2 SEVERE OBESITY DUE TO EXCESS CALORIES WITH SERIOUS COMORBIDITY AND BODY MASS INDEX (BMI) OF 35.0 TO 35.9 IN ADULT (HCC): Primary | ICD-10-CM

## 2020-03-20 LAB — HBA1C MFR BLD: 5.7 %

## 2020-03-20 PROCEDURE — 83036 HEMOGLOBIN GLYCOSYLATED A1C: CPT | Performed by: INTERNAL MEDICINE

## 2020-03-20 PROCEDURE — 99214 OFFICE O/P EST MOD 30 MIN: CPT | Performed by: INTERNAL MEDICINE

## 2020-03-20 RX ORDER — AMOXICILLIN 500 MG/1
CAPSULE ORAL
COMMUNITY
Start: 2020-03-13 | End: 2020-05-15

## 2020-03-20 NOTE — PROGRESS NOTES
Chief Complaint   Patient presents with   • Follow-up     DM-A1C       Subjective     History of Present Illness   Alex Torres is a 35 y.o. male with history of type II diabetes mellitus presenting for follow up.       Glucose range at home: not checking glucose much.  Checked a few months ago and noticed glucose in the low 100s.    Associated diabetic symptoms: denies  Episodes of hypoglycemia/hyperglycemia: denies  Medicine compliance/ regimen: good with metformin.     Has changed to valsartan and has better BP today.  Has been off some psychiatric medications and his weight has come down from his medication changes.  He has been watching his diet more carefully.     The following portions of the patient's history were reviewed and updated as appropriate: allergies, current medications, past family history, past medical history, past social history, past surgical history and problem list.    Review of Systems   Constitutional: Negative for chills, fatigue and fever.   HENT: Negative for congestion, ear pain, rhinorrhea, sinus pressure and sore throat.    Eyes: Negative for visual disturbance.   Respiratory: Negative for cough, chest tightness, shortness of breath and wheezing.    Cardiovascular: Negative for chest pain, palpitations and leg swelling.   Gastrointestinal: Negative for abdominal pain, blood in stool, constipation, diarrhea, nausea and vomiting.   Endocrine: Negative for polydipsia and polyuria.   Genitourinary: Negative for dysuria and hematuria.   Musculoskeletal: Negative for arthralgias and back pain.   Skin: Negative for rash.   Neurological: Negative for dizziness, light-headedness, numbness and headaches.   Psychiatric/Behavioral: Negative for dysphoric mood and sleep disturbance. The patient is not nervous/anxious.        Allergies   Allergen Reactions   • Gabapentin Swelling     + swelling   • Latex Rash       Past Medical History:   Diagnosis Date   • Anxiety    • Bipolar 1 disorder  (CMS/[facility])    • Depression    • Hypertension    • Kidney stone    • Polyneuropathy    • Positive TB test     treated w/ meds x 6 months   • Suicide attempt (CMS/HCC)        Social History     Socioeconomic History   • Marital status:      Spouse name: Not on file   • Number of children: Not on file   • Years of education: Not on file   • Highest education level: Not on file   Tobacco Use   • Smoking status: Current Every Day Smoker     Packs/day: 1.00     Years: 18.00     Pack years: 18.00     Types: Cigarettes     Start date: 1999   • Smokeless tobacco: Never Used   Substance and Sexual Activity   • Alcohol use: No   • Drug use: Yes     Types: Marijuana     Comment: rarely   • Sexual activity: Yes     Partners: Female        Past Surgical History:   Procedure Laterality Date   • CHOLECYSTECTOMY  2002   • INGUINAL HERNIA REPAIR Right 1995   • ORIF METACARPAL FRACTURE Right 2000   • TIBIA FRACTURE SURGERY Left 1997    ORIF       Family History   Problem Relation Age of Onset   • Arthritis Father    • Hypertension Father    • Alcohol abuse Father    • Drug abuse Father    • Diabetes Maternal Aunt    • Diabetes Maternal Uncle    • Diabetes Maternal Grandmother    • Alzheimer's disease Maternal Grandmother    • Dementia Maternal Grandmother    • Diabetes Other    • Hypertension Mother    • Depression Mother    • ADD / ADHD Brother    • Early death Maternal Grandfather    • Liver disease Maternal Grandfather    • Alcohol abuse Maternal Grandfather    • No Known Problems Paternal Grandmother    • Liver disease Paternal Grandfather    • Alcohol abuse Paternal Grandfather    • Early death Paternal Grandfather    • No Known Problems Brother    • Anxiety disorder Neg Hx    • Bipolar disorder Neg Hx    • OCD Neg Hx    • Paranoid behavior Neg Hx    • Schizophrenia Neg Hx    • Seizures Neg Hx    • Self-Injurious Behavior  Neg Hx    • Suicide Attempts Neg Hx          Current Outpatient Medications:   •  albuterol sulfate  " (90 Base) MCG/ACT inhaler, Inhale 2 puffs Every 4 (Four) Hours As Needed for Wheezing or Shortness of Air., Disp: 18 g, Rfl: 3  •  amoxicillin (AMOXIL) 500 MG capsule, TAKE 2 STAT THEN 1 CAPSULE BY MOUTH EVERY 8 HOURS UNTIL GONE FOR INFECTION, Disp: , Rfl:   •  ARIPiprazole (ABILIFY) 15 MG tablet, Take 1 tablet by mouth Daily., Disp: 30 tablet, Rfl: 1  •  baclofen (LIORESAL) 10 MG tablet, TAKE 1 TABLET BY MOUTH 2 (TWO) TIMES A DAY AS NEEDED FOR MUSCLE SPASMS., Disp: 60 tablet, Rfl: 1  •  carvedilol (COREG) 6.25 MG tablet, TAKE 1 TABLET BY MOUTH TWICE A DAY WITH MEALS, Disp: 60 tablet, Rfl: 5  •  metFORMIN (GLUCOPHAGE) 500 MG tablet, TAKE 1 TABLET BY MOUTH TWICE A DAY WITH MEALS, Disp: 60 tablet, Rfl: 5  •  pantoprazole (PROTONIX) 40 MG EC tablet, TAKE 1 TABLET BY MOUTH EVERY DAY, Disp: 90 tablet, Rfl: 3  •  prazosin (MINIPRESS) 1 MG capsule, Take 1 capsule by mouth Every Night., Disp: 30 capsule, Rfl: 1  •  sertraline (ZOLOFT) 100 MG tablet, Take 2 tablets by mouth Daily., Disp: 60 tablet, Rfl: 1  •  triamterene-hydrochlorothiazide (DYAZIDE) 37.5-25 MG per capsule, TAKE 1 CAPSULE BY MOUTH EVERY DAY, Disp: 30 capsule, Rfl: 5  •  valsartan (Diovan) 160 MG tablet, Take 1 tablet by mouth Daily., Disp: 30 tablet, Rfl: 5  •  varenicline (CHANTIX CONTINUING MONTH RONNY) 1 MG tablet, Take 1 tablet by mouth Daily., Disp: 30 tablet, Rfl: 1  •  amitriptyline (ELAVIL) 10 MG tablet, Take 1 tablet by mouth Every Night., Disp: 30 tablet, Rfl: 4    Objective   /66   Pulse 70   Temp 97.6 °F (36.4 °C)   Resp 15   Ht 193 cm (76\")   Wt 127 kg (281 lb)   SpO2 99%   BMI 34.20 kg/m²     Physical Exam   Constitutional: He is oriented to person, place, and time. He appears well-developed and well-nourished.   HENT:   Head: Normocephalic and atraumatic.   Eyes: Conjunctivae are normal.   Neck: Normal range of motion. Neck supple.   Pulmonary/Chest: Effort normal.   Musculoskeletal: Normal range of motion.   Neurological: " He is alert and oriented to person, place, and time.   Skin: No rash noted.   Psychiatric: He has a normal mood and affect. His behavior is normal.   Nursing note and vitals reviewed.      Labs:  Results for orders placed or performed in visit on 03/20/20   POC Glycosylated Hemoglobin (Hb A1C)   Result Value Ref Range    Hemoglobin A1C 5.7 %          Assessment/Plan   Alex was seen today for follow-up.    Diagnoses and all orders for this visit:    Class 2 severe obesity due to excess calories with serious comorbidity and body mass index (BMI) of 35.0 to 35.9 in adult (CMS/[facility])    Borderline diabetes  -     POC Glycosylated Hemoglobin (Hb A1C)    Essential hypertension    Borderline personality disorder (CMS/[facility])          Discussion Summary:  35 y.o. male presenting for follow up.    1. Type II Diabetes Mellitus - controlled.   - Glucose range: Keep glucose between 80 mg/dL and 160 mg/dl.  - A1C: UTD  - Regimen:  No changes to metformin needed.  Excellent control recently.   - Immunizations: UTD   - Eye Exam: following with ophthalmology   - Foot exam: UTD   - Lipid Control:  WNL  - Annual Urine Microalbumin: UTD     2. Essential Hypertension  - well controlled on valsartan which was recently sent in due to losartan back order.  Cont current med regimen.     3. Obesity  - Weight loss options were discussed in detail including reducing fried, fatty, and sugary foods with diet control. A regular exercise regimen was discussed.  Patient's Body mass index is 34.2 kg/m². BMI is above normal parameters. Recommendations include: exercise counseling and nutrition counseling.    4. Borderline Personality  - stable recently, meds adjusted per psychiatry        Follow up:  Return in about 6 months (around 9/20/2020) for Next scheduled follow up.     Patient Instructions:  Patient instructions were provided.

## 2020-04-17 ENCOUNTER — TELEMEDICINE (OUTPATIENT)
Dept: PSYCHIATRY | Facility: CLINIC | Age: 35
End: 2020-04-17

## 2020-04-17 ENCOUNTER — TELEPHONE (OUTPATIENT)
Dept: PSYCHIATRY | Facility: CLINIC | Age: 35
End: 2020-04-17

## 2020-04-17 DIAGNOSIS — F51.05 INSOMNIA DUE TO MENTAL CONDITION: ICD-10-CM

## 2020-04-17 DIAGNOSIS — F43.10 POST TRAUMATIC STRESS DISORDER (PTSD): ICD-10-CM

## 2020-04-17 DIAGNOSIS — F51.5 NIGHTMARES: ICD-10-CM

## 2020-04-17 DIAGNOSIS — F41.9 ANXIETY DISORDER, UNSPECIFIED TYPE: ICD-10-CM

## 2020-04-17 DIAGNOSIS — F17.200 NICOTINE USE DISORDER: ICD-10-CM

## 2020-04-17 PROCEDURE — 99214 OFFICE O/P EST MOD 30 MIN: CPT | Performed by: PSYCHIATRY & NEUROLOGY

## 2020-04-17 RX ORDER — HALOPERIDOL 2 MG/1
2 TABLET ORAL 2 TIMES DAILY
Qty: 60 TABLET | Refills: 1 | Status: SHIPPED | OUTPATIENT
Start: 2020-04-17 | End: 2020-06-22

## 2020-04-17 RX ORDER — PRAZOSIN HYDROCHLORIDE 1 MG/1
1 CAPSULE ORAL NIGHTLY
Qty: 30 CAPSULE | Refills: 1 | Status: SHIPPED | OUTPATIENT
Start: 2020-04-17 | End: 2020-05-15 | Stop reason: SDUPTHER

## 2020-04-17 RX ORDER — SERTRALINE HYDROCHLORIDE 100 MG/1
200 TABLET, FILM COATED ORAL DAILY
Qty: 60 TABLET | Refills: 1 | Status: SHIPPED | OUTPATIENT
Start: 2020-04-17 | End: 2020-05-15

## 2020-04-17 RX ORDER — VARENICLINE TARTRATE 1 MG/1
1 TABLET, FILM COATED ORAL DAILY
Qty: 30 TABLET | Refills: 1 | OUTPATIENT
Start: 2020-04-17 | End: 2020-06-23

## 2020-04-17 NOTE — TELEPHONE ENCOUNTER
PATIENT CALLED IN STATING HE IS SUPPOSED TO START ON A NEW MED (HALDOL) AND PHARMACY HAS NOT RECEIVED YET. WHEN YOU GET A CHANCE, CAN YOU SEND IN FOR PT? [facility] [place]. THANKS
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
## 2020-04-26 DIAGNOSIS — I10 ESSENTIAL HYPERTENSION: ICD-10-CM

## 2020-04-27 RX ORDER — TRIAMTERENE AND HYDROCHLOROTHIAZIDE 37.5; 25 MG/1; MG/1
CAPSULE ORAL
Qty: 30 CAPSULE | Refills: 5 | OUTPATIENT
Start: 2020-04-27 | End: 2020-06-23

## 2020-05-02 DIAGNOSIS — F60.3 BORDERLINE PERSONALITY DISORDER (HCC): ICD-10-CM

## 2020-05-04 RX ORDER — AMITRIPTYLINE HYDROCHLORIDE 10 MG/1
TABLET, FILM COATED ORAL
Qty: 30 TABLET | Refills: 4 | Status: SHIPPED | OUTPATIENT
Start: 2020-05-04 | End: 2020-10-17

## 2020-05-15 ENCOUNTER — TELEMEDICINE (OUTPATIENT)
Dept: PSYCHIATRY | Facility: CLINIC | Age: 35
End: 2020-05-15

## 2020-05-15 DIAGNOSIS — F43.10 POST TRAUMATIC STRESS DISORDER (PTSD): ICD-10-CM

## 2020-05-15 DIAGNOSIS — F51.05 INSOMNIA DUE TO MENTAL CONDITION: ICD-10-CM

## 2020-05-15 DIAGNOSIS — F51.5 NIGHTMARES: ICD-10-CM

## 2020-05-15 DIAGNOSIS — F17.200 NICOTINE USE DISORDER: ICD-10-CM

## 2020-05-15 DIAGNOSIS — Z79.899 MEDICATION MANAGEMENT: Primary | ICD-10-CM

## 2020-05-15 PROCEDURE — 99214 OFFICE O/P EST MOD 30 MIN: CPT | Performed by: PSYCHIATRY & NEUROLOGY

## 2020-05-15 RX ORDER — LITHIUM CARBONATE 300 MG
300 TABLET ORAL NIGHTLY
Qty: 30 TABLET | Refills: 1 | Status: SHIPPED | OUTPATIENT
Start: 2020-05-15 | End: 2020-07-10 | Stop reason: SDUPTHER

## 2020-05-15 RX ORDER — PRAZOSIN HYDROCHLORIDE 1 MG/1
1 CAPSULE ORAL NIGHTLY
Qty: 30 CAPSULE | Refills: 1 | Status: SHIPPED | OUTPATIENT
Start: 2020-05-15 | End: 2020-07-10 | Stop reason: SDUPTHER

## 2020-05-15 NOTE — PROGRESS NOTES
Subjective   Alex Torres is a 35 y.o. male who presents today for follow up     This provider is located at [facility], [address] [place] [address]. The Patient is seen remotely at home, using Epic Mychart. Patient is being seen via telehealth and stated they are in a secure environment for this session. The patient’s condition being diagnosed/treated is appropriate for telemedicine. The provider identified himself as well as his credentials.   The patient gave consent to be seen remotely, and when consent is given they understand that the consent allows for patient identifiable information to be sent to a third party as needed.   They may refuse to be seen remotely at any time. The electronic data is encrypted and password protected, and the patient has been advised of the potential risks to privacy not withstanding such measures        Chief Complaint: Bipolar disorder/anxiety    History of Present Illness: Patient is a 35-year-old  male with a long history of bipolar disorder and anxiety who presents today for follow-up.  Patient states that he feels that Haldol has helped some but that he is continuing to experience some mood lability.  He states that when he was a teenager and young adult he was on a Haldol and lithium, which he felt, perhaps, controlled his symptoms the best.  He is asking if he can go on lithium management today.  I advised that we could increase Haldol to see if we can gain better benefit but patient was insistent on trying lithium for his bipolar disorder and mood instability.  He also reports that he does not feel that Zoloft is helpful.  We have maximized the dose and have not seen major benefit.  We will discontinue this medication by tapering it off in order to simplify medication regimen.  He reports he is having some sleep difficulty.  He does not have trouble getting to sleep but often wakes up early and does not feel tired or cannot get back to sleep.  I  discussed that he is on a high dose of Zoloft and takes Elavil as well.  Most agents for sleep that we would attempt, would cause him to be at higher risk for serotonin syndrome.  He could increase Elavil potentially.  He receives this from an outside provider for nerve pain.  He denies any issues with appetite.    Patient denies SI/HI/AVH.    The following portions of the patient's history were reviewed and updated as appropriate: allergies, current medications, past family history, past medical history, past social history, past surgical history and problem list.      Past Medical History:  Past Medical History:   Diagnosis Date   • Anxiety    • Bipolar 1 disorder (CMS/[facility])    • Depression    • Hypertension    • Kidney stone    • Polyneuropathy    • Positive TB test     treated w/ meds x 6 months   • Suicide attempt (CMS/[facility])        Social History:  Social History     Socioeconomic History   • Marital status:      Spouse name: Not on file   • Number of children: Not on file   • Years of education: Not on file   • Highest education level: Not on file   Tobacco Use   • Smoking status: Current Every Day Smoker     Packs/day: 1.00     Years: 18.00     Pack years: 18.00     Types: Cigarettes     Start date: 1999   • Smokeless tobacco: Never Used   Substance and Sexual Activity   • Alcohol use: No   • Drug use: Yes     Types: Marijuana     Comment: rarely   • Sexual activity: Yes     Partners: Female       Family History:  Family History   Problem Relation Age of Onset   • Arthritis Father    • Hypertension Father    • Alcohol abuse Father    • Drug abuse Father    • Diabetes Maternal Aunt    • Diabetes Maternal Uncle    • Diabetes Maternal Grandmother    • Alzheimer's disease Maternal Grandmother    • Dementia Maternal Grandmother    • Diabetes Other    • Hypertension Mother    • Depression Mother    • ADD / ADHD Brother    • Early death Maternal Grandfather    • Liver disease Maternal Grandfather    • Alcohol  abuse Maternal Grandfather    • No Known Problems Paternal Grandmother    • Liver disease Paternal Grandfather    • Alcohol abuse Paternal Grandfather    • Early death Paternal Grandfather    • No Known Problems Brother    • Anxiety disorder Neg Hx    • Bipolar disorder Neg Hx    • OCD Neg Hx    • Paranoid behavior Neg Hx    • Schizophrenia Neg Hx    • Seizures Neg Hx    • Self-Injurious Behavior  Neg Hx    • Suicide Attempts Neg Hx        Past Surgical History:  Past Surgical History:   Procedure Laterality Date   • CHOLECYSTECTOMY  2002   • INGUINAL HERNIA REPAIR Right 1995   • ORIF METACARPAL FRACTURE Right 2000   • TIBIA FRACTURE SURGERY Left 1997    ORIF       Problem List:  Patient Active Problem List   Diagnosis   • Essential hypertension   • GERD (gastroesophageal reflux disease)   • Bipolar disorder, current episode mixed, moderate (CMS/HCC)   • MOLLY on CPAP   • Arthritis   • Complex regional pain syndrome type 2 of left lower extremity   • Snoring   • Excessive daytime sleepiness   • Peripheral polyneuropathy   • Pain in both lower extremities   • Varicose veins of both lower extremities with pain   • MOLLY (obstructive sleep apnea)   • Morbidly obese (CMS/HCC)   • Neuromuscular respiratory weakness       Allergy:   Allergies   Allergen Reactions   • Gabapentin Swelling     + swelling   • Latex Rash        Current Medications:   Current Outpatient Medications   Medication Sig Dispense Refill   • albuterol sulfate  (90 Base) MCG/ACT inhaler Inhale 2 puffs Every 4 (Four) Hours As Needed for Wheezing or Shortness of Air. 18 g 3   • amitriptyline (ELAVIL) 10 MG tablet TAKE 1 TABLET BY MOUTH EVERY DAY AT NIGHT 30 tablet 4   • amoxicillin (AMOXIL) 500 MG capsule TAKE 2 STAT THEN 1 CAPSULE BY MOUTH EVERY 8 HOURS UNTIL GONE FOR INFECTION     • baclofen (LIORESAL) 10 MG tablet TAKE 1 TABLET BY MOUTH 2 (TWO) TIMES A DAY AS NEEDED FOR MUSCLE SPASMS. 60 tablet 1   • carvedilol (COREG) 6.25 MG tablet TAKE 1 TABLET  BY MOUTH TWICE A DAY WITH MEALS 60 tablet 5   • haloperidol (HALDOL) 2 MG tablet Take 1 tablet by mouth 2 (Two) Times a Day. 60 tablet 1   • metFORMIN (GLUCOPHAGE) 500 MG tablet TAKE 1 TABLET BY MOUTH TWICE A DAY WITH MEALS 60 tablet 5   • pantoprazole (PROTONIX) 40 MG EC tablet TAKE 1 TABLET BY MOUTH EVERY DAY 90 tablet 3   • prazosin (MINIPRESS) 1 MG capsule Take 1 capsule by mouth Every Night. 30 capsule 1   • sertraline (ZOLOFT) 100 MG tablet Take 2 tablets by mouth Daily. 60 tablet 1   • triamterene-hydrochlorothiazide (DYAZIDE) 37.5-25 MG per capsule TAKE 1 CAPSULE BY MOUTH EVERY DAY 30 capsule 5   • valsartan (Diovan) 160 MG tablet Take 1 tablet by mouth Daily. 30 tablet 5   • varenicline (Chantix Continuing Month William) 1 MG tablet Take 1 tablet by mouth Daily. 30 tablet 1     No current facility-administered medications for this visit.        Review of Symptoms:    Review of Systems   Constitutional: Positive for activity change. Negative for appetite change, chills, diaphoresis, fatigue and fever.   HENT: Negative.    Eyes: Negative.    Respiratory: Negative.    Cardiovascular: Negative.    Gastrointestinal: Negative.    Endocrine: Negative.    Genitourinary: Negative.    Musculoskeletal: Negative.    Skin: Negative.    Allergic/Immunologic: Negative.    Neurological: Negative.    Hematological: Negative.    Psychiatric/Behavioral: Positive for agitation, behavioral problems, dysphoric mood, sleep disturbance and stress. Negative for decreased concentration, hallucinations, self-injury, suicidal ideas, negative for hyperactivity and depressed mood. The patient is not nervous/anxious.          Physical Exam:   There were no vitals taken for this visit. Video Visit     Appearance: Overweight  male of stated age in no acute distress  Gait, Station, Strength: Within normal limits    Mental Status Exam:   Hygiene:   good  Cooperation:  Cooperative  Eye Contact:  Good  Psychomotor Behavior:   Appropriate  Affect:  Blunted  Mood: fluctates   Hopelessness: 2  Speech:  Normal  Thought Process:  Goal directed and Linear  Thought Content:  Normal  Suicidal:  None  Homicidal:  None  Hallucinations:  None  Delusion:  None  Memory:  Intact  Orientation:  Person, Place, Time and Situation  Reliability:  good  Insight:  Fair  Judgement:  Fair  Impulse Control:  Fair  Physical/Medical Issues:  No        Lab Results:   No visits with results within 1 Month(s) from this visit.   Latest known visit with results is:   Office Visit on 03/20/2020   Component Date Value Ref Range Status   • Hemoglobin A1C 03/20/2020 5.7  % Final       Assessment/Plan   Diagnoses and all orders for this visit:    Medication management  -     Comprehensive Metabolic Panel; Future  -     TSH  -     T4, free  -     Lipid Panel; Future  -     CBC (No Diff)  -     Vitamin B12 & Folate  -     Lithium Level; Future    Post traumatic stress disorder (PTSD)  -     lithium 300 MG tablet; Take 1 tablet by mouth Every Night.  -     prazosin (MINIPRESS) 1 MG capsule; Take 1 capsule by mouth Every Night.    Insomnia due to mental condition  -     prazosin (MINIPRESS) 1 MG capsule; Take 1 capsule by mouth Every Night.    Nightmares  -     prazosin (MINIPRESS) 1 MG capsule; Take 1 capsule by mouth Every Night.    Nicotine use disorder    -Patient continues to have some mood lability.  He states that Haldol has helped his overall level of anxiety and agitation but his mood fluctuates severely and he feels that he needs an additional mood stabilizer as he was previously on Haldol and lithium in the past as an early adolescent into young adulthood which seemed to control his symptoms more appropriately.  He also has some continued insomnia.  Patient states that Chantix has not been helpful for nicotine use disorder.  -Continue Chantix for smoking cessation, may consider discontinuing this medication should he not find any benefit in starting Wellbutrin.   Mood is labile and irritable at current so until mood is more stable, will hold off on Wellbutrin.  -Reviewed previous documentation  -Reviewed labs  -Abilify has been discontinued  -Continue Haldol 2 mg twice daily for PTSD and mood stabilization  -Continue prazosin 1 mg nightly for insomnia and nightmares  -Discontinue Zoloft.  Titrate to 100 mg p.o. daily for 3 to 4 days and then reduce to 50 mg p.o. daily for 3 to 4 days before discontinuing the medication  -Again performed some psychoeducation about goals of treatment, expectations of symptom burden, and expected stress from normal life events.  -Encouraged therapy and anger management  -Start lithium 300 mg p.o. nightly for mood stabilization.  -Advised patient to present to the clinic for routine laboratory studies and lithium level 5 to 7 days after initiating the medication.  Advised that lithium level should be obtained approximately 12 hours after his last dose of lithium.  -Approximate appointment time 11:10 AM to 11:35 AM via video visit    Visit Diagnoses:    ICD-10-CM ICD-9-CM   1. Medication management Z79.899 V58.69   2. Post traumatic stress disorder (PTSD) F43.10 309.81   3. 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 Nightmares F51.5 307.47   5. Nicotine use disorder F17.200 305.1       TREATMENT PLAN/GOALS: Continue supportive psychotherapy efforts and medications as indicated. Treatment and medication options discussed during today's visit. Patient ackowledged and verbally consented to continue with current treatment plan and was educated on the importance of compliance with treatment and follow-up appointments.    MEDICATION ISSUES:    Discussed medication options and treatment plan of prescribed medication as well as the risks, benefits, and side effects including potential falls, possible impaired driving and metabolic adversities among others. Patient is agreeable to call the office with any worsening of symptoms or onset  of side effects. Patient is agreeable to call 911 or go to the nearest ER should he/she begin having SI/HI. No medication side effects or related complaints today.     MEDS ORDERED DURING VISIT:  New Medications Ordered This Visit   Medications   • lithium 300 MG tablet     Sig: Take 1 tablet by mouth Every Night.     Dispense:  30 tablet     Refill:  1   • prazosin (MINIPRESS) 1 MG capsule     Sig: Take 1 capsule by mouth Every Night.     Dispense:  30 capsule     Refill:  1       Return in about 4 weeks (around 6/12/2020).             This document has been electronically signed by Stefan Marion MD  May 15, 2020

## 2020-05-18 ENCOUNTER — TELEPHONE (OUTPATIENT)
Dept: PSYCHIATRY | Facility: CLINIC | Age: 35
End: 2020-05-18

## 2020-05-18 NOTE — TELEPHONE ENCOUNTER
[facility] left a voicemail message on Friday regarding Alex. Wanted to make you aware of the drug interaction between the Lithium and his BP med HTCZ. Patient advised pharmacist he would stop the BP med, and contact his PCP if he needed to be prescribed something additional for his blood pressure. SUSANA

## 2020-05-26 ENCOUNTER — LAB (OUTPATIENT)
Dept: LAB | Facility: HOSPITAL | Age: 35
End: 2020-05-26

## 2020-05-26 DIAGNOSIS — Z79.899 MEDICATION MANAGEMENT: ICD-10-CM

## 2020-05-26 LAB
ALBUMIN SERPL-MCNC: 4.8 G/DL (ref 3.5–5.2)
ALBUMIN/GLOB SERPL: 2 G/DL
ALP SERPL-CCNC: 68 U/L (ref 39–117)
ALT SERPL W P-5'-P-CCNC: 32 U/L (ref 1–41)
ANION GAP SERPL CALCULATED.3IONS-SCNC: 10.9 MMOL/L (ref 5–15)
AST SERPL-CCNC: 19 U/L (ref 1–40)
BILIRUB SERPL-MCNC: 0.3 MG/DL (ref 0.2–1.2)
BUN BLD-MCNC: 9 MG/DL (ref 6–20)
BUN/CREAT SERPL: 8.3 (ref 7–25)
CALCIUM SPEC-SCNC: 9.6 MG/DL (ref 8.6–10.5)
CHLORIDE SERPL-SCNC: 101 MMOL/L (ref 98–107)
CHOLEST SERPL-MCNC: 182 MG/DL (ref 0–200)
CO2 SERPL-SCNC: 24.1 MMOL/L (ref 22–29)
CREAT BLD-MCNC: 1.09 MG/DL (ref 0.76–1.27)
DEPRECATED RDW RBC AUTO: 49.6 FL (ref 37–54)
ERYTHROCYTE [DISTWIDTH] IN BLOOD BY AUTOMATED COUNT: 15.2 % (ref 12.3–15.4)
GFR SERPL CREATININE-BSD FRML MDRD: 76 ML/MIN/1.73
GLOBULIN UR ELPH-MCNC: 2.4 GM/DL
GLUCOSE BLD-MCNC: 77 MG/DL (ref 65–99)
HCT VFR BLD AUTO: 42 % (ref 37.5–51)
HDLC SERPL-MCNC: 24 MG/DL (ref 40–60)
HGB BLD-MCNC: 14 G/DL (ref 13–17.7)
LDLC SERPL CALC-MCNC: 79 MG/DL (ref 0–100)
LDLC/HDLC SERPL: 3.31 {RATIO}
LITHIUM SERPL-SCNC: 0.2 MMOL/L (ref 0.6–1.2)
MCH RBC QN AUTO: 29.4 PG (ref 26.6–33)
MCHC RBC AUTO-ENTMCNC: 33.3 G/DL (ref 31.5–35.7)
MCV RBC AUTO: 88.2 FL (ref 79–97)
PLATELET # BLD AUTO: 235 10*3/MM3 (ref 140–450)
PMV BLD AUTO: 9 FL (ref 6–12)
POTASSIUM BLD-SCNC: 4.6 MMOL/L (ref 3.5–5.2)
PROT SERPL-MCNC: 7.2 G/DL (ref 6–8.5)
RBC # BLD AUTO: 4.76 10*6/MM3 (ref 4.14–5.8)
SODIUM BLD-SCNC: 136 MMOL/L (ref 136–145)
T4 FREE SERPL-MCNC: 1.01 NG/DL (ref 0.93–1.7)
TRIGL SERPL-MCNC: 393 MG/DL (ref 0–150)
TSH SERPL DL<=0.05 MIU/L-ACNC: 3.75 UIU/ML (ref 0.27–4.2)
VLDLC SERPL-MCNC: 78.6 MG/DL (ref 5–40)
WBC NRBC COR # BLD: 10.9 10*3/MM3 (ref 3.4–10.8)

## 2020-05-26 PROCEDURE — 84439 ASSAY OF FREE THYROXINE: CPT | Performed by: PSYCHIATRY & NEUROLOGY

## 2020-05-26 PROCEDURE — 80061 LIPID PANEL: CPT

## 2020-05-26 PROCEDURE — 80050 GENERAL HEALTH PANEL: CPT | Performed by: PSYCHIATRY & NEUROLOGY

## 2020-05-26 PROCEDURE — 80178 ASSAY OF LITHIUM: CPT

## 2020-05-26 PROCEDURE — 36415 COLL VENOUS BLD VENIPUNCTURE: CPT

## 2020-05-27 DIAGNOSIS — F31.62 BIPOLAR DISORDER, CURRENT EPISODE MIXED, MODERATE (HCC): ICD-10-CM

## 2020-05-27 DIAGNOSIS — F43.10 POST TRAUMATIC STRESS DISORDER (PTSD): ICD-10-CM

## 2020-05-27 DIAGNOSIS — F41.9 ANXIETY DISORDER, UNSPECIFIED TYPE: ICD-10-CM

## 2020-05-27 RX ORDER — ARIPIPRAZOLE 15 MG/1
TABLET ORAL
Qty: 30 TABLET | Refills: 1 | OUTPATIENT
Start: 2020-05-27

## 2020-06-10 DIAGNOSIS — R73.03 BORDERLINE DIABETES: ICD-10-CM

## 2020-06-19 DIAGNOSIS — F41.9 ANXIETY DISORDER, UNSPECIFIED TYPE: ICD-10-CM

## 2020-06-19 DIAGNOSIS — F43.10 POST TRAUMATIC STRESS DISORDER (PTSD): ICD-10-CM

## 2020-06-22 RX ORDER — HALOPERIDOL 2 MG/1
TABLET ORAL
Qty: 60 TABLET | Refills: 1 | Status: SHIPPED | OUTPATIENT
Start: 2020-06-22 | End: 2020-07-10 | Stop reason: SDUPTHER

## 2020-06-23 PROCEDURE — U0002 COVID-19 LAB TEST NON-CDC: HCPCS | Performed by: PHYSICIAN ASSISTANT

## 2020-07-10 ENCOUNTER — TELEMEDICINE (OUTPATIENT)
Dept: PSYCHIATRY | Facility: CLINIC | Age: 35
End: 2020-07-10

## 2020-07-10 VITALS
WEIGHT: 270 LBS | HEIGHT: 76 IN | SYSTOLIC BLOOD PRESSURE: 140 MMHG | HEART RATE: 66 BPM | TEMPERATURE: 97.3 F | RESPIRATION RATE: 18 BRPM | DIASTOLIC BLOOD PRESSURE: 80 MMHG | BODY MASS INDEX: 32.88 KG/M2

## 2020-07-10 DIAGNOSIS — F51.05 INSOMNIA DUE TO MENTAL CONDITION: ICD-10-CM

## 2020-07-10 DIAGNOSIS — F43.10 POST TRAUMATIC STRESS DISORDER (PTSD): ICD-10-CM

## 2020-07-10 DIAGNOSIS — F41.9 ANXIETY DISORDER, UNSPECIFIED TYPE: ICD-10-CM

## 2020-07-10 DIAGNOSIS — F51.5 NIGHTMARES: ICD-10-CM

## 2020-07-10 PROCEDURE — 99214 OFFICE O/P EST MOD 30 MIN: CPT | Performed by: PSYCHIATRY & NEUROLOGY

## 2020-07-10 RX ORDER — HALOPERIDOL 2 MG/1
2 TABLET ORAL 2 TIMES DAILY
Qty: 60 TABLET | Refills: 1 | Status: SHIPPED | OUTPATIENT
Start: 2020-07-10 | End: 2020-09-08 | Stop reason: SDUPTHER

## 2020-07-10 RX ORDER — LITHIUM CARBONATE 300 MG
300 TABLET ORAL 2 TIMES DAILY
Qty: 60 TABLET | Refills: 1 | Status: SHIPPED | OUTPATIENT
Start: 2020-07-10 | End: 2020-09-08 | Stop reason: SDUPTHER

## 2020-07-10 RX ORDER — PRAZOSIN HYDROCHLORIDE 1 MG/1
1 CAPSULE ORAL NIGHTLY
Qty: 30 CAPSULE | Refills: 1 | Status: SHIPPED | OUTPATIENT
Start: 2020-07-10 | End: 2020-09-08 | Stop reason: SDUPTHER

## 2020-07-10 NOTE — PROGRESS NOTES
Subjective   Alex Torres is a 35 y.o. male who presents today for follow up     This provider is located at [facility], Psychiatric, [address] [place] [address], using [employer].  The patient is seen remotely at [facility], Behavioral health, [address], [address] [facility] in [facility] via [employer], an encrypted service from one [facility] Facility to another, with staff present. The patient's condition being diagnosed/treated is appropriate for telemedecine.  The provider identified himself as well as his credentials.      The patient and/or patient's guardian consent to be seen remotely, and when consent is given they understand that the consent allows for patient identifiable information to be sent to a third party as needed.  They may refuse to be seen remotely at any time.  The electronic data is encrypted and password protected, and the patient has been advised of the potential risks to privacy notwithstanding such measures.      Chief Complaint: Bipolar disorder/anxiety    History of Present Illness: Patient is a 35-year-old  male with a long history of bipolar disorder and anxiety who presents today for follow-up. He reports that overall he has been doing much better.  He states he has noticed his depression is resolved and anxiety is much improved. He does note some increased frustration.  He states that for a couple hours every morning he noticed that he is irritable and everything tends to set him off.  He is not currently having any medication side effects and overall he feels that there have been improvements.  He asked if we could increase Lithium to twice a day dosing.  Reviewing his level, we have room to increase dose and it may benefit his overall mood stability.  We will plan to do this today.  He continues to have some anxiety about his upcoming court date next month.  He is working on his other life stressors.  He denies issues with sleep or  appetite.  Patient denies SI/HI/AVH.    The following portions of the patient's history were reviewed and updated as appropriate: allergies, current medications, past family history, past medical history, past social history, past surgical history and problem list.      Past Medical History:  Past Medical History:   Diagnosis Date   • Anxiety    • Bipolar 1 disorder (CMS/HCC)    • Depression    • Hypertension    • Kidney stone    • Polyneuropathy    • Positive TB test     treated w/ meds x 6 months   • Suicide attempt (CMS/HCC)        Social History:  Social History     Socioeconomic History   • Marital status:      Spouse name: Not on file   • Number of children: Not on file   • Years of education: Not on file   • Highest education level: Not on file   Tobacco Use   • Smoking status: Current Every Day Smoker     Packs/day: 1.00     Years: 18.00     Pack years: 18.00     Types: Cigarettes     Start date: 1999   • Smokeless tobacco: Never Used   Substance and Sexual Activity   • Alcohol use: No   • Drug use: Yes     Types: Marijuana     Comment: rarely   • Sexual activity: Yes     Partners: Female       Family History:  Family History   Problem Relation Age of Onset   • Arthritis Father    • Hypertension Father    • Alcohol abuse Father    • Drug abuse Father    • Diabetes Maternal Aunt    • Diabetes Maternal Uncle    • Diabetes Maternal Grandmother    • Alzheimer's disease Maternal Grandmother    • Dementia Maternal Grandmother    • Diabetes Other    • Hypertension Mother    • Depression Mother    • ADD / ADHD Brother    • Early death Maternal Grandfather    • Liver disease Maternal Grandfather    • Alcohol abuse Maternal Grandfather    • No Known Problems Paternal Grandmother    • Liver disease Paternal Grandfather    • Alcohol abuse Paternal Grandfather    • Early death Paternal Grandfather    • No Known Problems Brother    • Anxiety disorder Neg Hx    • Bipolar disorder Neg Hx    • OCD Neg Hx    •  Paranoid behavior Neg Hx    • Schizophrenia Neg Hx    • Seizures Neg Hx    • Self-Injurious Behavior  Neg Hx    • Suicide Attempts Neg Hx        Past Surgical History:  Past Surgical History:   Procedure Laterality Date   • CHOLECYSTECTOMY  2002   • INGUINAL HERNIA REPAIR Right 1995   • ORIF METACARPAL FRACTURE Right 2000   • TIBIA FRACTURE SURGERY Left 1997    ORIF       Problem List:  Patient Active Problem List   Diagnosis   • Essential hypertension   • GERD (gastroesophageal reflux disease)   • Bipolar disorder, current episode mixed, moderate (CMS/HCC)   • MOLLY on CPAP   • Arthritis   • Complex regional pain syndrome type 2 of left lower extremity   • Snoring   • Excessive daytime sleepiness   • Peripheral polyneuropathy   • Pain in both lower extremities   • Varicose veins of both lower extremities with pain   • MOLLY (obstructive sleep apnea)   • Morbidly obese (CMS/HCC)   • Neuromuscular respiratory weakness       Allergy:   Allergies   Allergen Reactions   • Gabapentin Swelling     + swelling   • Latex Rash        Current Medications:   Current Outpatient Medications   Medication Sig Dispense Refill   • albuterol sulfate  (90 Base) MCG/ACT inhaler Inhale 2 puffs Every 4 (Four) Hours As Needed for Wheezing or Shortness of Air. 18 g 3   • amitriptyline (ELAVIL) 10 MG tablet TAKE 1 TABLET BY MOUTH EVERY DAY AT NIGHT 30 tablet 4   • baclofen (LIORESAL) 10 MG tablet TAKE 1 TABLET BY MOUTH 2 (TWO) TIMES A DAY AS NEEDED FOR MUSCLE SPASMS. 60 tablet 1   • carvedilol (COREG) 6.25 MG tablet TAKE 1 TABLET BY MOUTH TWICE A DAY WITH MEALS 60 tablet 5   • chlorhexidine (PERIDEX) 0.12 % solution USE AS DIRECTED. DO NOT SWALLOW     • haloperidol (HALDOL) 2 MG tablet Take 1 tablet by mouth 2 (Two) Times a Day. 60 tablet 1   • lithium 300 MG tablet Take 1 tablet by mouth 2 (Two) Times a Day. 60 tablet 1   • metFORMIN (GLUCOPHAGE) 500 MG tablet TAKE 2 TABLETS BY MOUTH 2 (TWO) TIMES A DAY WITH MEALS. 120 tablet 5   •  "ondansetron ODT (ZOFRAN-ODT) 4 MG disintegrating tablet Place 1 tablet on the tongue Every 6 (Six) Hours As Needed for Nausea or Vomiting. 12 tablet 0   • pantoprazole (PROTONIX) 40 MG EC tablet TAKE 1 TABLET BY MOUTH EVERY DAY 90 tablet 3   • prazosin (MINIPRESS) 1 MG capsule Take 1 capsule by mouth Every Night. 30 capsule 1   • valsartan (Diovan) 160 MG tablet Take 1 tablet by mouth Daily. 30 tablet 5     No current facility-administered medications for this visit.        Review of Symptoms:    Review of Systems   Constitutional: Positive for activity change (improved). Negative for appetite change, chills, diaphoresis, fatigue and fever.   HENT: Negative.    Eyes: Negative.    Respiratory: Negative.    Cardiovascular: Negative.    Gastrointestinal: Negative.    Endocrine: Negative.    Genitourinary: Negative.    Musculoskeletal: Negative.    Skin: Negative.    Allergic/Immunologic: Negative.    Neurological: Negative.    Hematological: Negative.    Psychiatric/Behavioral: Positive for agitation and stress. Negative for behavioral problems, decreased concentration, dysphoric mood, hallucinations, self-injury, sleep disturbance, suicidal ideas, negative for hyperactivity and depressed mood. The patient is not nervous/anxious.          Physical Exam:   Blood pressure 140/80, pulse 66, temperature 97.3 °F (36.3 °C), temperature source Temporal, resp. rate 18, height 193 cm (76\"), weight 122 kg (270 lb).      Appearance: Overweight  male of stated age in no acute distress  Gait, Station, Strength: Within normal limits    Mental Status Exam:   Hygiene:   good  Cooperation:  Cooperative  Eye Contact:  Good  Psychomotor Behavior:  Appropriate  Affect:  Full range  Mood: irritable   Hopelessness: Optimistic  Speech:  Normal  Thought Process:  Goal directed and Linear  Thought Content:  Normal  Suicidal:  None  Homicidal:  None  Hallucinations:  None  Delusion:  None  Memory:  Intact  Orientation:  Person, Place, " Time and Situation  Reliability:  good  Insight:  Fair  Judgement:  Fair  Impulse Control:  Fair  Physical/Medical Issues:  No        Lab Results:   Admission on 06/23/2020, Discharged on 06/23/2020   Component Date Value Ref Range Status   • Reference Lab Report 06/23/2020 See scanned report   Final   • COVID19 06/23/2020 Not Detected  Not Detected - Ref. Range Final       Assessment/Plan   Diagnoses and all orders for this visit:    Post traumatic stress disorder (PTSD)  -     haloperidol (HALDOL) 2 MG tablet; Take 1 tablet by mouth 2 (Two) Times a Day.  -     prazosin (MINIPRESS) 1 MG capsule; Take 1 capsule by mouth Every Night.  -     lithium 300 MG tablet; Take 1 tablet by mouth 2 (Two) Times a Day.    Anxiety disorder, unspecified type  -     haloperidol (HALDOL) 2 MG tablet; Take 1 tablet by mouth 2 (Two) Times a Day.    Insomnia due to mental condition  -     prazosin (MINIPRESS) 1 MG capsule; Take 1 capsule by mouth Every Night.    Nightmares  -     prazosin (MINIPRESS) 1 MG capsule; Take 1 capsule by mouth Every Night.    -Patient reports overall improvement in mood and anxiety symptoms with less mood lability.  He does state that he does have some increased agitation in the morning that last several hours and has been difficult to deal with as it causes interpersonal strain.  -Continue Chantix for smoking cessation, may consider discontinuing this medication should he not find any benefit in starting Wellbutrin.  Mood is labile and irritable at current so until mood is more stable, will hold off on Wellbutrin.  -Reviewed previous documentation  -Reviewed labs  -Reviewed lithium level  -Continue Haldol 2 mg twice daily for PTSD and mood stabilization  -Continue prazosin 1 mg nightly for insomnia and nightmares  -Encouraged therapy and anger management  -Increase lithium to 300 mg twice daily for mood stabilization      Visit Diagnoses:    ICD-10-CM ICD-9-CM   1. Post traumatic stress disorder (PTSD)  F43.10 309.81   2. Anxiety disorder, unspecified type F41.9 300.00   3. 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 Nightmares F51.5 307.47       TREATMENT PLAN/GOALS: Continue supportive psychotherapy efforts and medications as indicated. Treatment and medication options discussed during today's visit. Patient ackowledged and verbally consented to continue with current treatment plan and was educated on the importance of compliance with treatment and follow-up appointments.    MEDICATION ISSUES:    Discussed medication options and treatment plan of prescribed medication as well as the risks, benefits, and side effects including potential falls, possible impaired driving and metabolic adversities among others. Patient is agreeable to call the office with any worsening of symptoms or onset of side effects. Patient is agreeable to call 911 or go to the nearest ER should he/she begin having SI/HI. No medication side effects or related complaints today.     MEDS ORDERED DURING VISIT:  New Medications Ordered This Visit   Medications   • haloperidol (HALDOL) 2 MG tablet     Sig: Take 1 tablet by mouth 2 (Two) Times a Day.     Dispense:  60 tablet     Refill:  1   • prazosin (MINIPRESS) 1 MG capsule     Sig: Take 1 capsule by mouth Every Night.     Dispense:  30 capsule     Refill:  1   • lithium 300 MG tablet     Sig: Take 1 tablet by mouth 2 (Two) Times a Day.     Dispense:  60 tablet     Refill:  1       Return in about 4 weeks (around 8/7/2020).             This document has been electronically signed by Stefan Marion MD  July 10, 2020

## 2020-07-20 DIAGNOSIS — F43.10 POST TRAUMATIC STRESS DISORDER (PTSD): ICD-10-CM

## 2020-07-20 DIAGNOSIS — F41.9 ANXIETY DISORDER, UNSPECIFIED TYPE: ICD-10-CM

## 2020-07-20 RX ORDER — SERTRALINE HYDROCHLORIDE 100 MG/1
TABLET, FILM COATED ORAL
Qty: 60 TABLET | Refills: 1 | OUTPATIENT
Start: 2020-07-20

## 2020-08-22 DIAGNOSIS — I10 ESSENTIAL HYPERTENSION: ICD-10-CM

## 2020-08-22 RX ORDER — CARVEDILOL 6.25 MG/1
TABLET ORAL
Qty: 60 TABLET | Refills: 5 | Status: SHIPPED | OUTPATIENT
Start: 2020-08-22 | End: 2021-02-18

## 2020-08-27 ENCOUNTER — TELEPHONE (OUTPATIENT)
Dept: PSYCHIATRY | Facility: CLINIC | Age: 35
End: 2020-08-27

## 2020-08-27 DIAGNOSIS — Z79.899 MEDICATION MANAGEMENT: Primary | ICD-10-CM

## 2020-08-27 NOTE — TELEPHONE ENCOUNTER
Alex had to reschedule his apt for Friday, but he asked if you could put in the lab orders for his lithium level, that you and he talked about? He will have those done so that when he gets to see you again, you can go over with him. Thanks!

## 2020-09-03 DIAGNOSIS — I10 ESSENTIAL HYPERTENSION: ICD-10-CM

## 2020-09-03 RX ORDER — VALSARTAN 160 MG/1
TABLET ORAL
Qty: 30 TABLET | Refills: 5 | Status: SHIPPED | OUTPATIENT
Start: 2020-09-03 | End: 2021-03-15

## 2020-09-08 DIAGNOSIS — F51.5 NIGHTMARES: ICD-10-CM

## 2020-09-08 DIAGNOSIS — F43.10 POST TRAUMATIC STRESS DISORDER (PTSD): ICD-10-CM

## 2020-09-08 DIAGNOSIS — F41.9 ANXIETY DISORDER, UNSPECIFIED TYPE: ICD-10-CM

## 2020-09-08 DIAGNOSIS — F51.05 INSOMNIA DUE TO MENTAL CONDITION: ICD-10-CM

## 2020-09-08 RX ORDER — PRAZOSIN HYDROCHLORIDE 1 MG/1
1 CAPSULE ORAL NIGHTLY
Qty: 30 CAPSULE | Refills: 1 | Status: SHIPPED | OUTPATIENT
Start: 2020-09-08 | End: 2020-10-09 | Stop reason: SDUPTHER

## 2020-09-08 RX ORDER — LITHIUM CARBONATE 300 MG
300 TABLET ORAL 2 TIMES DAILY
Qty: 60 TABLET | Refills: 1 | Status: SHIPPED | OUTPATIENT
Start: 2020-09-08 | End: 2020-10-09 | Stop reason: SDUPTHER

## 2020-09-08 RX ORDER — HALOPERIDOL 2 MG/1
2 TABLET ORAL 2 TIMES DAILY
Qty: 60 TABLET | Refills: 1 | Status: SHIPPED | OUTPATIENT
Start: 2020-09-08 | End: 2020-10-09 | Stop reason: SDUPTHER

## 2020-09-14 ENCOUNTER — OFFICE VISIT (OUTPATIENT)
Dept: PSYCHIATRY | Facility: CLINIC | Age: 35
End: 2020-09-14

## 2020-09-14 DIAGNOSIS — F31.62 BIPOLAR DISORDER, CURRENT EPISODE MIXED, MODERATE (HCC): Primary | ICD-10-CM

## 2020-09-14 PROCEDURE — 90791 PSYCH DIAGNOSTIC EVALUATION: CPT | Performed by: SOCIAL WORKER

## 2020-09-15 NOTE — TREATMENT PLAN
Multi-Disciplinary Problems (from Behavioral Health Treatment Plan)    Active Problems     Problem: Mood Instability  Start Date: 09/15/20    Problem Details: The patient self-scales this problem as a 8 with 10 being the worst.        Goal Priority Start Date Expected End Date End Date    Patient will achieve mood stability as evidenced by controlled behavior and a more deliberate thought process -- 09/15/20 -- --    Goal Details: Progress toward goal:  Not appropriate to rate progress toward goal since this is the initial treatment plan.        Goal Intervention Frequency Start Date End Date    Provide structure and focus to patient's thoughts and actions by establishing plans and routine. Weekly 09/15/20 --    Intervention Details: Duration of treatment until until remission of symptoms.        Goal Intervention Frequency Start Date End Date    Assist patient in setting responsible goals and limits in behavior. Weekly 09/15/20 --    Intervention Details: Duration of treatment until until remission of symptoms.                           I have discussed and reviewed this treatment plan with the patient.  It has been printed for signatures.

## 2020-09-28 ENCOUNTER — OFFICE VISIT (OUTPATIENT)
Dept: PSYCHIATRY | Facility: CLINIC | Age: 35
End: 2020-09-28

## 2020-09-28 DIAGNOSIS — F31.62 BIPOLAR DISORDER, CURRENT EPISODE MIXED, MODERATE (HCC): Primary | ICD-10-CM

## 2020-09-28 PROCEDURE — 90837 PSYTX W PT 60 MINUTES: CPT | Performed by: SOCIAL WORKER

## 2020-09-28 NOTE — PROGRESS NOTES
Date: September 30, 2020  Time In: 8:00 am  Time Out: 9:00 am      PROGRESS NOTE  Data:  Alex Torres is a 35 y.o. male who presents today for individual therapy session at [facility].     Patient is continuing to struggle with bipolar symptoms and has significant trouble working. Patient states that he did not get any sleep last night because he was unable to fill his medication which has caused his nightmares to resume. Patient was encouraged to fill his medication regularly, prior to running out of medication.       Clinical Maneuvering/Intervention:    Assisted patient in processing above session content; acknowledged and normalized patient’s thoughts, feelings, and concerns.  Rationalized patient thought process regarding bipolar disorder.  Discussed triggers associated with patient's bipolar disorder.  Also discussed coping skills for patient to implement such as deep breathing, mindfulness skills, behavioral activation.    Allowed patient to freely discuss issues without interruption or judgment. Provided safe, confidential environment to facilitate the development of positive therapeutic relationship and encourage open, honest communication. Assisted patient in identifying risk factors which would indicate the need for higher level of care including thoughts to harm self or others and/or self-harming behavior and encouraged patient to contact this office, call 911, or present to the nearest emergency room should any of these events occur. Discussed crisis intervention services and means to access. Patient adamantly and convincingly denies current suicidal or homicidal ideation or perceptual disturbance.    Assessment   Patient appears to maintain relative stability as compared to their baseline.  However, patient continues to struggle with bipolar disorder, which continues to cause impairment in important areas of functioning.  A result, they can be reasonably expected to continue  to benefit from treatment and would likely be at increased risk for decompensation otherwise.    Mental Status Exam:   Hygiene:   good  Cooperation:  Cooperative  Eye Contact:  Good  Psychomotor Behavior:  Appropriate  Affect:  Full range  Mood: normal  Speech:  Normal  Thought Process:  Goal directed  Thought Content:  Normal  Suicidal:  None  Homicidal:  None  Hallucinations:  None  Delusion:  None  Memory:  Intact  Orientation:  Person, Place, Time and Situation  Reliability:  fair  Insight:  Fair  Judgement:  Fair  Impulse Control:  Fair  Physical/Medical Issues:  Yes in [facility]       Patient's Support Network Includes:  wife    Functional Status: Mild impairment     Progress toward goal: Not at goal    Prognosis: Fair with Ongoing Treatment          Plan     Patient will continue in individual outpatient therapy with focus on improved functioning and coping skills, maintaining stability, and avoiding decompensation and the need for higher level of care.    Patient will adhere to medication regimen as prescribed and report any side effects. Patient will contact this office, call 911 or present to the nearest emergency room should suicidal or homicidal ideations occur. Provide Cognitive Behavioral Therapy and Solution Focused Therapy to improve functioning, maintain stability, and avoid decompensation and the need for higher level of care.     Return in about 2 weeks, or earlier if symptoms worsen or fail to improve.           VISIT DIAGNOSIS:     ICD-10-CM ICD-9-CM   1. Bipolar disorder, current episode mixed, moderate (CMS/[facility])  F31.62 296.62             This document has been electronically signed by Gabriela Hernández LCSW  September 30, 2020 09:45 EDT      Part of this note may be an electronic transcription/translation of spoken language to printed text using the Dragon Dictation System.

## 2020-10-09 ENCOUNTER — TELEMEDICINE (OUTPATIENT)
Dept: PSYCHIATRY | Facility: CLINIC | Age: 35
End: 2020-10-09

## 2020-10-09 DIAGNOSIS — F43.10 POST TRAUMATIC STRESS DISORDER (PTSD): ICD-10-CM

## 2020-10-09 DIAGNOSIS — Z79.899 MEDICATION MANAGEMENT: Primary | ICD-10-CM

## 2020-10-09 DIAGNOSIS — F41.9 ANXIETY DISORDER, UNSPECIFIED TYPE: ICD-10-CM

## 2020-10-09 DIAGNOSIS — F51.05 INSOMNIA DUE TO MENTAL CONDITION: ICD-10-CM

## 2020-10-09 DIAGNOSIS — F51.5 NIGHTMARES: ICD-10-CM

## 2020-10-09 PROCEDURE — 99214 OFFICE O/P EST MOD 30 MIN: CPT | Performed by: PSYCHIATRY & NEUROLOGY

## 2020-10-09 RX ORDER — PRAZOSIN HYDROCHLORIDE 1 MG/1
1 CAPSULE ORAL NIGHTLY
Qty: 30 CAPSULE | Refills: 1 | Status: SHIPPED | OUTPATIENT
Start: 2020-10-09 | End: 2021-01-22 | Stop reason: SDUPTHER

## 2020-10-09 RX ORDER — DULOXETIN HYDROCHLORIDE 30 MG/1
30 CAPSULE, DELAYED RELEASE ORAL DAILY
Qty: 30 CAPSULE | Refills: 1 | Status: SHIPPED | OUTPATIENT
Start: 2020-10-09 | End: 2020-12-18

## 2020-10-09 RX ORDER — LITHIUM CARBONATE 300 MG
300 TABLET ORAL 2 TIMES DAILY
Qty: 60 TABLET | Refills: 1 | Status: SHIPPED | OUTPATIENT
Start: 2020-10-09 | End: 2020-12-18

## 2020-10-09 RX ORDER — HALOPERIDOL 2 MG/1
2 TABLET ORAL 2 TIMES DAILY
Qty: 60 TABLET | Refills: 1 | Status: SHIPPED | OUTPATIENT
Start: 2020-10-09 | End: 2020-12-18

## 2020-10-14 ENCOUNTER — OFFICE VISIT (OUTPATIENT)
Dept: INTERNAL MEDICINE | Facility: CLINIC | Age: 35
End: 2020-10-14

## 2020-10-14 VITALS
OXYGEN SATURATION: 100 % | DIASTOLIC BLOOD PRESSURE: 72 MMHG | HEIGHT: 76 IN | SYSTOLIC BLOOD PRESSURE: 116 MMHG | RESPIRATION RATE: 18 BRPM | BODY MASS INDEX: 31.42 KG/M2 | HEART RATE: 61 BPM | TEMPERATURE: 97.4 F | WEIGHT: 258 LBS

## 2020-10-14 DIAGNOSIS — G47.33 OSA (OBSTRUCTIVE SLEEP APNEA): ICD-10-CM

## 2020-10-14 DIAGNOSIS — R73.03 BORDERLINE DIABETES: ICD-10-CM

## 2020-10-14 DIAGNOSIS — K52.9 CHRONIC DIARRHEA: ICD-10-CM

## 2020-10-14 DIAGNOSIS — Z00.00 ENCOUNTER FOR PREVENTIVE HEALTH EXAMINATION: Primary | ICD-10-CM

## 2020-10-14 DIAGNOSIS — Z11.59 ENCOUNTER FOR HEPATITIS C SCREENING TEST FOR LOW RISK PATIENT: ICD-10-CM

## 2020-10-14 DIAGNOSIS — F52.4 PREMATURE EJACULATION: ICD-10-CM

## 2020-10-14 DIAGNOSIS — E66.09 CLASS 1 OBESITY DUE TO EXCESS CALORIES WITH SERIOUS COMORBIDITY AND BODY MASS INDEX (BMI) OF 31.0 TO 31.9 IN ADULT: ICD-10-CM

## 2020-10-14 DIAGNOSIS — Z23 NEED FOR IMMUNIZATION AGAINST INFLUENZA: ICD-10-CM

## 2020-10-14 LAB — HBA1C MFR BLD: 4.8 %

## 2020-10-14 PROCEDURE — 90686 IIV4 VACC NO PRSV 0.5 ML IM: CPT | Performed by: INTERNAL MEDICINE

## 2020-10-14 PROCEDURE — 99395 PREV VISIT EST AGE 18-39: CPT | Performed by: INTERNAL MEDICINE

## 2020-10-14 PROCEDURE — 83036 HEMOGLOBIN GLYCOSYLATED A1C: CPT | Performed by: INTERNAL MEDICINE

## 2020-10-14 PROCEDURE — 90471 IMMUNIZATION ADMIN: CPT | Performed by: INTERNAL MEDICINE

## 2020-10-14 PROCEDURE — 82044 UR ALBUMIN SEMIQUANTITATIVE: CPT | Performed by: INTERNAL MEDICINE

## 2020-10-14 RX ORDER — TRIAMTERENE AND HYDROCHLOROTHIAZIDE 37.5; 25 MG/1; MG/1
1 CAPSULE ORAL DAILY
COMMUNITY
Start: 2020-08-26 | End: 2020-10-14

## 2020-10-14 NOTE — PROGRESS NOTES
Chief Complaint   Patient presents with   • Annual Exam     physical, needs new referral to Neurology   • Diarrhea     had diarrhea all the time for a long time, hasn't changed with diet changes   • Sexual Problem     pre mature        Subjective     History of Present Illness   Alex Torres is a 35 y.o. male presenting for annual physical.  Preventive health maintenance was reviewed and discussed today. Vaccines were updated.     Has done excellent with diet control and reducing unhealthy foods.  As a result has lost significant weight over the last few months.  Glucose is improving but his chronic diarrhea continues to be an issue for him.  He has had watery stools now for the last 1 year with 5 BM per day.      Has had problems with premature ejaculation which may be related to his psychiatric medications.  Psychiatry has tried changing medications but despite this, his symptoms persist.  He is open to urology evaluation.     The following portions of the patient's history were reviewed and updated as appropriate: allergies, current medications, past family history, past medical history, past social history, past surgical history and problem list.    Review of Systems   Constitutional: Negative for chills, fatigue and fever.   HENT: Negative for congestion, ear pain, rhinorrhea, sinus pressure and sore throat.    Eyes: Negative for visual disturbance.   Respiratory: Negative for cough, chest tightness, shortness of breath and wheezing.    Cardiovascular: Negative for chest pain, palpitations and leg swelling.   Gastrointestinal: Negative for abdominal pain, blood in stool, constipation, diarrhea, nausea and vomiting.   Endocrine: Negative for polydipsia and polyuria.   Genitourinary: Negative for dysuria and hematuria.   Musculoskeletal: Negative for arthralgias and back pain.   Skin: Negative for rash.   Neurological: Negative for dizziness, light-headedness, numbness and headaches.    Psychiatric/Behavioral: Negative for dysphoric mood and sleep disturbance. The patient is not nervous/anxious.        Allergies   Allergen Reactions   • Gabapentin Swelling     + swelling   • Latex Rash       Past Medical History:   Diagnosis Date   • Anxiety    • Bipolar 1 disorder (CMS/HCC)    • Depression    • Hypertension    • Kidney stone    • Polyneuropathy    • Positive TB test     treated w/ meds x 6 months   • Suicide attempt (CMS/HCC)        Social History     Socioeconomic History   • Marital status:      Spouse name: Not on file   • Number of children: Not on file   • Years of education: Not on file   • Highest education level: Not on file   Tobacco Use   • Smoking status: Current Every Day Smoker     Packs/day: 1.00     Years: 18.00     Pack years: 18.00     Types: Cigarettes     Start date: 1999   • Smokeless tobacco: Never Used   Substance and Sexual Activity   • Alcohol use: No   • Drug use: Yes     Types: Marijuana     Comment: rarely   • Sexual activity: Yes     Partners: Female        Past Surgical History:   Procedure Laterality Date   • CHOLECYSTECTOMY  2002   • INGUINAL HERNIA REPAIR Right 1995   • ORIF METACARPAL FRACTURE Right 2000   • TIBIA FRACTURE SURGERY Left 1997    ORIF       Family History   Problem Relation Age of Onset   • Arthritis Father    • Hypertension Father    • Alcohol abuse Father    • Drug abuse Father    • Diabetes Maternal Aunt    • Diabetes Maternal Uncle    • Diabetes Maternal Grandmother    • Alzheimer's disease Maternal Grandmother    • Dementia Maternal Grandmother    • Diabetes Other    • Hypertension Mother    • Depression Mother    • ADD / ADHD Brother    • Early death Maternal Grandfather    • Liver disease Maternal Grandfather    • Alcohol abuse Maternal Grandfather    • No Known Problems Paternal Grandmother    • Liver disease Paternal Grandfather    • Alcohol abuse Paternal Grandfather    • Early death Paternal Grandfather    • No Known Problems  "Brother    • Anxiety disorder Neg Hx    • Bipolar disorder Neg Hx    • OCD Neg Hx    • Paranoid behavior Neg Hx    • Schizophrenia Neg Hx    • Seizures Neg Hx    • Self-Injurious Behavior  Neg Hx    • Suicide Attempts Neg Hx          Current Outpatient Medications:   •  albuterol sulfate  (90 Base) MCG/ACT inhaler, Inhale 2 puffs Every 4 (Four) Hours As Needed for Wheezing or Shortness of Air., Disp: 18 g, Rfl: 3  •  baclofen (LIORESAL) 10 MG tablet, TAKE 1 TABLET BY MOUTH 2 (TWO) TIMES A DAY AS NEEDED FOR MUSCLE SPASMS., Disp: 60 tablet, Rfl: 1  •  carvedilol (COREG) 6.25 MG tablet, TAKE 1 TABLET BY MOUTH TWICE A DAY WITH MEALS, Disp: 60 tablet, Rfl: 5  •  DULoxetine (CYMBALTA) 30 MG capsule, Take 1 capsule by mouth Daily., Disp: 30 capsule, Rfl: 1  •  haloperidol (HALDOL) 2 MG tablet, Take 1 tablet by mouth 2 (Two) Times a Day., Disp: 60 tablet, Rfl: 1  •  lithium 300 MG tablet, Take 1 tablet by mouth 2 (Two) Times a Day., Disp: 60 tablet, Rfl: 1  •  pantoprazole (PROTONIX) 40 MG EC tablet, TAKE 1 TABLET BY MOUTH EVERY DAY, Disp: 90 tablet, Rfl: 3  •  prazosin (MINIPRESS) 1 MG capsule, Take 1 capsule by mouth Every Night., Disp: 30 capsule, Rfl: 1  •  valsartan (DIOVAN) 160 MG tablet, TAKE 1 TABLET BY MOUTH EVERY DAY, Disp: 30 tablet, Rfl: 5  •  amitriptyline (ELAVIL) 10 MG tablet, TAKE 1 TABLET BY MOUTH EVERY DAY AT NIGHT, Disp: 30 tablet, Rfl: 4    Objective   /72   Pulse 61   Temp 97.4 °F (36.3 °C)   Resp 18   Ht 193 cm (76\")   Wt 117 kg (258 lb)   SpO2 100%   BMI 31.40 kg/m²     Physical Exam  Vitals signs and nursing note reviewed.   Constitutional:       Appearance: He is well-developed.   HENT:      Head: Normocephalic and atraumatic.      Right Ear: External ear normal.      Left Ear: External ear normal.      Nose: Nose normal.      Mouth/Throat:      Pharynx: No oropharyngeal exudate.   Eyes:      General: No scleral icterus.        Right eye: No discharge.      Pupils: Pupils are " equal, round, and reactive to light.   Neck:      Musculoskeletal: Normal range of motion and neck supple.      Thyroid: No thyromegaly.      Vascular: No JVD.   Cardiovascular:      Rate and Rhythm: Normal rate and regular rhythm.      Heart sounds: Normal heart sounds. No murmur. No friction rub.   Pulmonary:      Effort: Pulmonary effort is normal. No respiratory distress.      Breath sounds: Normal breath sounds. No stridor. No wheezing.   Abdominal:      General: Bowel sounds are normal. There is no distension.      Palpations: Abdomen is soft.      Tenderness: There is no abdominal tenderness. There is no guarding.   Genitourinary:     Comments: Deferred  Musculoskeletal: Normal range of motion.         General: No tenderness.   Lymphadenopathy:      Cervical: No cervical adenopathy.   Skin:     General: Skin is warm and dry.      Findings: No rash.   Neurological:      Mental Status: He is alert and oriented to person, place, and time.      Cranial Nerves: No cranial nerve deficit.   Psychiatric:         Behavior: Behavior normal.         Thought Content: Thought content normal.         Judgment: Judgment normal.         Assessment/Plan   Diagnoses and all orders for this visit:    1. Encounter for preventive health examination (Primary)  -     CBC & Differential  -     Comprehensive Metabolic Panel  -     Lipid Panel  -     TSH    2. Need for immunization against influenza  -     Fluarix Quad >6 Months (2885-4106)    3. Class 1 obesity due to excess calories with serious comorbidity and body mass index (BMI) of 31.0 to 31.9 in adult    4. Borderline diabetes  -     POC Glycosylated Hemoglobin (Hb A1C)  -     POCT microalbumin    5. Chronic diarrhea  -     Ambulatory Referral to Gastroenterology  -     CBC & Differential  -     Comprehensive Metabolic Panel  -     Lipid Panel  -     TSH    6. Premature ejaculation  -     Ambulatory Referral to Urology    7. Encounter for hepatitis C screening test for low  risk patient  -     Hepatitis Panel, Acute    8. MOLLY (obstructive sleep apnea)  -     Ambulatory Referral to Sleep Medicine          Discussion Summary:  Patient is a 35 y.o. male presenting for annual physical    Preventive Health Maintenance  - Baseline labs are up-to-date or ordered per above.  - Vaccines reviewed and updated  - Preventive health measures were discussed including: healthy diet with increase in fruits and vegetables, regular exercise at least 3 times a week, safe sex practices, avoidance of drugs, tobacco, and alcohol, and regular seatbelt use.     Premature ejaculation  Based on urology evaluation given patient is already on multiple antipsychotic medications.  Alternative options may need to be discussed for consider.    Borderline diabetes  -A1c is in fair control.  Continue monitoring.      Follow up:  No follow-ups on file.     Patient Instructions:  Patient instructions were provided.

## 2020-10-16 LAB
POC CREATININE URINE: NORMAL
POC MICROALBUMIN URINE: NORMAL

## 2020-10-16 NOTE — PROGRESS NOTES
"Subjective   Alex Torres is a 35 y.o. male who presents today for follow up     This provider is located at [facility] at [address] [place] [address]. The provider identified himself as well as his credentials.   The Patient is at home using his phone because problems with video connection. The patient's condition being diagnosed/treated is appropriate for telemedicine. The patient gave consent to be seen remotely, and when consent is given they understand that the consent allows for patient identifiable information to be sent to a third party as needed.   They may refuse to be seen remotely at any time. The electronic data is encrypted and password protected, and the patient has been advised of the potential risks to privacy not withstanding such measures      Chief Complaint: Bipolar disorder/anxiety    History of Present Illness: Patient is a 35-year-old  male with a long history of bipolar disorder and anxiety who presents today for follow-up.  I last visit he was doing much better but today he reports that he is having, \"very high anxiety,\" due to stressors at work.  He states that since starting work back it is been a difficult adjustment and he has difficulty dealing with people in dealing with the stress at work entails.  He states that his job is, \"not working out.\"  He is trying to remain hopeful but is finding it difficult to go into work and complete his other daily tasks.  He denies any current medication side effects.  He denies issues with sleep or appetite.  Patient denies SI/HI/AVH.    The following portions of the patient's history were reviewed and updated as appropriate: allergies, current medications, past family history, past medical history, past social history, past surgical history and problem list.      Past Medical History:  Past Medical History:   Diagnosis Date   • Anxiety    • Bipolar 1 disorder (CMS/HCC)    • Depression    • " Hypertension    • Kidney stone    • Polyneuropathy    • Positive TB test     treated w/ meds x 6 months   • Suicide attempt (CMS/HCC)        Social History:  Social History     Socioeconomic History   • Marital status:      Spouse name: Not on file   • Number of children: Not on file   • Years of education: Not on file   • Highest education level: Not on file   Tobacco Use   • Smoking status: Current Every Day Smoker     Packs/day: 1.00     Years: 18.00     Pack years: 18.00     Types: Cigarettes     Start date: 1999   • Smokeless tobacco: Never Used   Substance and Sexual Activity   • Alcohol use: No   • Drug use: Yes     Types: Marijuana     Comment: rarely   • Sexual activity: Yes     Partners: Female       Family History:  Family History   Problem Relation Age of Onset   • Arthritis Father    • Hypertension Father    • Alcohol abuse Father    • Drug abuse Father    • Diabetes Maternal Aunt    • Diabetes Maternal Uncle    • Diabetes Maternal Grandmother    • Alzheimer's disease Maternal Grandmother    • Dementia Maternal Grandmother    • Diabetes Other    • Hypertension Mother    • Depression Mother    • ADD / ADHD Brother    • Early death Maternal Grandfather    • Liver disease Maternal Grandfather    • Alcohol abuse Maternal Grandfather    • No Known Problems Paternal Grandmother    • Liver disease Paternal Grandfather    • Alcohol abuse Paternal Grandfather    • Early death Paternal Grandfather    • No Known Problems Brother    • Anxiety disorder Neg Hx    • Bipolar disorder Neg Hx    • OCD Neg Hx    • Paranoid behavior Neg Hx    • Schizophrenia Neg Hx    • Seizures Neg Hx    • Self-Injurious Behavior  Neg Hx    • Suicide Attempts Neg Hx        Past Surgical History:  Past Surgical History:   Procedure Laterality Date   • CHOLECYSTECTOMY  2002   • INGUINAL HERNIA REPAIR Right 1995   • ORIF METACARPAL FRACTURE Right 2000   • TIBIA FRACTURE SURGERY Left 1997    ORIF       Problem List:  Patient Active  Problem List   Diagnosis   • Essential hypertension   • GERD (gastroesophageal reflux disease)   • Bipolar disorder, current episode mixed, moderate (CMS/HCC)   • MOLLY on CPAP   • Arthritis   • Complex regional pain syndrome type 2 of left lower extremity   • Snoring   • Excessive daytime sleepiness   • Peripheral polyneuropathy   • Pain in both lower extremities   • Varicose veins of both lower extremities with pain   • MOLLY (obstructive sleep apnea)   • Morbidly obese (CMS/HCC)   • Neuromuscular respiratory weakness       Allergy:   Allergies   Allergen Reactions   • Gabapentin Swelling     + swelling   • Latex Rash        Current Medications:   Current Outpatient Medications   Medication Sig Dispense Refill   • albuterol sulfate  (90 Base) MCG/ACT inhaler Inhale 2 puffs Every 4 (Four) Hours As Needed for Wheezing or Shortness of Air. 18 g 3   • amitriptyline (ELAVIL) 10 MG tablet TAKE 1 TABLET BY MOUTH EVERY DAY AT NIGHT 30 tablet 4   • baclofen (LIORESAL) 10 MG tablet TAKE 1 TABLET BY MOUTH 2 (TWO) TIMES A DAY AS NEEDED FOR MUSCLE SPASMS. 60 tablet 1   • carvedilol (COREG) 6.25 MG tablet TAKE 1 TABLET BY MOUTH TWICE A DAY WITH MEALS 60 tablet 5   • DULoxetine (CYMBALTA) 30 MG capsule Take 1 capsule by mouth Daily. 30 capsule 1   • haloperidol (HALDOL) 2 MG tablet Take 1 tablet by mouth 2 (Two) Times a Day. 60 tablet 1   • lithium 300 MG tablet Take 1 tablet by mouth 2 (Two) Times a Day. 60 tablet 1   • pantoprazole (PROTONIX) 40 MG EC tablet TAKE 1 TABLET BY MOUTH EVERY DAY 90 tablet 3   • prazosin (MINIPRESS) 1 MG capsule Take 1 capsule by mouth Every Night. 30 capsule 1   • valsartan (DIOVAN) 160 MG tablet TAKE 1 TABLET BY MOUTH EVERY DAY 30 tablet 5     No current facility-administered medications for this visit.        Review of Symptoms:    Review of Systems   Constitutional: Positive for activity change (improved). Negative for appetite change, chills, diaphoresis, fatigue and fever.   HENT: Negative.     Eyes: Negative.    Respiratory: Negative.    Cardiovascular: Negative.    Gastrointestinal: Negative.    Endocrine: Negative.    Genitourinary: Negative.    Musculoskeletal: Negative.    Skin: Negative.    Allergic/Immunologic: Negative.    Neurological: Negative.    Hematological: Negative.    Psychiatric/Behavioral: Positive for agitation and stress. Negative for behavioral problems, decreased concentration, dysphoric mood, hallucinations, self-injury, sleep disturbance, suicidal ideas, negative for hyperactivity and depressed mood. The patient is nervous/anxious.          Physical Exam:   There were no vitals taken for this visit.  Unable to assess, telephone visit.     Appearance: Unable to assess, telephone visit.   Gait, Station, Strength: Unable to assess, telephone visit.     Mental Status Exam:   Hygiene:   Unable to assess, telephone visit.   Cooperation:  Cooperative  Eye Contact:  Unable to assess, telephone visit.   Psychomotor Behavior:  Unable to assess, telephone visit.   Affect:  Restricted  Mood: irritable and anxious   Hopelessness: 4  Speech:  Normal  Thought Process:  Goal directed and Linear  Thought Content:  Normal  Suicidal:  None  Homicidal:  None  Hallucinations:  None  Delusion:  None  Memory:  Intact  Orientation:  Person, Place, Time and Situation  Reliability:  good  Insight:  Fair  Judgement:  Fair  Impulse Control:  Fair  Physical/Medical Issues:  No        Lab Results:   No visits with results within 1 Month(s) from this visit.   Latest known visit with results is:   Admission on 06/23/2020, Discharged on 06/23/2020   Component Date Value Ref Range Status   • Reference Lab Report 06/23/2020 See scanned report   Final   • COVID19 06/23/2020 Not Detected  Not Detected - Ref. Range Final       Assessment/Plan   Diagnoses and all orders for this visit:    1. Medication management (Primary)  -     Lithium Level; Future    2. Post traumatic stress disorder (PTSD)  -     lithium 300 MG  tablet; Take 1 tablet by mouth 2 (Two) Times a Day.  Dispense: 60 tablet; Refill: 1  -     prazosin (MINIPRESS) 1 MG capsule; Take 1 capsule by mouth Every Night.  Dispense: 30 capsule; Refill: 1  -     haloperidol (HALDOL) 2 MG tablet; Take 1 tablet by mouth 2 (Two) Times a Day.  Dispense: 60 tablet; Refill: 1    3. Insomnia due to mental condition  -     prazosin (MINIPRESS) 1 MG capsule; Take 1 capsule by mouth Every Night.  Dispense: 30 capsule; Refill: 1    4. Nightmares  -     prazosin (MINIPRESS) 1 MG capsule; Take 1 capsule by mouth Every Night.  Dispense: 30 capsule; Refill: 1    5. Anxiety disorder, unspecified type  -     haloperidol (HALDOL) 2 MG tablet; Take 1 tablet by mouth 2 (Two) Times a Day.  Dispense: 60 tablet; Refill: 1    Other orders  -     DULoxetine (CYMBALTA) 30 MG capsule; Take 1 capsule by mouth Daily.  Dispense: 30 capsule; Refill: 1    -Patient had been improving but with returning to work he is having increased anxiety making it difficult for him to go to work as well as irritability at work.  -Patient has discontinued Chantix and is smoking again due to stress at work  -Reviewed previous documentation  -Reviewed labs  -Reviewed lithium level: Sent for new lithium level as patient had a dose increase  -Continue Haldol 2 mg twice daily for PTSD and mood stabilization  -Continue prazosin 1 mg nightly for insomnia and nightmares  -Start Cymbalta 30 mg p.o. daily for mood and anxiety  -Encouraged therapy and anger management  -Continue lithium 300 mg twice daily for mood stabilization  -Approximate appointment time 9:00 AM to 9:14 AM via telephone visit due to technical difficulty with video visit      Visit Diagnoses:    ICD-10-CM ICD-9-CM   1. Medication management  Z79.899 V58.69   2. Post traumatic stress disorder (PTSD)  F43.10 309.81   3. Insomnia due to mental condition  F51.05 300.9     327.02   4. Nightmares  F51.5 307.47   5. Anxiety disorder, unspecified type  F41.9 300.00        TREATMENT PLAN/GOALS: Continue supportive psychotherapy efforts and medications as indicated. Treatment and medication options discussed during today's visit. Patient ackowledged and verbally consented to continue with current treatment plan and was educated on the importance of compliance with treatment and follow-up appointments.    MEDICATION ISSUES:    Discussed medication options and treatment plan of prescribed medication as well as the risks, benefits, and side effects including potential falls, possible impaired driving and metabolic adversities among others. Patient is agreeable to call the office with any worsening of symptoms or onset of side effects. Patient is agreeable to call 911 or go to the nearest ER should he/she begin having SI/HI. No medication side effects or related complaints today.     MEDS ORDERED DURING VISIT:  New Medications Ordered This Visit   Medications   • lithium 300 MG tablet     Sig: Take 1 tablet by mouth 2 (Two) Times a Day.     Dispense:  60 tablet     Refill:  1   • prazosin (MINIPRESS) 1 MG capsule     Sig: Take 1 capsule by mouth Every Night.     Dispense:  30 capsule     Refill:  1   • haloperidol (HALDOL) 2 MG tablet     Sig: Take 1 tablet by mouth 2 (Two) Times a Day.     Dispense:  60 tablet     Refill:  1   • DULoxetine (CYMBALTA) 30 MG capsule     Sig: Take 1 capsule by mouth Daily.     Dispense:  30 capsule     Refill:  1       Return in about 4 weeks (around 11/6/2020).             This document has been electronically signed by Stefan Marion MD  October 16, 2020

## 2020-10-17 DIAGNOSIS — F60.3 BORDERLINE PERSONALITY DISORDER (HCC): ICD-10-CM

## 2020-10-17 RX ORDER — AMITRIPTYLINE HYDROCHLORIDE 10 MG/1
TABLET, FILM COATED ORAL
Qty: 30 TABLET | Refills: 4 | Status: SHIPPED | OUTPATIENT
Start: 2020-10-17 | End: 2021-03-17

## 2020-12-18 DIAGNOSIS — F43.10 POST TRAUMATIC STRESS DISORDER (PTSD): ICD-10-CM

## 2020-12-18 DIAGNOSIS — F41.9 ANXIETY DISORDER, UNSPECIFIED TYPE: ICD-10-CM

## 2020-12-18 RX ORDER — LITHIUM CARBONATE 300 MG
TABLET ORAL
Qty: 60 TABLET | Refills: 1 | Status: SHIPPED | OUTPATIENT
Start: 2020-12-18 | End: 2021-01-22 | Stop reason: SDUPTHER

## 2020-12-18 RX ORDER — DULOXETIN HYDROCHLORIDE 30 MG/1
CAPSULE, DELAYED RELEASE ORAL
Qty: 30 CAPSULE | Refills: 1 | Status: SHIPPED | OUTPATIENT
Start: 2020-12-18 | End: 2021-01-22 | Stop reason: SDUPTHER

## 2020-12-18 RX ORDER — HALOPERIDOL 2 MG/1
TABLET ORAL
Qty: 60 TABLET | Refills: 1 | Status: SHIPPED | OUTPATIENT
Start: 2020-12-18 | End: 2021-01-22 | Stop reason: SDUPTHER

## 2021-01-14 ENCOUNTER — OFFICE VISIT (OUTPATIENT)
Dept: PSYCHIATRY | Facility: CLINIC | Age: 36
End: 2021-01-14

## 2021-01-14 DIAGNOSIS — F43.10 POST TRAUMATIC STRESS DISORDER (PTSD): Primary | ICD-10-CM

## 2021-01-14 DIAGNOSIS — F31.62 BIPOLAR DISORDER, CURRENT EPISODE MIXED, MODERATE (HCC): ICD-10-CM

## 2021-01-14 PROCEDURE — 90837 PSYTX W PT 60 MINUTES: CPT | Performed by: SOCIAL WORKER

## 2021-01-14 NOTE — PROGRESS NOTES
Date: January 14, 2021  Time In: 8:00 am  Time Out: 9:00 am      PROGRESS NOTE  Data:  Alex Torres is a 36 y.o. male who presents today for individual therapy session at [facility].     Patient states that his medication is working well. He states he took the medication when he was younger and it affected him differently then than it does now. He states that his mood is more stable than it was before. Patient states that he has an interview today that he hopes goes well, he has an interview at [facility]. 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 He states all his focus has been on that. He states these past 3 years have been difficult. Today patient is hopeful that he and his girlfriend will be able to pay their bills if they are both working. 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 Patient states his girlfriend is doing [employer], and that is helpful to him because he can work, and she can take their youngest child with her while she is doing [employer]. Patient states his traumatic memories are still always in the back of his mind, but he is not letting them control him anymore.       Clinical Maneuvering/Intervention:    Assisted patient in processing above session content; acknowledged and normalized patient’s thoughts, feelings, and concerns.  Rationalized patient thought process regarding bipolar disorder.  Discussed triggers associated with patient's bipolar disorder.  Also discussed coping skills for patient to implement such as deep breathing, mindfulness skills, behavioral activation.    Allowed patient to freely discuss issues without interruption or judgment. Provided safe, confidential environment to facilitate the development of positive therapeutic relationship and encourage open, honest communication. Assisted patient in identifying risk factors which would indicate the need for higher level of care including  thoughts to harm self or others and/or self-harming behavior and encouraged patient to contact this office, call 911, or present to the nearest emergency room should any of these events occur. Discussed crisis intervention services and means to access. Patient adamantly and convincingly denies current suicidal or homicidal ideation or perceptual disturbance.    Assessment   Patient appears to maintain relative stability as compared to their baseline.  However, patient continues to struggle with bipolar disorder, which continues to cause impairment in important areas of functioning.  A result, they can be reasonably expected to continue to benefit from treatment and would likely be at increased risk for decompensation otherwise.    Mental Status Exam:   Hygiene:   good  Cooperation:  Cooperative  Eye Contact:  Good  Psychomotor Behavior:  Appropriate  Affect:  Full range  Mood: normal  Speech:  Normal  Thought Process:  Goal directed  Thought Content:  Normal  Suicidal:  None  Homicidal:  None  Hallucinations:  None  Delusion:  None  Memory:  Intact  Orientation:  Person, Place, Time and Situation  Reliability:  fair  Insight:  Fair  Judgement:  Fair  Impulse Control:  Fair  Physical/Medical Issues:  Yes in epic       Patient's Support Network Includes:  wife    Functional Status: Mild impairment     Progress toward goal: Not at goal    Prognosis: Fair with Ongoing Treatment          Plan     Patient will continue in individual outpatient therapy with focus on improved functioning and coping skills, maintaining stability, and avoiding decompensation and the need for higher level of care.    Patient will adhere to medication regimen as prescribed and report any side effects. Patient will contact this office, call 911 or present to the nearest emergency room should suicidal or homicidal ideations occur. Provide Cognitive Behavioral Therapy and Solution Focused Therapy to improve functioning, maintain stability, and  avoid decompensation and the need for higher level of care.     Return in about 2 weeks, or earlier if symptoms worsen or fail to improve.           VISIT DIAGNOSIS:     ICD-10-CM ICD-9-CM   1. Post traumatic stress disorder (PTSD)  F43.10 309.81   2. Bipolar disorder, current episode mixed, moderate (CMS/HCC)  F31.62 296.62             This document has been electronically signed by Gabriela Hernández LCSW  January 14, 2021 08:26 EST      Part of this note may be an electronic transcription/translation of spoken language to printed text using the Dragon Dictation System.

## 2021-01-17 DIAGNOSIS — K21.9 GASTROESOPHAGEAL REFLUX DISEASE WITHOUT ESOPHAGITIS: ICD-10-CM

## 2021-01-18 RX ORDER — PANTOPRAZOLE SODIUM 40 MG/1
TABLET, DELAYED RELEASE ORAL
Qty: 90 TABLET | Refills: 2 | Status: SHIPPED | OUTPATIENT
Start: 2021-01-18 | End: 2021-08-24 | Stop reason: ALTCHOICE

## 2021-01-22 ENCOUNTER — TELEMEDICINE (OUTPATIENT)
Dept: PSYCHIATRY | Facility: CLINIC | Age: 36
End: 2021-01-22

## 2021-01-22 DIAGNOSIS — F51.5 NIGHTMARES: ICD-10-CM

## 2021-01-22 DIAGNOSIS — F51.05 INSOMNIA DUE TO MENTAL CONDITION: ICD-10-CM

## 2021-01-22 DIAGNOSIS — F43.10 POST TRAUMATIC STRESS DISORDER (PTSD): ICD-10-CM

## 2021-01-22 DIAGNOSIS — F41.9 ANXIETY DISORDER, UNSPECIFIED TYPE: ICD-10-CM

## 2021-01-22 PROCEDURE — 99214 OFFICE O/P EST MOD 30 MIN: CPT | Performed by: PSYCHIATRY & NEUROLOGY

## 2021-01-22 RX ORDER — DULOXETIN HYDROCHLORIDE 30 MG/1
30 CAPSULE, DELAYED RELEASE ORAL DAILY
Qty: 30 CAPSULE | Refills: 1 | Status: SHIPPED | OUTPATIENT
Start: 2021-01-22 | End: 2021-04-23 | Stop reason: SDUPTHER

## 2021-01-22 RX ORDER — LITHIUM CARBONATE 300 MG
300 TABLET ORAL 2 TIMES DAILY
Qty: 60 TABLET | Refills: 1 | Status: SHIPPED | OUTPATIENT
Start: 2021-01-22 | End: 2021-04-23 | Stop reason: SDUPTHER

## 2021-01-22 RX ORDER — PRAZOSIN HYDROCHLORIDE 1 MG/1
1 CAPSULE ORAL NIGHTLY
Qty: 30 CAPSULE | Refills: 1 | Status: SHIPPED | OUTPATIENT
Start: 2021-01-22 | End: 2021-04-23 | Stop reason: SDUPTHER

## 2021-01-22 RX ORDER — HALOPERIDOL 2 MG/1
2 TABLET ORAL 2 TIMES DAILY
Qty: 60 TABLET | Refills: 1 | Status: SHIPPED | OUTPATIENT
Start: 2021-01-22 | End: 2021-04-23 | Stop reason: SDUPTHER

## 2021-01-29 NOTE — PROGRESS NOTES
Subjective   Alex Torres is a 36 y.o. male who presents today for follow up     This provider is located at [facility] at [address] [place] [address]. The provider identified himself as well as his credentials.   The Patient is at home using his phone because problems with video connection. The patient's condition being diagnosed/treated is appropriate for telemedicine. The patient gave consent to be seen remotely, and when consent is given they understand that the consent allows for patient identifiable information to be sent to a third party as needed.   They may refuse to be seen remotely at any time. The electronic data is encrypted and password protected, and the patient has been advised of the potential risks to privacy not withstanding such measures      Chief Complaint: Bipolar disorder/anxiety    History of Present Illness: Patient is a 36-year-old  male with a long history of bipolar disorder and anxiety who presents today for follow-up.  Patient reports that he has been doing relatively well since last visit.  He moved into a new apartment and has a new job.  He is working at [employer].  He reports that he is happier being on his and his daughters are much happier.  His mood is stable and his anxiety is low though not completely negated.  He feels that he is having a normal amount of anxiety given the circumstances.  He denies any current medication side effects.  He denies issues with sleep or appetite.  Patient denies SI/HI/AVH.    The following portions of the patient's history were reviewed and updated as appropriate: allergies, current medications, past family history, past medical history, past social history, past surgical history and problem list.      Past Medical History:  Past Medical History:   Diagnosis Date   • Anxiety    • Bipolar 1 disorder (CMS/HCC)    • Depression    • Hypertension    • Kidney stone    • Polyneuropathy    • Positive TB  test     treated w/ meds x 6 months   • Suicide attempt (CMS/HCC)        Social History:  Social History     Socioeconomic History   • Marital status:      Spouse name: Not on file   • Number of children: Not on file   • Years of education: Not on file   • Highest education level: Not on file   Tobacco Use   • Smoking status: Current Every Day Smoker     Packs/day: 1.00     Years: 18.00     Pack years: 18.00     Types: Cigarettes     Start date: 1999   • Smokeless tobacco: Never Used   Substance and Sexual Activity   • Alcohol use: No   • Drug use: Yes     Types: Marijuana     Comment: rarely   • Sexual activity: Yes     Partners: Female       Family History:  Family History   Problem Relation Age of Onset   • Arthritis Father    • Hypertension Father    • Alcohol abuse Father    • Drug abuse Father    • Diabetes Maternal Aunt    • Diabetes Maternal Uncle    • Diabetes Maternal Grandmother    • Alzheimer's disease Maternal Grandmother    • Dementia Maternal Grandmother    • Diabetes Other    • Hypertension Mother    • Depression Mother    • ADD / ADHD Brother    • Early death Maternal Grandfather    • Liver disease Maternal Grandfather    • Alcohol abuse Maternal Grandfather    • No Known Problems Paternal Grandmother    • Liver disease Paternal Grandfather    • Alcohol abuse Paternal Grandfather    • Early death Paternal Grandfather    • No Known Problems Brother    • Anxiety disorder Neg Hx    • Bipolar disorder Neg Hx    • OCD Neg Hx    • Paranoid behavior Neg Hx    • Schizophrenia Neg Hx    • Seizures Neg Hx    • Self-Injurious Behavior  Neg Hx    • Suicide Attempts Neg Hx        Past Surgical History:  Past Surgical History:   Procedure Laterality Date   • CHOLECYSTECTOMY  2002   • INGUINAL HERNIA REPAIR Right 1995   • ORIF METACARPAL FRACTURE Right 2000   • TIBIA FRACTURE SURGERY Left 1997    ORIF       Problem List:  Patient Active Problem List   Diagnosis   • Essential hypertension   • GERD  (gastroesophageal reflux disease)   • Bipolar disorder, current episode mixed, moderate (CMS/HCC)   • MOLLY on CPAP   • Arthritis   • Complex regional pain syndrome type 2 of left lower extremity   • Snoring   • Excessive daytime sleepiness   • Peripheral polyneuropathy   • Pain in both lower extremities   • Varicose veins of both lower extremities with pain   • MOLLY (obstructive sleep apnea)   • Morbidly obese (CMS/HCC)   • Neuromuscular respiratory weakness       Allergy:   Allergies   Allergen Reactions   • Gabapentin Swelling     + swelling   • Latex Rash        Current Medications:   Current Outpatient Medications   Medication Sig Dispense Refill   • albuterol sulfate  (90 Base) MCG/ACT inhaler Inhale 2 puffs Every 4 (Four) Hours As Needed for Wheezing or Shortness of Air. 18 g 3   • amitriptyline (ELAVIL) 10 MG tablet TAKE 1 TABLET BY MOUTH EVERY DAY AT NIGHT 30 tablet 4   • baclofen (LIORESAL) 10 MG tablet TAKE 1 TABLET BY MOUTH 2 (TWO) TIMES A DAY AS NEEDED FOR MUSCLE SPASMS. 60 tablet 1   • carvedilol (COREG) 6.25 MG tablet TAKE 1 TABLET BY MOUTH TWICE A DAY WITH MEALS 60 tablet 5   • DULoxetine (CYMBALTA) 30 MG capsule Take 1 capsule by mouth Daily. 30 capsule 1   • haloperidol (HALDOL) 2 MG tablet Take 1 tablet by mouth 2 (Two) Times a Day. 60 tablet 1   • lithium 300 MG tablet Take 1 tablet by mouth 2 (Two) Times a Day. 60 tablet 1   • pantoprazole (PROTONIX) 40 MG EC tablet TAKE 1 TABLET BY MOUTH EVERY DAY 90 tablet 2   • prazosin (MINIPRESS) 1 MG capsule Take 1 capsule by mouth Every Night. 30 capsule 1   • valsartan (DIOVAN) 160 MG tablet TAKE 1 TABLET BY MOUTH EVERY DAY 30 tablet 5     No current facility-administered medications for this visit.        Review of Symptoms:    Review of Systems   Constitutional: Negative for activity change (improved), appetite change, chills, diaphoresis, fatigue and fever.   HENT: Negative.    Eyes: Negative.    Respiratory: Negative.    Cardiovascular: Negative.     Gastrointestinal: Negative.    Endocrine: Negative.    Genitourinary: Negative.    Musculoskeletal: Negative.    Skin: Negative.    Allergic/Immunologic: Negative.    Neurological: Negative.    Hematological: Negative.    Psychiatric/Behavioral: Positive for stress. Negative for agitation, behavioral problems, decreased concentration, dysphoric mood, hallucinations, self-injury, sleep disturbance, suicidal ideas, negative for hyperactivity and depressed mood. The patient is nervous/anxious.          Physical Exam:   There were no vitals taken for this visit.  Unable to assess, telephone visit.     Appearance: Unable to assess, telephone visit.   Gait, Station, Strength: Unable to assess, telephone visit.     Mental Status Exam:   Hygiene:   Unable to assess, telephone visit.   Cooperation:  Cooperative  Eye Contact:  Unable to assess, telephone visit.   Psychomotor Behavior:  Unable to assess, telephone visit.   Affect:  Full range  Mood: anxious but improved  Hopelessness: Optimistic  Speech:  Normal  Thought Process:  Goal directed and Linear  Thought Content:  Normal  Suicidal:  None  Homicidal:  None  Hallucinations:  None  Delusion:  None  Memory:  Intact  Orientation:  Person, Place, Time and Situation  Reliability:  good  Insight:  Fair  Judgement:  Fair  Impulse Control:  Fair  Physical/Medical Issues:  No        Lab Results:   No visits with results within 1 Month(s) from this visit.   Latest known visit with results is:   Office Visit on 10/14/2020   Component Date Value Ref Range Status   • Hemoglobin A1C 10/14/2020 4.8  % Final   • Microalbumin, Urine 10/16/2020 30 mg/L   Final   • Creatinine, Urine 10/16/2020 300 mg/dL   Final       Assessment/Plan   Diagnoses and all orders for this visit:    1. Post traumatic stress disorder (PTSD)  -     haloperidol (HALDOL) 2 MG tablet; Take 1 tablet by mouth 2 (Two) Times a Day.  Dispense: 60 tablet; Refill: 1  -     DULoxetine (CYMBALTA) 30 MG capsule; Take 1  capsule by mouth Daily.  Dispense: 30 capsule; Refill: 1  -     lithium 300 MG tablet; Take 1 tablet by mouth 2 (Two) Times a Day.  Dispense: 60 tablet; Refill: 1  -     prazosin (MINIPRESS) 1 MG capsule; Take 1 capsule by mouth Every Night.  Dispense: 30 capsule; Refill: 1    2. Anxiety disorder, unspecified type  -     haloperidol (HALDOL) 2 MG tablet; Take 1 tablet by mouth 2 (Two) Times a Day.  Dispense: 60 tablet; Refill: 1  -     DULoxetine (CYMBALTA) 30 MG capsule; Take 1 capsule by mouth Daily.  Dispense: 30 capsule; Refill: 1    3. Insomnia due to mental condition  -     prazosin (MINIPRESS) 1 MG capsule; Take 1 capsule by mouth Every Night.  Dispense: 30 capsule; Refill: 1    4. Nightmares  -     prazosin (MINIPRESS) 1 MG capsule; Take 1 capsule by mouth Every Night.  Dispense: 30 capsule; Refill: 1    -Patient having overall improved anxiety, mood, PTSD symptoms.  Insomnia is well controlled.  He is having some anxiety but feels that it is appropriate and manageable.  -Reviewed previous documentation  -Reviewed labs  -Reviewed lithium level and reviewed results with patient  -Continue Haldol 2 mg twice daily for PTSD and mood stabilization  -Continue prazosin 1 mg nightly for insomnia and nightmares  -Continue Cymbalta 30 mg p.o. daily for mood and anxiety  -Continue lithium 300 mg twice daily for mood stabilization  -Encouraged therapy and anger management  -Approximate appointment time 9:00 AM to 9:30 AM via telephone visit due to technical difficulty with video visit    Visit Diagnoses:    ICD-10-CM ICD-9-CM   1. Post traumatic stress disorder (PTSD)  F43.10 309.81   2. Anxiety disorder, unspecified type  F41.9 300.00   3. Insomnia due to mental condition  F51.05 300.9     327.02   4. Nightmares  F51.5 307.47       TREATMENT PLAN/GOALS: Continue supportive psychotherapy efforts and medications as indicated. Treatment and medication options discussed during today's visit. Patient ackowledged and  verbally consented to continue with current treatment plan and was educated on the importance of compliance with treatment and follow-up appointments.    MEDICATION ISSUES:    Discussed medication options and treatment plan of prescribed medication as well as the risks, benefits, and side effects including potential falls, possible impaired driving and metabolic adversities among others. Patient is agreeable to call the office with any worsening of symptoms or onset of side effects. Patient is agreeable to call 911 or go to the nearest ER should he/she begin having SI/HI. No medication side effects or related complaints today.     MEDS ORDERED DURING VISIT:  New Medications Ordered This Visit   Medications   • haloperidol (HALDOL) 2 MG tablet     Sig: Take 1 tablet by mouth 2 (Two) Times a Day.     Dispense:  60 tablet     Refill:  1   • DULoxetine (CYMBALTA) 30 MG capsule     Sig: Take 1 capsule by mouth Daily.     Dispense:  30 capsule     Refill:  1   • lithium 300 MG tablet     Sig: Take 1 tablet by mouth 2 (Two) Times a Day.     Dispense:  60 tablet     Refill:  1   • prazosin (MINIPRESS) 1 MG capsule     Sig: Take 1 capsule by mouth Every Night.     Dispense:  30 capsule     Refill:  1       Return in about 3 months (around 4/22/2021).             This document has been electronically signed by Stefan Marion MD  January 29, 2021

## 2021-02-18 DIAGNOSIS — I10 ESSENTIAL HYPERTENSION: ICD-10-CM

## 2021-02-18 RX ORDER — CARVEDILOL 6.25 MG/1
TABLET ORAL
Qty: 180 TABLET | Refills: 3 | Status: SHIPPED | OUTPATIENT
Start: 2021-02-18 | End: 2022-02-14

## 2021-03-14 DIAGNOSIS — I10 ESSENTIAL HYPERTENSION: ICD-10-CM

## 2021-03-15 RX ORDER — VALSARTAN 160 MG/1
TABLET ORAL
Qty: 90 TABLET | Refills: 3 | Status: SHIPPED | OUTPATIENT
Start: 2021-03-15 | End: 2022-04-05

## 2021-03-17 DIAGNOSIS — F60.3 BORDERLINE PERSONALITY DISORDER (HCC): ICD-10-CM

## 2021-03-17 RX ORDER — AMITRIPTYLINE HYDROCHLORIDE 10 MG/1
TABLET, FILM COATED ORAL
Qty: 90 TABLET | Refills: 3 | Status: SHIPPED | OUTPATIENT
Start: 2021-03-17 | End: 2021-11-05 | Stop reason: SDUPTHER

## 2021-04-23 ENCOUNTER — TELEMEDICINE (OUTPATIENT)
Dept: PSYCHIATRY | Facility: CLINIC | Age: 36
End: 2021-04-23

## 2021-04-23 DIAGNOSIS — F51.05 INSOMNIA DUE TO MENTAL CONDITION: ICD-10-CM

## 2021-04-23 DIAGNOSIS — F17.200 NICOTINE USE DISORDER: ICD-10-CM

## 2021-04-23 DIAGNOSIS — F41.9 ANXIETY DISORDER, UNSPECIFIED TYPE: ICD-10-CM

## 2021-04-23 DIAGNOSIS — F43.10 POST TRAUMATIC STRESS DISORDER (PTSD): Primary | Chronic | ICD-10-CM

## 2021-04-23 DIAGNOSIS — F51.5 NIGHTMARES: ICD-10-CM

## 2021-04-23 PROCEDURE — 99214 OFFICE O/P EST MOD 30 MIN: CPT | Performed by: PSYCHIATRY & NEUROLOGY

## 2021-04-23 RX ORDER — PRAZOSIN HYDROCHLORIDE 1 MG/1
1 CAPSULE ORAL NIGHTLY
Qty: 30 CAPSULE | Refills: 1 | Status: SHIPPED | OUTPATIENT
Start: 2021-04-23 | End: 2021-05-28 | Stop reason: SDUPTHER

## 2021-04-23 RX ORDER — HALOPERIDOL 2 MG/1
2 TABLET ORAL 2 TIMES DAILY
Qty: 60 TABLET | Refills: 1 | Status: SHIPPED | OUTPATIENT
Start: 2021-04-23 | End: 2021-05-28 | Stop reason: SDUPTHER

## 2021-04-23 RX ORDER — LITHIUM CARBONATE 300 MG
TABLET ORAL
Qty: 45 TABLET | Refills: 1 | Status: SHIPPED | OUTPATIENT
Start: 2021-04-23 | End: 2021-05-28 | Stop reason: SDUPTHER

## 2021-04-23 RX ORDER — DULOXETIN HYDROCHLORIDE 30 MG/1
30 CAPSULE, DELAYED RELEASE ORAL DAILY
Qty: 30 CAPSULE | Refills: 1 | Status: SHIPPED | OUTPATIENT
Start: 2021-04-23 | End: 2021-05-28 | Stop reason: SDUPTHER

## 2021-04-23 NOTE — PROGRESS NOTES
Subjective   Alex Trores is a 36 y.o. male who presents today for follow up     This provider is located at [facility], [address] [place] [address]. The Patient is seen remotely at home, using Epic Mychart. Patient is being seen via telehealth and stated they are in a secure environment for this session. The patient’s condition being diagnosed/treated is appropriate for telemedicine. The provider identified himself as well as his credentials.   The patient gave consent to be seen remotely, and when consent is given they understand that the consent allows for patient identifiable information to be sent to a third party as needed.   They may refuse to be seen remotely at any time. The electronic data is encrypted and password protected, and the patient has been advised of the potential risks to privacy not withstanding such measures      Chief Complaint: Bipolar disorder/anxiety    History of Present Illness: Patient reports that he is doing relatively well but recently he has had some increased mood fluctuations with anxiety and dysphoria.  He continues to struggle with jobs and has elected to try for disability again.  His anxiety is relatively high with ruminations and worry.  He also is having some sexual side effects from the medication.  He denies any issues with appetite.  Sleep is improved.  He denies SI/HI/AVH.    The following portions of the patient's history were reviewed and updated as appropriate: allergies, current medications, past family history, past medical history, past social history, past surgical history and problem list.      Past Medical History:  Past Medical History:   Diagnosis Date   • Anxiety    • Bipolar 1 disorder (CMS/HCC)    • Depression    • Hypertension    • Kidney stone    • Polyneuropathy    • Positive TB test     treated w/ meds x 6 months   • Suicide attempt (CMS/HCC)        Social History:  Social History     Socioeconomic History   • Marital status:       Spouse name: Not on file   • Number of children: Not on file   • Years of education: Not on file   • Highest education level: Not on file   Tobacco Use   • Smoking status: Current Every Day Smoker     Packs/day: 1.00     Years: 18.00     Pack years: 18.00     Types: Cigarettes     Start date: 1999   • Smokeless tobacco: Never Used   Substance and Sexual Activity   • Alcohol use: No   • Drug use: Yes     Types: Marijuana     Comment: rarely   • Sexual activity: Yes     Partners: Female       Family History:  Family History   Problem Relation Age of Onset   • Arthritis Father    • Hypertension Father    • Alcohol abuse Father    • Drug abuse Father    • Diabetes Maternal Aunt    • Diabetes Maternal Uncle    • Diabetes Maternal Grandmother    • Alzheimer's disease Maternal Grandmother    • Dementia Maternal Grandmother    • Diabetes Other    • Hypertension Mother    • Depression Mother    • ADD / ADHD Brother    • Early death Maternal Grandfather    • Liver disease Maternal Grandfather    • Alcohol abuse Maternal Grandfather    • No Known Problems Paternal Grandmother    • Liver disease Paternal Grandfather    • Alcohol abuse Paternal Grandfather    • Early death Paternal Grandfather    • No Known Problems Brother    • Anxiety disorder Neg Hx    • Bipolar disorder Neg Hx    • OCD Neg Hx    • Paranoid behavior Neg Hx    • Schizophrenia Neg Hx    • Seizures Neg Hx    • Self-Injurious Behavior  Neg Hx    • Suicide Attempts Neg Hx        Past Surgical History:  Past Surgical History:   Procedure Laterality Date   • CHOLECYSTECTOMY  2002   • INGUINAL HERNIA REPAIR Right 1995   • ORIF METACARPAL FRACTURE Right 2000   • TIBIA FRACTURE SURGERY Left 1997    ORIF       Problem List:  Patient Active Problem List   Diagnosis   • Essential hypertension   • GERD (gastroesophageal reflux disease)   • Bipolar disorder, current episode mixed, moderate (CMS/HCC)   • MOLLY on CPAP   • Arthritis   • Complex regional pain  syndrome type 2 of left lower extremity   • Snoring   • Excessive daytime sleepiness   • Peripheral polyneuropathy   • Pain in both lower extremities   • Varicose veins of both lower extremities with pain   • MOLLY (obstructive sleep apnea)   • Morbidly obese (CMS/HCC)   • Neuromuscular respiratory weakness       Allergy:   Allergies   Allergen Reactions   • Gabapentin Swelling     + swelling   • Latex Rash        Current Medications:   Current Outpatient Medications   Medication Sig Dispense Refill   • albuterol sulfate  (90 Base) MCG/ACT inhaler Inhale 2 puffs Every 4 (Four) Hours As Needed for Wheezing or Shortness of Air. 18 g 3   • amitriptyline (ELAVIL) 10 MG tablet TAKE 1 TABLET BY MOUTH EVERY DAY AT NIGHT 90 tablet 3   • carvedilol (COREG) 6.25 MG tablet TAKE 1 TABLET BY MOUTH TWICE A DAY WITH MEALS 180 tablet 3   • DULoxetine (CYMBALTA) 30 MG capsule Take 1 capsule by mouth Daily. 30 capsule 1   • haloperidol (HALDOL) 2 MG tablet Take 1 tablet by mouth 2 (Two) Times a Day. 60 tablet 1   • lithium 300 MG tablet Take 0.5 tablets PO tablets in the morning and 1 tablet PO at night. 45 tablet 1   • pantoprazole (PROTONIX) 40 MG EC tablet TAKE 1 TABLET BY MOUTH EVERY DAY 90 tablet 2   • prazosin (MINIPRESS) 1 MG capsule Take 1 capsule by mouth Every Night. 30 capsule 1   • valsartan (DIOVAN) 160 MG tablet TAKE 1 TABLET BY MOUTH EVERY DAY 90 tablet 3     No current facility-administered medications for this visit.       Review of Symptoms:    Review of Systems   Constitutional: Negative for activity change (improved), appetite change, chills, diaphoresis, fatigue and fever.   HENT: Negative.    Eyes: Negative.    Respiratory: Negative.    Cardiovascular: Negative.    Gastrointestinal: Negative.    Endocrine: Negative.    Genitourinary: Positive for decreased libido.   Musculoskeletal: Negative.    Skin: Negative.    Allergic/Immunologic: Negative.    Neurological: Negative.    Hematological: Negative.     Psychiatric/Behavioral: Positive for dysphoric mood and stress. Negative for agitation, behavioral problems, decreased concentration, hallucinations, self-injury, sleep disturbance, suicidal ideas, negative for hyperactivity and depressed mood. The patient is nervous/anxious.          Physical Exam:   There were no vitals taken for this visit.  Video visit    Appearance:  male stated age in no acute distress, smoking cigarettes  Gait, Station, Strength: WNL    Mental Status Exam:   Hygiene:   good  Cooperation:  Cooperative  Eye Contact:  Good  Psychomotor Behavior:  Appropriate  Affect:  Full range  Mood: fluctates, somewhat worse lately  Hopelessness: Optimistic  Speech:  Normal  Thought Process:  Goal directed and Linear  Thought Content:  Normal  Suicidal:  None  Homicidal:  None  Hallucinations:  None  Delusion:  None  Memory:  Intact  Orientation:  Person, Place, Time and Situation  Reliability:  good  Insight:  Fair  Judgement:  Fair  Impulse Control:  Fair  Physical/Medical Issues:  No        Lab Results:   No visits with results within 1 Month(s) from this visit.   Latest known visit with results is:   Office Visit on 10/14/2020   Component Date Value Ref Range Status   • Hemoglobin A1C 10/14/2020 4.8  % Final   • Microalbumin, Urine 10/16/2020 30 mg/L   Final   • Creatinine, Urine 10/16/2020 300 mg/dL   Final       Assessment/Plan   Diagnoses and all orders for this visit:    1. Chronic Post traumatic stress disorder (PTSD) (Primary)  -     haloperidol (HALDOL) 2 MG tablet; Take 1 tablet by mouth 2 (Two) Times a Day.  Dispense: 60 tablet; Refill: 1  -     lithium 300 MG tablet; Take 0.5 tablets PO tablets in the morning and 1 tablet PO at night.  Dispense: 45 tablet; Refill: 1  -     prazosin (MINIPRESS) 1 MG capsule; Take 1 capsule by mouth Every Night.  Dispense: 30 capsule; Refill: 1  -     DULoxetine (CYMBALTA) 30 MG capsule; Take 1 capsule by mouth Daily.  Dispense: 30 capsule; Refill:  1    2. Anxiety disorder, unspecified type  -     haloperidol (HALDOL) 2 MG tablet; Take 1 tablet by mouth 2 (Two) Times a Day.  Dispense: 60 tablet; Refill: 1  -     DULoxetine (CYMBALTA) 30 MG capsule; Take 1 capsule by mouth Daily.  Dispense: 30 capsule; Refill: 1    3. Insomnia due to mental condition  -     prazosin (MINIPRESS) 1 MG capsule; Take 1 capsule by mouth Every Night.  Dispense: 30 capsule; Refill: 1    4. Nightmares  -     prazosin (MINIPRESS) 1 MG capsule; Take 1 capsule by mouth Every Night.  Dispense: 30 capsule; Refill: 1    5. Nicotine use disorder    -Patient having some worsening mood fluctuations with dysphoria and high anxiety.  He is also having sexual side effects.  -Reviewed previous documentation  -Reviewed labs  -Reviewed lithium level and reviewed results with patient  -Continue Haldol 2 mg twice daily for PTSD and mood stabilization  -Continue prazosin 1 mg nightly for insomnia and nightmares  -Continue Cymbalta 30 mg p.o. daily for mood and anxiety  -Reduce lithium to 150 mg in the morning and 300 mg at night due to the increased fatigue, sexual side effects  -Encourage cessation of nicotine  -Encouraged therapy and anger management  -Approximate appointment time 8:44 AM to 9:04 AM via video visit    Visit Diagnoses:    ICD-10-CM ICD-9-CM   1. Post traumatic stress disorder (PTSD)  F43.10 309.81   2. Anxiety disorder, unspecified type  F41.9 300.00   3. Insomnia due to mental condition  F51.05 300.9     327.02   4. Nightmares  F51.5 307.47       TREATMENT PLAN/GOALS: Continue supportive psychotherapy efforts and medications as indicated. Treatment and medication options discussed during today's visit. Patient ackowledged and verbally consented to continue with current treatment plan and was educated on the importance of compliance with treatment and follow-up appointments.    MEDICATION ISSUES:    Discussed medication options and treatment plan of prescribed medication as well as the  risks, benefits, and side effects including potential falls, possible impaired driving and metabolic adversities among others. Patient is agreeable to call the office with any worsening of symptoms or onset of side effects. Patient is agreeable to call 911 or go to the nearest ER should he/she begin having SI/HI. No medication side effects or related complaints today.     MEDS ORDERED DURING VISIT:  New Medications Ordered This Visit   Medications   • haloperidol (HALDOL) 2 MG tablet     Sig: Take 1 tablet by mouth 2 (Two) Times a Day.     Dispense:  60 tablet     Refill:  1   • lithium 300 MG tablet     Sig: Take 0.5 tablets PO tablets in the morning and 1 tablet PO at night.     Dispense:  45 tablet     Refill:  1   • prazosin (MINIPRESS) 1 MG capsule     Sig: Take 1 capsule by mouth Every Night.     Dispense:  30 capsule     Refill:  1   • DULoxetine (CYMBALTA) 30 MG capsule     Sig: Take 1 capsule by mouth Daily.     Dispense:  30 capsule     Refill:  1       Return in about 4 weeks (around 5/21/2021).             This document has been electronically signed by Stefan Marion MD  April 23, 2021

## 2021-05-07 ENCOUNTER — OFFICE VISIT (OUTPATIENT)
Dept: INTERNAL MEDICINE | Facility: CLINIC | Age: 36
End: 2021-05-07

## 2021-05-07 VITALS
BODY MASS INDEX: 32.27 KG/M2 | RESPIRATION RATE: 17 BRPM | SYSTOLIC BLOOD PRESSURE: 108 MMHG | WEIGHT: 265 LBS | HEIGHT: 76 IN | OXYGEN SATURATION: 98 % | HEART RATE: 67 BPM | DIASTOLIC BLOOD PRESSURE: 61 MMHG | TEMPERATURE: 97.1 F

## 2021-05-07 DIAGNOSIS — R19.7 DIARRHEA OF PRESUMED INFECTIOUS ORIGIN: ICD-10-CM

## 2021-05-07 DIAGNOSIS — E11.69 TYPE 2 DIABETES MELLITUS WITH OTHER SPECIFIED COMPLICATION, WITHOUT LONG-TERM CURRENT USE OF INSULIN (HCC): Primary | ICD-10-CM

## 2021-05-07 DIAGNOSIS — E11.9 TYPE 2 DIABETES MELLITUS WITHOUT COMPLICATION, WITHOUT LONG-TERM CURRENT USE OF INSULIN (HCC): ICD-10-CM

## 2021-05-07 PROBLEM — R73.03 BORDERLINE DIABETES: Status: ACTIVE | Noted: 2021-05-07

## 2021-05-07 LAB — HBA1C MFR BLD: 5.3 %

## 2021-05-07 PROCEDURE — 83036 HEMOGLOBIN GLYCOSYLATED A1C: CPT | Performed by: INTERNAL MEDICINE

## 2021-05-07 PROCEDURE — 99214 OFFICE O/P EST MOD 30 MIN: CPT | Performed by: INTERNAL MEDICINE

## 2021-05-07 NOTE — PATIENT INSTRUCTIONS
MyPlate from USDA    MyPlate is an outline of a general healthy diet based on the 2010 Dietary Guidelines for Americans, from the U.S. Department of Agriculture (USDA). It sets guidelines for how much food you should eat from each food group based on your age, sex, and level of physical activity.  What are tips for following MyPlate?  To follow MyPlate recommendations:  · Eat a wide variety of fruits and vegetables, grains, and protein foods.  · Serve smaller portions and eat less food throughout the day.  · Limit portion sizes to avoid overeating.  · Enjoy your food.  · Get at least 150 minutes of exercise every week. This is about 30 minutes each day, 5 or more days per week.  It can be difficult to have every meal look like MyPlate. Think about MyPlate as eating guidelines for an entire day, rather than each individual meal.  Fruits and vegetables  · Make half of your plate fruits and vegetables.  · Eat many different colors of fruits and vegetables each day.  · For a 2,000 calorie daily food plan, eat:  ? 2½ cups of vegetables every day.  ? 2 cups of fruit every day.  · 1 cup is equal to:  ? 1 cup raw or cooked vegetables.  ? 1 cup raw fruit.  ? 1 medium-sized orange, apple, or banana.  ? 1 cup 100% fruit or vegetable juice.  ? 2 cups raw leafy greens, such as lettuce, spinach, or kale.  ? ½ cup dried fruit.  Grains  · One fourth of your plate should be grains.  · Make at least half of the grains you eat each day whole grains.  · For a 2,000 calorie daily food plan, eat 6 oz of grains every day.  · 1 oz is equal to:  ? 1 slice bread.  ? 1 cup cereal.  ? ½ cup cooked rice, cereal, or pasta.  Protein  · One fourth of your plate should be protein.  · Eat a wide variety of protein foods, including meat, poultry, fish, eggs, beans, nuts, and tofu.  · For a 2,000 calorie daily food plan, eat 5½ oz of protein every day.  · 1 oz is equal to:  ? 1 oz meat, poultry, or fish.  ? ¼ cup cooked beans.  ? 1 egg.  ? ½ oz nuts  or seeds.  ? 1 Tbsp peanut butter.  Dairy  · Drink fat-free or low-fat (1%) milk.  · Eat or drink dairy as a side to meals.  · For a 2,000 calorie daily food plan, eat or drink 3 cups of dairy every day.  · 1 cup is equal to:  ? 1 cup milk, yogurt, cottage cheese, or soy milk (soy beverage).  ? 2 oz processed cheese.  ? 1½ oz natural cheese.  Fats, oils, salt, and sugars  · Only small amounts of oils are recommended.  · Avoid foods that are high in calories and low in nutritional value (empty calories), like foods high in fat or added sugars.  · Choose foods that are low in salt (sodium). Choose foods that have less than 140 milligrams (mg) of sodium per serving.  · Drink water instead of sugary drinks. Drink enough water each day to keep your urine pale yellow.  Where to find support  · Work with your health care provider or a nutrition specialist (dietitian) to develop a customized eating plan that is right for you.  · Download an yosi (mobile application) to help you track your daily food intake.  Where to find more information  · Go to ChooseMyPlate.gov for more information.  Summary  · MyPlate is a general guideline for healthy eating from the USDA. It is based on the 2010 Dietary Guidelines for Americans.  · In general, fruits and vegetables should take up ½ of your plate, grains should take up ¼ of your plate, and protein should take up ¼ of your plate.  This information is not intended to replace advice given to you by your health care provider. Make sure you discuss any questions you have with your health care provider.  Document Revised: 05/21/2020 Document Reviewed: 03/19/2018  Elsevier Patient Education © 2021 [employer]

## 2021-05-07 NOTE — PROGRESS NOTES
Chief Complaint   Patient presents with   • Follow-up     6 months-A1c check   • Diarrhea     for several months, with pain when movement is over-has GI appointment later this month       Subjective     History of Present Illness   Alex Torres is a 36 y.o. male with history of type II diabetes mellitus presenting for follow up.       Glucose range at home: not checking  Medication regimen: just diet control    Has concerns about diarrhea over the last few months.  He has very loose stools, 10 BM per day, non bloody.  Has had associated fatigue.  No other sick contacts. Coughing and sneezing leads to bowel incontinence. Weight is up.  He is trying to eat healthier.     The following portions of the patient's history were reviewed and updated as appropriate: allergies, current medications, past family history, past medical history, past social history, past surgical history and problem list.    Review of Systems   Constitutional: Negative for chills, fatigue and fever.   HENT: Negative for congestion, ear pain, rhinorrhea, sinus pressure and sore throat.    Eyes: Negative for visual disturbance.   Respiratory: Negative for cough, chest tightness, shortness of breath and wheezing.    Cardiovascular: Negative for chest pain, palpitations and leg swelling.   Gastrointestinal: Positive for abdominal pain and diarrhea. Negative for blood in stool, constipation, nausea and vomiting.   Endocrine: Negative for polydipsia and polyuria.   Genitourinary: Negative for dysuria and hematuria.   Musculoskeletal: Negative for arthralgias and back pain.   Skin: Negative for rash.   Neurological: Negative for dizziness, light-headedness, numbness and headaches.   Psychiatric/Behavioral: Negative for dysphoric mood and sleep disturbance. The patient is not nervous/anxious.        Allergies   Allergen Reactions   • Gabapentin Swelling     + swelling   • Latex Rash       Past Medical History:   Diagnosis Date   • Anxiety    •  Bipolar 1 disorder (CMS/HCC)    • Depression    • Hypertension    • Kidney stone    • Polyneuropathy    • Positive TB test     treated w/ meds x 6 months   • Suicide attempt (CMS/HCC)        Social History     Socioeconomic History   • Marital status:      Spouse name: Not on file   • Number of children: Not on file   • Years of education: Not on file   • Highest education level: Not on file   Tobacco Use   • Smoking status: Current Every Day Smoker     Packs/day: 1.00     Years: 18.00     Pack years: 18.00     Types: Cigarettes     Start date: 1999   • Smokeless tobacco: Never Used   Substance and Sexual Activity   • Alcohol use: No   • Drug use: Yes     Types: Marijuana     Comment: rarely   • Sexual activity: Yes     Partners: Female        Past Surgical History:   Procedure Laterality Date   • CHOLECYSTECTOMY  2002   • INGUINAL HERNIA REPAIR Right 1995   • ORIF METACARPAL FRACTURE Right 2000   • TIBIA FRACTURE SURGERY Left 1997    ORIF       Family History   Problem Relation Age of Onset   • Arthritis Father    • Hypertension Father    • Alcohol abuse Father    • Drug abuse Father    • Diabetes Maternal Aunt    • Diabetes Maternal Uncle    • Diabetes Maternal Grandmother    • Alzheimer's disease Maternal Grandmother    • Dementia Maternal Grandmother    • Diabetes Other    • Hypertension Mother    • Depression Mother    • ADD / ADHD Brother    • Early death Maternal Grandfather    • Liver disease Maternal Grandfather    • Alcohol abuse Maternal Grandfather    • No Known Problems Paternal Grandmother    • Liver disease Paternal Grandfather    • Alcohol abuse Paternal Grandfather    • Early death Paternal Grandfather    • No Known Problems Brother    • Anxiety disorder Neg Hx    • Bipolar disorder Neg Hx    • OCD Neg Hx    • Paranoid behavior Neg Hx    • Schizophrenia Neg Hx    • Seizures Neg Hx    • Self-Injurious Behavior  Neg Hx    • Suicide Attempts Neg Hx          Current Outpatient Medications:   •   "albuterol sulfate  (90 Base) MCG/ACT inhaler, Inhale 2 puffs Every 4 (Four) Hours As Needed for Wheezing or Shortness of Air., Disp: 18 g, Rfl: 3  •  amitriptyline (ELAVIL) 10 MG tablet, TAKE 1 TABLET BY MOUTH EVERY DAY AT NIGHT, Disp: 90 tablet, Rfl: 3  •  carvedilol (COREG) 6.25 MG tablet, TAKE 1 TABLET BY MOUTH TWICE A DAY WITH MEALS, Disp: 180 tablet, Rfl: 3  •  DULoxetine (CYMBALTA) 30 MG capsule, Take 1 capsule by mouth Daily., Disp: 30 capsule, Rfl: 1  •  haloperidol (HALDOL) 2 MG tablet, Take 1 tablet by mouth 2 (Two) Times a Day., Disp: 60 tablet, Rfl: 1  •  lithium 300 MG tablet, Take 0.5 tablets PO tablets in the morning and 1 tablet PO at night., Disp: 45 tablet, Rfl: 1  •  pantoprazole (PROTONIX) 40 MG EC tablet, TAKE 1 TABLET BY MOUTH EVERY DAY, Disp: 90 tablet, Rfl: 2  •  prazosin (MINIPRESS) 1 MG capsule, Take 1 capsule by mouth Every Night., Disp: 30 capsule, Rfl: 1  •  valsartan (DIOVAN) 160 MG tablet, TAKE 1 TABLET BY MOUTH EVERY DAY, Disp: 90 tablet, Rfl: 3    Objective   /61   Pulse 67   Temp 97.1 °F (36.2 °C)   Resp 17   Ht 193 cm (76\")   Wt 120 kg (265 lb)   SpO2 98%   BMI 32.26 kg/m²     Physical Exam  Vitals and nursing note reviewed.   Constitutional:       Appearance: Normal appearance. He is well-developed. He is obese.   HENT:      Head: Normocephalic and atraumatic.   Eyes:      Extraocular Movements: Extraocular movements intact.      Conjunctiva/sclera: Conjunctivae normal.   Cardiovascular:      Pulses:           Dorsalis pedis pulses are 2+ on the right side and 2+ on the left side.        Posterior tibial pulses are 2+ on the right side and 2+ on the left side.   Pulmonary:      Effort: Pulmonary effort is normal.      Breath sounds: No decreased breath sounds.   Musculoskeletal:      Cervical back: Normal range of motion and neck supple.   Feet:      Right foot:      Protective Sensation: 5 sites tested. 5 sites sensed.      Skin integrity: Skin integrity " normal.      Left foot:      Protective Sensation: 5 sites tested. 5 sites sensed.      Skin integrity: Skin integrity normal.   Skin:     General: Skin is warm and dry.      Findings: No rash.   Neurological:      General: No focal deficit present.      Mental Status: He is alert and oriented to person, place, and time.      Deep Tendon Reflexes: Reflexes are normal and symmetric.   Psychiatric:         Mood and Affect: Mood normal.         Behavior: Behavior normal.         Labs:  Results for orders placed or performed in visit on 05/07/21   POC Glycosylated Hemoglobin (Hb A1C)    Specimen: Blood   Result Value Ref Range    Hemoglobin A1C 5.3 %          Assessment/Plan   Diagnoses and all orders for this visit:    1. Type 2 diabetes mellitus with other specified complication, without long-term current use of insulin (CMS/[facility]) (Primary)  -     POC Glycosylated Hemoglobin (Hb A1C)    2. Diarrhea of presumed infectious origin  -     Clostridium Difficile Toxin, PCR - Stool, Per Rectum  -     Gastrointestinal Panel, PCR - Stool, Per Rectum    3. Type 2 diabetes mellitus without complication, without long-term current use of insulin (CMS/[facility])  -     Ambulatory Referral to Ophthalmology          Discussion Summary:  36 y.o. male presenting for follow up.    1. Type II Diabetes Mellitus  - Glucose range: not checking at home   A1C: UTD  - Regimen: diet control  - Immunizations: UTD   - Eye Exam: following with ophthalmology   - Foot exam: Diabetic Foot Exam Performed and Monofilament Test Performed   - Lipid Control:  WNL  - Annual Urine Microalbumin: UTD    2. Chronic Diarrhea  -Rule out infectious causes with stool studies.  Order placed today.  Patient also needs to obtain his routine labs which were ordered at his prior visit.  He was encouraged to obtain this in the next week.  -Patient also does have follow-up with gastroenterology for further management.        Follow up:  Return in about 6 months (around  11/7/2021) for Annual physical.     Patient Instructions:  Patient instructions were provided.  Answers for HPI/ROS submitted by the patient on 4/23/2021  What is the primary reason for your visit?: Other  Please describe your symptoms.: wellness check  Have you had these symptoms before?: No  How long have you been having these symptoms?: 1-4 days

## 2021-05-18 LAB
ALBUMIN SERPL-MCNC: 4.8 G/DL (ref 3.5–5.2)
ALBUMIN/GLOB SERPL: 2.7 G/DL
ALP SERPL-CCNC: 86 U/L (ref 39–117)
ALT SERPL-CCNC: 30 U/L (ref 1–41)
AST SERPL-CCNC: 17 U/L (ref 1–40)
BASOPHILS # BLD AUTO: 0.05 10*3/MM3 (ref 0–0.2)
BASOPHILS NFR BLD AUTO: 0.5 % (ref 0–1.5)
BILIRUB SERPL-MCNC: 0.4 MG/DL (ref 0–1.2)
BUN SERPL-MCNC: 11 MG/DL (ref 6–20)
BUN/CREAT SERPL: 9.9 (ref 7–25)
CALCIUM SERPL-MCNC: 9.5 MG/DL (ref 8.6–10.5)
CHLORIDE SERPL-SCNC: 106 MMOL/L (ref 98–107)
CHOLEST SERPL-MCNC: 187 MG/DL (ref 0–200)
CO2 SERPL-SCNC: 24.8 MMOL/L (ref 22–29)
CREAT SERPL-MCNC: 1.11 MG/DL (ref 0.76–1.27)
EOSINOPHIL # BLD AUTO: 0.23 10*3/MM3 (ref 0–0.4)
EOSINOPHIL NFR BLD AUTO: 2.3 % (ref 0.3–6.2)
ERYTHROCYTE [DISTWIDTH] IN BLOOD BY AUTOMATED COUNT: 14.4 % (ref 12.3–15.4)
GLOBULIN SER CALC-MCNC: 1.8 GM/DL
GLUCOSE SERPL-MCNC: 93 MG/DL (ref 65–99)
HAV IGM SERPL QL IA: NEGATIVE
HBV CORE IGM SERPL QL IA: NEGATIVE
HBV SURFACE AG SERPL QL IA: NEGATIVE
HCT VFR BLD AUTO: 45 % (ref 37.5–51)
HCV AB S/CO SERPL IA: 0.1 S/CO RATIO (ref 0–0.9)
HDLC SERPL-MCNC: 25 MG/DL (ref 40–60)
HGB BLD-MCNC: 15.3 G/DL (ref 13–17.7)
IMM GRANULOCYTES # BLD AUTO: 0.13 10*3/MM3 (ref 0–0.05)
IMM GRANULOCYTES NFR BLD AUTO: 1.3 % (ref 0–0.5)
LDLC SERPL CALC-MCNC: 115 MG/DL (ref 0–100)
LYMPHOCYTES # BLD AUTO: 2.54 10*3/MM3 (ref 0.7–3.1)
LYMPHOCYTES NFR BLD AUTO: 25.8 % (ref 19.6–45.3)
MCH RBC QN AUTO: 31.7 PG (ref 26.6–33)
MCHC RBC AUTO-ENTMCNC: 34 G/DL (ref 31.5–35.7)
MCV RBC AUTO: 93.2 FL (ref 79–97)
MONOCYTES # BLD AUTO: 0.48 10*3/MM3 (ref 0.1–0.9)
MONOCYTES NFR BLD AUTO: 4.9 % (ref 5–12)
NEUTROPHILS # BLD AUTO: 6.42 10*3/MM3 (ref 1.7–7)
NEUTROPHILS NFR BLD AUTO: 65.2 % (ref 42.7–76)
NRBC BLD AUTO-RTO: 0 /100 WBC (ref 0–0.2)
PLATELET # BLD AUTO: 214 10*3/MM3 (ref 140–450)
POTASSIUM SERPL-SCNC: 4.5 MMOL/L (ref 3.5–5.2)
PROT SERPL-MCNC: 6.6 G/DL (ref 6–8.5)
RBC # BLD AUTO: 4.83 10*6/MM3 (ref 4.14–5.8)
SODIUM SERPL-SCNC: 142 MMOL/L (ref 136–145)
TRIGL SERPL-MCNC: 270 MG/DL (ref 0–150)
TSH SERPL DL<=0.005 MIU/L-ACNC: 2.28 UIU/ML (ref 0.27–4.2)
VLDLC SERPL CALC-MCNC: 47 MG/DL (ref 5–40)
WBC # BLD AUTO: 9.85 10*3/MM3 (ref 3.4–10.8)

## 2021-05-19 LAB

## 2021-05-25 ENCOUNTER — OFFICE VISIT (OUTPATIENT)
Dept: GASTROENTEROLOGY | Facility: CLINIC | Age: 36
End: 2021-05-25

## 2021-05-25 VITALS
DIASTOLIC BLOOD PRESSURE: 75 MMHG | WEIGHT: 266 LBS | HEIGHT: 76 IN | HEART RATE: 59 BPM | RESPIRATION RATE: 16 BRPM | BODY MASS INDEX: 32.39 KG/M2 | SYSTOLIC BLOOD PRESSURE: 133 MMHG | TEMPERATURE: 96.6 F

## 2021-05-25 DIAGNOSIS — R19.7 DIARRHEA, UNSPECIFIED TYPE: Primary | Chronic | ICD-10-CM

## 2021-05-25 DIAGNOSIS — K21.9 GASTROESOPHAGEAL REFLUX DISEASE, UNSPECIFIED WHETHER ESOPHAGITIS PRESENT: Chronic | ICD-10-CM

## 2021-05-25 PROCEDURE — 99214 OFFICE O/P EST MOD 30 MIN: CPT | Performed by: NURSE PRACTITIONER

## 2021-05-25 NOTE — PROGRESS NOTES
New Patient Consult      Date: 2021   Patient Name: Alex Torres  MRN: [id number]  : 1985     Primary Care Provider: Jose Rich DO    Chief Complaint   Patient presents with   • Diarrhea     History of Present Illness: Alex Torres is a 36 y.o. male who is here today to establish care with Gastroenterology for diarrhea.     The patient has a history of diarrhea off and on for several years that has gradually worsened over the past 8 months. He is having diarrhea >5 times per day. He is having incontinence of bowels at times with coughing. No rectal bleeding. Stool can be bile tinged.  No abdominal pain.      He has a history of reflux for many years. He is taking Pantoprazole with moderate control, it does not work as well as it did in the past. He uses TUMs as needed, but it does not help.  No difficulty swallowing.     The patient has not had colonoscopy or EGD in the past. There is no family history of colon cancer or IBD. Both grandfathers had cirrhosis related to alcohol use.     Subjective      Past Medical History:   Diagnosis Date   • Anxiety    • Asthma    • Bipolar 1 disorder (CMS/HCC)    • Depression    • DM (diabetes mellitus) (CMS/HCC)    • Hypertension    • Kidney stone    • Polyneuropathy    • Positive TB test     treated w/ meds x 6 months   • PTSD (post-traumatic stress disorder)    • Sleep apnea    • Suicide attempt (CMS/[facility])    • Tattoos      Past Surgical History:   Procedure Laterality Date   • CHOLECYSTECTOMY     • INGUINAL HERNIA REPAIR Right    • ORIF METACARPAL FRACTURE Right    • TIBIA FRACTURE SURGERY Left     ORIF     Family History   Problem Relation Age of Onset   • Arthritis Father    • Hypertension Father    • Alcohol abuse Father    • Drug abuse Father    • Diabetes Maternal Aunt    • Diabetes Maternal Uncle    • Diabetes Maternal Grandmother    • Alzheimer's disease Maternal Grandmother    • Dementia Maternal Grandmother    •  Diabetes Other    • Hypertension Mother    • Depression Mother    • ADD / ADHD Brother    • Early death Maternal Grandfather    • Liver disease Maternal Grandfather    • Alcohol abuse Maternal Grandfather    • Cirrhosis Maternal Grandfather    • No Known Problems Paternal Grandmother    • Liver disease Paternal Grandfather    • Alcohol abuse Paternal Grandfather    • Early death Paternal Grandfather    • Cirrhosis Paternal Grandfather    • No Known Problems Brother    • Anxiety disorder Neg Hx    • Bipolar disorder Neg Hx    • OCD Neg Hx    • Paranoid behavior Neg Hx    • Schizophrenia Neg Hx    • Seizures Neg Hx    • Self-Injurious Behavior  Neg Hx    • Suicide Attempts Neg Hx    • Colon cancer Neg Hx      Social History     Socioeconomic History   • Marital status:      Spouse name: Not on file   • Number of children: Not on file   • Years of education: Not on file   • Highest education level: Not on file   Tobacco Use   • Smoking status: Current Every Day Smoker     Packs/day: 1.00     Years: 18.00     Pack years: 18.00     Types: Cigarettes     Start date: 1999   • Smokeless tobacco: Never Used   Vaping Use   • Vaping Use: Never used   Substance and Sexual Activity   • Alcohol use: No   • Drug use: Yes     Types: Marijuana     Comment: rarely   • Sexual activity: Yes     Partners: Female       Current Outpatient Medications:   •  albuterol sulfate  (90 Base) MCG/ACT inhaler, Inhale 2 puffs Every 4 (Four) Hours As Needed for Wheezing or Shortness of Air., Disp: 18 g, Rfl: 3  •  amitriptyline (ELAVIL) 10 MG tablet, TAKE 1 TABLET BY MOUTH EVERY DAY AT NIGHT, Disp: 90 tablet, Rfl: 3  •  carvedilol (COREG) 6.25 MG tablet, TAKE 1 TABLET BY MOUTH TWICE A DAY WITH MEALS, Disp: 180 tablet, Rfl: 3  •  DULoxetine (CYMBALTA) 30 MG capsule, Take 1 capsule by mouth Daily., Disp: 30 capsule, Rfl: 1  •  haloperidol (HALDOL) 2 MG tablet, Take 1 tablet by mouth 2 (Two) Times a Day., Disp: 60 tablet, Rfl: 1  •   lithium 300 MG tablet, Take 0.5 tablets PO tablets in the morning and 1 tablet PO at night., Disp: 45 tablet, Rfl: 1  •  pantoprazole (PROTONIX) 40 MG EC tablet, TAKE 1 TABLET BY MOUTH EVERY DAY, Disp: 90 tablet, Rfl: 2  •  prazosin (MINIPRESS) 1 MG capsule, Take 1 capsule by mouth Every Night., Disp: 30 capsule, Rfl: 1  •  valsartan (DIOVAN) 160 MG tablet, TAKE 1 TABLET BY MOUTH EVERY DAY, Disp: 90 tablet, Rfl: 3  •  cholestyramine light 4 g powder, Take 1 packet by mouth 2 (Two) Times a Day., Disp: 60 packet, Rfl: 3    Allergies   Allergen Reactions   • Gabapentin Swelling     + swelling   • Latex Rash     The following portions of the patient's history were reviewed and updated as appropriate: allergies, current medications, past family history, past medical history, past social history, past surgical history and problem list.    Objective     Physical Exam  Vitals and nursing note reviewed.   Constitutional:       General: He is not in acute distress.     Appearance: Normal appearance. He is well-developed. He is not diaphoretic.   HENT:      Head: Normocephalic and atraumatic.      Right Ear: Hearing and external ear normal.      Left Ear: Hearing and external ear normal.      Nose: Nose normal.      Mouth/Throat:      Mouth: Mucous membranes are not pale, not dry and not cyanotic.   Eyes:      General: Lids are normal.         Right eye: No discharge.         Left eye: No discharge.      Conjunctiva/sclera: Conjunctivae normal.   Neck:      Trachea: Trachea normal.   Cardiovascular:      Rate and Rhythm: Normal rate and regular rhythm.      Heart sounds: Normal heart sounds.   Pulmonary:      Effort: Pulmonary effort is normal. No respiratory distress.      Breath sounds: Normal breath sounds.   Chest:      Chest wall: No tenderness.   Abdominal:      General: Bowel sounds are normal. There is no distension.      Palpations: Abdomen is soft. There is no mass.      Tenderness: There is no abdominal tenderness.  "There is no guarding or rebound.      Hernia: No hernia is present.   Musculoskeletal:      Cervical back: No edema.   Skin:     General: Skin is warm and dry.      Coloration: Skin is not pale.      Findings: No rash.   Neurological:      Mental Status: He is alert and oriented to person, place, and time.      Cranial Nerves: No cranial nerve deficit.   Psychiatric:         Mood and Affect: Mood normal.         Speech: Speech normal.         Behavior: Behavior is cooperative.       Vitals:    05/25/21 0856   BP: 133/75   Pulse: 59   Resp: 16   Temp: 96.6 °F (35.9 °C)   Weight: 121 kg (266 lb)   Height: 193 cm (76\")     Results Review:   I have reviewed the patient's new clinical and imaging results.    Office Visit on 05/07/2021   Component Date Value Ref Range Status   • Hemoglobin A1C 05/07/2021 5.3  % Final   • C difficile Toxin Gene NAAT 05/17/2021 Negative  Negative Final   • Campylobacter 05/17/2021 Not Detected  Not Detected Final   • C.difficile toxin A/B 05/17/2021 Not Detected  Not Detected Final   • Plesiomonas shigelloides 05/17/2021 Not Detected  Not Detected Final   • Salmonella 05/17/2021 Not Detected  Not Detected Final   • Vibrio 05/17/2021 Not Detected  Not Detected Final   • Vibrio cholerae 05/17/2021 Not Detected  Not Detected Final   • Yersinia enterocolitica 05/17/2021 Not Detected  Not Detected Final   • Enteroaggregative E. coli 05/17/2021 Not Detected  Not Detected Final   • Enteropathogenic E. coli 05/17/2021 Not Detected  Not Detected Final   • Enterotoxigenic E. coli 05/17/2021 Not Detected  Not Detected Final   • Shiga-like toxin-producing E. coli  05/17/2021 Not Detected  Not Detected Final   • E. coli O157 05/17/2021 Not applicable  Not Detected Final   • Shigella enteroinvasive E. coli 05/17/2021 Not Detected  Not Detected Final   • Cryptosporidium 05/17/2021 Not Detected  Not Detected Final   • Cyclospora cayetanensis 05/17/2021 Not Detected  Not Detected Final   • Entamoeba " Histolytica Ag 05/17/2021 Not Detected  Not Detected Final   • Giardia lamblia 05/17/2021 Not Detected  Not Detected Final   • Adenovirus 40/41 Ag 05/17/2021 Not Detected  Not Detected Final   • Astrovirus 05/17/2021 Not Detected  Not Detected Final   • Norovirus GI/GII 05/17/2021 Not Detected  Not Detected Final   • Rotavirus A 05/17/2021 Not Detected  Not Detected Final   • Sapovirus 05/17/2021 Not Detected  Not Detected Final      No radiology results for the last 90 days.     Assessment / Plan      1. Diarrhea, unspecified type  History of diarrhea off and on for several years with gradual worsening over the past 8 months. He is having diarrhea >5 episodes per day. Stools are loose, bile tinged. No rectal bleeding. No abdominal pain. No weight loss. TSH normal. Basic labs unremarkable. Stool studies with negative C. difficile, gastrointestinal panel. Suspect bile acid/functional diarrhea. He has not had colonoscopy in the past. There is no family history of IBD or colon cancer.  Cholestyramine powder 1 packet twice a day - do not take with other medications  Colonoscopy in the future if no improvement or symptoms worsen.    - cholestyramine light 4 g powder; Take 1 packet by mouth 2 (Two) Times a Day.  Dispense: 60 packet; Refill: 3    2. Gastroesophageal reflux disease, unspecified whether esophagitis present  Moderate control with Pantoprazole daily and TUMs as needed. He does not follow anti-reflux measures and drinks coffee and sodas. He takes his PPI with all of his medications. No history of difficulty swallowing.  Anti-reflux measures.  Pantoprazole 40 mg daily in the morning 30 minutes before breakfast.    Patient Instructions   Antireflux measures: Avoid fried, fatty foods, alcohol, chocolate, coffee, tea,  soft drinks, peppermint and spearmint, spicy foods, tomatoes and tomato based foods, onion based foods, and smoking. Other antireflux measures include weight reduction if overweight, avoiding tight  clothing around the abdomen, elevating the head of the bed 6 inches with blocks under the head board, and don't drink or eat before going to bed and avoid lying down immediately after meals.  Pantoprazole 40 mg 1 by mouth in the am 30 minutes before breakfast.  Cholestyramine powder 1 packet twice a day - do not take with other medications.   Colonoscopy in the future if no improvement.   Follow up: 8 weeks     Catalino Pope, APRN  5/25/2021    Please note that portions of this note may have been completed with a voice recognition program. Efforts were made to edit the dictations, but occasionally words are mistranscribed.

## 2021-05-25 NOTE — PATIENT INSTRUCTIONS
Antireflux measures: Avoid fried, fatty foods, alcohol, chocolate, coffee, tea,  soft drinks, peppermint and spearmint, spicy foods, tomatoes and tomato based foods, onion based foods, and smoking. Other antireflux measures include weight reduction if overweight, avoiding tight clothing around the abdomen, elevating the head of the bed 6 inches with blocks under the head board, and don't drink or eat before going to bed and avoid lying down immediately after meals.  Pantoprazole 40 mg 1 by mouth in the am 30 minutes before breakfast.  Cholestyramine powder 1 packet twice a day - do not take with other medications.   Colonoscopy in the future if no improvement.   Follow up: 8 weeks

## 2021-05-28 ENCOUNTER — OFFICE VISIT (OUTPATIENT)
Dept: PSYCHIATRY | Facility: CLINIC | Age: 36
End: 2021-05-28

## 2021-05-28 DIAGNOSIS — F51.05 INSOMNIA DUE TO MENTAL CONDITION: ICD-10-CM

## 2021-05-28 DIAGNOSIS — F41.9 ANXIETY DISORDER, UNSPECIFIED TYPE: ICD-10-CM

## 2021-05-28 DIAGNOSIS — F17.200 NICOTINE USE DISORDER: ICD-10-CM

## 2021-05-28 DIAGNOSIS — F43.10 POST TRAUMATIC STRESS DISORDER (PTSD): Primary | Chronic | ICD-10-CM

## 2021-05-28 DIAGNOSIS — F51.5 NIGHTMARES: ICD-10-CM

## 2021-05-28 PROCEDURE — 99214 OFFICE O/P EST MOD 30 MIN: CPT | Performed by: PSYCHIATRY & NEUROLOGY

## 2021-05-28 RX ORDER — DULOXETIN HYDROCHLORIDE 30 MG/1
30 CAPSULE, DELAYED RELEASE ORAL DAILY
Qty: 30 CAPSULE | Refills: 1 | Status: SHIPPED | OUTPATIENT
Start: 2021-05-28 | End: 2021-08-05 | Stop reason: SDUPTHER

## 2021-05-28 RX ORDER — LITHIUM CARBONATE 300 MG
TABLET ORAL
Qty: 45 TABLET | Refills: 1 | Status: SHIPPED | OUTPATIENT
Start: 2021-05-28 | End: 2021-08-30

## 2021-05-28 RX ORDER — HALOPERIDOL 2 MG/1
2 TABLET ORAL 2 TIMES DAILY
Qty: 60 TABLET | Refills: 1 | Status: SHIPPED | OUTPATIENT
Start: 2021-05-28 | End: 2021-08-05 | Stop reason: SDUPTHER

## 2021-05-28 RX ORDER — PRAZOSIN HYDROCHLORIDE 1 MG/1
1 CAPSULE ORAL NIGHTLY
Qty: 30 CAPSULE | Refills: 1 | Status: SHIPPED | OUTPATIENT
Start: 2021-05-28 | End: 2021-09-17 | Stop reason: SDUPTHER

## 2021-05-28 NOTE — PROGRESS NOTES
"Subjective   Alex Torres is a 36 y.o. male who presents today for follow up     This provider is located at [facility] at [address] [place] [address]. The provider identified himself as well as his credentials.   The Patient is at home using his phone because problems with video connection. The patient's condition being diagnosed/treated is appropriate for telemedicine. The patient gave consent to be seen remotely, and when consent is given they understand that the consent allows for patient identifiable information to be sent to a third party as needed.   They may refuse to be seen remotely at any time. The electronic data is encrypted and password protected, and the patient has been advised of the potential risks to privacy not withstanding such measures      Chief Complaint: Bipolar disorder/anxiety    History of Present Illness: Patient reports that he is, \"doing pretty good.\"  He states that his lethargy is better after decreasing the morning dose of lithium.  He continues to have sexual side effects with the existed prior to him being on some of his current medication regimen and may be mood related.  He does not endorse any major manic or depressive symptoms.  Anxiety seems to be stable.  Patient is hoping to get a job soon at [employer].  He denies any medication side effects.  He denies issues with sleep or appetite.  He denies SI/HI/AVH.    The following portions of the patient's history were reviewed and updated as appropriate: allergies, current medications, past family history, past medical history, past social history, past surgical history and problem list.      Past Medical History:  Past Medical History:   Diagnosis Date   • Anxiety    • Asthma    • Bipolar 1 disorder (CMS/[facility])    • Depression    • DM (diabetes mellitus) (CMS/[facility])    • Hypertension    • Kidney stone    • Polyneuropathy    • Positive TB test     treated w/ meds x 6 months   • PTSD " (post-traumatic stress disorder)    • Sleep apnea    • Suicide attempt (CMS/HCC)    • Tattoos        Social History:  Social History     Socioeconomic History   • Marital status:      Spouse name: Not on file   • Number of children: Not on file   • Years of education: Not on file   • Highest education level: Not on file   Tobacco Use   • Smoking status: Current Every Day Smoker     Packs/day: 1.00     Years: 18.00     Pack years: 18.00     Types: Cigarettes     Start date: 1999   • Smokeless tobacco: Never Used   Vaping Use   • Vaping Use: Never used   Substance and Sexual Activity   • Alcohol use: No   • Drug use: Yes     Types: Marijuana     Comment: rarely   • Sexual activity: Yes     Partners: Female       Family History:  Family History   Problem Relation Age of Onset   • Arthritis Father    • Hypertension Father    • Alcohol abuse Father    • Drug abuse Father    • Diabetes Maternal Aunt    • Diabetes Maternal Uncle    • Diabetes Maternal Grandmother    • Alzheimer's disease Maternal Grandmother    • Dementia Maternal Grandmother    • Diabetes Other    • Hypertension Mother    • Depression Mother    • ADD / ADHD Brother    • Early death Maternal Grandfather    • Liver disease Maternal Grandfather    • Alcohol abuse Maternal Grandfather    • Cirrhosis Maternal Grandfather    • No Known Problems Paternal Grandmother    • Liver disease Paternal Grandfather    • Alcohol abuse Paternal Grandfather    • Early death Paternal Grandfather    • Cirrhosis Paternal Grandfather    • No Known Problems Brother    • Anxiety disorder Neg Hx    • Bipolar disorder Neg Hx    • OCD Neg Hx    • Paranoid behavior Neg Hx    • Schizophrenia Neg Hx    • Seizures Neg Hx    • Self-Injurious Behavior  Neg Hx    • Suicide Attempts Neg Hx    • Colon cancer Neg Hx        Past Surgical History:  Past Surgical History:   Procedure Laterality Date   • CHOLECYSTECTOMY  2002   • INGUINAL HERNIA REPAIR Right 1995   • ORIF METACARPAL  FRACTURE Right 2000   • TIBIA FRACTURE SURGERY Left 1997    ORIF       Problem List:  Patient Active Problem List   Diagnosis   • Essential hypertension   • GERD (gastroesophageal reflux disease)   • Bipolar disorder, current episode mixed, moderate (CMS/HCC)   • MOLLY on CPAP   • Arthritis   • Complex regional pain syndrome type 2 of left lower extremity   • Snoring   • Excessive daytime sleepiness   • Peripheral polyneuropathy   • Pain in both lower extremities   • Varicose veins of both lower extremities with pain   • MOLLY (obstructive sleep apnea)   • Morbidly obese (CMS/HCC)   • Neuromuscular respiratory weakness   • Borderline diabetes   • Diarrhea       Allergy:   Allergies   Allergen Reactions   • Gabapentin Swelling     + swelling   • Latex Rash        Current Medications:   Current Outpatient Medications   Medication Sig Dispense Refill   • albuterol sulfate  (90 Base) MCG/ACT inhaler Inhale 2 puffs Every 4 (Four) Hours As Needed for Wheezing or Shortness of Air. 18 g 3   • amitriptyline (ELAVIL) 10 MG tablet TAKE 1 TABLET BY MOUTH EVERY DAY AT NIGHT 90 tablet 3   • carvedilol (COREG) 6.25 MG tablet TAKE 1 TABLET BY MOUTH TWICE A DAY WITH MEALS 180 tablet 3   • cholestyramine light 4 g powder Take 1 packet by mouth 2 (Two) Times a Day. 60 packet 3   • DULoxetine (CYMBALTA) 30 MG capsule Take 1 capsule by mouth Daily. 30 capsule 1   • haloperidol (HALDOL) 2 MG tablet Take 1 tablet by mouth 2 (Two) Times a Day. 60 tablet 1   • lithium 300 MG tablet Take 0.5 tablets PO tablets in the morning and 1 tablet PO at night. 45 tablet 1   • pantoprazole (PROTONIX) 40 MG EC tablet TAKE 1 TABLET BY MOUTH EVERY DAY 90 tablet 2   • prazosin (MINIPRESS) 1 MG capsule Take 1 capsule by mouth Every Night. 30 capsule 1   • valsartan (DIOVAN) 160 MG tablet TAKE 1 TABLET BY MOUTH EVERY DAY 90 tablet 3     No current facility-administered medications for this visit.       Review of Symptoms:    Review of Systems    Constitutional: Negative for activity change (improved), appetite change, chills, diaphoresis, fatigue and fever.   HENT: Negative.    Eyes: Negative.    Respiratory: Negative.    Cardiovascular: Negative.    Gastrointestinal: Negative.    Endocrine: Negative.    Genitourinary: Positive for decreased libido.   Musculoskeletal: Negative.    Skin: Negative.    Allergic/Immunologic: Negative.    Neurological: Negative.    Hematological: Negative.    Psychiatric/Behavioral: Positive for stress. Negative for agitation, behavioral problems, decreased concentration, dysphoric mood, hallucinations, self-injury, sleep disturbance, suicidal ideas, negative for hyperactivity and depressed mood. The patient is not nervous/anxious.          Physical Exam:   There were no vitals taken for this visit.  Unable to assess, telephone visit    Appearance: Unable to assess, telephone visit  Gait, Station, Strength: Unable to assess, telephone visit    Mental Status Exam:   Hygiene:   Unable to assess, telephone visit  Cooperation:  Cooperative  Eye Contact:  Unable to assess, telephone visit  Psychomotor Behavior:  Unable to assess, telephone visit  Affect:  Full range  Mood: normal, improved   Hopelessness: Optimistic  Speech:  Normal  Thought Process:  Goal directed and Linear  Thought Content:  Normal  Suicidal:  None  Homicidal:  None  Hallucinations:  None  Delusion:  None  Memory:  Intact  Orientation:  Person, Place, Time and Situation  Reliability:  good  Insight:  Fair  Judgement:  Fair  Impulse Control:  Fair  Physical/Medical Issues:  No        Lab Results:   Office Visit on 05/07/2021   Component Date Value Ref Range Status   • Hemoglobin A1C 05/07/2021 5.3  % Final   • C difficile Toxin Gene NAAT 05/17/2021 Negative  Negative Final   • Campylobacter 05/17/2021 Not Detected  Not Detected Final   • C.difficile toxin A/B 05/17/2021 Not Detected  Not Detected Final   • Plesiomonas shigelloides 05/17/2021 Not Detected  Not  Detected Final   • Salmonella 05/17/2021 Not Detected  Not Detected Final   • Vibrio 05/17/2021 Not Detected  Not Detected Final   • Vibrio cholerae 05/17/2021 Not Detected  Not Detected Final   • Yersinia enterocolitica 05/17/2021 Not Detected  Not Detected Final   • Enteroaggregative E. coli 05/17/2021 Not Detected  Not Detected Final   • Enteropathogenic E. coli 05/17/2021 Not Detected  Not Detected Final   • Enterotoxigenic E. coli 05/17/2021 Not Detected  Not Detected Final   • Shiga-like toxin-producing E. coli  05/17/2021 Not Detected  Not Detected Final   • E. coli O157 05/17/2021 Not applicable  Not Detected Final   • Shigella enteroinvasive E. coli 05/17/2021 Not Detected  Not Detected Final   • Cryptosporidium 05/17/2021 Not Detected  Not Detected Final   • Cyclospora cayetanensis 05/17/2021 Not Detected  Not Detected Final   • Entamoeba Histolytica Ag 05/17/2021 Not Detected  Not Detected Final   • Giardia lamblia 05/17/2021 Not Detected  Not Detected Final   • Adenovirus 40/41 Ag 05/17/2021 Not Detected  Not Detected Final   • Astrovirus 05/17/2021 Not Detected  Not Detected Final   • Norovirus GI/GII 05/17/2021 Not Detected  Not Detected Final   • Rotavirus A 05/17/2021 Not Detected  Not Detected Final   • Sapovirus 05/17/2021 Not Detected  Not Detected Final       Assessment/Plan   Diagnoses and all orders for this visit:    1. Chronic Post traumatic stress disorder (PTSD) (Primary)  -     DULoxetine (CYMBALTA) 30 MG capsule; Take 1 capsule by mouth Daily.  Dispense: 30 capsule; Refill: 1  -     haloperidol (HALDOL) 2 MG tablet; Take 1 tablet by mouth 2 (Two) Times a Day.  Dispense: 60 tablet; Refill: 1  -     prazosin (MINIPRESS) 1 MG capsule; Take 1 capsule by mouth Every Night.  Dispense: 30 capsule; Refill: 1  -     lithium 300 MG tablet; Take 0.5 tablets PO tablets in the morning and 1 tablet PO at night.  Dispense: 45 tablet; Refill: 1    2. Anxiety disorder, unspecified type  -     DULoxetine  (CYMBALTA) 30 MG capsule; Take 1 capsule by mouth Daily.  Dispense: 30 capsule; Refill: 1  -     haloperidol (HALDOL) 2 MG tablet; Take 1 tablet by mouth 2 (Two) Times a Day.  Dispense: 60 tablet; Refill: 1    3. Insomnia due to mental condition  -     prazosin (MINIPRESS) 1 MG capsule; Take 1 capsule by mouth Every Night.  Dispense: 30 capsule; Refill: 1    4. Nightmares  -     prazosin (MINIPRESS) 1 MG capsule; Take 1 capsule by mouth Every Night.  Dispense: 30 capsule; Refill: 1    5. Nicotine use disorder    -Patient doing relatively well.  He is not having any major mood fluctuations, manic symptoms, anxiety, or depressive symptoms.  He continues to have some sexual side effects with this may be related to things outside of his psychotropic medications.  Overall symptom burden has improved.  -Reviewed previous documentation  -Reviewed labs  -Reviewed previous lithium level  -Continue Haldol 2 mg twice daily for PTSD and mood stabilization  -Continue prazosin 1 mg nightly for insomnia and nightmares  -Continue Cymbalta 30 mg p.o. daily for mood and anxiety  -Continue lithium 150 mg in the morning and 300 mg at night due to the increased fatigue, sexual side effects  -Encourage cessation of nicotine  -Encouraged therapy and anger management  -Approximate appointment time 8:30 AM to 8:45 AM via telephone visit.    Visit Diagnoses:    ICD-10-CM ICD-9-CM   1. Chronic Post traumatic stress disorder (PTSD)  F43.10 309.81   2. Anxiety disorder, unspecified type  F41.9 300.00   3. Insomnia due to mental condition  F51.05 300.9     327.02   4. Nightmares  F51.5 307.47   5. Nicotine use disorder  F17.200 305.1       TREATMENT PLAN/GOALS: Continue supportive psychotherapy efforts and medications as indicated. Treatment and medication options discussed during today's visit. Patient ackowledged and verbally consented to continue with current treatment plan and was educated on the importance of compliance with treatment and  follow-up appointments.    MEDICATION ISSUES:    Discussed medication options and treatment plan of prescribed medication as well as the risks, benefits, and side effects including potential falls, possible impaired driving and metabolic adversities among others. Patient is agreeable to call the office with any worsening of symptoms or onset of side effects. Patient is agreeable to call 911 or go to the nearest ER should he/she begin having SI/HI. No medication side effects or related complaints today.     MEDS ORDERED DURING VISIT:  New Medications Ordered This Visit   Medications   • DULoxetine (CYMBALTA) 30 MG capsule     Sig: Take 1 capsule by mouth Daily.     Dispense:  30 capsule     Refill:  1   • haloperidol (HALDOL) 2 MG tablet     Sig: Take 1 tablet by mouth 2 (Two) Times a Day.     Dispense:  60 tablet     Refill:  1   • prazosin (MINIPRESS) 1 MG capsule     Sig: Take 1 capsule by mouth Every Night.     Dispense:  30 capsule     Refill:  1   • lithium 300 MG tablet     Sig: Take 0.5 tablets PO tablets in the morning and 1 tablet PO at night.     Dispense:  45 tablet     Refill:  1       Return in about 3 months (around 8/28/2021).             This document has been electronically signed by Stefan Marion MD  May 28, 2021

## 2021-06-21 ENCOUNTER — HOSPITAL ENCOUNTER (EMERGENCY)
Facility: HOSPITAL | Age: 36
Discharge: HOME | End: 2021-06-21
Attending: EMERGENCY MEDICINE | Admitting: EMERGENCY MEDICINE

## 2021-06-21 VITALS
HEIGHT: 76 IN | DIASTOLIC BLOOD PRESSURE: 85 MMHG | BODY MASS INDEX: 31.56 KG/M2 | WEIGHT: 259.2 LBS | OXYGEN SATURATION: 98 % | TEMPERATURE: 97.7 F | RESPIRATION RATE: 16 BRPM | SYSTOLIC BLOOD PRESSURE: 131 MMHG | HEART RATE: 67 BPM

## 2021-06-21 DIAGNOSIS — R11.2 NAUSEA AND VOMITING, INTRACTABILITY OF VOMITING NOT SPECIFIED, UNSPECIFIED VOMITING TYPE: Primary | ICD-10-CM

## 2021-06-21 PROCEDURE — 63710000001 ONDANSETRON ODT 4 MG TABLET DISPERSIBLE: Performed by: PHYSICIAN ASSISTANT

## 2021-06-21 PROCEDURE — 99283 EMERGENCY DEPT VISIT LOW MDM: CPT

## 2021-06-21 RX ORDER — ONDANSETRON 4 MG/1
4 TABLET, ORALLY DISINTEGRATING ORAL ONCE
Status: COMPLETED | OUTPATIENT
Start: 2021-06-21 | End: 2021-06-21

## 2021-06-21 RX ORDER — ONDANSETRON 4 MG/1
4 TABLET, ORALLY DISINTEGRATING ORAL EVERY 6 HOURS PRN
Qty: 20 TABLET | Refills: 0 | Status: SHIPPED | OUTPATIENT
Start: 2021-06-21 | End: 2021-09-17

## 2021-06-21 RX ADMIN — ONDANSETRON 4 MG: 4 TABLET, ORALLY DISINTEGRATING ORAL at 09:27

## 2021-06-21 NOTE — ED PROVIDER NOTES
Subjective   Chief Complaint: Vomiting  History of Present Illness: 36-year-old male comes in for evaluation above complaint.  He states he vomited 3 times since 10:00 last night.  He is thinks it may been something he ate as he ate some [employer] fast food last night.  He denies any abdominal pain.  He has chronic diarrhea that is being worked up for by GI and states no change in his stool.  Onset: 10:00 last night  Timing: Intermittent  Exacerbating / Alleviating factors: Nothing makes symptoms better or worse  Associated symptoms: No abdominal pain, dysuria, change in stool      Nurses Notes reviewed and agree, including vitals, allergies, social history and prior medical history.          Review of Systems   Constitutional: Negative.    HENT: Negative.    Eyes: Negative.    Respiratory: Negative.    Cardiovascular: Negative.    Gastrointestinal: Positive for nausea and vomiting.   Genitourinary: Negative.    Musculoskeletal: Negative.    Skin: Negative.    Allergic/Immunologic: Negative.    Neurological: Negative.    Psychiatric/Behavioral: Negative.    All other systems reviewed and are negative.      Past Medical History:   Diagnosis Date   • Anxiety    • Asthma    • Bipolar 1 disorder (CMS/[facility])    • Depression    • DM (diabetes mellitus) (CMS/[facility])    • Hypertension    • Kidney stone    • Polyneuropathy    • Positive TB test     treated w/ meds x 6 months   • PTSD (post-traumatic stress disorder)    • Sleep apnea    • Suicide attempt (CMS/[facility])    • Tattoos        Allergies   Allergen Reactions   • Gabapentin Swelling     + swelling   • Latex Rash       Past Surgical History:   Procedure Laterality Date   • CHOLECYSTECTOMY  2002   • INGUINAL HERNIA REPAIR Right 1995   • ORIF METACARPAL FRACTURE Right 2000   • TIBIA FRACTURE SURGERY Left 1997    ORIF       Family History   Problem Relation Age of Onset   • Arthritis Father    • Hypertension Father    • Alcohol abuse Father    • Drug abuse Father    • Diabetes  Maternal Aunt    • Diabetes Maternal Uncle    • Diabetes Maternal Grandmother    • Alzheimer's disease Maternal Grandmother    • Dementia Maternal Grandmother    • Diabetes Other    • Hypertension Mother    • Depression Mother    • ADD / ADHD Brother    • Early death Maternal Grandfather    • Liver disease Maternal Grandfather    • Alcohol abuse Maternal Grandfather    • Cirrhosis Maternal Grandfather    • No Known Problems Paternal Grandmother    • Liver disease Paternal Grandfather    • Alcohol abuse Paternal Grandfather    • Early death Paternal Grandfather    • Cirrhosis Paternal Grandfather    • No Known Problems Brother    • Anxiety disorder Neg Hx    • Bipolar disorder Neg Hx    • OCD Neg Hx    • Paranoid behavior Neg Hx    • Schizophrenia Neg Hx    • Seizures Neg Hx    • Self-Injurious Behavior  Neg Hx    • Suicide Attempts Neg Hx    • Colon cancer Neg Hx        Social History     Socioeconomic History   • Marital status:      Spouse name: Not on file   • Number of children: Not on file   • Years of education: Not on file   • Highest education level: Not on file   Tobacco Use   • Smoking status: Current Every Day Smoker     Packs/day: 1.00     Years: 18.00     Pack years: 18.00     Types: Cigarettes     Start date: 1999   • Smokeless tobacco: Never Used   Vaping Use   • Vaping Use: Never used   Substance and Sexual Activity   • Alcohol use: No   • Drug use: Yes     Types: Marijuana     Comment: rarely   • Sexual activity: Yes     Partners: Female           Objective   Physical Exam  Vitals and nursing note reviewed.   Constitutional:       Appearance: Normal appearance. He is obese.   HENT:      Head: Normocephalic and atraumatic.      Nose: Nose normal.   Eyes:      Extraocular Movements: Extraocular movements intact.   Cardiovascular:      Rate and Rhythm: Normal rate and regular rhythm.   Pulmonary:      Effort: Pulmonary effort is normal.   Abdominal:      General: Abdomen is flat. Bowel sounds  are normal.      Palpations: Abdomen is soft.      Tenderness: There is no abdominal tenderness. There is no rebound.   Musculoskeletal:         General: Normal range of motion.      Cervical back: Normal range of motion.   Skin:     General: Skin is warm and dry.   Neurological:      General: No focal deficit present.      Mental Status: He is alert. Mental status is at baseline.   Psychiatric:         Mood and Affect: Mood normal.         Behavior: Behavior normal.         Procedures           ED Course      Discussed with patient vitals are reassuring, nonsurgical abdominal exam.  Did offer IV fluids labs and antiemetics however he declined stating he wishes to try oral treatment and a work note first.                                     MDM    Final diagnoses:   Nausea and vomiting, intractability of vomiting not specified, unspecified vomiting type       ED Disposition  ED Disposition     ED Disposition Condition Comment    Discharge Stable           Jose Rich DO  [address]  [facility] [address]  [place] [address] [phone]      As needed    [facility]  [address] [address] [phone]    If symptoms worsen         Medication List      New Prescriptions    ondansetron ODT 4 MG disintegrating tablet  Commonly known as: ZOFRAN-ODT  Place 1 tablet on the tongue Every 6 (Six) Hours As Needed for Nausea or Vomiting.           Where to Get Your Medications      These medications were sent to [facility]/pharmacy #[id number] - [place], [place] - [address] [facility] - [phone]  - [phone]   [address] [address]    Phone: [phone]   · ondansetron ODT 4 MG disintegrating tablet          Otto Bueno PA-C  06/21/21 0917

## 2021-07-30 ENCOUNTER — HOSPITAL ENCOUNTER (EMERGENCY)
Facility: HOSPITAL | Age: 36
Discharge: HOME | End: 2021-07-30
Attending: FAMILY MEDICINE | Admitting: FAMILY MEDICINE

## 2021-07-30 VITALS
BODY MASS INDEX: 32.56 KG/M2 | TEMPERATURE: 97.6 F | RESPIRATION RATE: 18 BRPM | SYSTOLIC BLOOD PRESSURE: 138 MMHG | DIASTOLIC BLOOD PRESSURE: 82 MMHG | OXYGEN SATURATION: 98 % | WEIGHT: 267.4 LBS | HEART RATE: 76 BPM | HEIGHT: 76 IN

## 2021-07-30 DIAGNOSIS — R11.2 NON-INTRACTABLE VOMITING WITH NAUSEA, UNSPECIFIED VOMITING TYPE: Primary | ICD-10-CM

## 2021-07-30 PROCEDURE — 63710000001 ONDANSETRON ODT 4 MG TABLET DISPERSIBLE: Performed by: FAMILY MEDICINE

## 2021-07-30 PROCEDURE — 99283 EMERGENCY DEPT VISIT LOW MDM: CPT

## 2021-07-30 RX ORDER — ONDANSETRON 4 MG/1
4 TABLET, ORALLY DISINTEGRATING ORAL ONCE
Status: COMPLETED | OUTPATIENT
Start: 2021-07-30 | End: 2021-07-30

## 2021-07-30 RX ADMIN — ONDANSETRON 4 MG: 4 TABLET, ORALLY DISINTEGRATING ORAL at 06:02

## 2021-08-05 ENCOUNTER — TELEPHONE (OUTPATIENT)
Dept: PSYCHIATRY | Facility: CLINIC | Age: 36
End: 2021-08-05

## 2021-08-05 DIAGNOSIS — F43.10 POST TRAUMATIC STRESS DISORDER (PTSD): Chronic | ICD-10-CM

## 2021-08-05 DIAGNOSIS — F41.9 ANXIETY DISORDER, UNSPECIFIED TYPE: ICD-10-CM

## 2021-08-05 RX ORDER — DULOXETIN HYDROCHLORIDE 60 MG/1
60 CAPSULE, DELAYED RELEASE ORAL DAILY
Qty: 30 CAPSULE | Refills: 2 | Status: SHIPPED | OUTPATIENT
Start: 2021-08-05 | End: 2021-08-27 | Stop reason: SDUPTHER

## 2021-08-05 RX ORDER — CHOLESTYRAMINE 4 G/5.5G
POWDER, FOR SUSPENSION ORAL
COMMUNITY
Start: 2021-08-02 | End: 2021-08-24 | Stop reason: SINTOL

## 2021-08-05 RX ORDER — HALOPERIDOL 2 MG/1
TABLET ORAL
Qty: 90 TABLET | Refills: 1 | Status: SHIPPED | OUTPATIENT
Start: 2021-08-05 | End: 2021-09-17 | Stop reason: SDUPTHER

## 2021-08-05 NOTE — TELEPHONE ENCOUNTER
PT states anxiety is flaring up increasing irritability, not sleeping maybe 4 hours a night at best. This has been going on for 2-3 weeks every day that has went by symptoms are  getting worse. Please Advise

## 2021-08-05 NOTE — TELEPHONE ENCOUNTER
Let's increase Cymbalta to 60mg daily and increase Haldol to 2mg in the morning and 4mg at night. Sent in new scripts.

## 2021-08-23 NOTE — TELEPHONE ENCOUNTER
PT STATES MED CHANGES HAS NOT HELPED STILL AGITATED, REALLY DEPRESSED, ROBERTO AND HAVING A HARD TIME SLEEPING. PLEASE ADVISE ON WHAT HE SHOULD DO.

## 2021-08-24 ENCOUNTER — OFFICE VISIT (OUTPATIENT)
Dept: GASTROENTEROLOGY | Facility: CLINIC | Age: 36
End: 2021-08-24

## 2021-08-24 VITALS
DIASTOLIC BLOOD PRESSURE: 77 MMHG | TEMPERATURE: 98.7 F | SYSTOLIC BLOOD PRESSURE: 151 MMHG | RESPIRATION RATE: 18 BRPM | WEIGHT: 266 LBS | HEART RATE: 74 BPM | BODY MASS INDEX: 32.39 KG/M2 | HEIGHT: 76 IN

## 2021-08-24 DIAGNOSIS — E66.09 CLASS 1 OBESITY DUE TO EXCESS CALORIES WITHOUT SERIOUS COMORBIDITY WITH BODY MASS INDEX (BMI) OF 32.0 TO 32.9 IN ADULT: Chronic | ICD-10-CM

## 2021-08-24 DIAGNOSIS — K21.9 GASTROESOPHAGEAL REFLUX DISEASE, UNSPECIFIED WHETHER ESOPHAGITIS PRESENT: Chronic | ICD-10-CM

## 2021-08-24 DIAGNOSIS — R11.2 NAUSEA AND VOMITING, INTRACTABILITY OF VOMITING NOT SPECIFIED, UNSPECIFIED VOMITING TYPE: Chronic | ICD-10-CM

## 2021-08-24 DIAGNOSIS — Z01.818 PREOP TESTING: Primary | ICD-10-CM

## 2021-08-24 DIAGNOSIS — R19.7 DIARRHEA, UNSPECIFIED TYPE: Primary | Chronic | ICD-10-CM

## 2021-08-24 PROBLEM — E66.811 CLASS 1 OBESITY DUE TO EXCESS CALORIES WITHOUT SERIOUS COMORBIDITY WITH BODY MASS INDEX (BMI) OF 32.0 TO 32.9 IN ADULT: Status: ACTIVE | Noted: 2019-05-17

## 2021-08-24 PROCEDURE — 99214 OFFICE O/P EST MOD 30 MIN: CPT | Performed by: NURSE PRACTITIONER

## 2021-08-24 RX ORDER — CHOLESTYRAMINE 4 G/5.5G
POWDER, FOR SUSPENSION ORAL
Qty: 30 PACKET | Refills: 1 | Status: SHIPPED | OUTPATIENT
Start: 2021-08-24 | End: 2021-10-18

## 2021-08-24 RX ORDER — OMEPRAZOLE 40 MG/1
CAPSULE, DELAYED RELEASE ORAL
Qty: 30 CAPSULE | Refills: 3 | Status: SHIPPED | OUTPATIENT
Start: 2021-08-24 | End: 2022-01-03

## 2021-08-24 RX ORDER — POLYETHYLENE GLYCOL 3350 17 G/17G
POWDER, FOR SOLUTION ORAL
Qty: 238 G | Refills: 0 | Status: SHIPPED | OUTPATIENT
Start: 2021-08-24 | End: 2022-05-20

## 2021-08-24 RX ORDER — BISACODYL 5 MG/1
TABLET, DELAYED RELEASE ORAL
Qty: 4 TABLET | Refills: 0 | Status: SHIPPED | OUTPATIENT
Start: 2021-08-24 | End: 2022-05-20

## 2021-08-24 RX ORDER — SODIUM CHLORIDE 9 MG/ML
70 INJECTION, SOLUTION INTRAVENOUS CONTINUOUS PRN
Status: CANCELLED | OUTPATIENT
Start: 2021-08-24

## 2021-08-24 NOTE — PATIENT INSTRUCTIONS
Antireflux measures: Avoid fried, fatty foods, alcohol, chocolate, coffee, tea,  soft drinks, peppermint and spearmint, spicy foods, tomatoes and tomato based foods, onions, peppers, and smoking.   Other antireflux measures include weight reduction if overweight, avoiding tight clothing around the abdomen, elevating the head of the bed 6 inches with blocks under the head board, and don't drink or eat before going to bed and avoid lying down immediately after meals.  Omeprazole 40 mg 1 by mouth in the am 30 minutes before breakfast.  Zofran 4 mg 1 po every 8 hours as needed for nausea.  Low FODMAP diet - avoid dairy  Decrease cholestyramine to 1/2 packet at bedtime. Adjust to have 1-2 soft bowel movements daily.   High fiber, low fat diet with liberal water intake.   Advised to exercise 30 minutes 4-5 days per week.   Advised to lose 20-25 pounds in the next 6-12 months.   Upper endoscopy-EGD: The indications, technique, alternatives and potential risk and complications were discussed with the patient including but not limited to bleeding, perforations, missing lesions and anesthetic complications. The patient understands and wishes to proceed with the procedure and has given their verbal consent. Written patient education information was given to the patient.   Colonoscopy: The indications, technique, alternatives and potential risk and complications were discussed with the patient including but not limited to bleeding, perforations, missing lesions and anesthetic complications. The patient understands and wishes to proceed with the procedure and has given their verbal consent. Written patient education information was given to the patient.   The patient will call if they have further questions before procedure.   COVID-19 testing prior to procedure. The patient will need to self-quarantine after testing until the procedure. Instructions given to patient.     Low-FODMAP Eating Plan    FODMAPs (fermentable  oligosaccharides, disaccharides, monosaccharides, and polyols) are sugars that are hard for some people to digest. A low-FODMAP eating plan may help some people who have bowel (intestinal) diseases to manage their symptoms.  This meal plan can be complicated to follow. Work with a diet and nutrition specialist (dietitian) to make a low-FODMAP eating plan that is right for you. A dietitian can make sure that you get enough nutrition from this diet.  What are tips for following this plan?  Reading food labels  · Check labels for hidden FODMAPs such as:  ? High-fructose syrup.  ? Honey.  ? Agave.  ? Natural fruit flavors.  ? Onion or garlic powder.  · Choose low-FODMAP foods that contain 3-4 grams of fiber per serving.  · Check food labels for serving sizes. Eat only one serving at a time to make sure FODMAP levels stay low.  Meal planning  · Follow a low-FODMAP eating plan for up to 6 weeks, or as told by your health care provider or dietitian.  · To follow the eating plan:  1. Eliminate high-FODMAP foods from your diet completely.  2. Gradually reintroduce high-FODMAP foods into your diet one at a time. Most people should wait a few days after introducing one high-FODMAP food before they introduce the next high-FODMAP food. Your dietitian can recommend how quickly you may reintroduce foods.  3. Keep a daily record of what you eat and drink, and make note of any symptoms that you have after eating.  4. Review your daily record with a dietitian regularly. Your dietitian can help you identify which foods you can eat and which foods you should avoid.  General tips  · Drink enough fluid each day to keep your urine pale yellow.  · Avoid processed foods. These often have added sugar and may be high in FODMAPs.  · Avoid most dairy products, whole grains, and sweeteners.  · Work with a dietitian to make sure you get enough fiber in your diet.  Recommended foods  Grains  · Gluten-free grains, such as rice, oats, buckwheat,  "quinoa, corn, polenta, and millet. Gluten-free pasta, bread, or cereal. Rice noodles. Corn tortillas.  Vegetables  · Eggplant, zucchini, cucumber, peppers, green beans, [place] sprouts, bean sprouts, lettuce, arugula, kale, Swiss chard, spinach, madai greens, bok alex, summer squash, potato, and tomato. Limited amounts of corn, carrot, and sweet potato. Green parts of scallions.  Fruits  · Bananas, oranges, nitish, limes, blueberries, raspberries, strawberries, grapes, cantaloupe, honeydew melon, kiwi, papaya, passion fruit, and pineapple. Limited amounts of dried cranberries, banana chips, and shredded coconut.  Dairy  · Lactose-free milk, yogurt, and kefir. Lactose-free cottage cheese and ice cream. Non-dairy milks, such as almond, coconut, hemp, and rice milk. Yogurts made of non-dairy milks. Limited amounts of goat cheese, brie, mozzarella, parmesan, swiss, and other hard cheeses.  Meats and other protein foods  · Unseasoned beef, pork, poultry, or fish. Eggs. Tate. Tofu (firm) and tempeh. Limited amounts of nuts and seeds, such as almonds, walnuts, brazil nuts, pecans, peanuts, pumpkin seeds, edu seeds, and sunflower seeds.  Fats and oils  · Butter-free spreads. Vegetable oils, such as olive, canola, and sunflower oil.  Seasoning and other foods  · Artificial sweeteners with names that do not end in \"ol\" such as aspartame, saccharine, and stevia. Maple syrup, white table sugar, raw sugar, brown sugar, and molasses. Fresh basil, coriander, parsley, rosemary, and thyme.  Beverages  · Water and mineral water. Sugar-sweetened soft drinks. Small amounts of orange juice or cranberry juice. Black and green tea. Most dry christine. Coffee.  This may not be a complete list of low-FODMAP foods. Talk with your dietitian for more information.  Foods to avoid  Grains  · Wheat, including kamut, durum, and semolina. Barley and bulgur. Couscous. Wheat-based cereals. Wheat noodles, bread, crackers, and " pastries.  Vegetables  · Chicory root, artichoke, asparagus, cabbage, snow peas, sugar snap peas, mushrooms, and cauliflower. Onions, garlic, leeks, and the white part of scallions.  Fruits  · Fresh, dried, and juiced forms of apple, pear, watermelon, peach, plum, cherries, apricots, blackberries, boysenberries, figs, nectarines, and [place]. Avocado.  Dairy  · Milk, yogurt, ice cream, and soft cheese. Cream and sour cream. Milk-based sauces. Custard.  Meats and other protein foods  · Fried or fatty meat. Sausage. Cashews and pistachios. Soybeans, baked beans, black beans, chickpeas, kidney beans, shayna beans, navy beans, lentils, and split peas.  Seasoning and other foods  · Any sugar-free gum or candy. Foods that contain artificial sweeteners such as sorbitol, mannitol, isomalt, or xylitol. Foods that contain honey, high-fructose corn syrup, or agave. Bouillon, vegetable stock, beef stock, and chicken stock. Garlic and onion powder. Condiments made with onion, such as hummus, chutney, pickles, relish, salad dressing, and salsa. Tomato paste.  Beverages  · Chicory-based drinks. Coffee substitutes. Chamomile tea. Fennel tea. Sweet or fortified christine such as port or brandy. Diet soft drinks made with isomalt, mannitol, maltitol, sorbitol, or xylitol. Apple, pear, and [place] juice. Juices with high-fructose corn syrup.  This may not be a complete list of high-FODMAP foods. Talk with your dietitian to discuss what dietary choices are best for you.   Summary  · A low-FODMAP eating plan is a short-term diet that eliminates FODMAPs from your diet to help ease symptoms of certain bowel diseases.  · The eating plan usually lasts up to 6 weeks. After that, high-FODMAP foods are restarted gradually, one at a time, so you can find out which may be causing symptoms.  · A low-FODMAP eating plan can be complicated. It is best to work with a dietitian who has experience with this type of plan.  This information is not intended to  replace advice given to you by your health care provider. Make sure you discuss any questions you have with your health care provider.  Document Revised: 11/30/2018 Document Reviewed: 08/14/2018  [employer] Patient Education © 2021 [employer]

## 2021-08-24 NOTE — PROGRESS NOTES
Follow Up Note     Date: 2021   Patient Name: Alex Torres  MRN: [id number]  : 1985     Primary Care Provider: Jose Rich DO     Chief Complaint   Patient presents with   • Follow-up   • Heartburn     History of present illness:   2021  Alex Torres is a 36 y.o. male who is here today for follow up.    Diarrhea is worsening. He tried taking the cholestyramine 1 packet at bedtime but he stopped it as it caused constipation. He is having 7-9 loose bowel movements daily. He occasionally has mucus in his stools. No abdominal pain. No history of rectal bleeding.     Reflux is worsening. He is taking Pantoprazole daily but he is frequently waking up at night with nausea and vomiting of acid and would like to try something else. He denies hematemesis. No history of difficulty swallowing.    Interval History:  2021  The patient has a history of diarrhea off and on for several years that has gradually worsened over the past 8 months. He is having diarrhea >5 times per day. He is having incontinence of bowels at times with coughing. No rectal bleeding. Stool can be bile tinged.  No abdominal pain.       He has a history of reflux for many years. He is taking Pantoprazole with moderate control, it does not work as well as it did in the past. He uses TUMs as needed, but it does not help.  No difficulty swallowing.      The patient has not had colonoscopy or EGD in the past. There is no family history of colon cancer or IBD. Both grandfathers had cirrhosis related to alcohol use.     Subjective      Past Medical History:   Diagnosis Date   • Anxiety    • Asthma    • Bipolar 1 disorder (CMS/HCC)    • Depression    • DM (diabetes mellitus) (CMS/HCC)    • Hypertension    • Kidney stone    • Polyneuropathy    • Positive TB test     treated w/ meds x 6 months   • PTSD (post-traumatic stress disorder)    • Sleep apnea    • Suicide attempt (CMS/[facility])    • Tattoos      Past Surgical  History:   Procedure Laterality Date   • CHOLECYSTECTOMY  2002   • INGUINAL HERNIA REPAIR Right 1995   • ORIF METACARPAL FRACTURE Right 2000   • TIBIA FRACTURE SURGERY Left 1997    ORIF     Family History   Problem Relation Age of Onset   • Arthritis Father    • Hypertension Father    • Alcohol abuse Father    • Drug abuse Father    • Diabetes Maternal Aunt    • Diabetes Maternal Uncle    • Diabetes Maternal Grandmother    • Alzheimer's disease Maternal Grandmother    • Dementia Maternal Grandmother    • Diabetes Other    • Hypertension Mother    • Depression Mother    • ADD / ADHD Brother    • Early death Maternal Grandfather    • Liver disease Maternal Grandfather    • Alcohol abuse Maternal Grandfather    • Cirrhosis Maternal Grandfather    • No Known Problems Paternal Grandmother    • Liver disease Paternal Grandfather    • Alcohol abuse Paternal Grandfather    • Early death Paternal Grandfather    • Cirrhosis Paternal Grandfather    • No Known Problems Brother    • Anxiety disorder Neg Hx    • Bipolar disorder Neg Hx    • OCD Neg Hx    • Paranoid behavior Neg Hx    • Schizophrenia Neg Hx    • Seizures Neg Hx    • Self-Injurious Behavior  Neg Hx    • Suicide Attempts Neg Hx    • Colon cancer Neg Hx      Social History     Socioeconomic History   • Marital status:      Spouse name: Not on file   • Number of children: Not on file   • Years of education: Not on file   • Highest education level: Not on file   Tobacco Use   • Smoking status: Current Every Day Smoker     Packs/day: 1.00     Years: 18.00     Pack years: 18.00     Types: Cigarettes     Start date: 1999   • Smokeless tobacco: Never Used   Vaping Use   • Vaping Use: Never used   Substance and Sexual Activity   • Alcohol use: No   • Drug use: Yes     Types: Marijuana     Comment: rarely   • Sexual activity: Yes     Partners: Female       Current Outpatient Medications:   •  albuterol sulfate  (90 Base) MCG/ACT inhaler, Inhale 2 puffs Every 4  (Four) Hours As Needed for Wheezing or Shortness of Air., Disp: 18 g, Rfl: 3  •  amitriptyline (ELAVIL) 10 MG tablet, TAKE 1 TABLET BY MOUTH EVERY DAY AT NIGHT, Disp: 90 tablet, Rfl: 3  •  carvedilol (COREG) 6.25 MG tablet, TAKE 1 TABLET BY MOUTH TWICE A DAY WITH MEALS, Disp: 180 tablet, Rfl: 3  •  DULoxetine (CYMBALTA) 60 MG capsule, Take 1 capsule by mouth Daily., Disp: 30 capsule, Rfl: 2  •  haloperidol (HALDOL) 2 MG tablet, Take 1 tablet PO in the morning and take 2 tablets PO at night., Disp: 90 tablet, Rfl: 1  •  lithium 300 MG tablet, Take 0.5 tablets PO tablets in the morning and 1 tablet PO at night., Disp: 45 tablet, Rfl: 1  •  ondansetron ODT (ZOFRAN-ODT) 4 MG disintegrating tablet, Place 1 tablet on the tongue Every 6 (Six) Hours As Needed for Nausea or Vomiting., Disp: 20 tablet, Rfl: 0  •  prazosin (MINIPRESS) 1 MG capsule, Take 1 capsule by mouth Every Night., Disp: 30 capsule, Rfl: 1  •  valsartan (DIOVAN) 160 MG tablet, TAKE 1 TABLET BY MOUTH EVERY DAY, Disp: 90 tablet, Rfl: 3  •  bisacodyl (DULCOLAX) 5 MG EC tablet, Take as directed for colon prep, Disp: 4 tablet, Rfl: 0  •  omeprazole (priLOSEC) 40 MG capsule, 1 po daily in the am 30 minutes before breakfast, Disp: 30 capsule, Rfl: 3  •  polyethylene glycol (MiraLax) 17 GM/SCOOP powder, Take as directed for colonoscopy prep, Disp: 238 g, Rfl: 0  •  Prevalite 4 g packet, Take 1/2 packet in 6 ounces of liquid at bedtime. Adjust to have 1-2 soft bowel movements per day., Disp: 30 packet, Rfl: 1     Allergies   Allergen Reactions   • Gabapentin Swelling     + swelling   • Latex Rash     The following portions of the patient's history were reviewed and updated as appropriate: allergies, current medications, past family history, past medical history, past social history, past surgical history and problem list.  Objective     Physical Exam  Vitals and nursing note reviewed.   Constitutional:       General: He is not in acute distress.     Appearance:  "Normal appearance. He is well-developed. He is not diaphoretic.   HENT:      Head: Normocephalic and atraumatic.      Right Ear: Hearing and external ear normal.      Left Ear: Hearing and external ear normal.      Nose: Nose normal.      Mouth/Throat:      Mouth: Mucous membranes are not pale, not dry and not cyanotic.   Eyes:      General: Lids are normal.         Right eye: No discharge.         Left eye: No discharge.      Conjunctiva/sclera: Conjunctivae normal.   Neck:      Trachea: Trachea normal.   Cardiovascular:      Rate and Rhythm: Normal rate and regular rhythm.      Heart sounds: Normal heart sounds.   Pulmonary:      Effort: Pulmonary effort is normal. No respiratory distress.      Breath sounds: Normal breath sounds.   Chest:      Chest wall: No tenderness.   Abdominal:      General: Bowel sounds are normal. There is no distension.      Palpations: Abdomen is soft. There is no mass.      Tenderness: There is no abdominal tenderness. There is no guarding or rebound.      Hernia: No hernia is present.   Musculoskeletal:      Cervical back: No edema.   Skin:     General: Skin is warm and dry.      Coloration: Skin is not pale.      Findings: No rash.   Neurological:      Mental Status: He is alert and oriented to person, place, and time.      Cranial Nerves: No cranial nerve deficit.   Psychiatric:         Mood and Affect: Mood normal.         Speech: Speech normal.         Behavior: Behavior is cooperative.       Vitals:    08/24/21 0903   BP: 151/77   Pulse: 74   Resp: 18   Temp: 98.7 °F (37.1 °C)   Weight: 121 kg (266 lb)   Height: 193 cm (76\")     Results Review:   I reviewed the patient's new clinical results.    No visits with results within 90 Day(s) from this visit.   Latest known visit with results is:   Office Visit on 05/07/2021   Component Date Value Ref Range Status   • Hemoglobin A1C 05/07/2021 5.3  % Final   • C difficile Toxin Gene NAAT 05/17/2021 Negative  Negative Final   • " Campylobacter 05/17/2021 Not Detected  Not Detected Final   • C.difficile toxin A/B 05/17/2021 Not Detected  Not Detected Final   • Plesiomonas shigelloides 05/17/2021 Not Detected  Not Detected Final   • Salmonella 05/17/2021 Not Detected  Not Detected Final   • Vibrio 05/17/2021 Not Detected  Not Detected Final   • Vibrio cholerae 05/17/2021 Not Detected  Not Detected Final   • Yersinia enterocolitica 05/17/2021 Not Detected  Not Detected Final   • Enteroaggregative E. coli 05/17/2021 Not Detected  Not Detected Final   • Enteropathogenic E. coli 05/17/2021 Not Detected  Not Detected Final   • Enterotoxigenic E. coli 05/17/2021 Not Detected  Not Detected Final   • Shiga-like toxin-producing E. coli  05/17/2021 Not Detected  Not Detected Final   • E. coli O157 05/17/2021 Not applicable  Not Detected Final   • Shigella enteroinvasive E. coli 05/17/2021 Not Detected  Not Detected Final   • Cryptosporidium 05/17/2021 Not Detected  Not Detected Final   • Cyclospora cayetanensis 05/17/2021 Not Detected  Not Detected Final   • Entamoeba Histolytica Ag 05/17/2021 Not Detected  Not Detected Final   • Giardia lamblia 05/17/2021 Not Detected  Not Detected Final   • Adenovirus 40/41 Ag 05/17/2021 Not Detected  Not Detected Final   • Astrovirus 05/17/2021 Not Detected  Not Detected Final   • Norovirus GI/GII 05/17/2021 Not Detected  Not Detected Final   • Rotavirus A 05/17/2021 Not Detected  Not Detected Final   • Sapovirus 05/17/2021 Not Detected  Not Detected Final      Comprehensive Metabolic Panel (05/17/2021 08:43)  CBC & Differential (05/17/2021 08:43)  TSH (05/17/2021 08:43)  Hepatitis Panel, Acute (05/17/2021 08:43)  Lipid Panel (05/17/2021 08:43)    No radiology results for the last 90 days.     Assessment / Plan      1. Diarrhea, unspecified type  Diarrhea has worsened. He tried taking 1 packet of cholestyramine at bedtime, but he had constipation and only had 1 hard bowel movement daily, so he stopped it. No history  of rectal bleeding. He has had mucus in his stools recently. No abdominal pain. Suspect bile salt diarrhea.  Low FODMAP diet - avoid dairy.  Cholestyramine 1/2 packet at bedtime-long discussion with patient regarding adjusting to have 1-2 soft bowel movements daily.  Colonoscopy to rule out IBD as diarrhea has worsened.    - Prevalite 4 g packet; Take 1/2 packet in 6 ounces of liquid at bedtime. Adjust to have 1-2 soft bowel movements per day.  Dispense: 30 packet; Refill: 1  - polyethylene glycol (MiraLax) 17 GM/SCOOP powder; Take as directed for colonoscopy prep  Dispense: 238 g; Refill: 0  - bisacodyl (DULCOLAX) 5 MG EC tablet; Take as directed for colon prep  Dispense: 4 tablet; Refill: 0  - Case Request; Standing  - Implement Anesthesia Orders Day of Procedure; Standing  - Obtain Informed Consent; Standing  - Verify Bowel Prep Was Successful; Standing  - Oxygen Therapy- Nasal Cannula; 2 LPM; Titrate for SPO2: equal to or greater than, 90%; Standing  - POC Glucose Once; Standing  - sodium chloride 0.9 % infusion  - Case Request    5/25/2021  History of diarrhea off and on for several years with gradual worsening over the past 8 months. He is having diarrhea >5 episodes per day. Stools are loose, bile tinged. No rectal bleeding. No abdominal pain. No weight loss. TSH normal. Basic labs unremarkable. Stool studies with negative C. difficile, gastrointestinal panel. Suspect bile acid/functional diarrhea. He has not had colonoscopy in the past. There is no family history of IBD or colon cancer.  Cholestyramine powder 1 packet twice a day - do not take with other medications  Colonoscopy in the future if no improvement or symptoms worsen.    2. Gastroesophageal reflux disease, unspecified whether esophagitis present  3. Nausea and vomiting, intractability of vomiting not specified, unspecified vomiting type  Reflux is worsening. He is taking high dose PPI daily without improvement. He is frequently waking at night  with nausea and vomiting of acid. No history of difficulty swallowing. No history of abdominal pain. No melena.   Anti-reflux measures-discussed in detail with patient.  Omeprazole 40 mg daily.   EGD for further evaluation.    - omeprazole (priLOSEC) 40 MG capsule; 1 po daily in the am 30 minutes before breakfast  Dispense: 30 capsule; Refill: 3  - Case Request; Standing  - Implement Anesthesia Orders Day of Procedure; Standing  - Obtain Informed Consent; Standing  - Verify Bowel Prep Was Successful; Standing  - Oxygen Therapy- Nasal Cannula; 2 LPM; Titrate for SPO2: equal to or greater than, 90%; Standing  - POC Glucose Once; Standing  - sodium chloride 0.9 % infusion  - Case Request    5/25/2021  Moderate control with Pantoprazole daily and TUMs as needed. He does not follow anti-reflux measures and drinks coffee and sodas. He takes his PPI with all of his medications. No history of difficulty swallowing.  Anti-reflux measures.  Pantoprazole 40 mg daily in the morning 30 minutes before breakfast.    4. Class 1 obesity due to excess calories without serious comorbidity with body mass index (BMI) of 32.0 to 32.9 in adult  BMI 32.4. No history of elevated liver enzymes.  High fiber, low fat diet with liberal water intake.   Advised to exercise 30 minutes 4-5 days per week.  Advised to lose 20-25 pounds in the next 6-12 months.    Patient Instructions   Antireflux measures: Avoid fried, fatty foods, alcohol, chocolate, coffee, tea,  soft drinks, peppermint and spearmint, spicy foods, tomatoes and tomato based foods, onions, peppers, and smoking.   Other antireflux measures include weight reduction if overweight, avoiding tight clothing around the abdomen, elevating the head of the bed 6 inches with blocks under the head board, and don't drink or eat before going to bed and avoid lying down immediately after meals.  Omeprazole 40 mg 1 by mouth in the am 30 minutes before breakfast.  Zofran 4 mg 1 po every 8 hours as  needed for nausea.  Low FODMAP diet - avoid dairy  Decrease cholestyramine to 1/2 packet at bedtime. Adjust to have 1-2 soft bowel movements daily.   High fiber, low fat diet with liberal water intake.   Advised to exercise 30 minutes 4-5 days per week.   Advised to lose 20-25 pounds in the next 6-12 months.   Upper endoscopy-EGD: The indications, technique, alternatives and potential risk and complications were discussed with the patient including but not limited to bleeding, perforations, missing lesions and anesthetic complications. The patient understands and wishes to proceed with the procedure and has given their verbal consent. Written patient education information was given to the patient.   Colonoscopy: The indications, technique, alternatives and potential risk and complications were discussed with the patient including but not limited to bleeding, perforations, missing lesions and anesthetic complications. The patient understands and wishes to proceed with the procedure and has given their verbal consent. Written patient education information was given to the patient.   The patient will call if they have further questions before procedure.   COVID-19 testing prior to procedure. The patient will need to self-quarantine after testing until the procedure. Instructions given to patient.     Low-FODMAP Eating Plan    FODMAPs (fermentable oligosaccharides, disaccharides, monosaccharides, and polyols) are sugars that are hard for some people to digest. A low-FODMAP eating plan may help some people who have bowel (intestinal) diseases to manage their symptoms.  This meal plan can be complicated to follow. Work with a diet and nutrition specialist (dietitian) to make a low-FODMAP eating plan that is right for you. A dietitian can make sure that you get enough nutrition from this diet.  What are tips for following this plan?  Reading food labels  · Check labels for hidden FODMAPs such as:  ? High-fructose  syrup.  ? Honey.  ? Agave.  ? Natural fruit flavors.  ? Onion or garlic powder.  · Choose low-FODMAP foods that contain 3-4 grams of fiber per serving.  · Check food labels for serving sizes. Eat only one serving at a time to make sure FODMAP levels stay low.  Meal planning  · Follow a low-FODMAP eating plan for up to 6 weeks, or as told by your health care provider or dietitian.  · To follow the eating plan:  1. Eliminate high-FODMAP foods from your diet completely.  2. Gradually reintroduce high-FODMAP foods into your diet one at a time. Most people should wait a few days after introducing one high-FODMAP food before they introduce the next high-FODMAP food. Your dietitian can recommend how quickly you may reintroduce foods.  3. Keep a daily record of what you eat and drink, and make note of any symptoms that you have after eating.  4. Review your daily record with a dietitian regularly. Your dietitian can help you identify which foods you can eat and which foods you should avoid.  General tips  · Drink enough fluid each day to keep your urine pale yellow.  · Avoid processed foods. These often have added sugar and may be high in FODMAPs.  · Avoid most dairy products, whole grains, and sweeteners.  · Work with a dietitian to make sure you get enough fiber in your diet.  Recommended foods  Grains  · Gluten-free grains, such as rice, oats, buckwheat, quinoa, corn, polenta, and millet. Gluten-free pasta, bread, or cereal. Rice noodles. Corn tortillas.  Vegetables  · Eggplant, zucchini, cucumber, peppers, green beans, [place] sprouts, bean sprouts, lettuce, arugula, kale, Swiss chard, spinach, madai greens, bok alex, summer squash, potato, and tomato. Limited amounts of corn, carrot, and sweet potato. Green parts of scallions.  Fruits  · Bananas, oranges, nitish, limes, blueberries, raspberries, strawberries, grapes, cantaloupe, honeydew melon, kiwi, papaya, passion fruit, and pineapple. Limited amounts of dried  "cranberries, banana chips, and shredded coconut.  Dairy  · Lactose-free milk, yogurt, and kefir. Lactose-free cottage cheese and ice cream. Non-dairy milks, such as almond, coconut, hemp, and rice milk. Yogurts made of non-dairy milks. Limited amounts of goat cheese, brie, mozzarella, parmesan, swiss, and other hard cheeses.  Meats and other protein foods  · Unseasoned beef, pork, poultry, or fish. Eggs. Tate. Tofu (firm) and tempeh. Limited amounts of nuts and seeds, such as almonds, walnuts, brazil nuts, pecans, peanuts, pumpkin seeds, edu seeds, and sunflower seeds.  Fats and oils  · Butter-free spreads. Vegetable oils, such as olive, canola, and sunflower oil.  Seasoning and other foods  · Artificial sweeteners with names that do not end in \"ol\" such as aspartame, saccharine, and stevia. Maple syrup, white table sugar, raw sugar, brown sugar, and molasses. Fresh basil, coriander, parsley, rosemary, and thyme.  Beverages  · Water and mineral water. Sugar-sweetened soft drinks. Small amounts of orange juice or cranberry juice. Black and green tea. Most dry christine. Coffee.  This may not be a complete list of low-FODMAP foods. Talk with your dietitian for more information.  Foods to avoid  Grains  · Wheat, including kamut, durum, and semolina. Barley and bulgur. Couscous. Wheat-based cereals. Wheat noodles, bread, crackers, and pastries.  Vegetables  · Chicory root, artichoke, asparagus, cabbage, snow peas, sugar snap peas, mushrooms, and cauliflower. Onions, garlic, leeks, and the white part of scallions.  Fruits  · Fresh, dried, and juiced forms of apple, pear, watermelon, peach, plum, cherries, apricots, blackberries, boysenberries, figs, nectarines, and [place]. Avocado.  Dairy  · Milk, yogurt, ice cream, and soft cheese. Cream and sour cream. Milk-based sauces. Custard.  Meats and other protein foods  · Fried or fatty meat. Sausage. Cashews and pistachios. Soybeans, baked beans, black beans, chickpeas, kidney " beans, shanya beans, navy beans, lentils, and split peas.  Seasoning and other foods  · Any sugar-free gum or candy. Foods that contain artificial sweeteners such as sorbitol, mannitol, isomalt, or xylitol. Foods that contain honey, high-fructose corn syrup, or agave. Bouillon, vegetable stock, beef stock, and chicken stock. Garlic and onion powder. Condiments made with onion, such as hummus, chutney, pickles, relish, salad dressing, and salsa. Tomato paste.  Beverages  · Chicory-based drinks. Coffee substitutes. Chamomile tea. Fennel tea. Sweet or fortified christine such as port or brandy. Diet soft drinks made with isomalt, mannitol, maltitol, sorbitol, or xylitol. Apple, pear, and [place] juice. Juices with high-fructose corn syrup.  This may not be a complete list of high-FODMAP foods. Talk with your dietitian to discuss what dietary choices are best for you.   Summary  · A low-FODMAP eating plan is a short-term diet that eliminates FODMAPs from your diet to help ease symptoms of certain bowel diseases.  · The eating plan usually lasts up to 6 weeks. After that, high-FODMAP foods are restarted gradually, one at a time, so you can find out which may be causing symptoms.  · A low-FODMAP eating plan can be complicated. It is best to work with a dietitian who has experience with this type of plan.  This information is not intended to replace advice given to you by your health care provider. Make sure you discuss any questions you have with your health care provider.  Document Revised: 11/30/2018 Document Reviewed: 08/14/2018  [employer] Patient Education © 2021 [employer]    Catalino Pope, APRN  8/24/2021    Please note that portions of this note may have been completed with a voice recognition program. Efforts were made to edit the dictations, but occasionally words are mistranscribed.

## 2021-08-25 NOTE — TELEPHONE ENCOUNTER
PT STATES THAT HIS MEDICATION IS NOT WORKING INCREASE IN AGITATION, VERY DEPRESSED, ROBERTO, HAVING A HARD TIME SLEEPING, HAVING A HARD TIME CONCENTRATING ON THINGS, FEELS LIKE HE IS IN A VERY DARK PLACE RIGHT NOW. PT DENIES ACTIVE SI BUT STATES HE HAS PERIODICALLY HAD THOUGHTS. I ADVISED PT IF HE HAD THOUGHTS HE NEEDED TO PRESENT TO THE ED AND BE EVALUATED.  PLEASE ADVISE

## 2021-08-27 DIAGNOSIS — F41.9 ANXIETY DISORDER, UNSPECIFIED TYPE: ICD-10-CM

## 2021-08-27 DIAGNOSIS — F43.10 POST TRAUMATIC STRESS DISORDER (PTSD): Chronic | ICD-10-CM

## 2021-08-27 RX ORDER — DULOXETIN HYDROCHLORIDE 60 MG/1
60 CAPSULE, DELAYED RELEASE ORAL 2 TIMES DAILY
Qty: 60 CAPSULE | Refills: 2 | Status: SHIPPED | OUTPATIENT
Start: 2021-08-27 | End: 2021-09-17 | Stop reason: SDUPTHER

## 2021-08-28 DIAGNOSIS — F41.9 ANXIETY DISORDER, UNSPECIFIED TYPE: ICD-10-CM

## 2021-08-28 DIAGNOSIS — F43.10 POST TRAUMATIC STRESS DISORDER (PTSD): Chronic | ICD-10-CM

## 2021-08-30 RX ORDER — LITHIUM CARBONATE 300 MG
TABLET ORAL
Qty: 45 TABLET | Refills: 1 | Status: SHIPPED | OUTPATIENT
Start: 2021-08-30 | End: 2021-09-17 | Stop reason: SDUPTHER

## 2021-08-30 RX ORDER — DULOXETIN HYDROCHLORIDE 30 MG/1
CAPSULE, DELAYED RELEASE ORAL
Qty: 30 CAPSULE | Refills: 1 | OUTPATIENT
Start: 2021-08-30

## 2021-09-17 ENCOUNTER — TELEMEDICINE (OUTPATIENT)
Dept: PSYCHIATRY | Facility: CLINIC | Age: 36
End: 2021-09-17

## 2021-09-17 DIAGNOSIS — F51.05 INSOMNIA DUE TO MENTAL CONDITION: ICD-10-CM

## 2021-09-17 DIAGNOSIS — F41.9 ANXIETY DISORDER, UNSPECIFIED TYPE: ICD-10-CM

## 2021-09-17 DIAGNOSIS — F17.200 NICOTINE USE DISORDER: ICD-10-CM

## 2021-09-17 DIAGNOSIS — F43.10 POST TRAUMATIC STRESS DISORDER (PTSD): Chronic | ICD-10-CM

## 2021-09-17 DIAGNOSIS — F51.5 NIGHTMARES: ICD-10-CM

## 2021-09-17 DIAGNOSIS — F31.62 BIPOLAR DISORDER, CURRENT EPISODE MIXED, MODERATE (HCC): Primary | ICD-10-CM

## 2021-09-17 PROCEDURE — 99214 OFFICE O/P EST MOD 30 MIN: CPT | Performed by: PSYCHIATRY & NEUROLOGY

## 2021-09-17 RX ORDER — PRAZOSIN HYDROCHLORIDE 2 MG/1
2 CAPSULE ORAL NIGHTLY
Qty: 30 CAPSULE | Refills: 2 | Status: SHIPPED | OUTPATIENT
Start: 2021-09-17 | End: 2021-11-05 | Stop reason: SDUPTHER

## 2021-09-17 RX ORDER — QUETIAPINE FUMARATE 50 MG/1
50 TABLET, FILM COATED ORAL NIGHTLY PRN
Qty: 30 TABLET | Refills: 1 | Status: SHIPPED | OUTPATIENT
Start: 2021-09-17 | End: 2021-11-05 | Stop reason: SDUPTHER

## 2021-09-17 RX ORDER — HALOPERIDOL 2 MG/1
TABLET ORAL
Qty: 90 TABLET | Refills: 1 | Status: SHIPPED | OUTPATIENT
Start: 2021-09-17 | End: 2021-10-07

## 2021-09-17 RX ORDER — DULOXETIN HYDROCHLORIDE 60 MG/1
60 CAPSULE, DELAYED RELEASE ORAL 2 TIMES DAILY
Qty: 60 CAPSULE | Refills: 2 | Status: SHIPPED | OUTPATIENT
Start: 2021-09-17 | End: 2021-11-05 | Stop reason: SDUPTHER

## 2021-09-17 RX ORDER — LITHIUM CARBONATE 300 MG
300 TABLET ORAL 2 TIMES DAILY
Qty: 60 TABLET | Refills: 1 | Status: SHIPPED | OUTPATIENT
Start: 2021-09-17 | End: 2021-11-05 | Stop reason: SDUPTHER

## 2021-09-17 NOTE — PROGRESS NOTES
Subjective   Alex Torres is a 36 y.o. male who presents today for follow up     This provider is located at [facility], [address] [place] [address]. The Patient is seen remotely at home, using Epic Mychart. Patient is being seen via telehealth and stated they are in a secure environment for this session. The patient’s condition being diagnosed/treated is appropriate for telemedicine. The provider identified himself as well as his credentials.   The patient gave consent to be seen remotely, and when consent is given they understand that the consent allows for patient identifiable information to be sent to a third party as needed.   They may refuse to be seen remotely at any time. The electronic data is encrypted and password protected, and the patient has been advised of the potential risks to privacy not withstanding such measures      Chief Complaint: Bipolar disorder/anxiety    History of Present Illness: Patient reports that since last visit he has had worsening mood fluctuations with irritability, agitation, anxiety, and dysphoria.  His sleep is also been poor which has led to daytime fatigue and is likely contributing to his mood and anxiety symptoms.  Patient reports that sleep is primarily a problem due to increased nightmares related to PTSD and previous traumas.  In between visits we did increase Haldol to try and alleviate some of his issues with sleep and mood fluctuations but he has not noted a major difference and continues to struggle.  He is not having any major medication side effects at this time.  He denies any issues with appetite.  He denies SI/HI/AVH.    The following portions of the patient's history were reviewed and updated as appropriate: allergies, current medications, past family history, past medical history, past social history, past surgical history and problem list.      Past Medical History:  Past Medical History:   Diagnosis Date   • Anxiety    • Asthma    •  Bipolar 1 disorder (CMS/Formerly [facility])    • Depression    • DM (diabetes mellitus) (CMS/Formerly [facility])    • Hypertension    • Kidney stone    • Polyneuropathy    • Positive TB test     treated w/ meds x 6 months   • PTSD (post-traumatic stress disorder)    • Sleep apnea    • Suicide attempt (CMS/Formerly [facility])    • Tattoos        Social History:  Social History     Socioeconomic History   • Marital status:      Spouse name: Not on file   • Number of children: Not on file   • Years of education: Not on file   • Highest education level: Not on file   Tobacco Use   • Smoking status: Current Every Day Smoker     Packs/day: 1.00     Years: 18.00     Pack years: 18.00     Types: Cigarettes     Start date: 1999   • Smokeless tobacco: Never Used   Vaping Use   • Vaping Use: Never used   Substance and Sexual Activity   • Alcohol use: No   • Drug use: Yes     Types: Marijuana     Comment: rarely   • Sexual activity: Yes     Partners: Female       Family History:  Family History   Problem Relation Age of Onset   • Arthritis Father    • Hypertension Father    • Alcohol abuse Father    • Drug abuse Father    • Diabetes Maternal Aunt    • Diabetes Maternal Uncle    • Diabetes Maternal Grandmother    • Alzheimer's disease Maternal Grandmother    • Dementia Maternal Grandmother    • Diabetes Other    • Hypertension Mother    • Depression Mother    • ADD / ADHD Brother    • Early death Maternal Grandfather    • Liver disease Maternal Grandfather    • Alcohol abuse Maternal Grandfather    • Cirrhosis Maternal Grandfather    • No Known Problems Paternal Grandmother    • Liver disease Paternal Grandfather    • Alcohol abuse Paternal Grandfather    • Early death Paternal Grandfather    • Cirrhosis Paternal Grandfather    • No Known Problems Brother    • Anxiety disorder Neg Hx    • Bipolar disorder Neg Hx    • OCD Neg Hx    • Paranoid behavior Neg Hx    • Schizophrenia Neg Hx    • Seizures Neg Hx    • Self-Injurious Behavior  Neg Hx    • Suicide Attempts Neg  Hx    • Colon cancer Neg Hx        Past Surgical History:  Past Surgical History:   Procedure Laterality Date   • CHOLECYSTECTOMY  2002   • INGUINAL HERNIA REPAIR Right 1995   • ORIF METACARPAL FRACTURE Right 2000   • TIBIA FRACTURE SURGERY Left 1997    ORIF       Problem List:  Patient Active Problem List   Diagnosis   • Essential hypertension   • GERD (gastroesophageal reflux disease)   • Bipolar disorder, current episode mixed, moderate (CMS/HCC)   • MOLLY on CPAP   • Arthritis   • Complex regional pain syndrome type 2 of left lower extremity   • Snoring   • Excessive daytime sleepiness   • Peripheral polyneuropathy   • Pain in both lower extremities   • Varicose veins of both lower extremities with pain   • MOLLY (obstructive sleep apnea)   • Class 1 obesity due to excess calories without serious comorbidity with body mass index (BMI) of 32.0 to 32.9 in adult   • Neuromuscular respiratory weakness   • Borderline diabetes   • Diarrhea   • Nausea and vomiting       Allergy:   Allergies   Allergen Reactions   • Gabapentin Swelling     + swelling   • Latex Rash        Current Medications:   Current Outpatient Medications   Medication Sig Dispense Refill   • albuterol sulfate  (90 Base) MCG/ACT inhaler Inhale 2 puffs Every 4 (Four) Hours As Needed for Wheezing or Shortness of Air. 18 g 3   • amitriptyline (ELAVIL) 10 MG tablet TAKE 1 TABLET BY MOUTH EVERY DAY AT NIGHT 90 tablet 3   • bisacodyl (DULCOLAX) 5 MG EC tablet Take as directed for colon prep 4 tablet 0   • carvedilol (COREG) 6.25 MG tablet TAKE 1 TABLET BY MOUTH TWICE A DAY WITH MEALS 180 tablet 3   • DULoxetine (CYMBALTA) 60 MG capsule Take 1 capsule by mouth 2 (Two) Times a Day. 60 capsule 2   • haloperidol (HALDOL) 2 MG tablet Take 1 tablet PO in the morning and take 2 tablets PO at night. 90 tablet 1   • lithium 300 MG tablet Take 1 tablet by mouth 2 (two) times a day. TAKE 0.5 TABLETS BY MOUTH IN THE MORNING AND 1 TABLET AT NIGHT. 60 tablet 1   •  omeprazole (priLOSEC) 40 MG capsule 1 po daily in the am 30 minutes before breakfast 30 capsule 3   • polyethylene glycol (MiraLax) 17 GM/SCOOP powder Take as directed for colonoscopy prep 238 g 0   • prazosin (MINIPRESS) 2 MG capsule Take 1 capsule by mouth Every Night. 30 capsule 2   • Prevalite 4 g packet Take 1/2 packet in 6 ounces of liquid at bedtime. Adjust to have 1-2 soft bowel movements per day. 30 packet 1   • QUEtiapine (SEROquel) 50 MG tablet Take 1 tablet by mouth At Night As Needed (Insomnia). 30 tablet 1   • valsartan (DIOVAN) 160 MG tablet TAKE 1 TABLET BY MOUTH EVERY DAY 90 tablet 3     No current facility-administered medications for this visit.       Review of Symptoms:    Review of Systems   Constitutional: Positive for fatigue. Negative for activity change (improved), appetite change, chills, diaphoresis and fever.   HENT: Negative.    Eyes: Negative.    Respiratory: Negative.    Cardiovascular: Negative.    Gastrointestinal: Negative.    Endocrine: Negative.    Genitourinary: Negative for decreased libido.   Musculoskeletal: Negative.    Skin: Negative.    Allergic/Immunologic: Negative.    Neurological: Negative.    Hematological: Negative.    Psychiatric/Behavioral: Positive for agitation, dysphoric mood, sleep disturbance and stress. Negative for behavioral problems, decreased concentration, hallucinations, self-injury, suicidal ideas, negative for hyperactivity and depressed mood. The patient is nervous/anxious.          Physical Exam:   There were no vitals taken for this visit.  Video visit    Appearance:  male stated age in no acute distress  Gait, Station, Strength: Video visit with patient walking and appears WNL    Mental Status Exam:   Hygiene:   good  Cooperation:  Cooperative  Eye Contact:  Good  Psychomotor Behavior:  Appropriate  Affect:  Restricted  Mood: fluctates, worsening agitation, anxiety, and dysphoria  Hopelessness: Optimistic  Speech:  Normal  Thought Process:   Goal directed and Linear  Thought Content:  Normal  Suicidal:  None  Homicidal:  None  Hallucinations:  None  Delusion:  None  Memory:  Intact  Orientation:  Person, Place, Time and Situation  Reliability:  good  Insight:  Fair  Judgement:  Fair  Impulse Control:  Fair  Physical/Medical Issues:  No        Lab Results:   No visits with results within 1 Month(s) from this visit.   Latest known visit with results is:   Office Visit on 05/07/2021   Component Date Value Ref Range Status   • Hemoglobin A1C 05/07/2021 5.3  % Final   • C difficile Toxin Gene NAAT 05/17/2021 Negative  Negative Final   • Campylobacter 05/17/2021 Not Detected  Not Detected Final   • C.difficile toxin A/B 05/17/2021 Not Detected  Not Detected Final   • Plesiomonas shigelloides 05/17/2021 Not Detected  Not Detected Final   • Salmonella 05/17/2021 Not Detected  Not Detected Final   • Vibrio 05/17/2021 Not Detected  Not Detected Final   • Vibrio cholerae 05/17/2021 Not Detected  Not Detected Final   • Yersinia enterocolitica 05/17/2021 Not Detected  Not Detected Final   • Enteroaggregative E. coli 05/17/2021 Not Detected  Not Detected Final   • Enteropathogenic E. coli 05/17/2021 Not Detected  Not Detected Final   • Enterotoxigenic E. coli 05/17/2021 Not Detected  Not Detected Final   • Shiga-like toxin-producing E. coli  05/17/2021 Not Detected  Not Detected Final   • E. coli O157 05/17/2021 Not applicable  Not Detected Final   • Shigella enteroinvasive E. coli 05/17/2021 Not Detected  Not Detected Final   • Cryptosporidium 05/17/2021 Not Detected  Not Detected Final   • Cyclospora cayetanensis 05/17/2021 Not Detected  Not Detected Final   • Entamoeba Histolytica Ag 05/17/2021 Not Detected  Not Detected Final   • Giardia lamblia 05/17/2021 Not Detected  Not Detected Final   • Adenovirus 40/41 Ag 05/17/2021 Not Detected  Not Detected Final   • Astrovirus 05/17/2021 Not Detected  Not Detected Final   • Norovirus GI/GII 05/17/2021 Not Detected  Not  Detected Final   • Rotavirus A 05/17/2021 Not Detected  Not Detected Final   • Sapovirus 05/17/2021 Not Detected  Not Detected Final       Assessment/Plan   Diagnoses and all orders for this visit:    1. Bipolar disorder, current episode mixed, moderate (CMS/HCC) (Primary)  -     lithium 300 MG tablet; Take 1 tablet by mouth 2 (two) times a day. TAKE 0.5 TABLETS BY MOUTH IN THE MORNING AND 1 TABLET AT NIGHT.  Dispense: 60 tablet; Refill: 1  -     DULoxetine (CYMBALTA) 60 MG capsule; Take 1 capsule by mouth 2 (Two) Times a Day.  Dispense: 60 capsule; Refill: 2  -     haloperidol (HALDOL) 2 MG tablet; Take 1 tablet PO in the morning and take 2 tablets PO at night.  Dispense: 90 tablet; Refill: 1  -     QUEtiapine (SEROquel) 50 MG tablet; Take 1 tablet by mouth At Night As Needed (Insomnia).  Dispense: 30 tablet; Refill: 1    2. Chronic Post traumatic stress disorder (PTSD)  -     lithium 300 MG tablet; Take 1 tablet by mouth 2 (two) times a day. TAKE 0.5 TABLETS BY MOUTH IN THE MORNING AND 1 TABLET AT NIGHT.  Dispense: 60 tablet; Refill: 1  -     prazosin (MINIPRESS) 2 MG capsule; Take 1 capsule by mouth Every Night.  Dispense: 30 capsule; Refill: 2  -     DULoxetine (CYMBALTA) 60 MG capsule; Take 1 capsule by mouth 2 (Two) Times a Day.  Dispense: 60 capsule; Refill: 2  -     haloperidol (HALDOL) 2 MG tablet; Take 1 tablet PO in the morning and take 2 tablets PO at night.  Dispense: 90 tablet; Refill: 1  -     QUEtiapine (SEROquel) 50 MG tablet; Take 1 tablet by mouth At Night As Needed (Insomnia).  Dispense: 30 tablet; Refill: 1    3. Insomnia due to mental condition  -     prazosin (MINIPRESS) 2 MG capsule; Take 1 capsule by mouth Every Night.  Dispense: 30 capsule; Refill: 2  -     QUEtiapine (SEROquel) 50 MG tablet; Take 1 tablet by mouth At Night As Needed (Insomnia).  Dispense: 30 tablet; Refill: 1    4. Nightmares  -     prazosin (MINIPRESS) 2 MG capsule; Take 1 capsule by mouth Every Night.  Dispense: 30  capsule; Refill: 2  -     QUEtiapine (SEROquel) 50 MG tablet; Take 1 tablet by mouth At Night As Needed (Insomnia).  Dispense: 30 tablet; Refill: 1    5. Anxiety disorder, unspecified type  -     lithium 300 MG tablet; Take 1 tablet by mouth 2 (two) times a day. TAKE 0.5 TABLETS BY MOUTH IN THE MORNING AND 1 TABLET AT NIGHT.  Dispense: 60 tablet; Refill: 1  -     DULoxetine (CYMBALTA) 60 MG capsule; Take 1 capsule by mouth 2 (Two) Times a Day.  Dispense: 60 capsule; Refill: 2  -     haloperidol (HALDOL) 2 MG tablet; Take 1 tablet PO in the morning and take 2 tablets PO at night.  Dispense: 90 tablet; Refill: 1  -     QUEtiapine (SEROquel) 50 MG tablet; Take 1 tablet by mouth At Night As Needed (Insomnia).  Dispense: 30 tablet; Refill: 1    6. Nicotine use disorder    -Patient currently having exacerbation of PTSD symptoms with nightmares causing impaired sleep leading to fatigue and worsening irritability, agitation, and mood fluctuations during the day.  -Reviewed previous documentation  -Reviewed labs  -We will get lithium level at next visit due to dose change  -Continue Haldol 2 mg in the morning and 4 mg at night for PTSD and mood stabilization: This medication was increased in between visits  -Increase prazosin 1 mg to 2 mg nightly for insomnia and nightmares  -Continue Cymbalta 30 mg p.o. daily for mood and anxiety  -Increase lithium 150 mg in the morning and 300 mg at night to 300 mg twice daily for mood stabilization  -Start Seroquel 50 mg nightly as needed for insomnia and mood stabilization  -Encourage cessation of nicotine  -Encouraged therapy and anger management  -Approximate appointment time 9:30 AM to 10 AM via video visit    Visit Diagnoses:    ICD-10-CM ICD-9-CM   1. Bipolar disorder, current episode mixed, moderate (CMS/HCC)  F31.62 296.62   2. Chronic Post traumatic stress disorder (PTSD)  F43.10 309.81   3. Insomnia due to mental condition  F51.05 300.9     327.02   4. Nightmares  F51.5 307.47    5. Anxiety disorder, unspecified type  F41.9 300.00   6. Nicotine use disorder  F17.200 305.1       TREATMENT PLAN/GOALS: Continue supportive psychotherapy efforts and medications as indicated. Treatment and medication options discussed during today's visit. Patient ackowledged and verbally consented to continue with current treatment plan and was educated on the importance of compliance with treatment and follow-up appointments.    MEDICATION ISSUES:    Discussed medication options and treatment plan of prescribed medication as well as the risks, benefits, and side effects including potential falls, possible impaired driving and metabolic adversities among others. Patient is agreeable to call the office with any worsening of symptoms or onset of side effects. Patient is agreeable to call 911 or go to the nearest ER should he/she begin having SI/HI. No medication side effects or related complaints today.     MEDS ORDERED DURING VISIT:  New Medications Ordered This Visit   Medications   • lithium 300 MG tablet     Sig: Take 1 tablet by mouth 2 (two) times a day. TAKE 0.5 TABLETS BY MOUTH IN THE MORNING AND 1 TABLET AT NIGHT.     Dispense:  60 tablet     Refill:  1   • prazosin (MINIPRESS) 2 MG capsule     Sig: Take 1 capsule by mouth Every Night.     Dispense:  30 capsule     Refill:  2   • DULoxetine (CYMBALTA) 60 MG capsule     Sig: Take 1 capsule by mouth 2 (Two) Times a Day.     Dispense:  60 capsule     Refill:  2   • haloperidol (HALDOL) 2 MG tablet     Sig: Take 1 tablet PO in the morning and take 2 tablets PO at night.     Dispense:  90 tablet     Refill:  1   • QUEtiapine (SEROquel) 50 MG tablet     Sig: Take 1 tablet by mouth At Night As Needed (Insomnia).     Dispense:  30 tablet     Refill:  1       Return in about 4 weeks (around 10/15/2021).             This document has been electronically signed by Stefan Marion MD  September 17, 2021

## 2021-10-01 ENCOUNTER — TELEMEDICINE (OUTPATIENT)
Dept: PSYCHIATRY | Facility: CLINIC | Age: 36
End: 2021-10-01

## 2021-10-01 DIAGNOSIS — F43.10 POST TRAUMATIC STRESS DISORDER (PTSD): Primary | ICD-10-CM

## 2021-10-01 PROCEDURE — 90837 PSYTX W PT 60 MINUTES: CPT | Performed by: SOCIAL WORKER
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
## 2021-10-07 DIAGNOSIS — F41.9 ANXIETY DISORDER, UNSPECIFIED TYPE: ICD-10-CM

## 2021-10-07 DIAGNOSIS — F43.10 POST TRAUMATIC STRESS DISORDER (PTSD): Chronic | ICD-10-CM

## 2021-10-07 DIAGNOSIS — F31.62 BIPOLAR DISORDER, CURRENT EPISODE MIXED, MODERATE (HCC): ICD-10-CM

## 2021-10-07 RX ORDER — HALOPERIDOL 2 MG/1
TABLET ORAL
Qty: 60 TABLET | Refills: 1 | Status: SHIPPED | OUTPATIENT
Start: 2021-10-07 | End: 2021-11-05 | Stop reason: SDUPTHER

## 2021-10-15 DIAGNOSIS — R19.7 DIARRHEA, UNSPECIFIED TYPE: Chronic | ICD-10-CM

## 2021-10-18 RX ORDER — CHOLESTYRAMINE LIGHT 4 G/5.7G
POWDER, FOR SUSPENSION ORAL
Qty: 60 PACKET | Refills: 3 | Status: SHIPPED | OUTPATIENT
Start: 2021-10-18 | End: 2022-02-21

## 2021-10-27 ENCOUNTER — TELEMEDICINE (OUTPATIENT)
Dept: PSYCHIATRY | Facility: CLINIC | Age: 36
End: 2021-10-27

## 2021-10-27 DIAGNOSIS — F43.10 POST TRAUMATIC STRESS DISORDER (PTSD): Primary | ICD-10-CM

## 2021-10-27 PROCEDURE — 90837 PSYTX W PT 60 MINUTES: CPT | Performed by: SOCIAL WORKER
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
## 2021-11-05 ENCOUNTER — TELEMEDICINE (OUTPATIENT)
Dept: PSYCHIATRY | Facility: CLINIC | Age: 36
End: 2021-11-05

## 2021-11-05 DIAGNOSIS — F51.5 NIGHTMARES: ICD-10-CM

## 2021-11-05 DIAGNOSIS — F51.05 INSOMNIA DUE TO MENTAL CONDITION: ICD-10-CM

## 2021-11-05 DIAGNOSIS — F31.62 BIPOLAR DISORDER, CURRENT EPISODE MIXED, MODERATE (HCC): ICD-10-CM

## 2021-11-05 DIAGNOSIS — F60.3 BORDERLINE PERSONALITY DISORDER (HCC): ICD-10-CM

## 2021-11-05 DIAGNOSIS — F43.10 POST TRAUMATIC STRESS DISORDER (PTSD): Chronic | ICD-10-CM

## 2021-11-05 DIAGNOSIS — F41.9 ANXIETY DISORDER, UNSPECIFIED TYPE: ICD-10-CM

## 2021-11-05 PROCEDURE — 99214 OFFICE O/P EST MOD 30 MIN: CPT | Performed by: PSYCHIATRY & NEUROLOGY

## 2021-11-05 RX ORDER — QUETIAPINE FUMARATE 50 MG/1
50 TABLET, FILM COATED ORAL NIGHTLY PRN
Qty: 30 TABLET | Refills: 1 | Status: SHIPPED | OUTPATIENT
Start: 2021-11-05 | End: 2021-12-03 | Stop reason: SDUPTHER

## 2021-11-05 RX ORDER — LITHIUM CARBONATE 300 MG
TABLET ORAL
Qty: 60 TABLET | Refills: 1 | Status: SHIPPED | OUTPATIENT
Start: 2021-11-05 | End: 2021-12-03 | Stop reason: SDUPTHER

## 2021-11-05 RX ORDER — AMITRIPTYLINE HYDROCHLORIDE 10 MG/1
10 TABLET, FILM COATED ORAL
Qty: 90 TABLET | Refills: 3 | Status: SHIPPED | OUTPATIENT
Start: 2021-11-05 | End: 2021-12-03 | Stop reason: SDUPTHER

## 2021-11-05 RX ORDER — DULOXETIN HYDROCHLORIDE 60 MG/1
60 CAPSULE, DELAYED RELEASE ORAL 2 TIMES DAILY
Qty: 60 CAPSULE | Refills: 2 | Status: SHIPPED | OUTPATIENT
Start: 2021-11-05 | End: 2022-02-25 | Stop reason: SDUPTHER

## 2021-11-05 RX ORDER — PRAZOSIN HYDROCHLORIDE 5 MG/1
5 CAPSULE ORAL NIGHTLY
Qty: 30 CAPSULE | Refills: 2 | Status: SHIPPED | OUTPATIENT
Start: 2021-11-05 | End: 2021-12-03 | Stop reason: SDUPTHER

## 2021-11-05 RX ORDER — HALOPERIDOL 2 MG/1
TABLET ORAL
Qty: 60 TABLET | Refills: 1 | Status: SHIPPED | OUTPATIENT
Start: 2021-11-05 | End: 2021-12-03 | Stop reason: SDUPTHER

## 2021-11-05 NOTE — PROGRESS NOTES
Subjective   Alex Torres is a 36 y.o. male who presents today for follow up     This provider is located at [facility] at [address] [place] [address]. The provider identified himself as well as his credentials.   The Patient is at home using his phone because problems with video connection. The patient's condition being diagnosed/treated is appropriate for telemedicine. The patient gave consent to be seen remotely, and when consent is given they understand that the consent allows for patient identifiable information to be sent to a third party as needed.   They may refuse to be seen remotely at any time. The electronic data is encrypted and password protected, and the patient has been advised of the potential risks to privacy not withstanding such measures      Chief Complaint: Bipolar disorder/anxiety    History of Present Illness: Patient presenting today for follow-up.  He reports that he is doing relatively well and does not feel that he is having any major mood, anxiety, or manic symptoms.  He is having some difficulty with sleep and is having nightmares though they are not as frequent but when he does have them, they are more intense and lead to difficulty sleeping the rest of the night.  As result he has been sleeping more during the day and has felt more fatigue leading to irritability and mood fluctuations.  He states that his mood has been more down lately rather than but the trend overall is he is having improvement in mood and anxiety symptoms.  He is not having any major medication side effects.  He denies any issues with appetite.  He denies SI/HI/AVH.    The following portions of the patient's history were reviewed and updated as appropriate: allergies, current medications, past family history, past medical history, past social history, past surgical history and problem list.      Past Medical History:  Past Medical History:   Diagnosis Date   • Anxiety    •  Asthma    • Bipolar 1 disorder (CMS/HCC)    • Depression    • DM (diabetes mellitus) (CMS/HCC)    • Hypertension    • Kidney stone    • Polyneuropathy    • Positive TB test     treated w/ meds x 6 months   • PTSD (post-traumatic stress disorder)    • Sleep apnea    • Suicide attempt (CMS/HCC)    • Tattoos        Social History:  Social History     Socioeconomic History   • Marital status:    Tobacco Use   • Smoking status: Current Every Day Smoker     Packs/day: 1.00     Years: 18.00     Pack years: 18.00     Types: Cigarettes     Start date: 1999   • Smokeless tobacco: Never Used   Vaping Use   • Vaping Use: Never used   Substance and Sexual Activity   • Alcohol use: No   • Drug use: Yes     Types: Marijuana     Comment: rarely   • Sexual activity: Yes     Partners: Female       Family History:  Family History   Problem Relation Age of Onset   • Arthritis Father    • Hypertension Father    • Alcohol abuse Father    • Drug abuse Father    • Diabetes Maternal Aunt    • Diabetes Maternal Uncle    • Diabetes Maternal Grandmother    • Alzheimer's disease Maternal Grandmother    • Dementia Maternal Grandmother    • Diabetes Other    • Hypertension Mother    • Depression Mother    • ADD / ADHD Brother    • Early death Maternal Grandfather    • Liver disease Maternal Grandfather    • Alcohol abuse Maternal Grandfather    • Cirrhosis Maternal Grandfather    • No Known Problems Paternal Grandmother    • Liver disease Paternal Grandfather    • Alcohol abuse Paternal Grandfather    • Early death Paternal Grandfather    • Cirrhosis Paternal Grandfather    • No Known Problems Brother    • Anxiety disorder Neg Hx    • Bipolar disorder Neg Hx    • OCD Neg Hx    • Paranoid behavior Neg Hx    • Schizophrenia Neg Hx    • Seizures Neg Hx    • Self-Injurious Behavior  Neg Hx    • Suicide Attempts Neg Hx    • Colon cancer Neg Hx        Past Surgical History:  Past Surgical History:   Procedure Laterality Date   • CHOLECYSTECTOMY   2002   • INGUINAL HERNIA REPAIR Right 1995   • ORIF METACARPAL FRACTURE Right 2000   • TIBIA FRACTURE SURGERY Left 1997    ORIF       Problem List:  Patient Active Problem List   Diagnosis   • Essential hypertension   • GERD (gastroesophageal reflux disease)   • Bipolar disorder, current episode mixed, moderate (HCC)   • MOLLY on CPAP   • Arthritis   • Complex regional pain syndrome type 2 of left lower extremity   • Snoring   • Excessive daytime sleepiness   • Peripheral polyneuropathy   • Pain in both lower extremities   • Varicose veins of both lower extremities with pain   • MOLLY (obstructive sleep apnea)   • Class 1 obesity due to excess calories without serious comorbidity with body mass index (BMI) of 32.0 to 32.9 in adult   • Neuromuscular respiratory weakness (HCC)   • Borderline diabetes   • Diarrhea   • Nausea and vomiting       Allergy:   Allergies   Allergen Reactions   • Gabapentin Swelling     + swelling   • Latex Rash        Current Medications:   Current Outpatient Medications   Medication Sig Dispense Refill   • albuterol sulfate  (90 Base) MCG/ACT inhaler Inhale 2 puffs Every 4 (Four) Hours As Needed for Wheezing or Shortness of Air. 18 g 3   • amitriptyline (ELAVIL) 10 MG tablet Take 1 tablet by mouth every night at bedtime. 90 tablet 3   • bisacodyl (DULCOLAX) 5 MG EC tablet Take as directed for colon prep 4 tablet 0   • carvedilol (COREG) 6.25 MG tablet TAKE 1 TABLET BY MOUTH TWICE A DAY WITH MEALS 180 tablet 3   • cholestyramine light (Prevalite) 4 g packet TAKE 1 PACKET BY MOUTH TWICE A DAY 60 packet 3   • DULoxetine (CYMBALTA) 60 MG capsule Take 1 capsule by mouth 2 (Two) Times a Day. 60 capsule 2   • haloperidol (HALDOL) 2 MG tablet TAKE 1 TABLET BY MOUTH TWICE A DAY 60 tablet 1   • lithium 300 MG tablet TAKE 0.5 TABLETS BY MOUTH IN THE MORNING AND 1 TABLET AT NIGHT. 60 tablet 1   • omeprazole (priLOSEC) 40 MG capsule 1 po daily in the am 30 minutes before breakfast 30 capsule 3   •  polyethylene glycol (MiraLax) 17 GM/SCOOP powder Take as directed for colonoscopy prep 238 g 0   • prazosin (MINIPRESS) 5 MG capsule Take 1 capsule by mouth Every Night. 30 capsule 2   • QUEtiapine (SEROquel) 50 MG tablet Take 1 tablet by mouth At Night As Needed (Insomnia). 30 tablet 1   • valsartan (DIOVAN) 160 MG tablet TAKE 1 TABLET BY MOUTH EVERY DAY 90 tablet 3     No current facility-administered medications for this visit.       Review of Symptoms:    Review of Systems   Constitutional: Positive for fatigue. Negative for activity change (improved), appetite change, chills, diaphoresis and fever.   HENT: Negative.    Eyes: Negative.    Respiratory: Negative.    Cardiovascular: Negative.    Gastrointestinal: Negative.    Endocrine: Negative.    Genitourinary: Negative for decreased libido.   Musculoskeletal: Negative.    Skin: Negative.    Allergic/Immunologic: Negative.    Neurological: Negative.    Hematological: Negative.    Psychiatric/Behavioral: Positive for agitation, dysphoric mood, sleep disturbance and stress. Negative for behavioral problems, decreased concentration, hallucinations, self-injury, suicidal ideas, negative for hyperactivity and depressed mood. The patient is not nervous/anxious.          Physical Exam:   There were no vitals taken for this visit.  Unable to assess, telephone visit    Appearance: Unable to assess, telephone visit  Gait, Station, Strength: Unable to assess, telephone visit    Mental Status Exam:   Hygiene:   Unable to assess, telephone visit  Cooperation:  Cooperative  Eye Contact:  Unable to assess, telephone visit  Psychomotor Behavior:  Unable to assess, telephone visit  Affect:  Restricted  Mood: fluctates, overall improvement in anxiety but irritability and fatigue cause dysphoria  Hopelessness: Optimistic  Speech:  Normal  Thought Process:  Goal directed and Linear  Thought Content:  Normal  Suicidal:  None  Homicidal:  None  Hallucinations:  None  Delusion:   None  Memory:  Intact  Orientation:  Person, Place, Time and Situation  Reliability:  good  Insight:  Fair  Judgement:  Fair  Impulse Control:  Fair  Physical/Medical Issues:  No        Lab Results:   No visits with results within 1 Month(s) from this visit.   Latest known visit with results is:   Office Visit on 05/07/2021   Component Date Value Ref Range Status   • Hemoglobin A1C 05/07/2021 5.3  % Final   • C difficile Toxin Gene NAAT 05/17/2021 Negative  Negative Final   • Campylobacter 05/17/2021 Not Detected  Not Detected Final   • C.difficile toxin A/B 05/17/2021 Not Detected  Not Detected Final   • Plesiomonas shigelloides 05/17/2021 Not Detected  Not Detected Final   • Salmonella 05/17/2021 Not Detected  Not Detected Final   • Vibrio 05/17/2021 Not Detected  Not Detected Final   • Vibrio cholerae 05/17/2021 Not Detected  Not Detected Final   • Yersinia enterocolitica 05/17/2021 Not Detected  Not Detected Final   • Enteroaggregative E. coli 05/17/2021 Not Detected  Not Detected Final   • Enteropathogenic E. coli 05/17/2021 Not Detected  Not Detected Final   • Enterotoxigenic E. coli 05/17/2021 Not Detected  Not Detected Final   • Shiga-like toxin-producing E. coli  05/17/2021 Not Detected  Not Detected Final   • E. coli O157 05/17/2021 Not applicable  Not Detected Final   • Shigella enteroinvasive E. coli 05/17/2021 Not Detected  Not Detected Final   • Cryptosporidium 05/17/2021 Not Detected  Not Detected Final   • Cyclospora cayetanensis 05/17/2021 Not Detected  Not Detected Final   • Entamoeba Histolytica Ag 05/17/2021 Not Detected  Not Detected Final   • Giardia lamblia 05/17/2021 Not Detected  Not Detected Final   • Adenovirus 40/41 Ag 05/17/2021 Not Detected  Not Detected Final   • Astrovirus 05/17/2021 Not Detected  Not Detected Final   • Norovirus GI/GII 05/17/2021 Not Detected  Not Detected Final   • Rotavirus A 05/17/2021 Not Detected  Not Detected Final   • Sapovirus 05/17/2021 Not Detected  Not  Detected Final       Assessment/Plan   Diagnoses and all orders for this visit:    1. Chronic Post traumatic stress disorder (PTSD)  -     DULoxetine (CYMBALTA) 60 MG capsule; Take 1 capsule by mouth 2 (Two) Times a Day.  Dispense: 60 capsule; Refill: 2  -     haloperidol (HALDOL) 2 MG tablet; TAKE 1 TABLET BY MOUTH TWICE A DAY  Dispense: 60 tablet; Refill: 1  -     lithium 300 MG tablet; TAKE 0.5 TABLETS BY MOUTH IN THE MORNING AND 1 TABLET AT NIGHT.  Dispense: 60 tablet; Refill: 1  -     QUEtiapine (SEROquel) 50 MG tablet; Take 1 tablet by mouth At Night As Needed (Insomnia).  Dispense: 30 tablet; Refill: 1  -     prazosin (MINIPRESS) 5 MG capsule; Take 1 capsule by mouth Every Night.  Dispense: 30 capsule; Refill: 2    2. Anxiety disorder, unspecified type  -     DULoxetine (CYMBALTA) 60 MG capsule; Take 1 capsule by mouth 2 (Two) Times a Day.  Dispense: 60 capsule; Refill: 2  -     haloperidol (HALDOL) 2 MG tablet; TAKE 1 TABLET BY MOUTH TWICE A DAY  Dispense: 60 tablet; Refill: 1  -     lithium 300 MG tablet; TAKE 0.5 TABLETS BY MOUTH IN THE MORNING AND 1 TABLET AT NIGHT.  Dispense: 60 tablet; Refill: 1  -     QUEtiapine (SEROquel) 50 MG tablet; Take 1 tablet by mouth At Night As Needed (Insomnia).  Dispense: 30 tablet; Refill: 1    3. Bipolar disorder, current episode mixed, moderate (HCC)  -     DULoxetine (CYMBALTA) 60 MG capsule; Take 1 capsule by mouth 2 (Two) Times a Day.  Dispense: 60 capsule; Refill: 2  -     haloperidol (HALDOL) 2 MG tablet; TAKE 1 TABLET BY MOUTH TWICE A DAY  Dispense: 60 tablet; Refill: 1  -     lithium 300 MG tablet; TAKE 0.5 TABLETS BY MOUTH IN THE MORNING AND 1 TABLET AT NIGHT.  Dispense: 60 tablet; Refill: 1  -     QUEtiapine (SEROquel) 50 MG tablet; Take 1 tablet by mouth At Night As Needed (Insomnia).  Dispense: 30 tablet; Refill: 1    4. Insomnia due to mental condition  -     QUEtiapine (SEROquel) 50 MG tablet; Take 1 tablet by mouth At Night As Needed (Insomnia).  Dispense:  30 tablet; Refill: 1  -     prazosin (MINIPRESS) 5 MG capsule; Take 1 capsule by mouth Every Night.  Dispense: 30 capsule; Refill: 2    5. Nightmares  -     QUEtiapine (SEROquel) 50 MG tablet; Take 1 tablet by mouth At Night As Needed (Insomnia).  Dispense: 30 tablet; Refill: 1  -     prazosin (MINIPRESS) 5 MG capsule; Take 1 capsule by mouth Every Night.  Dispense: 30 capsule; Refill: 2    6. Borderline personality disorder (HCC)  -     amitriptyline (ELAVIL) 10 MG tablet; Take 1 tablet by mouth every night at bedtime.  Dispense: 90 tablet; Refill: 3    -Patient having improvement in mood and anxiety symptoms related to PTSD and nightmares have reduced in frequency but when he does have them they seem to be more severe which causes difficulty with sleep, poor sleep, fatigue during the day, and as a result irritability, dysphoria, and anxiety.  -Reviewed previous documentation  -Reviewed labs  -Sent for lithium level  -Continue Haldol 2 mg in the morning and 4 mg at night for PTSD and mood stabilization: This medication was increased in between visits  -Increase prazosin 2 mg to 5 mg nightly for insomnia and nightmares  -Continue Cymbalta 30 mg p.o. daily for mood and anxiety  -Continue lithium 300 mg twice daily for mood stabilization  -Continue Seroquel 50 mg nightly as needed for insomnia and mood stabilization  -Encourage cessation of nicotine  -Encouraged therapy and anger management  -Approximate appointment time 8:45 AM to 9 AM     Visit Diagnoses:    ICD-10-CM ICD-9-CM   1. Chronic Post traumatic stress disorder (PTSD)  F43.10 309.81   2. Anxiety disorder, unspecified type  F41.9 300.00   3. Bipolar disorder, current episode mixed, moderate (HCC)  F31.62 296.62   4. Insomnia due to mental condition  F51.05 300.9     327.02   5. Nightmares  F51.5 307.47   6. Borderline personality disorder (HCC)  F60.3 301.83       TREATMENT PLAN/GOALS: Continue supportive psychotherapy efforts and medications as indicated.  Treatment and medication options discussed during today's visit. Patient ackowledged and verbally consented to continue with current treatment plan and was educated on the importance of compliance with treatment and follow-up appointments.    MEDICATION ISSUES:    Discussed medication options and treatment plan of prescribed medication as well as the risks, benefits, and side effects including potential falls, possible impaired driving and metabolic adversities among others. Patient is agreeable to call the office with any worsening of symptoms or onset of side effects. Patient is agreeable to call 911 or go to the nearest ER should he/she begin having SI/HI. No medication side effects or related complaints today.     MEDS ORDERED DURING VISIT:  New Medications Ordered This Visit   Medications   • DULoxetine (CYMBALTA) 60 MG capsule     Sig: Take 1 capsule by mouth 2 (Two) Times a Day.     Dispense:  60 capsule     Refill:  2   • haloperidol (HALDOL) 2 MG tablet     Sig: TAKE 1 TABLET BY MOUTH TWICE A DAY     Dispense:  60 tablet     Refill:  1   • lithium 300 MG tablet     Sig: TAKE 0.5 TABLETS BY MOUTH IN THE MORNING AND 1 TABLET AT NIGHT.     Dispense:  60 tablet     Refill:  1   • QUEtiapine (SEROquel) 50 MG tablet     Sig: Take 1 tablet by mouth At Night As Needed (Insomnia).     Dispense:  30 tablet     Refill:  1   • amitriptyline (ELAVIL) 10 MG tablet     Sig: Take 1 tablet by mouth every night at bedtime.     Dispense:  90 tablet     Refill:  3   • prazosin (MINIPRESS) 5 MG capsule     Sig: Take 1 capsule by mouth Every Night.     Dispense:  30 capsule     Refill:  2       Return in about 4 weeks (around 12/3/2021).             This document has been electronically signed by Stefan Marion MD  November 5, 2021

## 2021-11-08 ENCOUNTER — TELEPHONE (OUTPATIENT)
Dept: GASTROENTEROLOGY | Facility: CLINIC | Age: 36
End: 2021-11-08

## 2021-11-08 NOTE — TELEPHONE ENCOUNTER
R/s to 12-    ----- Message from Sindhu Perry MA sent at 11/8/2021  1:21 PM EST -----  Patient needs to R/S procedures for 11/11.  Has no transportation home.   [phone]

## 2021-12-03 ENCOUNTER — OFFICE VISIT (OUTPATIENT)
Dept: PSYCHIATRY | Facility: CLINIC | Age: 36
End: 2021-12-03

## 2021-12-03 DIAGNOSIS — F51.05 INSOMNIA DUE TO MENTAL CONDITION: ICD-10-CM

## 2021-12-03 DIAGNOSIS — F43.10 POST TRAUMATIC STRESS DISORDER (PTSD): Chronic | ICD-10-CM

## 2021-12-03 DIAGNOSIS — F41.9 ANXIETY DISORDER, UNSPECIFIED TYPE: ICD-10-CM

## 2021-12-03 DIAGNOSIS — F31.62 BIPOLAR DISORDER, CURRENT EPISODE MIXED, MODERATE (HCC): ICD-10-CM

## 2021-12-03 DIAGNOSIS — F51.5 NIGHTMARES: ICD-10-CM

## 2021-12-03 DIAGNOSIS — F60.3 BORDERLINE PERSONALITY DISORDER (HCC): ICD-10-CM

## 2021-12-03 PROCEDURE — 99214 OFFICE O/P EST MOD 30 MIN: CPT | Performed by: PSYCHIATRY & NEUROLOGY

## 2021-12-03 RX ORDER — QUETIAPINE FUMARATE 50 MG/1
50 TABLET, FILM COATED ORAL NIGHTLY PRN
Qty: 30 TABLET | Refills: 2 | Status: SHIPPED | OUTPATIENT
Start: 2021-12-03 | End: 2022-02-25 | Stop reason: SDUPTHER

## 2021-12-03 RX ORDER — LITHIUM CARBONATE 300 MG
TABLET ORAL
Qty: 60 TABLET | Refills: 1 | Status: SHIPPED | OUTPATIENT
Start: 2021-12-03 | End: 2022-01-14 | Stop reason: SDUPTHER

## 2021-12-03 RX ORDER — PRAZOSIN HYDROCHLORIDE 5 MG/1
5 CAPSULE ORAL NIGHTLY
Qty: 30 CAPSULE | Refills: 2 | Status: SHIPPED | OUTPATIENT
Start: 2021-12-03 | End: 2022-02-25 | Stop reason: SDUPTHER

## 2021-12-03 RX ORDER — AMITRIPTYLINE HYDROCHLORIDE 25 MG/1
25 TABLET, FILM COATED ORAL
Qty: 30 TABLET | Refills: 2 | Status: SHIPPED | OUTPATIENT
Start: 2021-12-03 | End: 2022-02-25 | Stop reason: SDUPTHER

## 2021-12-03 RX ORDER — HALOPERIDOL 2 MG/1
TABLET ORAL
Qty: 60 TABLET | Refills: 1 | Status: SHIPPED | OUTPATIENT
Start: 2021-12-03 | End: 2022-01-14 | Stop reason: SDUPTHER

## 2021-12-07 NOTE — PROGRESS NOTES
Subjective   Alex Torres is a 36 y.o. male who presents today for follow up     This provider is located at [facility] at [address] [place] [address]. The provider identified himself as well as his credentials.   The Patient is at home using his phone because problems with video connection. The patient's condition being diagnosed/treated is appropriate for telemedicine. The patient gave consent to be seen remotely, and when consent is given they understand that the consent allows for patient identifiable information to be sent to a third party as needed.   They may refuse to be seen remotely at any time. The electronic data is encrypted and password protected, and the patient has been advised of the potential risks to privacy not withstanding such measures      Chief Complaint: Bipolar disorder/anxiety    History of Present Illness: Patient presenting today for follow-up and reports that since last visit, he feels that his mood is improving somewhat.  He feels that his mood is somewhat elevated and perhaps slightly hypomanic.  He is not having any overt symptoms of hypomania.  He endorses that he has had worsening insomnia since last visit.  For approximately 2 weeks he has had insomnia with difficulty getting to sleep, sometimes until 4:30 AM, 3-4 times weekly.  He endorses his mood and anxiety symptoms have been stable otherwise and his lability is well controlled.  He does endorse some balance issues which is noted that the physician and he was evaluated for disability who felt it may be related to his psychotropic medications, however patient feels that his minor balance issues are worth the trade-off with his current mood stability and he is able to cope appropriately.  He is not having any other medication side effects.  He denies any issues with appetite.  He denies SI/HI/AVH.    The following portions of the patient's history were reviewed and updated as  appropriate: allergies, current medications, past family history, past medical history, past social history, past surgical history and problem list.      Past Medical History:  Past Medical History:   Diagnosis Date   • Anxiety    • Asthma    • Bipolar 1 disorder (HCC)    • Depression    • DM (diabetes mellitus) ([facility])    • Hypertension    • Kidney stone    • Polyneuropathy    • Positive TB test     treated w/ meds x 6 months   • PTSD (post-traumatic stress disorder)    • Sleep apnea    • Suicide attempt ([facility])    • Tattoos        Social History:  Social History     Socioeconomic History   • Marital status:    Tobacco Use   • Smoking status: Current Every Day Smoker     Packs/day: 1.00     Years: 18.00     Pack years: 18.00     Types: Cigarettes     Start date: 1999   • Smokeless tobacco: Never Used   Vaping Use   • Vaping Use: Never used   Substance and Sexual Activity   • Alcohol use: No   • Drug use: Yes     Types: Marijuana     Comment: rarely   • Sexual activity: Yes     Partners: Female       Family History:  Family History   Problem Relation Age of Onset   • Arthritis Father    • Hypertension Father    • Alcohol abuse Father    • Drug abuse Father    • Diabetes Maternal Aunt    • Diabetes Maternal Uncle    • Diabetes Maternal Grandmother    • Alzheimer's disease Maternal Grandmother    • Dementia Maternal Grandmother    • Diabetes Other    • Hypertension Mother    • Depression Mother    • ADD / ADHD Brother    • Early death Maternal Grandfather    • Liver disease Maternal Grandfather    • Alcohol abuse Maternal Grandfather    • Cirrhosis Maternal Grandfather    • No Known Problems Paternal Grandmother    • Liver disease Paternal Grandfather    • Alcohol abuse Paternal Grandfather    • Early death Paternal Grandfather    • Cirrhosis Paternal Grandfather    • No Known Problems Brother    • Anxiety disorder Neg Hx    • Bipolar disorder Neg Hx    • OCD Neg Hx    • Paranoid behavior Neg Hx    • Schizophrenia  Neg Hx    • Seizures Neg Hx    • Self-Injurious Behavior  Neg Hx    • Suicide Attempts Neg Hx    • Colon cancer Neg Hx        Past Surgical History:  Past Surgical History:   Procedure Laterality Date   • CHOLECYSTECTOMY  2002   • INGUINAL HERNIA REPAIR Right 1995   • ORIF METACARPAL FRACTURE Right 2000   • TIBIA FRACTURE SURGERY Left 1997    ORIF       Problem List:  Patient Active Problem List   Diagnosis   • Essential hypertension   • GERD (gastroesophageal reflux disease)   • Bipolar disorder, current episode mixed, moderate (HCC)   • MOLLY on CPAP   • Arthritis   • Complex regional pain syndrome type 2 of left lower extremity   • Snoring   • Excessive daytime sleepiness   • Peripheral polyneuropathy   • Pain in both lower extremities   • Varicose veins of both lower extremities with pain   • MOLLY (obstructive sleep apnea)   • Class 1 obesity due to excess calories without serious comorbidity with body mass index (BMI) of 32.0 to 32.9 in adult   • Neuromuscular respiratory weakness (HCC)   • Borderline diabetes   • Diarrhea   • Nausea and vomiting       Allergy:   Allergies   Allergen Reactions   • Gabapentin Swelling     + swelling   • Latex Rash        Current Medications:   Current Outpatient Medications   Medication Sig Dispense Refill   • albuterol sulfate  (90 Base) MCG/ACT inhaler Inhale 2 puffs Every 4 (Four) Hours As Needed for Wheezing or Shortness of Air. 18 g 3   • amitriptyline (ELAVIL) 25 MG tablet Take 1 tablet by mouth every night at bedtime. 30 tablet 2   • bisacodyl (DULCOLAX) 5 MG EC tablet Take as directed for colon prep 4 tablet 0   • carvedilol (COREG) 6.25 MG tablet TAKE 1 TABLET BY MOUTH TWICE A DAY WITH MEALS 180 tablet 3   • cholestyramine light (Prevalite) 4 g packet TAKE 1 PACKET BY MOUTH TWICE A DAY 60 packet 3   • DULoxetine (CYMBALTA) 60 MG capsule Take 1 capsule by mouth 2 (Two) Times a Day. 60 capsule 2   • haloperidol (HALDOL) 2 MG tablet TAKE 1 TABLET BY MOUTH TWICE A DAY  60 tablet 1   • lithium 300 MG tablet TAKE 0.5 TABLETS BY MOUTH IN THE MORNING AND 1 TABLET AT NIGHT. 60 tablet 1   • omeprazole (priLOSEC) 40 MG capsule 1 po daily in the am 30 minutes before breakfast 30 capsule 3   • polyethylene glycol (MiraLax) 17 GM/SCOOP powder Take as directed for colonoscopy prep 238 g 0   • prazosin (MINIPRESS) 5 MG capsule Take 1 capsule by mouth Every Night. 30 capsule 2   • QUEtiapine (SEROquel) 50 MG tablet Take 1 tablet by mouth At Night As Needed (Insomnia). 30 tablet 2   • valsartan (DIOVAN) 160 MG tablet TAKE 1 TABLET BY MOUTH EVERY DAY 90 tablet 3     No current facility-administered medications for this visit.       Review of Symptoms:    Review of Systems   Constitutional: Positive for fatigue. Negative for activity change (improved), appetite change, chills, diaphoresis and fever.   HENT: Negative.    Eyes: Negative.    Respiratory: Negative.    Cardiovascular: Negative.    Gastrointestinal: Negative.    Endocrine: Negative.    Genitourinary: Negative for decreased libido.   Musculoskeletal: Negative.    Skin: Negative.    Allergic/Immunologic: Negative.    Neurological: Positive for dizziness.   Hematological: Negative.    Psychiatric/Behavioral: Positive for sleep disturbance and stress. Negative for agitation, behavioral problems, decreased concentration, dysphoric mood, hallucinations, self-injury, suicidal ideas, negative for hyperactivity and depressed mood. The patient is not nervous/anxious.          Physical Exam:   There were no vitals taken for this visit.  Unable to assess, telephone visit    Appearance: Unable to assess, telephone visit  Gait, Station, Strength: Unable to assess, telephone visit    Mental Status Exam:   Hygiene:   Unable to assess, telephone visit  Cooperation:  Cooperative  Eye Contact:  Unable to assess, telephone visit  Psychomotor Behavior:  Unable to assess, telephone visit  Affect:  Full range  Mood: normal, somewhat elevated  Hopelessness:  Optimistic  Speech:  Normal  Thought Process:  Goal directed and Linear  Thought Content:  Normal  Suicidal:  None  Homicidal:  None  Hallucinations:  None  Delusion:  None  Memory:  Intact  Orientation:  Person, Place, Time and Situation  Reliability:  good  Insight:  Fair  Judgement:  Fair  Impulse Control:  Fair  Physical/Medical Issues:  No        Lab Results:   No visits with results within 1 Month(s) from this visit.   Latest known visit with results is:   Office Visit on 05/07/2021   Component Date Value Ref Range Status   • Hemoglobin A1C 05/07/2021 5.3  % Final   • C difficile Toxin Gene NAAT 05/17/2021 Negative  Negative Final   • Campylobacter 05/17/2021 Not Detected  Not Detected Final   • C.difficile toxin A/B 05/17/2021 Not Detected  Not Detected Final   • Plesiomonas shigelloides 05/17/2021 Not Detected  Not Detected Final   • Salmonella 05/17/2021 Not Detected  Not Detected Final   • Vibrio 05/17/2021 Not Detected  Not Detected Final   • Vibrio cholerae 05/17/2021 Not Detected  Not Detected Final   • Yersinia enterocolitica 05/17/2021 Not Detected  Not Detected Final   • Enteroaggregative E. coli 05/17/2021 Not Detected  Not Detected Final   • Enteropathogenic E. coli 05/17/2021 Not Detected  Not Detected Final   • Enterotoxigenic E. coli 05/17/2021 Not Detected  Not Detected Final   • Shiga-like toxin-producing E. coli  05/17/2021 Not Detected  Not Detected Final   • E. coli O157 05/17/2021 Not applicable  Not Detected Final   • Shigella enteroinvasive E. coli 05/17/2021 Not Detected  Not Detected Final   • Cryptosporidium 05/17/2021 Not Detected  Not Detected Final   • Cyclospora cayetanensis 05/17/2021 Not Detected  Not Detected Final   • Entamoeba Histolytica Ag 05/17/2021 Not Detected  Not Detected Final   • Giardia lamblia 05/17/2021 Not Detected  Not Detected Final   • Adenovirus 40/41 Ag 05/17/2021 Not Detected  Not Detected Final   • Astrovirus 05/17/2021 Not Detected  Not Detected Final   •  Norovirus GI/GII 05/17/2021 Not Detected  Not Detected Final   • Rotavirus A 05/17/2021 Not Detected  Not Detected Final   • Sapovirus 05/17/2021 Not Detected  Not Detected Final       Assessment/Plan   Diagnoses and all orders for this visit:    1. Chronic Post traumatic stress disorder (PTSD)  -     haloperidol (HALDOL) 2 MG tablet; TAKE 1 TABLET BY MOUTH TWICE A DAY  Dispense: 60 tablet; Refill: 1  -     lithium 300 MG tablet; TAKE 0.5 TABLETS BY MOUTH IN THE MORNING AND 1 TABLET AT NIGHT.  Dispense: 60 tablet; Refill: 1  -     prazosin (MINIPRESS) 5 MG capsule; Take 1 capsule by mouth Every Night.  Dispense: 30 capsule; Refill: 2  -     QUEtiapine (SEROquel) 50 MG tablet; Take 1 tablet by mouth At Night As Needed (Insomnia).  Dispense: 30 tablet; Refill: 2    2. Anxiety disorder, unspecified type  -     haloperidol (HALDOL) 2 MG tablet; TAKE 1 TABLET BY MOUTH TWICE A DAY  Dispense: 60 tablet; Refill: 1  -     lithium 300 MG tablet; TAKE 0.5 TABLETS BY MOUTH IN THE MORNING AND 1 TABLET AT NIGHT.  Dispense: 60 tablet; Refill: 1  -     QUEtiapine (SEROquel) 50 MG tablet; Take 1 tablet by mouth At Night As Needed (Insomnia).  Dispense: 30 tablet; Refill: 2    3. Bipolar disorder, current episode mixed, moderate (HCC)  -     haloperidol (HALDOL) 2 MG tablet; TAKE 1 TABLET BY MOUTH TWICE A DAY  Dispense: 60 tablet; Refill: 1  -     lithium 300 MG tablet; TAKE 0.5 TABLETS BY MOUTH IN THE MORNING AND 1 TABLET AT NIGHT.  Dispense: 60 tablet; Refill: 1  -     QUEtiapine (SEROquel) 50 MG tablet; Take 1 tablet by mouth At Night As Needed (Insomnia).  Dispense: 30 tablet; Refill: 2    4. Insomnia due to mental condition  -     prazosin (MINIPRESS) 5 MG capsule; Take 1 capsule by mouth Every Night.  Dispense: 30 capsule; Refill: 2  -     QUEtiapine (SEROquel) 50 MG tablet; Take 1 tablet by mouth At Night As Needed (Insomnia).  Dispense: 30 tablet; Refill: 2    5. Nightmares  -     prazosin (MINIPRESS) 5 MG capsule; Take 1  capsule by mouth Every Night.  Dispense: 30 capsule; Refill: 2  -     QUEtiapine (SEROquel) 50 MG tablet; Take 1 tablet by mouth At Night As Needed (Insomnia).  Dispense: 30 tablet; Refill: 2    6. Borderline personality disorder (HCC)  -     amitriptyline (ELAVIL) 25 MG tablet; Take 1 tablet by mouth every night at bedtime.  Dispense: 30 tablet; Refill: 2    -Patient having improvement in mood and anxiety symptoms as well as elevated mood.  Patient is not having nightmares but is having initial insomnia which is worsening.  -Reviewed previous documentation  -Reviewed labs  -Continue Haldol 2 mg in the morning and 4 mg at night for PTSD and mood stabilization  -Continue prazosin 5 mg nightly for insomnia and nightmares  -Continue Cymbalta 30 mg p.o. daily for mood and anxiety  -Continue lithium 300 mg twice daily for mood stabilization  -Continue Seroquel 50 mg nightly as needed for insomnia and mood stabilization  -Start Elavil 25 mg p.o. nightly for insomnia  -Encourage cessation of nicotine  -Encouraged therapy and anger management  -Approximate appointment time 9:15 AM to 9:30 AM via telephone visit    Visit Diagnoses:    ICD-10-CM ICD-9-CM   1. Chronic Post traumatic stress disorder (PTSD)  F43.10 309.81   2. Anxiety disorder, unspecified type  F41.9 300.00   3. Bipolar disorder, current episode mixed, moderate (HCC)  F31.62 296.62   4. Insomnia due to mental condition  F51.05 300.9     327.02   5. Nightmares  F51.5 307.47   6. Borderline personality disorder (HCC)  F60.3 301.83       TREATMENT PLAN/GOALS: Continue supportive psychotherapy efforts and medications as indicated. Treatment and medication options discussed during today's visit. Patient ackowledged and verbally consented to continue with current treatment plan and was educated on the importance of compliance with treatment and follow-up appointments.    MEDICATION ISSUES:    Discussed medication options and treatment plan of prescribed medication as  well as the risks, benefits, and side effects including potential falls, possible impaired driving and metabolic adversities among others. Patient is agreeable to call the office with any worsening of symptoms or onset of side effects. Patient is agreeable to call 911 or go to the nearest ER should he/she begin having SI/HI. No medication side effects or related complaints today.     MEDS ORDERED DURING VISIT:  New Medications Ordered This Visit   Medications   • haloperidol (HALDOL) 2 MG tablet     Sig: TAKE 1 TABLET BY MOUTH TWICE A DAY     Dispense:  60 tablet     Refill:  1   • lithium 300 MG tablet     Sig: TAKE 0.5 TABLETS BY MOUTH IN THE MORNING AND 1 TABLET AT NIGHT.     Dispense:  60 tablet     Refill:  1   • prazosin (MINIPRESS) 5 MG capsule     Sig: Take 1 capsule by mouth Every Night.     Dispense:  30 capsule     Refill:  2   • amitriptyline (ELAVIL) 25 MG tablet     Sig: Take 1 tablet by mouth every night at bedtime.     Dispense:  30 tablet     Refill:  2   • QUEtiapine (SEROquel) 50 MG tablet     Sig: Take 1 tablet by mouth At Night As Needed (Insomnia).     Dispense:  30 tablet     Refill:  2       Return in about 4 weeks (around 12/31/2021).             This document has been electronically signed by Stefan Marion MD  December 7, 2021

## 2021-12-08 ENCOUNTER — TELEMEDICINE (OUTPATIENT)
Dept: PSYCHIATRY | Facility: CLINIC | Age: 36
End: 2021-12-08

## 2021-12-08 DIAGNOSIS — F43.10 POST TRAUMATIC STRESS DISORDER (PTSD): Primary | ICD-10-CM

## 2021-12-08 PROCEDURE — 90832 PSYTX W PT 30 MINUTES: CPT | Performed by: SOCIAL WORKER

## 2021-12-08 NOTE — PROGRESS NOTES
"Date: December 8, 2021  Time In: 9:00 am  Time Out: 9:30 am      PROGRESS NOTE  Data:  Alex Torres is a 36 y.o. male who presents today for individual therapy session at [facility].     Patient tells me that he was only able to have 4 hours of sleep last night and was not feeling well. He states that he has been having nightmares related to past traumatic events. Patient and therapist discuss calming container processing patient identifies as his \"vault.\" Patient states he will try this when he is having trouble sleeping.       Clinical Maneuvering/Intervention:    Assisted patient in processing above session content; acknowledged and normalized patient’s thoughts, feelings, and concerns.  Rationalized patient thought process regarding bipolar disorder.  Discussed triggers associated with patient's bipolar disorder.  Also discussed coping skills for patient to implement such as deep breathing, mindfulness skills, behavioral activation.    Allowed patient to freely discuss issues without interruption or judgment. Provided safe, confidential environment to facilitate the development of positive therapeutic relationship and encourage open, honest communication. Assisted patient in identifying risk factors which would indicate the need for higher level of care including thoughts to harm self or others and/or self-harming behavior and encouraged patient to contact this office, call 911, or present to the nearest emergency room should any of these events occur. Discussed crisis intervention services and means to access. Patient adamantly and convincingly denies current suicidal or homicidal ideation or perceptual disturbance.    Assessment   Patient appears to maintain relative stability as compared to their baseline.  However, patient continues to struggle with bipolar disorder, which continues to cause impairment in important areas of functioning.  A result, they can be reasonably expected to " continue to benefit from treatment and would likely be at increased risk for decompensation otherwise.    Mental Status Exam:   Hygiene:   good  Cooperation:  Cooperative  Eye Contact:  Good  Psychomotor Behavior:  Appropriate  Affect:  Full range  Mood: normal  Speech:  Normal  Thought Process:  Goal directed  Thought Content:  Normal  Suicidal:  None  Homicidal:  None  Hallucinations:  None  Delusion:  None  Memory:  Intact  Orientation:  Person, Place, Time and Situation  Reliability:  fair  Insight:  Fair  Judgement:  Fair  Impulse Control:  Fair  Physical/Medical Issues:  Yes in [facility]       Patient's Support Network Includes:  wife    Functional Status: Mild impairment     Progress toward goal: Not at goal    Prognosis: Fair with Ongoing Treatment          Plan     Patient will continue in individual outpatient therapy with focus on improved functioning and coping skills, maintaining stability, and avoiding decompensation and the need for higher level of care.    Patient will adhere to medication regimen as prescribed and report any side effects. Patient will contact this office, call 911 or present to the nearest emergency room should suicidal or homicidal ideations occur. Provide Cognitive Behavioral Therapy and Solution Focused Therapy to improve functioning, maintain stability, and avoid decompensation and the need for higher level of care.     Return in about 2 weeks, or earlier if symptoms worsen or fail to improve.           VISIT DIAGNOSIS:   No diagnosis found.          This document has been electronically signed by Gabriela Hernández LCSW  December 8, 2021 10:11 EST      Part of this note may be an electronic transcription/translation of spoken language to printed text using the Dragon Dictation System.

## 2021-12-14 ENCOUNTER — TELEPHONE (OUTPATIENT)
Dept: GASTROENTEROLOGY | Facility: CLINIC | Age: 36
End: 2021-12-14

## 2022-01-01 DIAGNOSIS — K21.9 GASTROESOPHAGEAL REFLUX DISEASE, UNSPECIFIED WHETHER ESOPHAGITIS PRESENT: Chronic | ICD-10-CM

## 2022-01-03 RX ORDER — OMEPRAZOLE 40 MG/1
CAPSULE, DELAYED RELEASE ORAL
Qty: 30 CAPSULE | Refills: 3 | Status: SHIPPED | OUTPATIENT
Start: 2022-01-03 | End: 2022-05-19

## 2022-01-14 ENCOUNTER — OFFICE VISIT (OUTPATIENT)
Dept: PSYCHIATRY | Facility: CLINIC | Age: 37
End: 2022-01-14

## 2022-01-14 DIAGNOSIS — F43.10 POST TRAUMATIC STRESS DISORDER (PTSD): Chronic | ICD-10-CM

## 2022-01-14 DIAGNOSIS — F41.9 ANXIETY DISORDER, UNSPECIFIED TYPE: ICD-10-CM

## 2022-01-14 DIAGNOSIS — F51.5 NIGHTMARES: ICD-10-CM

## 2022-01-14 DIAGNOSIS — F31.0 BIPOLAR AFFECTIVE DISORDER, CURRENT EPISODE HYPOMANIC: Primary | ICD-10-CM

## 2022-01-14 DIAGNOSIS — F51.05 INSOMNIA DUE TO MENTAL CONDITION: ICD-10-CM

## 2022-01-14 DIAGNOSIS — F17.200 NICOTINE USE DISORDER: ICD-10-CM

## 2022-01-14 PROCEDURE — 99214 OFFICE O/P EST MOD 30 MIN: CPT | Performed by: PSYCHIATRY & NEUROLOGY

## 2022-01-14 RX ORDER — HALOPERIDOL 2 MG/1
4 TABLET ORAL 2 TIMES DAILY
Qty: 120 TABLET | Refills: 1 | Status: SHIPPED | OUTPATIENT
Start: 2022-01-14 | End: 2022-01-27 | Stop reason: SDUPTHER

## 2022-01-14 RX ORDER — HALOPERIDOL 2 MG/1
4 TABLET ORAL 2 TIMES DAILY
Qty: 120 TABLET | Refills: 1 | Status: SHIPPED | OUTPATIENT
Start: 2022-01-14 | End: 2022-01-14 | Stop reason: SDUPTHER

## 2022-01-14 RX ORDER — LITHIUM CARBONATE 300 MG
TABLET ORAL
Qty: 60 TABLET | Refills: 1 | Status: SHIPPED | OUTPATIENT
Start: 2022-01-14 | End: 2022-02-25 | Stop reason: SDUPTHER

## 2022-01-14 NOTE — PROGRESS NOTES
Subjective   Alex Torres is a 37 y.o. male who presents today for follow up     This provider is located at [facility] at [address] [place] [address]. The provider identified himself as well as his credentials.   The Patient is at home using his phone because problems with video connection. The patient's condition being diagnosed/treated is appropriate for telemedicine. The patient gave consent to be seen remotely, and when consent is given they understand that the consent allows for patient identifiable information to be sent to a third party as needed.   They may refuse to be seen remotely at any time. The electronic data is encrypted and password protected, and the patient has been advised of the potential risks to privacy not withstanding such measures      Chief Complaint: Bipolar disorder/anxiety    History of Present Illness: Patient reports that since last visit he has felt somewhat manic.  He states that his mood has been elevated and he has been impulsively buying and spending money.  He is not having any current medication side effects but does endorse that he is having some lingering fatigue and grogginess in the morning for about 1.5 hours likely due to his sleeping medications.  He denies any major depressive or anxious symptoms.  He denies SI/HI/AVH.    The following portions of the patient's history were reviewed and updated as appropriate: allergies, current medications, past family history, past medical history, past social history, past surgical history and problem list.      Past Medical History:  Past Medical History:   Diagnosis Date   • Anxiety    • Asthma    • Bipolar 1 disorder (HCC)    • Depression    • DM (diabetes mellitus) ([facility])    • Hypertension    • Kidney stone    • Polyneuropathy    • Positive TB test     treated w/ meds x 6 months   • PTSD (post-traumatic stress disorder)    • Sleep apnea    • Suicide attempt ([facility])    • Tattoos         Social History:  Social History     Socioeconomic History   • Marital status:    Tobacco Use   • Smoking status: Current Every Day Smoker     Packs/day: 1.00     Years: 18.00     Pack years: 18.00     Types: Cigarettes     Start date: 1999   • Smokeless tobacco: Never Used   Vaping Use   • Vaping Use: Never used   Substance and Sexual Activity   • Alcohol use: No   • Drug use: Yes     Types: Marijuana     Comment: rarely   • Sexual activity: Yes     Partners: Female       Family History:  Family History   Problem Relation Age of Onset   • Arthritis Father    • Hypertension Father    • Alcohol abuse Father    • Drug abuse Father    • Diabetes Maternal Aunt    • Diabetes Maternal Uncle    • Diabetes Maternal Grandmother    • Alzheimer's disease Maternal Grandmother    • Dementia Maternal Grandmother    • Diabetes Other    • Hypertension Mother    • Depression Mother    • ADD / ADHD Brother    • Early death Maternal Grandfather    • Liver disease Maternal Grandfather    • Alcohol abuse Maternal Grandfather    • Cirrhosis Maternal Grandfather    • No Known Problems Paternal Grandmother    • Liver disease Paternal Grandfather    • Alcohol abuse Paternal Grandfather    • Early death Paternal Grandfather    • Cirrhosis Paternal Grandfather    • No Known Problems Brother    • Anxiety disorder Neg Hx    • Bipolar disorder Neg Hx    • OCD Neg Hx    • Paranoid behavior Neg Hx    • Schizophrenia Neg Hx    • Seizures Neg Hx    • Self-Injurious Behavior  Neg Hx    • Suicide Attempts Neg Hx    • Colon cancer Neg Hx        Past Surgical History:  Past Surgical History:   Procedure Laterality Date   • CHOLECYSTECTOMY  2002   • INGUINAL HERNIA REPAIR Right 1995   • ORIF METACARPAL FRACTURE Right 2000   • TIBIA FRACTURE SURGERY Left 1997    ORIF       Problem List:  Patient Active Problem List   Diagnosis   • Essential hypertension   • GERD (gastroesophageal reflux disease)   • Bipolar disorder, current episode mixed,  moderate (HCC)   • MOLLY on CPAP   • Arthritis   • Complex regional pain syndrome type 2 of left lower extremity   • Snoring   • Excessive daytime sleepiness   • Peripheral polyneuropathy   • Pain in both lower extremities   • Varicose veins of both lower extremities with pain   • MOLLY (obstructive sleep apnea)   • Class 1 obesity due to excess calories without serious comorbidity with body mass index (BMI) of 32.0 to 32.9 in adult   • Neuromuscular respiratory weakness (HCC)   • Borderline diabetes   • Diarrhea   • Nausea and vomiting       Allergy:   Allergies   Allergen Reactions   • Gabapentin Swelling     + swelling   • Latex Rash        Current Medications:   Current Outpatient Medications   Medication Sig Dispense Refill   • albuterol sulfate  (90 Base) MCG/ACT inhaler Inhale 2 puffs Every 4 (Four) Hours As Needed for Wheezing or Shortness of Air. 18 g 3   • amitriptyline (ELAVIL) 25 MG tablet Take 1 tablet by mouth every night at bedtime. 30 tablet 2   • bisacodyl (DULCOLAX) 5 MG EC tablet Take as directed for colon prep 4 tablet 0   • carvedilol (COREG) 6.25 MG tablet TAKE 1 TABLET BY MOUTH TWICE A DAY WITH MEALS 180 tablet 3   • cholestyramine light (Prevalite) 4 g packet TAKE 1 PACKET BY MOUTH TWICE A DAY 60 packet 3   • DULoxetine (CYMBALTA) 60 MG capsule Take 1 capsule by mouth 2 (Two) Times a Day. 60 capsule 2   • haloperidol (HALDOL) 2 MG tablet Take 2 tablets by mouth 2 (Two) Times a Day. 120 tablet 1   • lithium 300 MG tablet TAKE 0.5 TABLETS BY MOUTH IN THE MORNING AND 1 TABLET AT NIGHT. 60 tablet 1   • omeprazole (priLOSEC) 40 MG capsule TAKE 1 CAPSULE BY MOUTH EVERY MORNING 30 MINS BEFORE BREAKFAST 30 capsule 3   • polyethylene glycol (MiraLax) 17 GM/SCOOP powder Take as directed for colonoscopy prep 238 g 0   • prazosin (MINIPRESS) 5 MG capsule Take 1 capsule by mouth Every Night. 30 capsule 2   • QUEtiapine (SEROquel) 50 MG tablet Take 1 tablet by mouth At Night As Needed (Insomnia). 30  tablet 2   • valsartan (DIOVAN) 160 MG tablet TAKE 1 TABLET BY MOUTH EVERY DAY 90 tablet 3     No current facility-administered medications for this visit.       Review of Symptoms:    Review of Systems   Constitutional: Positive for fatigue. Negative for activity change (improved), appetite change, chills, diaphoresis and fever.   HENT: Negative.    Eyes: Negative.    Respiratory: Negative.    Cardiovascular: Negative.    Gastrointestinal: Negative.    Endocrine: Negative.    Genitourinary: Negative for decreased libido.   Musculoskeletal: Negative.    Skin: Negative.    Allergic/Immunologic: Negative.    Neurological: Positive for dizziness.   Hematological: Negative.    Psychiatric/Behavioral: Positive for behavioral problems and stress. Negative for agitation, decreased concentration, dysphoric mood, hallucinations, self-injury, sleep disturbance, suicidal ideas, negative for hyperactivity and depressed mood. The patient is not nervous/anxious.          Physical Exam:   There were no vitals taken for this visit.  Unable to assess, telephone visit    Appearance: Unable to assess, telephone visit  Gait, Station, Strength: Unable to assess, telephone visit    Mental Status Exam:     Mental Status exam performed 01/14/2022  and patient shows no significant changes from previous exam.     Hygiene:   Unable to assess, telephone visit  Cooperation:  Cooperative  Eye Contact:  Unable to assess, telephone visit  Psychomotor Behavior:  Unable to assess, telephone visit  Affect:  Full range  Mood: normal, somewhat elevated  Hopelessness: Optimistic  Speech:  Normal  Thought Process:  Goal directed and Linear  Thought Content:  Normal  Suicidal:  None  Homicidal:  None  Hallucinations:  None  Delusion:  None  Memory:  Intact  Orientation:  Person, Place, Time and Situation  Reliability:  good  Insight:  Fair  Judgement:  Fair  Impulse Control:  Fair  Physical/Medical Issues:  No        Lab Results:   No visits with results  within 1 Month(s) from this visit.   Latest known visit with results is:   Office Visit on 05/07/2021   Component Date Value Ref Range Status   • Hemoglobin A1C 05/07/2021 5.3  % Final   • C difficile Toxin Gene NAAT 05/17/2021 Negative  Negative Final   • Campylobacter 05/17/2021 Not Detected  Not Detected Final   • C.difficile toxin A/B 05/17/2021 Not Detected  Not Detected Final   • Plesiomonas shigelloides 05/17/2021 Not Detected  Not Detected Final   • Salmonella 05/17/2021 Not Detected  Not Detected Final   • Vibrio 05/17/2021 Not Detected  Not Detected Final   • Vibrio cholerae 05/17/2021 Not Detected  Not Detected Final   • Yersinia enterocolitica 05/17/2021 Not Detected  Not Detected Final   • Enteroaggregative E. coli 05/17/2021 Not Detected  Not Detected Final   • Enteropathogenic E. coli 05/17/2021 Not Detected  Not Detected Final   • Enterotoxigenic E. coli 05/17/2021 Not Detected  Not Detected Final   • Shiga-like toxin-producing E. coli  05/17/2021 Not Detected  Not Detected Final   • E. coli O157 05/17/2021 Not applicable  Not Detected Final   • Shigella enteroinvasive E. coli 05/17/2021 Not Detected  Not Detected Final   • Cryptosporidium 05/17/2021 Not Detected  Not Detected Final   • Cyclospora cayetanensis 05/17/2021 Not Detected  Not Detected Final   • Entamoeba Histolytica Ag 05/17/2021 Not Detected  Not Detected Final   • Giardia lamblia 05/17/2021 Not Detected  Not Detected Final   • Adenovirus 40/41 Ag 05/17/2021 Not Detected  Not Detected Final   • Astrovirus 05/17/2021 Not Detected  Not Detected Final   • Norovirus GI/GII 05/17/2021 Not Detected  Not Detected Final   • Rotavirus A 05/17/2021 Not Detected  Not Detected Final   • Sapovirus 05/17/2021 Not Detected  Not Detected Final       Assessment/Plan   Diagnoses and all orders for this visit:    1. Bipolar affective disorder, current episode hypomanic (HCC) (Primary)  -     lithium 300 MG tablet; TAKE 0.5 TABLETS BY MOUTH IN THE MORNING  AND 1 TABLET AT NIGHT.  Dispense: 60 tablet; Refill: 1  -     Discontinue: haloperidol (HALDOL) 2 MG tablet; Take 2 tablets by mouth 2 (Two) Times a Day. TAKE 1 TABLET BY MOUTH TWICE A DAY  Dispense: 120 tablet; Refill: 1  -     haloperidol (HALDOL) 2 MG tablet; Take 2 tablets by mouth 2 (Two) Times a Day.  Dispense: 120 tablet; Refill: 1    2. Chronic Post traumatic stress disorder (PTSD)  -     lithium 300 MG tablet; TAKE 0.5 TABLETS BY MOUTH IN THE MORNING AND 1 TABLET AT NIGHT.  Dispense: 60 tablet; Refill: 1  -     Discontinue: haloperidol (HALDOL) 2 MG tablet; Take 2 tablets by mouth 2 (Two) Times a Day. TAKE 1 TABLET BY MOUTH TWICE A DAY  Dispense: 120 tablet; Refill: 1  -     haloperidol (HALDOL) 2 MG tablet; Take 2 tablets by mouth 2 (Two) Times a Day.  Dispense: 120 tablet; Refill: 1    3. Anxiety disorder, unspecified type  -     lithium 300 MG tablet; TAKE 0.5 TABLETS BY MOUTH IN THE MORNING AND 1 TABLET AT NIGHT.  Dispense: 60 tablet; Refill: 1  -     Discontinue: haloperidol (HALDOL) 2 MG tablet; Take 2 tablets by mouth 2 (Two) Times a Day. TAKE 1 TABLET BY MOUTH TWICE A DAY  Dispense: 120 tablet; Refill: 1  -     haloperidol (HALDOL) 2 MG tablet; Take 2 tablets by mouth 2 (Two) Times a Day.  Dispense: 120 tablet; Refill: 1    4. Insomnia due to mental condition    5. Nightmares    6. Nicotine use disorder    -Patient having some mild manic symptoms without full-blown manic episode with impulsive buying, elevated and euphoric mood, and increased energy.  He denies any major depressive or anxious symptoms and sleep and appetite are appropriate.  -Reviewed previous documentation  -Reviewed labs  -Increase Haldol 2 mg in the morning and 4 mg at night to 4 mg twice daily for PTSD and mood stabilization  -Continue prazosin 5 mg nightly for insomnia and nightmares  -Continue Cymbalta 30 mg p.o. daily for mood and anxiety  -Continue lithium 300 mg twice daily for mood stabilization  -Continue Seroquel 50 mg  nightly as needed for insomnia and mood stabilization  -Continue Elavil 25 mg p.o. nightly for insomnia  -Encourage cessation of nicotine  -Encouraged therapy and anger management  -Approximate appointment time 8:45 AM to 9 AM via telephone visit    Visit Diagnoses:    ICD-10-CM ICD-9-CM   1. Bipolar affective disorder, current episode hypomanic (HCC)  F31.0 296.40   2. Chronic Post traumatic stress disorder (PTSD)  F43.10 309.81   3. Anxiety disorder, unspecified type  F41.9 300.00   4. Insomnia due to mental condition  F51.05 300.9     327.02   5. Nightmares  F51.5 307.47   6. Nicotine use disorder  F17.200 305.1       TREATMENT PLAN/GOALS: Continue supportive psychotherapy efforts and medications as indicated. Treatment and medication options discussed during today's visit. Patient ackowledged and verbally consented to continue with current treatment plan and was educated on the importance of compliance with treatment and follow-up appointments.    MEDICATION ISSUES:    Discussed medication options and treatment plan of prescribed medication as well as the risks, benefits, and side effects including potential falls, possible impaired driving and metabolic adversities among others. Patient is agreeable to call the office with any worsening of symptoms or onset of side effects. Patient is agreeable to call 911 or go to the nearest ER should he/she begin having SI/HI. No medication side effects or related complaints today.     MEDS ORDERED DURING VISIT:  New Medications Ordered This Visit   Medications   • lithium 300 MG tablet     Sig: TAKE 0.5 TABLETS BY MOUTH IN THE MORNING AND 1 TABLET AT NIGHT.     Dispense:  60 tablet     Refill:  1   • haloperidol (HALDOL) 2 MG tablet     Sig: Take 2 tablets by mouth 2 (Two) Times a Day.     Dispense:  120 tablet     Refill:  1       Return in about 4 weeks (around 2/11/2022).             This document has been electronically signed by Stefan Marion MD  January 14, 2022

## 2022-01-27 DIAGNOSIS — F43.10 POST TRAUMATIC STRESS DISORDER (PTSD): Chronic | ICD-10-CM

## 2022-01-27 DIAGNOSIS — F31.0 BIPOLAR AFFECTIVE DISORDER, CURRENT EPISODE HYPOMANIC: ICD-10-CM

## 2022-01-27 DIAGNOSIS — F41.9 ANXIETY DISORDER, UNSPECIFIED TYPE: ICD-10-CM

## 2022-01-27 RX ORDER — HALOPERIDOL 2 MG/1
4 TABLET ORAL 2 TIMES DAILY
Qty: 120 TABLET | Refills: 1 | Status: SHIPPED | OUTPATIENT
Start: 2022-01-27 | End: 2022-02-25 | Stop reason: SDUPTHER

## 2022-01-27 NOTE — TELEPHONE ENCOUNTER
Patient stated that he was started on Haldol on 1/14,. He stated that medication is causing excessive sleepiness and a lack of energy. He is wanting advice on what to do about it.

## 2022-02-13 DIAGNOSIS — I10 ESSENTIAL HYPERTENSION: ICD-10-CM

## 2022-02-14 RX ORDER — CARVEDILOL 6.25 MG/1
TABLET ORAL
Qty: 180 TABLET | Refills: 0 | Status: SHIPPED | OUTPATIENT
Start: 2022-02-14 | End: 2022-05-17

## 2022-02-19 DIAGNOSIS — R19.7 DIARRHEA, UNSPECIFIED TYPE: Chronic | ICD-10-CM

## 2022-02-21 RX ORDER — CHOLESTYRAMINE 4 G/5.5G
POWDER, FOR SUSPENSION ORAL
Qty: 30 PACKET | Refills: 3 | Status: SHIPPED | OUTPATIENT
Start: 2022-02-21 | End: 2022-05-19

## 2022-02-25 ENCOUNTER — OFFICE VISIT (OUTPATIENT)
Dept: PSYCHIATRY | Facility: CLINIC | Age: 37
End: 2022-02-25

## 2022-02-25 DIAGNOSIS — F51.5 NIGHTMARES: ICD-10-CM

## 2022-02-25 DIAGNOSIS — F60.3 BORDERLINE PERSONALITY DISORDER: ICD-10-CM

## 2022-02-25 DIAGNOSIS — F31.0 BIPOLAR AFFECTIVE DISORDER, CURRENT EPISODE HYPOMANIC: ICD-10-CM

## 2022-02-25 DIAGNOSIS — F41.9 ANXIETY DISORDER, UNSPECIFIED TYPE: ICD-10-CM

## 2022-02-25 DIAGNOSIS — F43.10 POST TRAUMATIC STRESS DISORDER (PTSD): Chronic | ICD-10-CM

## 2022-02-25 DIAGNOSIS — F31.62 BIPOLAR DISORDER, CURRENT EPISODE MIXED, MODERATE: ICD-10-CM

## 2022-02-25 DIAGNOSIS — F51.05 INSOMNIA DUE TO MENTAL CONDITION: ICD-10-CM

## 2022-02-25 PROCEDURE — 99214 OFFICE O/P EST MOD 30 MIN: CPT | Performed by: PSYCHIATRY & NEUROLOGY

## 2022-02-25 RX ORDER — AMITRIPTYLINE HYDROCHLORIDE 25 MG/1
25 TABLET, FILM COATED ORAL
Qty: 30 TABLET | Refills: 2 | Status: SHIPPED | OUTPATIENT
Start: 2022-02-25 | End: 2022-04-22 | Stop reason: SDUPTHER

## 2022-02-25 RX ORDER — QUETIAPINE FUMARATE 50 MG/1
50 TABLET, FILM COATED ORAL NIGHTLY PRN
Qty: 30 TABLET | Refills: 2 | Status: SHIPPED | OUTPATIENT
Start: 2022-02-25 | End: 2022-04-22 | Stop reason: SDUPTHER

## 2022-02-25 RX ORDER — LITHIUM CARBONATE 300 MG
TABLET ORAL
Qty: 60 TABLET | Refills: 1 | Status: SHIPPED | OUTPATIENT
Start: 2022-02-25 | End: 2022-04-22 | Stop reason: SDUPTHER

## 2022-02-25 RX ORDER — HALOPERIDOL 2 MG/1
2 TABLET ORAL 2 TIMES DAILY
Qty: 180 TABLET | Refills: 1 | Status: SHIPPED | OUTPATIENT
Start: 2022-02-25 | End: 2022-04-22 | Stop reason: SDUPTHER

## 2022-02-25 RX ORDER — PRAZOSIN HYDROCHLORIDE 5 MG/1
5 CAPSULE ORAL NIGHTLY
Qty: 30 CAPSULE | Refills: 2 | Status: SHIPPED | OUTPATIENT
Start: 2022-02-25 | End: 2022-04-22 | Stop reason: SDUPTHER

## 2022-02-25 RX ORDER — DULOXETIN HYDROCHLORIDE 60 MG/1
60 CAPSULE, DELAYED RELEASE ORAL 2 TIMES DAILY
Qty: 60 CAPSULE | Refills: 2 | Status: SHIPPED | OUTPATIENT
Start: 2022-02-25 | End: 2022-04-22 | Stop reason: SDUPTHER

## 2022-03-02 NOTE — PROGRESS NOTES
Subjective   Alex Torres is a 37 y.o. male who presents today for follow up     This provider is located at [facility] at [address] [place] [address]. The provider identified himself as well as his credentials.   The Patient is at home using his phone because problems with video connection. The patient's condition being diagnosed/treated is appropriate for telemedicine. The patient gave consent to be seen remotely, and when consent is given they understand that the consent allows for patient identifiable information to be sent to a third party as needed.   They may refuse to be seen remotely at any time. The electronic data is encrypted and password protected, and the patient has been advised of the potential risks to privacy not withstanding such measures      Chief Complaint: Bipolar disorder/anxiety    History of Present Illness: Patient reports that since last visit he has been struggling with aggravation and feeling restless or fidgety since we increased the Haldol last visit.  We had increased the Haldol as patient felt like he was having some hypomanic symptoms and some increased anxiety but he feels that he is not having any manic symptoms any longer and his anxiety has actually worsened somewhat with the increase in Haldol.  He is not having any major depression or anxious symptoms.  He denies any major mood fluctuations.  He feels that medications are appropriately controlling symptoms.  He denies SI/HI/AVH.    The following portions of the patient's history were reviewed and updated as appropriate: allergies, current medications, past family history, past medical history, past social history, past surgical history and problem list.      Past Medical History:  Past Medical History:   Diagnosis Date   • Anxiety    • Asthma    • Bipolar 1 disorder (HCC)    • Depression    • DM (diabetes mellitus) (HCC)    • Hypertension    • Kidney stone    • Polyneuropathy    •  Positive TB test     treated w/ meds x 6 months   • PTSD (post-traumatic stress disorder)    • Sleep apnea    • Suicide attempt (HCC)    • Tattoos        Social History:  Social History     Socioeconomic History   • Marital status:    Tobacco Use   • Smoking status: Current Every Day Smoker     Packs/day: 1.00     Years: 18.00     Pack years: 18.00     Types: Cigarettes     Start date: 1999   • Smokeless tobacco: Never Used   Vaping Use   • Vaping Use: Never used   Substance and Sexual Activity   • Alcohol use: No   • Drug use: Yes     Types: Marijuana     Comment: rarely   • Sexual activity: Yes     Partners: Female       Family History:  Family History   Problem Relation Age of Onset   • Arthritis Father    • Hypertension Father    • Alcohol abuse Father    • Drug abuse Father    • Diabetes Maternal Aunt    • Diabetes Maternal Uncle    • Diabetes Maternal Grandmother    • Alzheimer's disease Maternal Grandmother    • Dementia Maternal Grandmother    • Diabetes Other    • Hypertension Mother    • Depression Mother    • ADD / ADHD Brother    • Early death Maternal Grandfather    • Liver disease Maternal Grandfather    • Alcohol abuse Maternal Grandfather    • Cirrhosis Maternal Grandfather    • No Known Problems Paternal Grandmother    • Liver disease Paternal Grandfather    • Alcohol abuse Paternal Grandfather    • Early death Paternal Grandfather    • Cirrhosis Paternal Grandfather    • No Known Problems Brother    • Anxiety disorder Neg Hx    • Bipolar disorder Neg Hx    • OCD Neg Hx    • Paranoid behavior Neg Hx    • Schizophrenia Neg Hx    • Seizures Neg Hx    • Self-Injurious Behavior  Neg Hx    • Suicide Attempts Neg Hx    • Colon cancer Neg Hx        Past Surgical History:  Past Surgical History:   Procedure Laterality Date   • CHOLECYSTECTOMY  2002   • INGUINAL HERNIA REPAIR Right 1995   • ORIF METACARPAL FRACTURE Right 2000   • TIBIA FRACTURE SURGERY Left 1997    ORIF       Problem List:  Patient  Active Problem List   Diagnosis   • Essential hypertension   • GERD (gastroesophageal reflux disease)   • Bipolar disorder, current episode mixed, moderate (HCC)   • MOLLY on CPAP   • Arthritis   • Complex regional pain syndrome type 2 of left lower extremity   • Snoring   • Excessive daytime sleepiness   • Peripheral polyneuropathy   • Pain in both lower extremities   • Varicose veins of both lower extremities with pain   • MOLLY (obstructive sleep apnea)   • Class 1 obesity due to excess calories without serious comorbidity with body mass index (BMI) of 32.0 to 32.9 in adult   • Neuromuscular respiratory weakness (HCC)   • Borderline diabetes   • Diarrhea   • Nausea and vomiting       Allergy:   Allergies   Allergen Reactions   • Gabapentin Swelling     + swelling   • Latex Rash        Current Medications:   Current Outpatient Medications   Medication Sig Dispense Refill   • albuterol sulfate  (90 Base) MCG/ACT inhaler Inhale 2 puffs Every 4 (Four) Hours As Needed for Wheezing or Shortness of Air. 18 g 3   • amitriptyline (ELAVIL) 25 MG tablet Take 1 tablet by mouth every night at bedtime. 30 tablet 2   • bisacodyl (DULCOLAX) 5 MG EC tablet Take as directed for colon prep 4 tablet 0   • carvedilol (COREG) 6.25 MG tablet TAKE 1 TABLET BY MOUTH TWICE A DAY WITH MEALS 180 tablet 0   • DULoxetine (CYMBALTA) 60 MG capsule Take 1 capsule by mouth 2 (Two) Times a Day. 60 capsule 2   • haloperidol (HALDOL) 2 MG tablet Take 1 tablet by mouth 2 (Two) Times a Day. 180 tablet 1   • lithium 300 MG tablet TAKE 0.5 TABLETS BY MOUTH IN THE MORNING AND 1 TABLET AT NIGHT. 60 tablet 1   • omeprazole (priLOSEC) 40 MG capsule TAKE 1 CAPSULE BY MOUTH EVERY MORNING 30 MINS BEFORE BREAKFAST 30 capsule 3   • polyethylene glycol (MiraLax) 17 GM/SCOOP powder Take as directed for colonoscopy prep 238 g 0   • prazosin (MINIPRESS) 5 MG capsule Take 1 capsule by mouth Every Night. 30 capsule 2   • Prevalite 4 g packet TAKE 1/2-1 PACKET IN 6  OUNCES OF LIQUID AT BEDTIME. ADJUST TO HAVE 1-2 SOFT BOWEL MOVEMENTS PER DAY. 30 packet 3   • QUEtiapine (SEROquel) 50 MG tablet Take 1 tablet by mouth At Night As Needed (Insomnia). 30 tablet 2   • valsartan (DIOVAN) 160 MG tablet TAKE 1 TABLET BY MOUTH EVERY DAY 90 tablet 3     No current facility-administered medications for this visit.       Review of Symptoms:    Review of Systems   Constitutional: Positive for fatigue. Negative for activity change (improved), appetite change, chills, diaphoresis and fever.   HENT: Negative.    Eyes: Negative.    Respiratory: Negative.    Cardiovascular: Negative.    Gastrointestinal: Negative.    Endocrine: Negative.    Genitourinary: Negative for decreased libido.   Musculoskeletal: Negative.    Skin: Negative.    Allergic/Immunologic: Negative.    Neurological: Positive for dizziness.   Hematological: Negative.    Psychiatric/Behavioral: Positive for agitation, behavioral problems, positive for hyperactivity and stress. Negative for decreased concentration, dysphoric mood, hallucinations, self-injury, sleep disturbance, suicidal ideas and depressed mood. The patient is nervous/anxious.          Physical Exam:   There were no vitals taken for this visit.  Unable to assess, telephone visit    Appearance: Unable to assess, telephone visit  Gait, Station, Strength: Unable to assess, telephone visit    Mental Status Exam:     Mental Status exam performed 02/25/2022  and patient shows no significant changes from previous exam.       Hygiene:   Unable to assess, telephone visit  Cooperation:  Cooperative  Eye Contact:  Unable to assess, telephone visit  Psychomotor Behavior:  Unable to assess, telephone visit  Affect:  Full range  Mood: normal, stable  Hopelessness: Optimistic  Speech:  Normal  Thought Process:  Goal directed and Linear  Thought Content:  Normal  Suicidal:  None  Homicidal:  None  Hallucinations:  None  Delusion:  None  Memory:  Intact  Orientation:  Person, Place,  Time and Situation  Reliability:  good  Insight:  Fair  Judgement:  Fair  Impulse Control:  Fair  Physical/Medical Issues:  No        Lab Results:   No visits with results within 1 Month(s) from this visit.   Latest known visit with results is:   Office Visit on 05/07/2021   Component Date Value Ref Range Status   • Hemoglobin A1C 05/07/2021 5.3  % Final   • C difficile Toxin Gene NAAT 05/17/2021 Negative  Negative Final   • Campylobacter 05/17/2021 Not Detected  Not Detected Final   • C.difficile toxin A/B 05/17/2021 Not Detected  Not Detected Final   • Plesiomonas shigelloides 05/17/2021 Not Detected  Not Detected Final   • Salmonella 05/17/2021 Not Detected  Not Detected Final   • Vibrio 05/17/2021 Not Detected  Not Detected Final   • Vibrio cholerae 05/17/2021 Not Detected  Not Detected Final   • Yersinia enterocolitica 05/17/2021 Not Detected  Not Detected Final   • Enteroaggregative E. coli 05/17/2021 Not Detected  Not Detected Final   • Enteropathogenic E. coli 05/17/2021 Not Detected  Not Detected Final   • Enterotoxigenic E. coli 05/17/2021 Not Detected  Not Detected Final   • Shiga-like toxin-producing E. coli  05/17/2021 Not Detected  Not Detected Final   • E. coli O157 05/17/2021 Not applicable  Not Detected Final   • Shigella enteroinvasive E. coli 05/17/2021 Not Detected  Not Detected Final   • Cryptosporidium 05/17/2021 Not Detected  Not Detected Final   • Cyclospora cayetanensis 05/17/2021 Not Detected  Not Detected Final   • Entamoeba Histolytica Ag 05/17/2021 Not Detected  Not Detected Final   • Giardia lamblia 05/17/2021 Not Detected  Not Detected Final   • Adenovirus 40/41 Ag 05/17/2021 Not Detected  Not Detected Final   • Astrovirus 05/17/2021 Not Detected  Not Detected Final   • Norovirus GI/GII 05/17/2021 Not Detected  Not Detected Final   • Rotavirus A 05/17/2021 Not Detected  Not Detected Final   • Sapovirus 05/17/2021 Not Detected  Not Detected Final       Assessment/Plan   Diagnoses and  all orders for this visit:    1. Chronic Post traumatic stress disorder (PTSD)  -     haloperidol (HALDOL) 2 MG tablet; Take 1 tablet by mouth 2 (Two) Times a Day.  Dispense: 180 tablet; Refill: 1  -     lithium 300 MG tablet; TAKE 0.5 TABLETS BY MOUTH IN THE MORNING AND 1 TABLET AT NIGHT.  Dispense: 60 tablet; Refill: 1  -     DULoxetine (CYMBALTA) 60 MG capsule; Take 1 capsule by mouth 2 (Two) Times a Day.  Dispense: 60 capsule; Refill: 2  -     QUEtiapine (SEROquel) 50 MG tablet; Take 1 tablet by mouth At Night As Needed (Insomnia).  Dispense: 30 tablet; Refill: 2  -     prazosin (MINIPRESS) 5 MG capsule; Take 1 capsule by mouth Every Night.  Dispense: 30 capsule; Refill: 2    2. Anxiety disorder, unspecified type  -     haloperidol (HALDOL) 2 MG tablet; Take 1 tablet by mouth 2 (Two) Times a Day.  Dispense: 180 tablet; Refill: 1  -     lithium 300 MG tablet; TAKE 0.5 TABLETS BY MOUTH IN THE MORNING AND 1 TABLET AT NIGHT.  Dispense: 60 tablet; Refill: 1  -     DULoxetine (CYMBALTA) 60 MG capsule; Take 1 capsule by mouth 2 (Two) Times a Day.  Dispense: 60 capsule; Refill: 2  -     QUEtiapine (SEROquel) 50 MG tablet; Take 1 tablet by mouth At Night As Needed (Insomnia).  Dispense: 30 tablet; Refill: 2    3. Bipolar affective disorder, current episode hypomanic (HCC)  -     haloperidol (HALDOL) 2 MG tablet; Take 1 tablet by mouth 2 (Two) Times a Day.  Dispense: 180 tablet; Refill: 1  -     lithium 300 MG tablet; TAKE 0.5 TABLETS BY MOUTH IN THE MORNING AND 1 TABLET AT NIGHT.  Dispense: 60 tablet; Refill: 1    4. Bipolar disorder, current episode mixed, moderate (HCC)  -     DULoxetine (CYMBALTA) 60 MG capsule; Take 1 capsule by mouth 2 (Two) Times a Day.  Dispense: 60 capsule; Refill: 2  -     QUEtiapine (SEROquel) 50 MG tablet; Take 1 tablet by mouth At Night As Needed (Insomnia).  Dispense: 30 tablet; Refill: 2    5. Insomnia due to mental condition  -     QUEtiapine (SEROquel) 50 MG tablet; Take 1 tablet by mouth  At Night As Needed (Insomnia).  Dispense: 30 tablet; Refill: 2  -     prazosin (MINIPRESS) 5 MG capsule; Take 1 capsule by mouth Every Night.  Dispense: 30 capsule; Refill: 2    6. Nightmares  -     QUEtiapine (SEROquel) 50 MG tablet; Take 1 tablet by mouth At Night As Needed (Insomnia).  Dispense: 30 tablet; Refill: 2  -     prazosin (MINIPRESS) 5 MG capsule; Take 1 capsule by mouth Every Night.  Dispense: 30 capsule; Refill: 2    7. Borderline personality disorder (HCC)  -     amitriptyline (ELAVIL) 25 MG tablet; Take 1 tablet by mouth every night at bedtime.  Dispense: 30 tablet; Refill: 2    -Patient not having any manic or depressive symptoms.  Sleep continues to be improved and he denies any nightmares.  Patient is having some worsening restlessness, fidgeting, and irritability and agitation since the increase in Haldol at last visit which may be some akathisia related to the medication versus some fatigue and sedation during the day that may make him feel more irritable.  -Reviewed previous documentation  -Reviewed labs  -Reduce Haldol back to 2 mg twice daily for mood stabilization  -Continue prazosin 5 mg nightly for insomnia and nightmares  -Continue Cymbalta 30 mg p.o. daily for mood and anxiety  -Continue lithium 300 mg twice daily for mood stabilization  -Continue Seroquel 50 mg nightly as needed for insomnia and mood stabilization  -Continue Elavil 25 mg p.o. nightly for insomnia  -Encourage cessation of nicotine  -Encouraged therapy and anger management  -Approximate appointment time 9 AM to 9:15 AM via telephone visit    Visit Diagnoses:    ICD-10-CM ICD-9-CM   1. Chronic Post traumatic stress disorder (PTSD)  F43.10 309.81   2. Anxiety disorder, unspecified type  F41.9 300.00   3. Bipolar affective disorder, current episode hypomanic (HCC)  F31.0 296.40   4. Bipolar disorder, current episode mixed, moderate (HCC)  F31.62 296.62   5. Insomnia due to mental condition  F51.05 300.9     327.02   6.  Nightmares  F51.5 307.47   7. Borderline personality disorder (HCC)  F60.3 301.83       TREATMENT PLAN/GOALS: Continue supportive psychotherapy efforts and medications as indicated. Treatment and medication options discussed during today's visit. Patient ackowledged and verbally consented to continue with current treatment plan and was educated on the importance of compliance with treatment and follow-up appointments.    MEDICATION ISSUES:    Discussed medication options and treatment plan of prescribed medication as well as the risks, benefits, and side effects including potential falls, possible impaired driving and metabolic adversities among others. Patient is agreeable to call the office with any worsening of symptoms or onset of side effects. Patient is agreeable to call 911 or go to the nearest ER should he/she begin having SI/HI. No medication side effects or related complaints today.     MEDS ORDERED DURING VISIT:  New Medications Ordered This Visit   Medications   • haloperidol (HALDOL) 2 MG tablet     Sig: Take 1 tablet by mouth 2 (Two) Times a Day.     Dispense:  180 tablet     Refill:  1   • lithium 300 MG tablet     Sig: TAKE 0.5 TABLETS BY MOUTH IN THE MORNING AND 1 TABLET AT NIGHT.     Dispense:  60 tablet     Refill:  1   • DULoxetine (CYMBALTA) 60 MG capsule     Sig: Take 1 capsule by mouth 2 (Two) Times a Day.     Dispense:  60 capsule     Refill:  2   • QUEtiapine (SEROquel) 50 MG tablet     Sig: Take 1 tablet by mouth At Night As Needed (Insomnia).     Dispense:  30 tablet     Refill:  2   • amitriptyline (ELAVIL) 25 MG tablet     Sig: Take 1 tablet by mouth every night at bedtime.     Dispense:  30 tablet     Refill:  2   • prazosin (MINIPRESS) 5 MG capsule     Sig: Take 1 capsule by mouth Every Night.     Dispense:  30 capsule     Refill:  2       Return in about 4 weeks (around 3/25/2022).             This document has been electronically signed by Stefan Marion MD  March 2, 2022

## 2022-03-16 ENCOUNTER — HOSPITAL ENCOUNTER (EMERGENCY)
Facility: HOSPITAL | Age: 37
Discharge: HOME | End: 2022-03-16
Attending: EMERGENCY MEDICINE | Admitting: EMERGENCY MEDICINE

## 2022-03-16 ENCOUNTER — APPOINTMENT (OUTPATIENT)
Dept: CT IMAGING | Facility: HOSPITAL | Age: 37
End: 2022-03-16

## 2022-03-16 VITALS
TEMPERATURE: 100.3 F | HEART RATE: 113 BPM | OXYGEN SATURATION: 96 % | SYSTOLIC BLOOD PRESSURE: 147 MMHG | WEIGHT: 270 LBS | HEIGHT: 76 IN | DIASTOLIC BLOOD PRESSURE: 89 MMHG | RESPIRATION RATE: 16 BRPM | BODY MASS INDEX: 32.88 KG/M2

## 2022-03-16 DIAGNOSIS — K61.0 PERIANAL ABSCESS: Primary | ICD-10-CM

## 2022-03-16 LAB
ALBUMIN SERPL-MCNC: 4.3 G/DL (ref 3.5–5.2)
ALBUMIN/GLOB SERPL: 1.7 G/DL
ALP SERPL-CCNC: 83 U/L (ref 39–117)
ALT SERPL W P-5'-P-CCNC: 24 U/L (ref 1–41)
ANION GAP SERPL CALCULATED.3IONS-SCNC: 14.3 MMOL/L (ref 5–15)
AST SERPL-CCNC: 15 U/L (ref 1–40)
BASOPHILS # BLD AUTO: 0.05 10*3/MM3 (ref 0–0.2)
BASOPHILS NFR BLD AUTO: 0.3 % (ref 0–1.5)
BILIRUB SERPL-MCNC: 0.7 MG/DL (ref 0–1.2)
BILIRUB UR QL STRIP: ABNORMAL
BUN SERPL-MCNC: 13 MG/DL (ref 6–20)
BUN/CREAT SERPL: 13 (ref 7–25)
CALCIUM SPEC-SCNC: 8.7 MG/DL (ref 8.6–10.5)
CHLORIDE SERPL-SCNC: 98 MMOL/L (ref 98–107)
CLARITY UR: CLEAR
CO2 SERPL-SCNC: 22.7 MMOL/L (ref 22–29)
COLOR UR: ABNORMAL
CREAT SERPL-MCNC: 1 MG/DL (ref 0.76–1.27)
D-LACTATE SERPL-SCNC: 1.4 MMOL/L (ref 0.5–2)
DEPRECATED RDW RBC AUTO: 49.3 FL (ref 37–54)
EGFRCR SERPLBLD CKD-EPI 2021: 98.2 ML/MIN/1.73
EOSINOPHIL # BLD AUTO: 0.22 10*3/MM3 (ref 0–0.4)
EOSINOPHIL NFR BLD AUTO: 1.2 % (ref 0.3–6.2)
ERYTHROCYTE [DISTWIDTH] IN BLOOD BY AUTOMATED COUNT: 14.1 % (ref 12.3–15.4)
GLOBULIN UR ELPH-MCNC: 2.6 GM/DL
GLUCOSE SERPL-MCNC: 101 MG/DL (ref 65–99)
GLUCOSE UR STRIP-MCNC: NEGATIVE MG/DL
HCT VFR BLD AUTO: 42.3 % (ref 37.5–51)
HEMOCCULT STL QL: NEGATIVE
HGB BLD-MCNC: 14.7 G/DL (ref 13–17.7)
HGB UR QL STRIP.AUTO: NEGATIVE
IMM GRANULOCYTES # BLD AUTO: 0.15 10*3/MM3 (ref 0–0.05)
IMM GRANULOCYTES NFR BLD AUTO: 0.8 % (ref 0–0.5)
KETONES UR QL STRIP: ABNORMAL
LEUKOCYTE ESTERASE UR QL STRIP.AUTO: NEGATIVE
LIPASE SERPL-CCNC: 22 U/L (ref 13–60)
LYMPHOCYTES # BLD AUTO: 3.14 10*3/MM3 (ref 0.7–3.1)
LYMPHOCYTES NFR BLD AUTO: 17.4 % (ref 19.6–45.3)
MCH RBC QN AUTO: 33.2 PG (ref 26.6–33)
MCHC RBC AUTO-ENTMCNC: 34.8 G/DL (ref 31.5–35.7)
MCV RBC AUTO: 95.5 FL (ref 79–97)
MONOCYTES # BLD AUTO: 1.26 10*3/MM3 (ref 0.1–0.9)
MONOCYTES NFR BLD AUTO: 7 % (ref 5–12)
NEUTROPHILS NFR BLD AUTO: 13.21 10*3/MM3 (ref 1.7–7)
NEUTROPHILS NFR BLD AUTO: 73.3 % (ref 42.7–76)
NITRITE UR QL STRIP: NEGATIVE
NRBC BLD AUTO-RTO: 0 /100 WBC (ref 0–0.2)
PH UR STRIP.AUTO: 5.5 [PH] (ref 5–8)
PLATELET # BLD AUTO: 225 10*3/MM3 (ref 140–450)
PMV BLD AUTO: 8.7 FL (ref 6–12)
POTASSIUM SERPL-SCNC: 3.4 MMOL/L (ref 3.5–5.2)
PROT SERPL-MCNC: 6.9 G/DL (ref 6–8.5)
PROT UR QL STRIP: ABNORMAL
RBC # BLD AUTO: 4.43 10*6/MM3 (ref 4.14–5.8)
SODIUM SERPL-SCNC: 135 MMOL/L (ref 136–145)
SP GR UR STRIP: >=1.03 (ref 1–1.03)
UROBILINOGEN UR QL STRIP: ABNORMAL
WBC NRBC COR # BLD: 18.03 10*3/MM3 (ref 3.4–10.8)

## 2022-03-16 PROCEDURE — 82272 OCCULT BLD FECES 1-3 TESTS: CPT | Performed by: EMERGENCY MEDICINE

## 2022-03-16 PROCEDURE — 99283 EMERGENCY DEPT VISIT LOW MDM: CPT

## 2022-03-16 PROCEDURE — 83690 ASSAY OF LIPASE: CPT | Performed by: PHYSICIAN ASSISTANT

## 2022-03-16 PROCEDURE — 81003 URINALYSIS AUTO W/O SCOPE: CPT | Performed by: PHYSICIAN ASSISTANT

## 2022-03-16 PROCEDURE — 85025 COMPLETE CBC W/AUTO DIFF WBC: CPT | Performed by: PHYSICIAN ASSISTANT

## 2022-03-16 PROCEDURE — 83605 ASSAY OF LACTIC ACID: CPT | Performed by: PHYSICIAN ASSISTANT

## 2022-03-16 PROCEDURE — 74177 CT ABD & PELVIS W/CONTRAST: CPT

## 2022-03-16 PROCEDURE — 80053 COMPREHEN METABOLIC PANEL: CPT | Performed by: PHYSICIAN ASSISTANT

## 2022-03-16 PROCEDURE — 25010000002 IOPAMIDOL 61 % SOLUTION: Performed by: EMERGENCY MEDICINE

## 2022-03-16 RX ORDER — AMOXICILLIN AND CLAVULANATE POTASSIUM 875; 125 MG/1; MG/1
1 TABLET, FILM COATED ORAL ONCE
Status: COMPLETED | OUTPATIENT
Start: 2022-03-16 | End: 2022-03-16

## 2022-03-16 RX ORDER — AMOXICILLIN AND CLAVULANATE POTASSIUM 875; 125 MG/1; MG/1
1 TABLET, FILM COATED ORAL 2 TIMES DAILY
Qty: 20 TABLET | Refills: 0 | Status: SHIPPED | OUTPATIENT
Start: 2022-03-16 | End: 2022-03-26

## 2022-03-16 RX ORDER — HYDROCODONE BITARTRATE AND ACETAMINOPHEN 5; 325 MG/1; MG/1
1 TABLET ORAL EVERY 6 HOURS PRN
Qty: 12 TABLET | Refills: 0 | Status: SHIPPED | OUTPATIENT
Start: 2022-03-16 | End: 2022-04-22

## 2022-03-16 RX ORDER — HYDROCODONE BITARTRATE AND ACETAMINOPHEN 5; 325 MG/1; MG/1
1 TABLET ORAL ONCE
Status: COMPLETED | OUTPATIENT
Start: 2022-03-16 | End: 2022-03-16

## 2022-03-16 RX ADMIN — AMOXICILLIN AND CLAVULANATE POTASSIUM 1 TABLET: 875; 125 TABLET, FILM COATED ORAL at 23:21

## 2022-03-16 RX ADMIN — SODIUM CHLORIDE 1000 ML: 9 INJECTION, SOLUTION INTRAVENOUS at 21:58

## 2022-03-16 RX ADMIN — HYDROCODONE BITARTRATE AND ACETAMINOPHEN 1 TABLET: 5; 325 TABLET ORAL at 23:21

## 2022-03-16 RX ADMIN — IOPAMIDOL 100 ML: 612 INJECTION, SOLUTION INTRAVENOUS at 22:13

## 2022-03-17 ENCOUNTER — OFFICE VISIT (OUTPATIENT)
Dept: SURGERY | Facility: CLINIC | Age: 37
End: 2022-03-17

## 2022-03-17 VITALS
DIASTOLIC BLOOD PRESSURE: 76 MMHG | WEIGHT: 255 LBS | OXYGEN SATURATION: 98 % | SYSTOLIC BLOOD PRESSURE: 128 MMHG | HEIGHT: 76 IN | TEMPERATURE: 97.9 F | RESPIRATION RATE: 18 BRPM | HEART RATE: 67 BPM | BODY MASS INDEX: 31.05 KG/M2

## 2022-03-17 DIAGNOSIS — K61.1 PERIRECTAL ABSCESS: Primary | ICD-10-CM

## 2022-03-17 PROCEDURE — 99203 OFFICE O/P NEW LOW 30 MIN: CPT | Performed by: SURGERY

## 2022-03-17 PROCEDURE — 46040 I&D ISCHIORCT&/PERIRCT ABSC: CPT | Performed by: SURGERY

## 2022-03-17 NOTE — PROGRESS NOTES
Patient: Alex Torres    YOB: 1985    Date: 03/17/2022    Primary Care Provider: Jose Rich DO    Chief Complaint   Patient presents with   • Cyst     New Patient in office today for Perianal Cyst       Subjective .     History of present illness: Patient with a 4-day history of perianal pain and swelling.  Worsening.  No drainage.  Never had this issue in the past.  Went to the emergency room yesterday and was found to have a perirectal abscess on CT.    The following portions of the patient's history were reviewed and updated as appropriate: allergies, current medications, past family history, past medical history, past social history, past surgical history and problem list.    Review of Systems   Constitutional: Negative for activity change, chills, fever and unexpected weight change.   HENT: Negative for hearing loss, trouble swallowing and voice change.    Eyes: Negative for visual disturbance.   Respiratory: Negative for apnea, cough, chest tightness, shortness of breath and wheezing.    Cardiovascular: Negative for chest pain, palpitations and leg swelling.   Gastrointestinal: Positive for rectal pain. Negative for abdominal distention, abdominal pain, anal bleeding, blood in stool, constipation, diarrhea, nausea and vomiting.   Endocrine: Negative for cold intolerance and heat intolerance.   Genitourinary: Negative for difficulty urinating, dysuria and flank pain.   Musculoskeletal: Negative for back pain and gait problem.   Skin: Negative for color change, rash and wound.   Neurological: Negative for dizziness, syncope, speech difficulty, weakness, light-headedness, numbness and headaches.   Hematological: Negative for adenopathy. Does not bruise/bleed easily.   Psychiatric/Behavioral: Negative for confusion. The patient is not nervous/anxious.        History:  Past Medical History:   Diagnosis Date   • Anxiety    • Asthma    • Bipolar 1 disorder (HCC)    • Depression    • DM  (diabetes mellitus) (HCC)    • Hypertension    • Kidney stone    • Polyneuropathy    • Positive TB test     treated w/ meds x 6 months   • PTSD (post-traumatic stress disorder)    • Sleep apnea    • Suicide attempt (HCC)    • Tattoos        Past Surgical History:   Procedure Laterality Date   • CHOLECYSTECTOMY  2002   • INGUINAL HERNIA REPAIR Right 1995   • ORIF METACARPAL FRACTURE Right 2000   • TIBIA FRACTURE SURGERY Left 1997    ORIF       Family History   Problem Relation Age of Onset   • Arthritis Father    • Hypertension Father    • Alcohol abuse Father    • Drug abuse Father    • Diabetes Maternal Aunt    • Diabetes Maternal Uncle    • Diabetes Maternal Grandmother    • Alzheimer's disease Maternal Grandmother    • Dementia Maternal Grandmother    • Diabetes Other    • Hypertension Mother    • Depression Mother    • ADD / ADHD Brother    • Early death Maternal Grandfather    • Liver disease Maternal Grandfather    • Alcohol abuse Maternal Grandfather    • Cirrhosis Maternal Grandfather    • No Known Problems Paternal Grandmother    • Liver disease Paternal Grandfather    • Alcohol abuse Paternal Grandfather    • Early death Paternal Grandfather    • Cirrhosis Paternal Grandfather    • No Known Problems Brother    • Anxiety disorder Neg Hx    • Bipolar disorder Neg Hx    • OCD Neg Hx    • Paranoid behavior Neg Hx    • Schizophrenia Neg Hx    • Seizures Neg Hx    • Self-Injurious Behavior  Neg Hx    • Suicide Attempts Neg Hx    • Colon cancer Neg Hx        Social History     Tobacco Use   • Smoking status: Current Every Day Smoker     Packs/day: 1.00     Years: 18.00     Pack years: 18.00     Types: Cigarettes     Start date: 1999   • Smokeless tobacco: Never Used   Vaping Use   • Vaping Use: Never used   Substance Use Topics   • Alcohol use: No   • Drug use: Yes     Types: Marijuana     Comment: rarely        Allergies:  Allergies   Allergen Reactions   • Gabapentin Swelling     + swelling   • Latex Rash        Medications:    Current Outpatient Medications:   •  amitriptyline (ELAVIL) 25 MG tablet, Take 1 tablet by mouth every night at bedtime., Disp: 30 tablet, Rfl: 2  •  amoxicillin-clavulanate (AUGMENTIN) 875-125 MG per tablet, Take 1 tablet by mouth 2 (Two) Times a Day for 10 days., Disp: 20 tablet, Rfl: 0  •  carvedilol (COREG) 6.25 MG tablet, TAKE 1 TABLET BY MOUTH TWICE A DAY WITH MEALS, Disp: 180 tablet, Rfl: 0  •  DULoxetine (CYMBALTA) 60 MG capsule, Take 1 capsule by mouth 2 (Two) Times a Day., Disp: 60 capsule, Rfl: 2  •  haloperidol (HALDOL) 2 MG tablet, Take 1 tablet by mouth 2 (Two) Times a Day., Disp: 180 tablet, Rfl: 1  •  lithium 300 MG tablet, TAKE 0.5 TABLETS BY MOUTH IN THE MORNING AND 1 TABLET AT NIGHT., Disp: 60 tablet, Rfl: 1  •  omeprazole (priLOSEC) 40 MG capsule, TAKE 1 CAPSULE BY MOUTH EVERY MORNING 30 MINS BEFORE BREAKFAST, Disp: 30 capsule, Rfl: 3  •  prazosin (MINIPRESS) 5 MG capsule, Take 1 capsule by mouth Every Night., Disp: 30 capsule, Rfl: 2  •  QUEtiapine (SEROquel) 50 MG tablet, Take 1 tablet by mouth At Night As Needed (Insomnia)., Disp: 30 tablet, Rfl: 2  •  valsartan (DIOVAN) 160 MG tablet, TAKE 1 TABLET BY MOUTH EVERY DAY, Disp: 90 tablet, Rfl: 3  •  albuterol sulfate  (90 Base) MCG/ACT inhaler, Inhale 2 puffs Every 4 (Four) Hours As Needed for Wheezing or Shortness of Air., Disp: 18 g, Rfl: 3  •  bisacodyl (DULCOLAX) 5 MG EC tablet, Take as directed for colon prep, Disp: 4 tablet, Rfl: 0  •  HYDROcodone-acetaminophen (NORCO) 5-325 MG per tablet, Take 1 tablet by mouth Every 6 (Six) Hours As Needed for Severe Pain ., Disp: 12 tablet, Rfl: 0  •  polyethylene glycol (MiraLax) 17 GM/SCOOP powder, Take as directed for colonoscopy prep, Disp: 238 g, Rfl: 0  •  Prevalite 4 g packet, TAKE 1/2-1 PACKET IN 6 OUNCES OF LIQUID AT BEDTIME. ADJUST TO HAVE 1-2 SOFT BOWEL MOVEMENTS PER DAY., Disp: 30 packet, Rfl: 3  No current facility-administered medications for this  "visit.    Objective     Vital Signs:   Vitals:    03/17/22 1356   BP: 128/76   BP Location: Left arm   Patient Position: Sitting   Cuff Size: Adult   Pulse: 67   Resp: 18   Temp: 97.9 °F (36.6 °C)   TempSrc: Temporal   SpO2: 98%   Weight: 116 kg (255 lb)   Height: 193 cm (76\")       Physical Exam:  General Appearance:    Alert, cooperative, in no acute distress   Head:    Normocephalic, without obvious abnormality, atraumatic   Eyes:            Normal.  No scleral icterus.  PERRLA    Lungs:     Clear to auscultation,respirations regular, even and                  unlabored    Heart:    Regular rhythm and normal rate, normal S1 and S2, no            murmur   Abdomen:     Normal bowel sounds, no masses, no organomegaly, soft        non-tender, non-distended, no guarding,    Extremities:   Moves all extremities well, no edema, no cyanosis, no             redness   Skin:   No bleeding, bruising or rash   Neurologic:   Normal without gross deficits.   Psychiatric: No evidence of depression or anxiety   Anal exam: Perianal examination was performed.  Anterior to the anus and the perineum there is evidence of a deeper abscess with tenderness.  No erythema.  No drainage.         Results Review:   I reviewed the patient's new clinical results.  I reviewed the CT scan including the films personally and agree with the interpretation    Review of Systems was reviewed and confirmed as accurate as documented by the MA.    Assessment/Plan     1. Perirectal abscess        I recommend incision and drainage of the abscess.  I will attempt to perform this here in the office.  He understands the procedure as well as the risk of bleeding and ongoing infection and he wishes to proceed.    1% lidocaine was used locally after the area was prepped in the usual fashion.  Incision was made.  Deeper dissection was performed and the abscess cavity was entered.  It was evacuated of any purulence.  There was a significant amount of purulence " present.  The wound was irrigated.  There was minimal blood loss and hemostasis had been well controlled.  There are no complications and the patient tolerated procedure well.  Wound care instructions were given.  I explained to him that his symptoms should completely resolve over the next couple of days.  If he has any further issues or recurrent symptoms he should follow-up with me.  Otherwise follow-up as needed.    Electronically signed by Jose Burns MD  03/17/22  14:12 EDT

## 2022-03-31 ENCOUNTER — TELEMEDICINE (OUTPATIENT)
Dept: PSYCHIATRY | Facility: CLINIC | Age: 37
End: 2022-03-31

## 2022-03-31 DIAGNOSIS — F31.62 BIPOLAR DISORDER, CURRENT EPISODE MIXED, MODERATE: Primary | ICD-10-CM

## 2022-03-31 PROCEDURE — 90837 PSYTX W PT 60 MINUTES: CPT | Performed by: SOCIAL WORKER
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
## 2022-04-05 DIAGNOSIS — I10 ESSENTIAL HYPERTENSION: ICD-10-CM

## 2022-04-05 RX ORDER — VALSARTAN 160 MG/1
160 TABLET ORAL DAILY
Qty: 30 TABLET | Refills: 0 | Status: SHIPPED | OUTPATIENT
Start: 2022-04-05 | End: 2022-05-02

## 2022-04-22 ENCOUNTER — OFFICE VISIT (OUTPATIENT)
Dept: PSYCHIATRY | Facility: CLINIC | Age: 37
End: 2022-04-22

## 2022-04-22 ENCOUNTER — TELEPHONE (OUTPATIENT)
Dept: PSYCHIATRY | Facility: CLINIC | Age: 37
End: 2022-04-22

## 2022-04-22 DIAGNOSIS — F51.5 NIGHTMARES: ICD-10-CM

## 2022-04-22 DIAGNOSIS — F31.0 BIPOLAR AFFECTIVE DISORDER, CURRENT EPISODE HYPOMANIC: ICD-10-CM

## 2022-04-22 DIAGNOSIS — F51.05 INSOMNIA DUE TO MENTAL CONDITION: ICD-10-CM

## 2022-04-22 DIAGNOSIS — F41.9 ANXIETY DISORDER, UNSPECIFIED TYPE: ICD-10-CM

## 2022-04-22 DIAGNOSIS — F31.62 BIPOLAR DISORDER, CURRENT EPISODE MIXED, MODERATE: ICD-10-CM

## 2022-04-22 DIAGNOSIS — F60.3 BORDERLINE PERSONALITY DISORDER: ICD-10-CM

## 2022-04-22 DIAGNOSIS — F43.10 POST TRAUMATIC STRESS DISORDER (PTSD): Chronic | ICD-10-CM

## 2022-04-22 PROCEDURE — 99214 OFFICE O/P EST MOD 30 MIN: CPT | Performed by: PSYCHIATRY & NEUROLOGY

## 2022-04-22 RX ORDER — AMITRIPTYLINE HYDROCHLORIDE 25 MG/1
25 TABLET, FILM COATED ORAL
Qty: 30 TABLET | Refills: 2 | Status: SHIPPED | OUTPATIENT
Start: 2022-04-22 | End: 2022-07-22 | Stop reason: SDUPTHER

## 2022-04-22 RX ORDER — DULOXETIN HYDROCHLORIDE 60 MG/1
60 CAPSULE, DELAYED RELEASE ORAL 2 TIMES DAILY
Qty: 60 CAPSULE | Refills: 2 | Status: SHIPPED | OUTPATIENT
Start: 2022-04-22 | End: 2022-07-22 | Stop reason: SDUPTHER

## 2022-04-22 RX ORDER — QUETIAPINE FUMARATE 50 MG/1
50 TABLET, FILM COATED ORAL NIGHTLY PRN
Qty: 30 TABLET | Refills: 2 | Status: SHIPPED | OUTPATIENT
Start: 2022-04-22 | End: 2022-07-22 | Stop reason: SDUPTHER

## 2022-04-22 RX ORDER — PRAZOSIN HYDROCHLORIDE 5 MG/1
5 CAPSULE ORAL NIGHTLY
Qty: 30 CAPSULE | Refills: 2 | Status: SHIPPED | OUTPATIENT
Start: 2022-04-22 | End: 2022-07-22 | Stop reason: SDUPTHER

## 2022-04-22 RX ORDER — HALOPERIDOL 2 MG/1
TABLET ORAL
Qty: 270 TABLET | Refills: 0 | Status: SHIPPED | OUTPATIENT
Start: 2022-04-22 | End: 2022-07-22 | Stop reason: SDUPTHER

## 2022-04-22 RX ORDER — LITHIUM CARBONATE 300 MG
TABLET ORAL
Qty: 60 TABLET | Refills: 1 | Status: SHIPPED | OUTPATIENT
Start: 2022-04-22 | End: 2022-07-22 | Stop reason: SDUPTHER

## 2022-04-22 NOTE — PROGRESS NOTES
Subjective   Alex Torres is a 37 y.o. male who presents today for follow up     This provider is located at [facility] at [address] [place] [address]. The provider identified himself as well as his credentials.   The Patient is at home using his phone because problems with video connection. The patient's condition being diagnosed/treated is appropriate for telemedicine. The patient gave consent to be seen remotely, and when consent is given they understand that the consent allows for patient identifiable information to be sent to a third party as needed.   They may refuse to be seen remotely at any time. The electronic data is encrypted and password protected, and the patient has been advised of the potential risks to privacy not withstanding such measures      Chief Complaint: Bipolar disorder/anxiety    History of Present Illness: Patient reports that since last visit he has noted that his mood is up and down with rapid fluctuations.  He is not having any overt manic or depressive symptoms but has difficulty functioning on a regular basis due to the mood lability.  Previously he was having some sedation and dysphoria with extrapyramidal side effects so we had reduced his Haldol lower than we had previously initiated the medication but he seemed to do well for a long period of time on 2 mg in the morning and 4 mg at night so we will return to that dose.  We will hold off on any other changes.  He denies SI/HI/AVH.    The following portions of the patient's history were reviewed and updated as appropriate: allergies, current medications, past family history, past medical history, past social history, past surgical history and problem list.      Past Medical History:  Past Medical History:   Diagnosis Date   • Anxiety    • Asthma    • Bipolar 1 disorder (HCC)    • Depression    • DM (diabetes mellitus) (HCC)    • Hypertension    • Kidney stone    • Polyneuropathy    •  Positive TB test     treated w/ meds x 6 months   • PTSD (post-traumatic stress disorder)    • Sleep apnea    • Suicide attempt (HCC)    • Tattoos        Social History:  Social History     Socioeconomic History   • Marital status:    Tobacco Use   • Smoking status: Current Every Day Smoker     Packs/day: 1.00     Years: 18.00     Pack years: 18.00     Types: Cigarettes     Start date: 1999   • Smokeless tobacco: Never Used   Vaping Use   • Vaping Use: Never used   Substance and Sexual Activity   • Alcohol use: No   • Drug use: Yes     Types: Marijuana     Comment: rarely   • Sexual activity: Yes     Partners: Female       Family History:  Family History   Problem Relation Age of Onset   • Arthritis Father    • Hypertension Father    • Alcohol abuse Father    • Drug abuse Father    • Diabetes Maternal Aunt    • Diabetes Maternal Uncle    • Diabetes Maternal Grandmother    • Alzheimer's disease Maternal Grandmother    • Dementia Maternal Grandmother    • Diabetes Other    • Hypertension Mother    • Depression Mother    • ADD / ADHD Brother    • Early death Maternal Grandfather    • Liver disease Maternal Grandfather    • Alcohol abuse Maternal Grandfather    • Cirrhosis Maternal Grandfather    • No Known Problems Paternal Grandmother    • Liver disease Paternal Grandfather    • Alcohol abuse Paternal Grandfather    • Early death Paternal Grandfather    • Cirrhosis Paternal Grandfather    • No Known Problems Brother    • Anxiety disorder Neg Hx    • Bipolar disorder Neg Hx    • OCD Neg Hx    • Paranoid behavior Neg Hx    • Schizophrenia Neg Hx    • Seizures Neg Hx    • Self-Injurious Behavior  Neg Hx    • Suicide Attempts Neg Hx    • Colon cancer Neg Hx        Past Surgical History:  Past Surgical History:   Procedure Laterality Date   • CHOLECYSTECTOMY  2002   • INGUINAL HERNIA REPAIR Right 1995   • ORIF METACARPAL FRACTURE Right 2000   • TIBIA FRACTURE SURGERY Left 1997    ORIF       Problem List:  Patient  Active Problem List   Diagnosis   • Essential hypertension   • GERD (gastroesophageal reflux disease)   • Bipolar disorder, current episode mixed, moderate (HCC)   • MOLLY on CPAP   • Arthritis   • Complex regional pain syndrome type 2 of left lower extremity   • Snoring   • Excessive daytime sleepiness   • Peripheral polyneuropathy   • Pain in both lower extremities   • Varicose veins of both lower extremities with pain   • MOLLY (obstructive sleep apnea)   • Class 1 obesity due to excess calories without serious comorbidity with body mass index (BMI) of 32.0 to 32.9 in adult   • Neuromuscular respiratory weakness (HCC)   • Borderline diabetes   • Diarrhea   • Nausea and vomiting       Allergy:   Allergies   Allergen Reactions   • Gabapentin Swelling     + swelling   • Latex Rash        Current Medications:   Current Outpatient Medications   Medication Sig Dispense Refill   • amitriptyline (ELAVIL) 25 MG tablet Take 1 tablet by mouth every night at bedtime. 30 tablet 2   • DULoxetine (CYMBALTA) 60 MG capsule Take 1 capsule by mouth 2 (Two) Times a Day. 60 capsule 2   • haloperidol (HALDOL) 2 MG tablet Take 1 tablet by mouth Every Morning AND 2 tablets Every Night. 270 tablet 0   • lithium 300 MG tablet TAKE 0.5 TABLETS BY MOUTH IN THE MORNING AND 1 TABLET AT NIGHT. 60 tablet 1   • prazosin (MINIPRESS) 5 MG capsule Take 1 capsule by mouth Every Night. 30 capsule 2   • QUEtiapine (SEROquel) 50 MG tablet Take 1 tablet by mouth At Night As Needed (Insomnia). 30 tablet 2   • albuterol sulfate  (90 Base) MCG/ACT inhaler Inhale 2 puffs Every 4 (Four) Hours As Needed for Wheezing or Shortness of Air. 18 g 3   • bisacodyl (DULCOLAX) 5 MG EC tablet Take as directed for colon prep 4 tablet 0   • carvedilol (COREG) 6.25 MG tablet TAKE 1 TABLET BY MOUTH TWICE A DAY WITH MEALS 180 tablet 0   • omeprazole (priLOSEC) 40 MG capsule TAKE 1 CAPSULE BY MOUTH EVERY MORNING 30 MINS BEFORE BREAKFAST 30 capsule 3   • polyethylene  glycol (MiraLax) 17 GM/SCOOP powder Take as directed for colonoscopy prep 238 g 0   • Prevalite 4 g packet TAKE 1/2-1 PACKET IN 6 OUNCES OF LIQUID AT BEDTIME. ADJUST TO HAVE 1-2 SOFT BOWEL MOVEMENTS PER DAY. 30 packet 3   • valsartan (DIOVAN) 160 MG tablet Take 1 tablet by mouth Daily. Appointment needed for additional refills 30 tablet 0     No current facility-administered medications for this visit.       Review of Symptoms:    Review of Systems   Constitutional: Positive for fatigue. Negative for activity change (improved), appetite change, chills, diaphoresis and fever.   HENT: Negative.    Eyes: Negative.    Respiratory: Negative.    Cardiovascular: Negative.    Gastrointestinal: Negative.    Endocrine: Negative.    Genitourinary: Negative for decreased libido.   Musculoskeletal: Negative.    Skin: Negative.    Allergic/Immunologic: Negative.    Neurological: Positive for dizziness.   Hematological: Negative.    Psychiatric/Behavioral: Positive for agitation, behavioral problems, positive for hyperactivity and stress. Negative for decreased concentration, dysphoric mood, hallucinations, self-injury, sleep disturbance, suicidal ideas and depressed mood. The patient is nervous/anxious.          Physical Exam:   There were no vitals taken for this visit.  Unable to assess, telephone visit    Appearance: Unable to assess, telephone visit  Gait, Station, Strength: Unable to assess, telephone visit    Mental Status Exam:     Hygiene:   Unable to assess, telephone visit  Cooperation:  Cooperative  Eye Contact:  Unable to assess, telephone visit  Psychomotor Behavior:  Unable to assess, telephone visit  Affect:  Full range  Mood: fluctates, worsening   Hopelessness: Optimistic  Speech:  Normal  Thought Process:  Goal directed and Linear  Thought Content:  Normal  Suicidal:  None  Homicidal:  None  Hallucinations:  None  Delusion:  None  Memory:  Intact  Orientation:  Person, Place, Time and Situation  Reliability:   good  Insight:  Fair  Judgement:  Fair  Impulse Control:  Fair  Physical/Medical Issues:  No        Lab Results:   No visits with results within 1 Month(s) from this visit.   Latest known visit with results is:   Admission on 03/16/2022, Discharged on 03/16/2022   Component Date Value Ref Range Status   • Glucose 03/16/2022 101 (A) 65 - 99 mg/dL Final   • BUN 03/16/2022 13  6 - 20 mg/dL Final   • Creatinine 03/16/2022 1.00  0.76 - 1.27 mg/dL Final   • Sodium 03/16/2022 135 (A) 136 - 145 mmol/L Final   • Potassium 03/16/2022 3.4 (A) 3.5 - 5.2 mmol/L Final   • Chloride 03/16/2022 98  98 - 107 mmol/L Final   • CO2 03/16/2022 22.7  22.0 - 29.0 mmol/L Final   • Calcium 03/16/2022 8.7  8.6 - 10.5 mg/dL Final   • Total Protein 03/16/2022 6.9  6.0 - 8.5 g/dL Final   • Albumin 03/16/2022 4.30  3.50 - 5.20 g/dL Final   • ALT (SGPT) 03/16/2022 24  1 - 41 U/L Final   • AST (SGOT) 03/16/2022 15  1 - 40 U/L Final   • Alkaline Phosphatase 03/16/2022 83  39 - 117 U/L Final   • Total Bilirubin 03/16/2022 0.7  0.0 - 1.2 mg/dL Final   • Globulin 03/16/2022 2.6  gm/dL Final   • A/G Ratio 03/16/2022 1.7  g/dL Final   • BUN/Creatinine Ratio 03/16/2022 13.0  7.0 - 25.0 Final   • Anion Gap 03/16/2022 14.3  5.0 - 15.0 mmol/L Final   • eGFR 03/16/2022 98.2  >60.0 mL/min/1.73 Final    National Kidney Foundation and American Society of Nephrology (ASN) Task Force recommended calculation based on the Chronic Kidney Disease Epidemiology Collaboration (CKD-EPI) equation refit without adjustment for race.   • Lipase 03/16/2022 22  13 - 60 U/L Final   • Color, UA 03/16/2022 Dark Yellow (A) Yellow, Straw Final   • Appearance, UA 03/16/2022 Clear  Clear Final   • pH, UA 03/16/2022 5.5  5.0 - 8.0 Final   • Specific Gravity, UA 03/16/2022 >=1.030  1.005 - 1.030 Final   • Glucose, UA 03/16/2022 Negative  Negative Final   • Ketones, UA 03/16/2022 Trace (A) Negative Final   • Bilirubin, UA 03/16/2022 Small (1+) (A) Negative Final   • Blood, UA 03/16/2022  Negative  Negative Final   • Protein, UA 03/16/2022 Trace (A) Negative Final   • Leuk Esterase, UA 03/16/2022 Negative  Negative Final   • Nitrite, UA 03/16/2022 Negative  Negative Final   • Urobilinogen, UA 03/16/2022 1.0 E.U./dL  0.2 - 1.0 E.U./dL Final   • Lactate 03/16/2022 1.4  0.5 - 2.0 mmol/L Final   • WBC 03/16/2022 18.03 (A) 3.40 - 10.80 10*3/mm3 Final   • RBC 03/16/2022 4.43  4.14 - 5.80 10*6/mm3 Final   • Hemoglobin 03/16/2022 14.7  13.0 - 17.7 g/dL Final   • Hematocrit 03/16/2022 42.3  37.5 - 51.0 % Final   • MCV 03/16/2022 95.5  79.0 - 97.0 fL Final   • MCH 03/16/2022 33.2 (A) 26.6 - 33.0 pg Final   • MCHC 03/16/2022 34.8  31.5 - 35.7 g/dL Final   • RDW 03/16/2022 14.1  12.3 - 15.4 % Final   • RDW-SD 03/16/2022 49.3  37.0 - 54.0 fl Final   • MPV 03/16/2022 8.7  6.0 - 12.0 fL Final   • Platelets 03/16/2022 225  140 - 450 10*3/mm3 Final   • Neutrophil % 03/16/2022 73.3  42.7 - 76.0 % Final   • Lymphocyte % 03/16/2022 17.4 (A) 19.6 - 45.3 % Final   • Monocyte % 03/16/2022 7.0  5.0 - 12.0 % Final   • Eosinophil % 03/16/2022 1.2  0.3 - 6.2 % Final   • Basophil % 03/16/2022 0.3  0.0 - 1.5 % Final   • Immature Grans % 03/16/2022 0.8 (A) 0.0 - 0.5 % Final   • Neutrophils, Absolute 03/16/2022 13.21 (A) 1.70 - 7.00 10*3/mm3 Final   • Lymphocytes, Absolute 03/16/2022 3.14 (A) 0.70 - 3.10 10*3/mm3 Final   • Monocytes, Absolute 03/16/2022 1.26 (A) 0.10 - 0.90 10*3/mm3 Final   • Eosinophils, Absolute 03/16/2022 0.22  0.00 - 0.40 10*3/mm3 Final   • Basophils, Absolute 03/16/2022 0.05  0.00 - 0.20 10*3/mm3 Final   • Immature Grans, Absolute 03/16/2022 0.15 (A) 0.00 - 0.05 10*3/mm3 Final   • nRBC 03/16/2022 0.0  0.0 - 0.2 /100 WBC Final   • Fecal Occult Blood 03/16/2022 Negative  Negative Final       Assessment/Plan   Diagnoses and all orders for this visit:    1. Chronic Post traumatic stress disorder (PTSD)  -     haloperidol (HALDOL) 2 MG tablet; Take 1 tablet by mouth Every Morning AND 2 tablets Every Night.   Dispense: 270 tablet; Refill: 0  -     lithium 300 MG tablet; TAKE 0.5 TABLETS BY MOUTH IN THE MORNING AND 1 TABLET AT NIGHT.  Dispense: 60 tablet; Refill: 1  -     DULoxetine (CYMBALTA) 60 MG capsule; Take 1 capsule by mouth 2 (Two) Times a Day.  Dispense: 60 capsule; Refill: 2  -     QUEtiapine (SEROquel) 50 MG tablet; Take 1 tablet by mouth At Night As Needed (Insomnia).  Dispense: 30 tablet; Refill: 2  -     prazosin (MINIPRESS) 5 MG capsule; Take 1 capsule by mouth Every Night.  Dispense: 30 capsule; Refill: 2    2. Anxiety disorder, unspecified type  -     haloperidol (HALDOL) 2 MG tablet; Take 1 tablet by mouth Every Morning AND 2 tablets Every Night.  Dispense: 270 tablet; Refill: 0  -     lithium 300 MG tablet; TAKE 0.5 TABLETS BY MOUTH IN THE MORNING AND 1 TABLET AT NIGHT.  Dispense: 60 tablet; Refill: 1  -     DULoxetine (CYMBALTA) 60 MG capsule; Take 1 capsule by mouth 2 (Two) Times a Day.  Dispense: 60 capsule; Refill: 2  -     QUEtiapine (SEROquel) 50 MG tablet; Take 1 tablet by mouth At Night As Needed (Insomnia).  Dispense: 30 tablet; Refill: 2    3. Bipolar affective disorder, current episode hypomanic (HCC)  -     haloperidol (HALDOL) 2 MG tablet; Take 1 tablet by mouth Every Morning AND 2 tablets Every Night.  Dispense: 270 tablet; Refill: 0  -     lithium 300 MG tablet; TAKE 0.5 TABLETS BY MOUTH IN THE MORNING AND 1 TABLET AT NIGHT.  Dispense: 60 tablet; Refill: 1    4. Bipolar disorder, current episode mixed, moderate (HCC)  -     DULoxetine (CYMBALTA) 60 MG capsule; Take 1 capsule by mouth 2 (Two) Times a Day.  Dispense: 60 capsule; Refill: 2  -     QUEtiapine (SEROquel) 50 MG tablet; Take 1 tablet by mouth At Night As Needed (Insomnia).  Dispense: 30 tablet; Refill: 2    5. Insomnia due to mental condition  -     QUEtiapine (SEROquel) 50 MG tablet; Take 1 tablet by mouth At Night As Needed (Insomnia).  Dispense: 30 tablet; Refill: 2  -     prazosin (MINIPRESS) 5 MG capsule; Take 1 capsule by  mouth Every Night.  Dispense: 30 capsule; Refill: 2    6. Nightmares  -     QUEtiapine (SEROquel) 50 MG tablet; Take 1 tablet by mouth At Night As Needed (Insomnia).  Dispense: 30 tablet; Refill: 2  -     prazosin (MINIPRESS) 5 MG capsule; Take 1 capsule by mouth Every Night.  Dispense: 30 capsule; Refill: 2    7. Borderline personality disorder (HCC)  -     amitriptyline (ELAVIL) 25 MG tablet; Take 1 tablet by mouth every night at bedtime.  Dispense: 30 tablet; Refill: 2    -Patient having worsening mood fluctuations with rapid fluctuations and difficulty maintaining on a day-to-day basis.  -Reviewed previous documentation  -Reviewed labs  -Change Haldol back to 2 mg in the morning and 4 mg at night for mood stabilization  -Continue prazosin 5 mg nightly for insomnia and nightmares  -Continue Cymbalta 30 mg p.o. daily for mood and anxiety  -Continue lithium 300 mg twice daily for mood stabilization  -Continue Seroquel 50 mg nightly as needed for insomnia and mood stabilization  -Continue Elavil 25 mg p.o. nightly for insomnia  -Encourage cessation of nicotine  -Encouraged therapy and anger management  -Approximate appointment time 8:30 AM to 8:45 AM via telephone visit    Visit Diagnoses:    ICD-10-CM ICD-9-CM   1. Chronic Post traumatic stress disorder (PTSD)  F43.10 309.81   2. Anxiety disorder, unspecified type  F41.9 300.00   3. Bipolar affective disorder, current episode hypomanic (HCC)  F31.0 296.40   4. Bipolar disorder, current episode mixed, moderate (HCC)  F31.62 296.62   5. Insomnia due to mental condition  F51.05 300.9     327.02   6. Nightmares  F51.5 307.47   7. Borderline personality disorder (HCC)  F60.3 301.83       TREATMENT PLAN/GOALS: Continue supportive psychotherapy efforts and medications as indicated. Treatment and medication options discussed during today's visit. Patient ackowledged and verbally consented to continue with current treatment plan and was educated on the importance of  compliance with treatment and follow-up appointments.    MEDICATION ISSUES:    Discussed medication options and treatment plan of prescribed medication as well as the risks, benefits, and side effects including potential falls, possible impaired driving and metabolic adversities among others. Patient is agreeable to call the office with any worsening of symptoms or onset of side effects. Patient is agreeable to call 911 or go to the nearest ER should he/she begin having SI/HI. No medication side effects or related complaints today.     MEDS ORDERED DURING VISIT:  New Medications Ordered This Visit   Medications   • haloperidol (HALDOL) 2 MG tablet     Sig: Take 1 tablet by mouth Every Morning AND 2 tablets Every Night.     Dispense:  270 tablet     Refill:  0   • lithium 300 MG tablet     Sig: TAKE 0.5 TABLETS BY MOUTH IN THE MORNING AND 1 TABLET AT NIGHT.     Dispense:  60 tablet     Refill:  1   • DULoxetine (CYMBALTA) 60 MG capsule     Sig: Take 1 capsule by mouth 2 (Two) Times a Day.     Dispense:  60 capsule     Refill:  2   • QUEtiapine (SEROquel) 50 MG tablet     Sig: Take 1 tablet by mouth At Night As Needed (Insomnia).     Dispense:  30 tablet     Refill:  2   • amitriptyline (ELAVIL) 25 MG tablet     Sig: Take 1 tablet by mouth every night at bedtime.     Dispense:  30 tablet     Refill:  2   • prazosin (MINIPRESS) 5 MG capsule     Sig: Take 1 capsule by mouth Every Night.     Dispense:  30 capsule     Refill:  2       Return in about 4 weeks (around 5/20/2022).             This document has been electronically signed by Stefan Marion MD  April 22, 2022

## 2022-04-22 NOTE — TELEPHONE ENCOUNTER
, I called Alex and let him know you increased his night dose of haldol. He understood and said thank you. No questions.Thanks!

## 2022-05-02 DIAGNOSIS — I10 ESSENTIAL HYPERTENSION: ICD-10-CM

## 2022-05-02 RX ORDER — VALSARTAN 160 MG/1
160 TABLET ORAL DAILY
Qty: 30 TABLET | Refills: 0 | Status: SHIPPED | OUTPATIENT
Start: 2022-05-02

## 2022-05-14 DIAGNOSIS — I10 ESSENTIAL HYPERTENSION: ICD-10-CM

## 2022-05-16 ENCOUNTER — TELEPHONE (OUTPATIENT)
Dept: PSYCHIATRY | Facility: CLINIC | Age: 37
End: 2022-05-16

## 2022-05-16 NOTE — TELEPHONE ENCOUNTER
Patient was mailed a policy letter on 5/11/22. Patient has no-showed on 11/19/21, 11/30/21, 12/28/21, 1/11/22 and 5/11/22. Patient was last seen on 3/31/22. Per the provider, patient is non-compliant and all future appointments have been cancelled. If patient calls back to reschedule, please just schedule one appointment and let him know about our policy.     Thank you.

## 2022-05-17 RX ORDER — CARVEDILOL 6.25 MG/1
TABLET ORAL
Qty: 60 TABLET | Refills: 0 | Status: SHIPPED | OUTPATIENT
Start: 2022-05-17 | End: 2022-06-17

## 2022-05-17 NOTE — TELEPHONE ENCOUNTER
Rx Refill Note  Requested Prescriptions     Pending Prescriptions Disp Refills   • carvedilol (COREG) 6.25 MG tablet [Pharmacy Med Name: CARVEDILOL 6.25 MG TABLET] 180 tablet 0     Sig: TAKE 1 TABLET BY MOUTH TWICE A DAY WITH MEALS      Last office visit with prescribing clinician: 5/7/2021      Next office visit with prescribing clinician: Visit date not found            MARIXA WONG MA  05/17/22, 10:24 EDT

## 2022-05-19 DIAGNOSIS — K21.9 GASTROESOPHAGEAL REFLUX DISEASE, UNSPECIFIED WHETHER ESOPHAGITIS PRESENT: Chronic | ICD-10-CM

## 2022-05-19 DIAGNOSIS — R19.7 DIARRHEA, UNSPECIFIED TYPE: Chronic | ICD-10-CM

## 2022-05-19 RX ORDER — CHOLESTYRAMINE LIGHT 4 G/5.7G
POWDER, FOR SUSPENSION ORAL
Qty: 60 PACKET | Refills: 3 | Status: SHIPPED | OUTPATIENT
Start: 2022-05-19 | End: 2022-08-28

## 2022-05-19 RX ORDER — OMEPRAZOLE 40 MG/1
CAPSULE, DELAYED RELEASE ORAL
Qty: 30 CAPSULE | Refills: 3 | Status: SHIPPED | OUTPATIENT
Start: 2022-05-19

## 2022-05-20 ENCOUNTER — OFFICE VISIT (OUTPATIENT)
Dept: PSYCHIATRY | Facility: CLINIC | Age: 37
End: 2022-05-20

## 2022-05-20 DIAGNOSIS — F41.9 ANXIETY DISORDER, UNSPECIFIED TYPE: ICD-10-CM

## 2022-05-20 DIAGNOSIS — F31.0 BIPOLAR AFFECTIVE DISORDER, CURRENT EPISODE HYPOMANIC: ICD-10-CM

## 2022-05-20 DIAGNOSIS — F17.200 NICOTINE USE DISORDER: ICD-10-CM

## 2022-05-20 DIAGNOSIS — F51.5 NIGHTMARES: ICD-10-CM

## 2022-05-20 DIAGNOSIS — F43.10 POST TRAUMATIC STRESS DISORDER (PTSD): Primary | ICD-10-CM

## 2022-05-20 DIAGNOSIS — F12.10 CANNABIS USE DISORDER, MILD, ABUSE: ICD-10-CM

## 2022-05-20 DIAGNOSIS — F51.05 INSOMNIA DUE TO MENTAL CONDITION: ICD-10-CM

## 2022-05-20 PROCEDURE — 99214 OFFICE O/P EST MOD 30 MIN: CPT | Performed by: PSYCHIATRY & NEUROLOGY

## 2022-05-20 NOTE — PROGRESS NOTES
Subjective   Alex Torres is a 37 y.o. male who presents today for follow up     This provider is located at [facility] at [address] [place] [address]. The provider identified himself as well as his credentials.   The Patient is at home using his phone because problems with video connection. The patient's condition being diagnosed/treated is appropriate for telemedicine. The patient gave consent to be seen remotely, and when consent is given they understand that the consent allows for patient identifiable information to be sent to a third party as needed.   They may refuse to be seen remotely at any time. The electronic data is encrypted and password protected, and the patient has been advised of the potential risks to privacy not withstanding such measures      Chief Complaint: Bipolar disorder/anxiety    History of Present Illness: Patient presenting today for follow-up.  Since last visit, he reports that he is having some life stressors but is managing well.  He reports mood fluctuations have improved.  He is not having any major depressive or anxious symptoms currently that are out side of normal limits for situational problems.  He has been self-medicating with cannabis intermittently.  He finds it helpful for his anxiety.  He does have some intermittent dizziness but denies any other medication side effects.  Sleep and appetite are appropriate.  He denies SI/HI/AVH.    The following portions of the patient's history were reviewed and updated as appropriate: allergies, current medications, past family history, past medical history, past social history, past surgical history and problem list.      Past Medical History:  Past Medical History:   Diagnosis Date   • Anxiety    • Asthma    • Bipolar 1 disorder (HCC)    • Depression    • DM (diabetes mellitus) ([facility])    • Hypertension    • Kidney stone    • Polyneuropathy    • Positive TB test     treated w/ meds x 6 months   •  PTSD (post-traumatic stress disorder)    • Sleep apnea    • Suicide attempt (HCC)    • Tattoos        Social History:  Social History     Socioeconomic History   • Marital status:    Tobacco Use   • Smoking status: Current Every Day Smoker     Packs/day: 1.00     Years: 18.00     Pack years: 18.00     Types: Cigarettes     Start date: 1999   • Smokeless tobacco: Never Used   Vaping Use   • Vaping Use: Never used   Substance and Sexual Activity   • Alcohol use: No   • Drug use: Yes     Types: Marijuana     Comment: rarely   • Sexual activity: Yes     Partners: Female       Family History:  Family History   Problem Relation Age of Onset   • Arthritis Father    • Hypertension Father    • Alcohol abuse Father    • Drug abuse Father    • Diabetes Maternal Aunt    • Diabetes Maternal Uncle    • Diabetes Maternal Grandmother    • Alzheimer's disease Maternal Grandmother    • Dementia Maternal Grandmother    • Diabetes Other    • Hypertension Mother    • Depression Mother    • ADD / ADHD Brother    • Early death Maternal Grandfather    • Liver disease Maternal Grandfather    • Alcohol abuse Maternal Grandfather    • Cirrhosis Maternal Grandfather    • No Known Problems Paternal Grandmother    • Liver disease Paternal Grandfather    • Alcohol abuse Paternal Grandfather    • Early death Paternal Grandfather    • Cirrhosis Paternal Grandfather    • No Known Problems Brother    • Anxiety disorder Neg Hx    • Bipolar disorder Neg Hx    • OCD Neg Hx    • Paranoid behavior Neg Hx    • Schizophrenia Neg Hx    • Seizures Neg Hx    • Self-Injurious Behavior  Neg Hx    • Suicide Attempts Neg Hx    • Colon cancer Neg Hx        Past Surgical History:  Past Surgical History:   Procedure Laterality Date   • CHOLECYSTECTOMY  2002   • INGUINAL HERNIA REPAIR Right 1995   • ORIF METACARPAL FRACTURE Right 2000   • TIBIA FRACTURE SURGERY Left 1997    ORIF       Problem List:  Patient Active Problem List   Diagnosis   • Essential  hypertension   • GERD (gastroesophageal reflux disease)   • Bipolar disorder, current episode mixed, moderate (HCC)   • MOLLY on CPAP   • Arthritis   • Complex regional pain syndrome type 2 of left lower extremity   • Snoring   • Excessive daytime sleepiness   • Peripheral polyneuropathy   • Pain in both lower extremities   • Varicose veins of both lower extremities with pain   • MOLLY (obstructive sleep apnea)   • Class 1 obesity due to excess calories without serious comorbidity with body mass index (BMI) of 32.0 to 32.9 in adult   • Neuromuscular respiratory weakness (HCC)   • Borderline diabetes   • Diarrhea   • Nausea and vomiting       Allergy:   Allergies   Allergen Reactions   • Gabapentin Swelling     + swelling   • Latex Rash        Current Medications:   Current Outpatient Medications   Medication Sig Dispense Refill   • albuterol sulfate  (90 Base) MCG/ACT inhaler Inhale 2 puffs Every 4 (Four) Hours As Needed for Wheezing or Shortness of Air. 18 g 3   • amitriptyline (ELAVIL) 25 MG tablet Take 1 tablet by mouth every night at bedtime. 30 tablet 2   • carvedilol (COREG) 6.25 MG tablet TAKE 1 TABLET BY MOUTH TWICE A DAY WITH MEALS 60 tablet 0   • cholestyramine light (Prevalite) 4 g packet TAKE 1 PACKET BY MOUTH TWICE A DAY 60 packet 3   • DULoxetine (CYMBALTA) 60 MG capsule Take 1 capsule by mouth 2 (Two) Times a Day. 60 capsule 2   • haloperidol (HALDOL) 2 MG tablet Take 1 tablet by mouth Every Morning AND 2 tablets Every Night. 270 tablet 0   • lithium 300 MG tablet TAKE 0.5 TABLETS BY MOUTH IN THE MORNING AND 1 TABLET AT NIGHT. 60 tablet 1   • omeprazole (priLOSEC) 40 MG capsule TAKE 1 CAPSULE BY MOUTH EVERY MORNING 30 MINS BEFORE BREAKFAST 30 capsule 3   • prazosin (MINIPRESS) 5 MG capsule Take 1 capsule by mouth Every Night. 30 capsule 2   • QUEtiapine (SEROquel) 50 MG tablet Take 1 tablet by mouth At Night As Needed (Insomnia). 30 tablet 2   • valsartan (DIOVAN) 160 MG tablet Take 1 tablet by  mouth Daily. APPOINTMENT REQUIRED. 30 tablet 0     No current facility-administered medications for this visit.       Review of Symptoms:    Review of Systems   Constitutional: Positive for fatigue. Negative for activity change (improved), appetite change, chills, diaphoresis and fever.   HENT: Negative.    Eyes: Negative.    Respiratory: Negative.    Cardiovascular: Negative.    Gastrointestinal: Negative.    Endocrine: Negative.    Genitourinary: Negative for decreased libido.   Musculoskeletal: Negative.    Skin: Negative.    Allergic/Immunologic: Negative.    Neurological: Positive for dizziness.   Hematological: Negative.    Psychiatric/Behavioral: Positive for stress. Negative for agitation, behavioral problems, decreased concentration, dysphoric mood, hallucinations, self-injury, sleep disturbance, suicidal ideas, negative for hyperactivity and depressed mood. The patient is not nervous/anxious.          Physical Exam:   There were no vitals taken for this visit.  Unable to assess, telephone visit    Appearance: Unable to assess, telephone visit  Gait, Station, Strength: Unable to assess, telephone visit    Mental Status Exam:     Hygiene:   Unable to assess, telephone visit  Cooperation:  Cooperative  Eye Contact:  Unable to assess, telephone visit  Psychomotor Behavior:  Unable to assess, telephone visit  Affect:  Full range  Mood: normal and euthymic, improved  Hopelessness: Optimistic  Speech:  Normal  Thought Process:  Goal directed and Linear  Thought Content:  Normal  Suicidal:  None  Homicidal:  None  Hallucinations:  None  Delusion:  None  Memory:  Intact  Orientation:  Person, Place, Time and Situation  Reliability:  good  Insight:  Fair  Judgement:  Fair  Impulse Control:  Fair  Physical/Medical Issues:  No        Lab Results:   No visits with results within 1 Month(s) from this visit.   Latest known visit with results is:   Admission on 03/16/2022, Discharged on 03/16/2022   Component Date Value  Ref Range Status   • Glucose 03/16/2022 101 (A) 65 - 99 mg/dL Final   • BUN 03/16/2022 13  6 - 20 mg/dL Final   • Creatinine 03/16/2022 1.00  0.76 - 1.27 mg/dL Final   • Sodium 03/16/2022 135 (A) 136 - 145 mmol/L Final   • Potassium 03/16/2022 3.4 (A) 3.5 - 5.2 mmol/L Final   • Chloride 03/16/2022 98  98 - 107 mmol/L Final   • CO2 03/16/2022 22.7  22.0 - 29.0 mmol/L Final   • Calcium 03/16/2022 8.7  8.6 - 10.5 mg/dL Final   • Total Protein 03/16/2022 6.9  6.0 - 8.5 g/dL Final   • Albumin 03/16/2022 4.30  3.50 - 5.20 g/dL Final   • ALT (SGPT) 03/16/2022 24  1 - 41 U/L Final   • AST (SGOT) 03/16/2022 15  1 - 40 U/L Final   • Alkaline Phosphatase 03/16/2022 83  39 - 117 U/L Final   • Total Bilirubin 03/16/2022 0.7  0.0 - 1.2 mg/dL Final   • Globulin 03/16/2022 2.6  gm/dL Final   • A/G Ratio 03/16/2022 1.7  g/dL Final   • BUN/Creatinine Ratio 03/16/2022 13.0  7.0 - 25.0 Final   • Anion Gap 03/16/2022 14.3  5.0 - 15.0 mmol/L Final   • eGFR 03/16/2022 98.2  >60.0 mL/min/1.73 Final    National Kidney Foundation and American Society of Nephrology (ASN) Task Force recommended calculation based on the Chronic Kidney Disease Epidemiology Collaboration (CKD-EPI) equation refit without adjustment for race.   • Lipase 03/16/2022 22  13 - 60 U/L Final   • Color, UA 03/16/2022 Dark Yellow (A) Yellow, Straw Final   • Appearance, UA 03/16/2022 Clear  Clear Final   • pH, UA 03/16/2022 5.5  5.0 - 8.0 Final   • Specific Gravity, UA 03/16/2022 >=1.030  1.005 - 1.030 Final   • Glucose, UA 03/16/2022 Negative  Negative Final   • Ketones, UA 03/16/2022 Trace (A) Negative Final   • Bilirubin, UA 03/16/2022 Small (1+) (A) Negative Final   • Blood, UA 03/16/2022 Negative  Negative Final   • Protein, UA 03/16/2022 Trace (A) Negative Final   • Leuk Esterase, UA 03/16/2022 Negative  Negative Final   • Nitrite, UA 03/16/2022 Negative  Negative Final   • Urobilinogen, UA 03/16/2022 1.0 E.U./dL  0.2 - 1.0 E.U./dL Final   • Lactate 03/16/2022 1.4   0.5 - 2.0 mmol/L Final   • WBC 03/16/2022 18.03 (A) 3.40 - 10.80 10*3/mm3 Final   • RBC 03/16/2022 4.43  4.14 - 5.80 10*6/mm3 Final   • Hemoglobin 03/16/2022 14.7  13.0 - 17.7 g/dL Final   • Hematocrit 03/16/2022 42.3  37.5 - 51.0 % Final   • MCV 03/16/2022 95.5  79.0 - 97.0 fL Final   • MCH 03/16/2022 33.2 (A) 26.6 - 33.0 pg Final   • MCHC 03/16/2022 34.8  31.5 - 35.7 g/dL Final   • RDW 03/16/2022 14.1  12.3 - 15.4 % Final   • RDW-SD 03/16/2022 49.3  37.0 - 54.0 fl Final   • MPV 03/16/2022 8.7  6.0 - 12.0 fL Final   • Platelets 03/16/2022 225  140 - 450 10*3/mm3 Final   • Neutrophil % 03/16/2022 73.3  42.7 - 76.0 % Final   • Lymphocyte % 03/16/2022 17.4 (A) 19.6 - 45.3 % Final   • Monocyte % 03/16/2022 7.0  5.0 - 12.0 % Final   • Eosinophil % 03/16/2022 1.2  0.3 - 6.2 % Final   • Basophil % 03/16/2022 0.3  0.0 - 1.5 % Final   • Immature Grans % 03/16/2022 0.8 (A) 0.0 - 0.5 % Final   • Neutrophils, Absolute 03/16/2022 13.21 (A) 1.70 - 7.00 10*3/mm3 Final   • Lymphocytes, Absolute 03/16/2022 3.14 (A) 0.70 - 3.10 10*3/mm3 Final   • Monocytes, Absolute 03/16/2022 1.26 (A) 0.10 - 0.90 10*3/mm3 Final   • Eosinophils, Absolute 03/16/2022 0.22  0.00 - 0.40 10*3/mm3 Final   • Basophils, Absolute 03/16/2022 0.05  0.00 - 0.20 10*3/mm3 Final   • Immature Grans, Absolute 03/16/2022 0.15 (A) 0.00 - 0.05 10*3/mm3 Final   • nRBC 03/16/2022 0.0  0.0 - 0.2 /100 WBC Final   • Fecal Occult Blood 03/16/2022 Negative  Negative Final       Assessment & Plan   Diagnoses and all orders for this visit:    1. Chronic Post traumatic stress disorder (PTSD) (Primary)    2. Bipolar affective disorder, current episode hypomanic (HCC)    3. Anxiety disorder, unspecified type    4. Insomnia due to mental condition    5. Nightmares    6. Nicotine use disorder    7. Cannabis use disorder, mild, abuse    -Patient reports mood fluctuations have improved and he is not having any major depressive or anxious symptoms.  He is continuing to utilize nicotine  and utilize cannabis intermittently for anxiety which she finds helpful.  He seems to be relatively stable at his baseline.  -Reviewed previous documentation  -Reviewed labs  -Continue Haldol 2 mg in the morning and 4 mg at night for mood stabilization  -Continue prazosin 5 mg nightly for insomnia and nightmares  -Continue Cymbalta 30 mg p.o. daily for mood and anxiety  -Continue lithium 300 mg twice daily for mood stabilization  -Continue Seroquel 50 mg nightly as needed for insomnia and mood stabilization  -Continue Elavil 25 mg p.o. nightly for insomnia  -Encourage cessation of nicotine and cannabis  -Encouraged therapy and anger management  -Approximate appointment time 8 AM to 8:15 AM via telephone visit    Visit Diagnoses:    ICD-10-CM ICD-9-CM   1. Chronic Post traumatic stress disorder (PTSD)  F43.10 309.81   2. Bipolar affective disorder, current episode hypomanic (HCC)  F31.0 296.40   3. Anxiety disorder, unspecified type  F41.9 300.00   4. Insomnia due to mental condition  F51.05 300.9     327.02   5. Nightmares  F51.5 307.47   6. Nicotine use disorder  F17.200 305.1   7. Cannabis use disorder, mild, abuse  F12.10 305.20       TREATMENT PLAN/GOALS: Continue supportive psychotherapy efforts and medications as indicated. Treatment and medication options discussed during today's visit. Patient ackowledged and verbally consented to continue with current treatment plan and was educated on the importance of compliance with treatment and follow-up appointments.    MEDICATION ISSUES:    Discussed medication options and treatment plan of prescribed medication as well as the risks, benefits, and side effects including potential falls, possible impaired driving and metabolic adversities among others. Patient is agreeable to call the office with any worsening of symptoms or onset of side effects. Patient is agreeable to call 911 or go to the nearest ER should he/she begin having SI/HI. No medication side effects or  related complaints today.     MEDS ORDERED DURING VISIT:  No orders of the defined types were placed in this encounter.      Return in about 3 months (around 8/20/2022).             This document has been electronically signed by Stefan Marion MD  May 20, 2022

## 2022-05-30 DIAGNOSIS — I10 ESSENTIAL HYPERTENSION: ICD-10-CM

## 2022-05-31 RX ORDER — VALSARTAN 160 MG/1
TABLET ORAL
Qty: 30 TABLET | Refills: 0 | OUTPATIENT
Start: 2022-05-31

## 2022-06-17 DIAGNOSIS — I10 ESSENTIAL HYPERTENSION: ICD-10-CM

## 2022-06-17 RX ORDER — CARVEDILOL 6.25 MG/1
TABLET ORAL
Qty: 180 TABLET | Refills: 1 | Status: SHIPPED | OUTPATIENT
Start: 2022-06-17 | End: 2022-12-19

## 2022-07-22 ENCOUNTER — OFFICE VISIT (OUTPATIENT)
Dept: PSYCHIATRY | Facility: CLINIC | Age: 37
End: 2022-07-22

## 2022-07-22 ENCOUNTER — TELEPHONE (OUTPATIENT)
Dept: PSYCHIATRY | Facility: CLINIC | Age: 37
End: 2022-07-22

## 2022-07-22 DIAGNOSIS — F60.3 BORDERLINE PERSONALITY DISORDER: ICD-10-CM

## 2022-07-22 DIAGNOSIS — F51.5 NIGHTMARES: ICD-10-CM

## 2022-07-22 DIAGNOSIS — Z79.899 HIGH RISK MEDICATION USE: Primary | ICD-10-CM

## 2022-07-22 DIAGNOSIS — F41.9 ANXIETY DISORDER, UNSPECIFIED TYPE: ICD-10-CM

## 2022-07-22 DIAGNOSIS — F31.0 BIPOLAR AFFECTIVE DISORDER, CURRENT EPISODE HYPOMANIC: ICD-10-CM

## 2022-07-22 DIAGNOSIS — F31.62 BIPOLAR DISORDER, CURRENT EPISODE MIXED, MODERATE: ICD-10-CM

## 2022-07-22 DIAGNOSIS — F51.05 INSOMNIA DUE TO MENTAL CONDITION: ICD-10-CM

## 2022-07-22 DIAGNOSIS — F43.10 POST TRAUMATIC STRESS DISORDER (PTSD): Chronic | ICD-10-CM

## 2022-07-22 PROCEDURE — 99214 OFFICE O/P EST MOD 30 MIN: CPT | Performed by: PSYCHIATRY & NEUROLOGY

## 2022-07-22 RX ORDER — PRAZOSIN HYDROCHLORIDE 5 MG/1
5 CAPSULE ORAL NIGHTLY
Qty: 30 CAPSULE | Refills: 2 | Status: SHIPPED | OUTPATIENT
Start: 2022-07-22 | End: 2022-09-23

## 2022-07-22 RX ORDER — QUETIAPINE FUMARATE 50 MG/1
50 TABLET, FILM COATED ORAL NIGHTLY PRN
Qty: 30 TABLET | Refills: 2 | Status: SHIPPED | OUTPATIENT
Start: 2022-07-22 | End: 2022-09-23 | Stop reason: SDUPTHER

## 2022-07-22 RX ORDER — AMITRIPTYLINE HYDROCHLORIDE 25 MG/1
25 TABLET, FILM COATED ORAL
Qty: 30 TABLET | Refills: 2 | Status: SHIPPED | OUTPATIENT
Start: 2022-07-22 | End: 2022-09-23 | Stop reason: SDUPTHER

## 2022-07-22 RX ORDER — LITHIUM CARBONATE 300 MG
TABLET ORAL
Qty: 90 TABLET | Refills: 2 | Status: SHIPPED | OUTPATIENT
Start: 2022-07-22 | End: 2022-09-07

## 2022-07-22 RX ORDER — LITHIUM CARBONATE 300 MG
TABLET ORAL
Qty: 90 TABLET | Refills: 2 | Status: SHIPPED | OUTPATIENT
Start: 2022-07-22 | End: 2022-07-22 | Stop reason: SDUPTHER

## 2022-07-22 RX ORDER — DULOXETIN HYDROCHLORIDE 60 MG/1
60 CAPSULE, DELAYED RELEASE ORAL 2 TIMES DAILY
Qty: 60 CAPSULE | Refills: 2 | Status: SHIPPED | OUTPATIENT
Start: 2022-07-22 | End: 2022-09-23 | Stop reason: SDUPTHER

## 2022-07-22 RX ORDER — HALOPERIDOL 2 MG/1
TABLET ORAL
Qty: 270 TABLET | Refills: 0 | Status: SHIPPED | OUTPATIENT
Start: 2022-07-22 | End: 2022-09-23 | Stop reason: SDUPTHER

## 2022-07-22 NOTE — TELEPHONE ENCOUNTER
[facility] states there are two sets of directions on the lithium 300 sent in today. Would like for you to advise and send in the correct dosing please.

## 2022-07-29 NOTE — PROGRESS NOTES
Subjective   Alex Torres is a 37 y.o. male who presents today for follow up     This provider is located at [facility] at [address] [place] [address]. The provider identified himself as well as his credentials.   The Patient is at home using his phone because problems with video connection. The patient's condition being diagnosed/treated is appropriate for telemedicine. The patient gave consent to be seen remotely, and when consent is given they understand that the consent allows for patient identifiable information to be sent to a third party as needed.   They may refuse to be seen remotely at any time. The electronic data is encrypted and password protected, and the patient has been advised of the potential risks to privacy not withstanding such measures      Chief Complaint: Bipolar disorder/anxiety    History of Present Illness: Patient presented today for follow-up.  Since last visit, he reports that he has been doing out to be well.  He got a new job in August and has had some increased irritability and mood lability as a result but feels overall he continues to be more stable and even than before.  He is having some poor sleep with fatigue during the day as he has not been getting his Seroquel from the pharmacy prescribed appropriately.  He denies any current medication side effects.  He denies SI/HI/AVH.    The following portions of the patient's history were reviewed and updated as appropriate: allergies, current medications, past family history, past medical history, past social history, past surgical history and problem list.      Past Medical History:  Past Medical History:   Diagnosis Date   • Anxiety    • Asthma    • Bipolar 1 disorder (HCC)    • Depression    • DM (diabetes mellitus) (Formerly [facility])    • Hypertension    • Kidney stone    • Polyneuropathy    • Positive TB test     treated w/ meds x 6 months   • PTSD (post-traumatic stress disorder)    • Sleep apnea    •  Suicide attempt (HCC)    • Tattoos        Social History:  Social History     Socioeconomic History   • Marital status:    Tobacco Use   • Smoking status: Current Every Day Smoker     Packs/day: 1.00     Years: 18.00     Pack years: 18.00     Types: Cigarettes     Start date: 1999   • Smokeless tobacco: Never Used   Vaping Use   • Vaping Use: Never used   Substance and Sexual Activity   • Alcohol use: No   • Drug use: Yes     Types: Marijuana     Comment: rarely   • Sexual activity: Yes     Partners: Female       Family History:  Family History   Problem Relation Age of Onset   • Arthritis Father    • Hypertension Father    • Alcohol abuse Father    • Drug abuse Father    • Diabetes Maternal Aunt    • Diabetes Maternal Uncle    • Diabetes Maternal Grandmother    • Alzheimer's disease Maternal Grandmother    • Dementia Maternal Grandmother    • Diabetes Other    • Hypertension Mother    • Depression Mother    • ADD / ADHD Brother    • Early death Maternal Grandfather    • Liver disease Maternal Grandfather    • Alcohol abuse Maternal Grandfather    • Cirrhosis Maternal Grandfather    • No Known Problems Paternal Grandmother    • Liver disease Paternal Grandfather    • Alcohol abuse Paternal Grandfather    • Early death Paternal Grandfather    • Cirrhosis Paternal Grandfather    • No Known Problems Brother    • Anxiety disorder Neg Hx    • Bipolar disorder Neg Hx    • OCD Neg Hx    • Paranoid behavior Neg Hx    • Schizophrenia Neg Hx    • Seizures Neg Hx    • Self-Injurious Behavior  Neg Hx    • Suicide Attempts Neg Hx    • Colon cancer Neg Hx        Past Surgical History:  Past Surgical History:   Procedure Laterality Date   • CHOLECYSTECTOMY  2002   • INGUINAL HERNIA REPAIR Right 1995   • ORIF METACARPAL FRACTURE Right 2000   • TIBIA FRACTURE SURGERY Left 1997    ORIF       Problem List:  Patient Active Problem List   Diagnosis   • Essential hypertension   • GERD (gastroesophageal reflux disease)   • Bipolar  disorder, current episode mixed, moderate (HCC)   • MOLLY on CPAP   • Arthritis   • Complex regional pain syndrome type 2 of left lower extremity   • Snoring   • Excessive daytime sleepiness   • Peripheral polyneuropathy   • Pain in both lower extremities   • Varicose veins of both lower extremities with pain   • MOLLY (obstructive sleep apnea)   • Class 1 obesity due to excess calories without serious comorbidity with body mass index (BMI) of 32.0 to 32.9 in adult   • Neuromuscular respiratory weakness (HCC)   • Borderline diabetes   • Diarrhea   • Nausea and vomiting       Allergy:   Allergies   Allergen Reactions   • Gabapentin Swelling     + swelling   • Latex Rash        Current Medications:   Current Outpatient Medications   Medication Sig Dispense Refill   • amitriptyline (ELAVIL) 25 MG tablet Take 1 tablet by mouth every night at bedtime. 30 tablet 2   • DULoxetine (CYMBALTA) 60 MG capsule Take 1 capsule by mouth 2 (Two) Times a Day. 60 capsule 2   • haloperidol (HALDOL) 2 MG tablet Take 1 tablet by mouth Every Morning AND 2 tablets Every Night. 270 tablet 0   • lithium 300 MG tablet Take 1 tablet by mouth Every Morning AND 2 tablets Every Night. 90 tablet 2   • prazosin (MINIPRESS) 5 MG capsule Take 1 capsule by mouth Every Night. 30 capsule 2   • QUEtiapine (SEROquel) 50 MG tablet Take 1 tablet by mouth At Night As Needed (Insomnia). 30 tablet 2   • albuterol sulfate  (90 Base) MCG/ACT inhaler Inhale 2 puffs Every 4 (Four) Hours As Needed for Wheezing or Shortness of Air. 18 g 3   • carvedilol (COREG) 6.25 MG tablet TAKE 1 TABLET BY MOUTH TWICE A DAY WITH MEALS 180 tablet 1   • cholestyramine light (Prevalite) 4 g packet TAKE 1 PACKET BY MOUTH TWICE A DAY 60 packet 3   • omeprazole (priLOSEC) 40 MG capsule TAKE 1 CAPSULE BY MOUTH EVERY MORNING 30 MINS BEFORE BREAKFAST 30 capsule 3   • valsartan (DIOVAN) 160 MG tablet Take 1 tablet by mouth Daily. APPOINTMENT REQUIRED. 30 tablet 0     No current  facility-administered medications for this visit.       Review of Symptoms:    Review of Systems   Constitutional: Positive for fatigue. Negative for activity change (improved), appetite change, chills, diaphoresis and fever.   HENT: Negative.    Eyes: Negative.    Respiratory: Negative.    Cardiovascular: Negative.    Gastrointestinal: Negative.    Endocrine: Negative.    Genitourinary: Negative for decreased libido.   Musculoskeletal: Negative.    Skin: Negative.    Allergic/Immunologic: Negative.    Neurological: Positive for dizziness.   Hematological: Negative.    Psychiatric/Behavioral: Positive for dysphoric mood and stress. Negative for agitation, behavioral problems, decreased concentration, hallucinations, self-injury, sleep disturbance, suicidal ideas, negative for hyperactivity and depressed mood. The patient is not nervous/anxious.          Physical Exam:   There were no vitals taken for this visit.  Unable to assess, telephone visit    Appearance: Unable to assess, telephone visit  Gait, Station, Strength: Unable to assess, telephone visit    Mental Status Exam:       Hygiene:   Unable to assess, telephone visit  Cooperation:  Cooperative  Eye Contact:  Unable to assess, telephone visit  Psychomotor Behavior:  Unable to assess, telephone visit  Affect:  Full range  Mood: normal, improved but some increased lability  Hopelessness: Optimistic  Speech:  Normal  Thought Process:  Goal directed and Linear  Thought Content:  Normal  Suicidal:  None  Homicidal:  None  Hallucinations:  None  Delusion:  None  Memory:  Intact  Orientation:  Person, Place, Time and Situation  Reliability:  good  Insight:  Fair  Judgement:  Fair  Impulse Control:  Fair  Physical/Medical Issues:  No        Lab Results:   No visits with results within 1 Month(s) from this visit.   Latest known visit with results is:   Admission on 03/16/2022, Discharged on 03/16/2022   Component Date Value Ref Range Status   • Glucose 03/16/2022 101  (A) 65 - 99 mg/dL Final   • BUN 03/16/2022 13  6 - 20 mg/dL Final   • Creatinine 03/16/2022 1.00  0.76 - 1.27 mg/dL Final   • Sodium 03/16/2022 135 (A) 136 - 145 mmol/L Final   • Potassium 03/16/2022 3.4 (A) 3.5 - 5.2 mmol/L Final   • Chloride 03/16/2022 98  98 - 107 mmol/L Final   • CO2 03/16/2022 22.7  22.0 - 29.0 mmol/L Final   • Calcium 03/16/2022 8.7  8.6 - 10.5 mg/dL Final   • Total Protein 03/16/2022 6.9  6.0 - 8.5 g/dL Final   • Albumin 03/16/2022 4.30  3.50 - 5.20 g/dL Final   • ALT (SGPT) 03/16/2022 24  1 - 41 U/L Final   • AST (SGOT) 03/16/2022 15  1 - 40 U/L Final   • Alkaline Phosphatase 03/16/2022 83  39 - 117 U/L Final   • Total Bilirubin 03/16/2022 0.7  0.0 - 1.2 mg/dL Final   • Globulin 03/16/2022 2.6  gm/dL Final   • A/G Ratio 03/16/2022 1.7  g/dL Final   • BUN/Creatinine Ratio 03/16/2022 13.0  7.0 - 25.0 Final   • Anion Gap 03/16/2022 14.3  5.0 - 15.0 mmol/L Final   • eGFR 03/16/2022 98.2  >60.0 mL/min/1.73 Final    National Kidney Foundation and American Society of Nephrology (ASN) Task Force recommended calculation based on the Chronic Kidney Disease Epidemiology Collaboration (CKD-EPI) equation refit without adjustment for race.   • Lipase 03/16/2022 22  13 - 60 U/L Final   • Color, UA 03/16/2022 Dark Yellow (A) Yellow, Straw Final   • Appearance, UA 03/16/2022 Clear  Clear Final   • pH, UA 03/16/2022 5.5  5.0 - 8.0 Final   • Specific Gravity, UA 03/16/2022 >=1.030  1.005 - 1.030 Final   • Glucose, UA 03/16/2022 Negative  Negative Final   • Ketones, UA 03/16/2022 Trace (A) Negative Final   • Bilirubin, UA 03/16/2022 Small (1+) (A) Negative Final   • Blood, UA 03/16/2022 Negative  Negative Final   • Protein, UA 03/16/2022 Trace (A) Negative Final   • Leuk Esterase, UA 03/16/2022 Negative  Negative Final   • Nitrite, UA 03/16/2022 Negative  Negative Final   • Urobilinogen, UA 03/16/2022 1.0 E.U./dL  0.2 - 1.0 E.U./dL Final   • Lactate 03/16/2022 1.4  0.5 - 2.0 mmol/L Final   • WBC 03/16/2022  18.03 (A) 3.40 - 10.80 10*3/mm3 Final   • RBC 03/16/2022 4.43  4.14 - 5.80 10*6/mm3 Final   • Hemoglobin 03/16/2022 14.7  13.0 - 17.7 g/dL Final   • Hematocrit 03/16/2022 42.3  37.5 - 51.0 % Final   • MCV 03/16/2022 95.5  79.0 - 97.0 fL Final   • MCH 03/16/2022 33.2 (A) 26.6 - 33.0 pg Final   • MCHC 03/16/2022 34.8  31.5 - 35.7 g/dL Final   • RDW 03/16/2022 14.1  12.3 - 15.4 % Final   • RDW-SD 03/16/2022 49.3  37.0 - 54.0 fl Final   • MPV 03/16/2022 8.7  6.0 - 12.0 fL Final   • Platelets 03/16/2022 225  140 - 450 10*3/mm3 Final   • Neutrophil % 03/16/2022 73.3  42.7 - 76.0 % Final   • Lymphocyte % 03/16/2022 17.4 (A) 19.6 - 45.3 % Final   • Monocyte % 03/16/2022 7.0  5.0 - 12.0 % Final   • Eosinophil % 03/16/2022 1.2  0.3 - 6.2 % Final   • Basophil % 03/16/2022 0.3  0.0 - 1.5 % Final   • Immature Grans % 03/16/2022 0.8 (A) 0.0 - 0.5 % Final   • Neutrophils, Absolute 03/16/2022 13.21 (A) 1.70 - 7.00 10*3/mm3 Final   • Lymphocytes, Absolute 03/16/2022 3.14 (A) 0.70 - 3.10 10*3/mm3 Final   • Monocytes, Absolute 03/16/2022 1.26 (A) 0.10 - 0.90 10*3/mm3 Final   • Eosinophils, Absolute 03/16/2022 0.22  0.00 - 0.40 10*3/mm3 Final   • Basophils, Absolute 03/16/2022 0.05  0.00 - 0.20 10*3/mm3 Final   • Immature Grans, Absolute 03/16/2022 0.15 (A) 0.00 - 0.05 10*3/mm3 Final   • nRBC 03/16/2022 0.0  0.0 - 0.2 /100 WBC Final   • Fecal Occult Blood 03/16/2022 Negative  Negative Final       Assessment & Plan   Diagnoses and all orders for this visit:    1. High risk medication use (Primary)  -     Lithium Level; Future    2. Chronic Post traumatic stress disorder (PTSD)  -     haloperidol (HALDOL) 2 MG tablet; Take 1 tablet by mouth Every Morning AND 2 tablets Every Night.  Dispense: 270 tablet; Refill: 0  -     DULoxetine (CYMBALTA) 60 MG capsule; Take 1 capsule by mouth 2 (Two) Times a Day.  Dispense: 60 capsule; Refill: 2  -     QUEtiapine (SEROquel) 50 MG tablet; Take 1 tablet by mouth At Night As Needed (Insomnia).   Dispense: 30 tablet; Refill: 2  -     prazosin (MINIPRESS) 5 MG capsule; Take 1 capsule by mouth Every Night.  Dispense: 30 capsule; Refill: 2  -     Discontinue: lithium 300 MG tablet; Take 1 tablet by mouth Every Morning AND 2 tablets Every Night. TAKE 0.5 TABLETS BY MOUTH IN THE MORNING AND 1 TABLET AT NIGHT.  Dispense: 90 tablet; Refill: 2    3. Anxiety disorder, unspecified type  -     haloperidol (HALDOL) 2 MG tablet; Take 1 tablet by mouth Every Morning AND 2 tablets Every Night.  Dispense: 270 tablet; Refill: 0  -     DULoxetine (CYMBALTA) 60 MG capsule; Take 1 capsule by mouth 2 (Two) Times a Day.  Dispense: 60 capsule; Refill: 2  -     QUEtiapine (SEROquel) 50 MG tablet; Take 1 tablet by mouth At Night As Needed (Insomnia).  Dispense: 30 tablet; Refill: 2  -     Discontinue: lithium 300 MG tablet; Take 1 tablet by mouth Every Morning AND 2 tablets Every Night. TAKE 0.5 TABLETS BY MOUTH IN THE MORNING AND 1 TABLET AT NIGHT.  Dispense: 90 tablet; Refill: 2    4. Bipolar affective disorder, current episode hypomanic (HCC)  -     haloperidol (HALDOL) 2 MG tablet; Take 1 tablet by mouth Every Morning AND 2 tablets Every Night.  Dispense: 270 tablet; Refill: 0  -     Discontinue: lithium 300 MG tablet; Take 1 tablet by mouth Every Morning AND 2 tablets Every Night. TAKE 0.5 TABLETS BY MOUTH IN THE MORNING AND 1 TABLET AT NIGHT.  Dispense: 90 tablet; Refill: 2    5. Bipolar disorder, current episode mixed, moderate (HCC)  -     DULoxetine (CYMBALTA) 60 MG capsule; Take 1 capsule by mouth 2 (Two) Times a Day.  Dispense: 60 capsule; Refill: 2  -     QUEtiapine (SEROquel) 50 MG tablet; Take 1 tablet by mouth At Night As Needed (Insomnia).  Dispense: 30 tablet; Refill: 2    6. Insomnia due to mental condition  -     QUEtiapine (SEROquel) 50 MG tablet; Take 1 tablet by mouth At Night As Needed (Insomnia).  Dispense: 30 tablet; Refill: 2  -     prazosin (MINIPRESS) 5 MG capsule; Take 1 capsule by mouth Every Night.   Dispense: 30 capsule; Refill: 2    7. Nightmares  -     QUEtiapine (SEROquel) 50 MG tablet; Take 1 tablet by mouth At Night As Needed (Insomnia).  Dispense: 30 tablet; Refill: 2  -     prazosin (MINIPRESS) 5 MG capsule; Take 1 capsule by mouth Every Night.  Dispense: 30 capsule; Refill: 2    8. Borderline personality disorder (HCC)  -     amitriptyline (ELAVIL) 25 MG tablet; Take 1 tablet by mouth every night at bedtime.  Dispense: 30 tablet; Refill: 2    -Patient doing relatively well but is having some increased mood lability due to a new job though he continues to cope more appropriately than in the past and irritability is much lower.  No medication side effects noted but he is having poor sleep as he has not been getting his Seroquel from the pharmacy.  -Reviewed previous documentation  -Reviewed labs  -Continue Haldol 2 mg in the morning and 4 mg at night for mood stabilization  -Continue prazosin 5 mg nightly for insomnia and nightmares  -Continue Cymbalta 30 mg p.o. daily for mood and anxiety  -Increase lithium 300 mg twice daily to 300 mg in the morning and 600 mg at night for mood stabilization  -Patient advised he would need a lithium level in 5 to 7 days  -Continue Seroquel 50 mg nightly as needed for insomnia and mood stabilization  -Continue Elavil 25 mg p.o. nightly for insomnia  -Encourage cessation of nicotine and cannabis  -Encouraged therapy and anger management  -Approximate appointment time 9:45 AM to 10 AM via telephone visit    Visit Diagnoses:    ICD-10-CM ICD-9-CM   1. High risk medication use  Z79.899 V58.69   2. Chronic Post traumatic stress disorder (PTSD)  F43.10 309.81   3. Anxiety disorder, unspecified type  F41.9 300.00   4. Bipolar affective disorder, current episode hypomanic (HCC)  F31.0 296.40   5. Bipolar disorder, current episode mixed, moderate (HCC)  F31.62 296.62   6. Insomnia due to mental condition  F51.05 300.9     327.02   7. Nightmares  F51.5 307.47   8. Borderline  personality disorder (HCC)  F60.3 301.83       TREATMENT PLAN/GOALS: Continue supportive psychotherapy efforts and medications as indicated. Treatment and medication options discussed during today patient advised he may need a lithium level in 5 to 7 days visit. Patient ackowledged and verbally consented to continue with current treatment plan and was educated on the importance of compliance with treatment and follow-up appointments.    MEDICATION ISSUES:    Discussed medication options and treatment plan of prescribed medication as well as the risks, benefits, and side effects including potential falls, possible impaired driving and metabolic adversities among others. Patient is agreeable to call the office with any worsening of symptoms or onset of side effects. Patient is agreeable to call 911 or go to the nearest ER should he/she begin having SI/HI. No medication side effects or related complaints today.     MEDS ORDERED DURING VISIT:  New Medications Ordered This Visit   Medications   • haloperidol (HALDOL) 2 MG tablet     Sig: Take 1 tablet by mouth Every Morning AND 2 tablets Every Night.     Dispense:  270 tablet     Refill:  0   • DULoxetine (CYMBALTA) 60 MG capsule     Sig: Take 1 capsule by mouth 2 (Two) Times a Day.     Dispense:  60 capsule     Refill:  2   • QUEtiapine (SEROquel) 50 MG tablet     Sig: Take 1 tablet by mouth At Night As Needed (Insomnia).     Dispense:  30 tablet     Refill:  2   • amitriptyline (ELAVIL) 25 MG tablet     Sig: Take 1 tablet by mouth every night at bedtime.     Dispense:  30 tablet     Refill:  2   • prazosin (MINIPRESS) 5 MG capsule     Sig: Take 1 capsule by mouth Every Night.     Dispense:  30 capsule     Refill:  2       Return in about 4 weeks (around 8/19/2022).             This document has been electronically signed by Stefan Marion MD  July 29, 2022

## 2022-08-27 ENCOUNTER — HOSPITAL ENCOUNTER (EMERGENCY)
Facility: HOSPITAL | Age: 37
Discharge: LEFT BEFORE DISCHARGE | End: 2022-08-27

## 2022-08-27 VITALS
SYSTOLIC BLOOD PRESSURE: 151 MMHG | HEART RATE: 86 BPM | TEMPERATURE: 99 F | OXYGEN SATURATION: 98 % | HEIGHT: 76 IN | WEIGHT: 251.2 LBS | RESPIRATION RATE: 19 BRPM | BODY MASS INDEX: 30.59 KG/M2 | DIASTOLIC BLOOD PRESSURE: 97 MMHG

## 2022-08-27 PROCEDURE — 99211 OFF/OP EST MAY X REQ PHY/QHP: CPT

## 2022-08-29 ENCOUNTER — OFFICE VISIT (OUTPATIENT)
Dept: SURGERY | Facility: CLINIC | Age: 37
End: 2022-08-29

## 2022-08-29 VITALS
OXYGEN SATURATION: 99 % | WEIGHT: 244.2 LBS | BODY MASS INDEX: 29.74 KG/M2 | TEMPERATURE: 97.6 F | RESPIRATION RATE: 16 BRPM | HEART RATE: 101 BPM | SYSTOLIC BLOOD PRESSURE: 122 MMHG | DIASTOLIC BLOOD PRESSURE: 82 MMHG | HEIGHT: 76 IN

## 2022-08-29 DIAGNOSIS — K61.1 PERIRECTAL ABSCESS: Primary | ICD-10-CM

## 2022-08-29 PROCEDURE — 46040 I&D ISCHIORCT&/PERIRCT ABSC: CPT | Performed by: SURGERY

## 2022-08-29 PROCEDURE — 99213 OFFICE O/P EST LOW 20 MIN: CPT | Performed by: SURGERY

## 2022-08-29 NOTE — PROGRESS NOTES
Patient: Alex Torres    YOB: 1985    Date: 08/29/2022    Primary Care Provider: Jose Rich DO    Reason for Consultation: Lesion    Chief Complaint   Patient presents with   • Skin Lesion       Subjective .     History of present illness:  I saw the patient in the office  today as a consultation for evaluation and treatment of a perirectal abscess.  He states very painful and sitting impossible.  Patient had an I&D done about 3 months ago.  Developed a recurrence, increased pain or fluctuance.  Patient placed on Bactrim yesterday by an abscess with drain.  Referred here for evaluation and management.    The following portions of the patient's history were reviewed and updated as appropriate: allergies, current medications, past family history, past medical history, past social history, past surgical history and problem list.    Review of Systems   Constitutional: Negative for activity change, chills, fever and unexpected weight change.   HENT: Negative for hearing loss, trouble swallowing and voice change.    Eyes: Negative for visual disturbance.   Respiratory: Negative for apnea, cough, chest tightness, shortness of breath and wheezing.    Cardiovascular: Negative for chest pain, palpitations and leg swelling.   Gastrointestinal: Negative for abdominal distention, abdominal pain, anal bleeding, blood in stool, constipation, diarrhea, nausea, rectal pain and vomiting.   Endocrine: Negative for cold intolerance and heat intolerance.   Genitourinary: Negative for difficulty urinating, dysuria, flank pain, scrotal swelling and testicular pain.   Musculoskeletal: Negative for back pain, gait problem and joint swelling.   Skin: Positive for wound. Negative for color change and rash.   Neurological: Negative for dizziness, syncope, speech difficulty, weakness, light-headedness, numbness and headaches.   Hematological: Negative for adenopathy. Does not bruise/bleed easily.    Psychiatric/Behavioral: Negative for confusion. The patient is not nervous/anxious.        History:  Past Medical History:   Diagnosis Date   • Anxiety    • Asthma    • Bipolar 1 disorder (HCC)    • Depression    • DM (diabetes mellitus) (HCC)    • Hypertension    • Kidney stone    • Polyneuropathy    • Positive TB test     treated w/ meds x 6 months   • PTSD (post-traumatic stress disorder)    • Sleep apnea    • Suicide attempt (HCC)    • Tattoos        Past Surgical History:   Procedure Laterality Date   • CHOLECYSTECTOMY  2002   • INGUINAL HERNIA REPAIR Right 1995   • ORIF METACARPAL FRACTURE Right 2000   • TIBIA FRACTURE SURGERY Left 1997    ORIF       Family History   Problem Relation Age of Onset   • Arthritis Father    • Hypertension Father    • Alcohol abuse Father    • Drug abuse Father    • Diabetes Maternal Aunt    • Diabetes Maternal Uncle    • Diabetes Maternal Grandmother    • Alzheimer's disease Maternal Grandmother    • Dementia Maternal Grandmother    • Diabetes Other    • Hypertension Mother    • Depression Mother    • ADD / ADHD Brother    • Early death Maternal Grandfather    • Liver disease Maternal Grandfather    • Alcohol abuse Maternal Grandfather    • Cirrhosis Maternal Grandfather    • No Known Problems Paternal Grandmother    • Liver disease Paternal Grandfather    • Alcohol abuse Paternal Grandfather    • Early death Paternal Grandfather    • Cirrhosis Paternal Grandfather    • No Known Problems Brother    • Anxiety disorder Neg Hx    • Bipolar disorder Neg Hx    • OCD Neg Hx    • Paranoid behavior Neg Hx    • Schizophrenia Neg Hx    • Seizures Neg Hx    • Self-Injurious Behavior  Neg Hx    • Suicide Attempts Neg Hx    • Colon cancer Neg Hx        Social History     Tobacco Use   • Smoking status: Current Every Day Smoker     Packs/day: 1.00     Years: 18.00     Pack years: 18.00     Types: Cigarettes     Start date: 1999   • Smokeless tobacco: Never Used   Vaping Use   • Vaping Use:  Never used   Substance Use Topics   • Alcohol use: No   • Drug use: Yes     Types: Marijuana     Comment: rarely        Allergies:  Allergies   Allergen Reactions   • Gabapentin Swelling     + swelling   • Latex Rash       Medications:    Current Outpatient Medications:   •  albuterol sulfate  (90 Base) MCG/ACT inhaler, Inhale 2 puffs Every 4 (Four) Hours As Needed for Wheezing or Shortness of Air., Disp: 18 g, Rfl: 3  •  amitriptyline (ELAVIL) 25 MG tablet, Take 1 tablet by mouth every night at bedtime., Disp: 30 tablet, Rfl: 2  •  carvedilol (COREG) 6.25 MG tablet, TAKE 1 TABLET BY MOUTH TWICE A DAY WITH MEALS, Disp: 180 tablet, Rfl: 1  •  DULoxetine (CYMBALTA) 60 MG capsule, Take 1 capsule by mouth 2 (Two) Times a Day., Disp: 60 capsule, Rfl: 2  •  haloperidol (HALDOL) 2 MG tablet, Take 1 tablet by mouth Every Morning AND 2 tablets Every Night., Disp: 270 tablet, Rfl: 0  •  lithium 300 MG tablet, Take 1 tablet by mouth Every Morning AND 2 tablets Every Night., Disp: 90 tablet, Rfl: 2  •  omeprazole (priLOSEC) 40 MG capsule, TAKE 1 CAPSULE BY MOUTH EVERY MORNING 30 MINS BEFORE BREAKFAST, Disp: 30 capsule, Rfl: 3  •  prazosin (MINIPRESS) 5 MG capsule, Take 1 capsule by mouth Every Night., Disp: 30 capsule, Rfl: 2  •  QUEtiapine (SEROquel) 50 MG tablet, Take 1 tablet by mouth At Night As Needed (Insomnia)., Disp: 30 tablet, Rfl: 2  •  sulfamethoxazole-trimethoprim (Bactrim DS) 800-160 MG per tablet, Take 1 tablet by mouth 2 (Two) Times a Day., Disp: 14 tablet, Rfl: 0  •  traMADol (ULTRAM) 50 MG tablet, Take 1 tablet by mouth Every 6 (Six) Hours As Needed for Moderate Pain  for up to 15 doses., Disp: 15 tablet, Rfl: 0  •  valsartan (DIOVAN) 160 MG tablet, Take 1 tablet by mouth Daily. APPOINTMENT REQUIRED., Disp: 30 tablet, Rfl: 0    Objective     Vital Signs:   Vitals:    08/29/22 1243   BP: 122/82   Pulse: 101   Resp: 16   Temp: 97.6 °F (36.4 °C)   TempSrc: Temporal   SpO2: 99%   Weight: 111 kg (244 lb 3.2  "oz)   Height: 193 cm (76\")       Physical Exam:  Lungs:     Clear to auscultation,respirations regular, even and                  unlabored    Heart:    Regular rhythm and normal rate, normal S1 and S2, no            murmur      General Appearance:    Alert, cooperative, in no acute distress   Head:    Normocephalic, without obvious abnormality, atraumatic   Eyes:            Lids and lashes normal, conjunctivae and sclerae normal, no   icterus, no pallor, corneas clear.   Ears:    Ears appear intact with no abnormalities noted   Throat:   No oral lesions, no thrush, oral mucosa moist   Neck:   No adenopathy, supple, trachea midline, no thyromegaly, no   carotid bruit.   Extremities:   Moves all extremities well, no edema, no cyanosis, no             Redness.    Pulses:   Pulses palpable and equal bilaterally   Skin:   No bleeding, bruising or rash.   Rectal-    Biopsy from anterior portion.  Fluctuant and tender.   Lymph nodes:   No palpable adenopathy   Neurologic:   Cranial nerves 2 - 12 grossly intact, sensation intact.     Results Review:   I reviewed the patient's new clinical results.    Review of Systems was reviewed and confirmed as accurate as documented by the MA.    Assessment & Plan     1. Perirectal abscess        I did have a detailed and extensive discussion with the patient in the hospital and they understand that they need to undergo incision and drainage of rectal abscess. Full risks and benefits of operative versus nonoperative intervention were discussed with the patient and these include bleeding, infection and reccurrence. The patient understands, agrees, and wishes to proceed with the surgical treatment plan as mentioned above. The patient had no questions for me at the end of the discussion.      Procedure: Incision and drainage complex rectal abscess    I recommended abscess drainage to the patient. I explained the indication as well as the risks and benefits which include bleeding, further " infection requiring additional procedures, non healing of the wound etc. The patient understands these and wishes to proceed.      The patient was brought to the procedure room. Consent and time out were performed. The area was prepped and draped in the usual fashion. 1% lidocaine with epinephrine was infused locally. The abscess was then incised and drained sharply with a #11 blade. Purulent contents were evacuated and irrigated with saline and peroxide. Minimal blood loss had occurred and was well controlled with pressure. There were no complications and the patient tolerated the procedure well. Wound instructions were given.     I discussed the patients findings and my recommendations with patient and consulting provider.    Electronically signed by Gilda Hill MD  08/29/22  13:02 EDT

## 2022-09-07 DIAGNOSIS — F43.10 POST TRAUMATIC STRESS DISORDER (PTSD): Chronic | ICD-10-CM

## 2022-09-07 DIAGNOSIS — F41.9 ANXIETY DISORDER, UNSPECIFIED TYPE: ICD-10-CM

## 2022-09-07 DIAGNOSIS — F31.0 BIPOLAR AFFECTIVE DISORDER, CURRENT EPISODE HYPOMANIC: ICD-10-CM

## 2022-09-07 RX ORDER — LITHIUM CARBONATE 300 MG
TABLET ORAL
Qty: 45 TABLET | Refills: 2 | Status: SHIPPED | OUTPATIENT
Start: 2022-09-07 | End: 2022-09-23 | Stop reason: SDUPTHER

## 2022-09-12 ENCOUNTER — TELEPHONE (OUTPATIENT)
Dept: SURGERY | Facility: CLINIC | Age: 37
End: 2022-09-12

## 2022-09-18 DIAGNOSIS — K21.9 GASTROESOPHAGEAL REFLUX DISEASE, UNSPECIFIED WHETHER ESOPHAGITIS PRESENT: Chronic | ICD-10-CM

## 2022-09-18 DIAGNOSIS — R19.7 DIARRHEA, UNSPECIFIED TYPE: Chronic | ICD-10-CM

## 2022-09-19 RX ORDER — CHOLESTYRAMINE LIGHT 4 G/5.7G
POWDER, FOR SUSPENSION ORAL
Qty: 60 PACKET | Refills: 3 | OUTPATIENT
Start: 2022-09-19

## 2022-09-19 RX ORDER — OMEPRAZOLE 40 MG/1
CAPSULE, DELAYED RELEASE ORAL
Qty: 30 CAPSULE | Refills: 3 | OUTPATIENT
Start: 2022-09-19

## 2022-09-23 ENCOUNTER — OFFICE VISIT (OUTPATIENT)
Dept: PSYCHIATRY | Facility: CLINIC | Age: 37
End: 2022-09-23

## 2022-09-23 DIAGNOSIS — F31.62 BIPOLAR DISORDER, CURRENT EPISODE MIXED, MODERATE: ICD-10-CM

## 2022-09-23 DIAGNOSIS — F12.10 CANNABIS USE DISORDER, MILD, ABUSE: ICD-10-CM

## 2022-09-23 DIAGNOSIS — F51.05 INSOMNIA DUE TO MENTAL CONDITION: ICD-10-CM

## 2022-09-23 DIAGNOSIS — F43.10 POST TRAUMATIC STRESS DISORDER (PTSD): Primary | Chronic | ICD-10-CM

## 2022-09-23 DIAGNOSIS — F17.200 NICOTINE USE DISORDER: ICD-10-CM

## 2022-09-23 DIAGNOSIS — F51.5 NIGHTMARES: ICD-10-CM

## 2022-09-23 DIAGNOSIS — F31.0 BIPOLAR AFFECTIVE DISORDER, CURRENT EPISODE HYPOMANIC: ICD-10-CM

## 2022-09-23 DIAGNOSIS — F41.9 ANXIETY DISORDER, UNSPECIFIED TYPE: ICD-10-CM

## 2022-09-23 DIAGNOSIS — F60.3 BORDERLINE PERSONALITY DISORDER: ICD-10-CM

## 2022-09-23 PROCEDURE — 99214 OFFICE O/P EST MOD 30 MIN: CPT | Performed by: PSYCHIATRY & NEUROLOGY

## 2022-09-23 RX ORDER — HALOPERIDOL 2 MG/1
TABLET ORAL
Qty: 270 TABLET | Refills: 0 | Status: SHIPPED | OUTPATIENT
Start: 2022-09-23 | End: 2023-01-06 | Stop reason: SDUPTHER

## 2022-09-23 RX ORDER — DULOXETIN HYDROCHLORIDE 60 MG/1
60 CAPSULE, DELAYED RELEASE ORAL 2 TIMES DAILY
Qty: 60 CAPSULE | Refills: 2 | Status: SHIPPED | OUTPATIENT
Start: 2022-09-23 | End: 2023-01-06 | Stop reason: SDUPTHER

## 2022-09-23 RX ORDER — QUETIAPINE FUMARATE 100 MG/1
100 TABLET, FILM COATED ORAL NIGHTLY PRN
Qty: 30 TABLET | Refills: 1 | Status: SHIPPED | OUTPATIENT
Start: 2022-09-23 | End: 2022-11-28

## 2022-09-23 RX ORDER — LITHIUM CARBONATE 300 MG
TABLET ORAL
Qty: 45 TABLET | Refills: 2 | Status: SHIPPED | OUTPATIENT
Start: 2022-09-23 | End: 2023-01-03 | Stop reason: SDUPTHER

## 2022-09-23 RX ORDER — AMITRIPTYLINE HYDROCHLORIDE 25 MG/1
25 TABLET, FILM COATED ORAL
Qty: 30 TABLET | Refills: 2 | Status: SHIPPED | OUTPATIENT
Start: 2022-09-23 | End: 2023-01-06 | Stop reason: SDUPTHER

## 2022-09-23 NOTE — PROGRESS NOTES
Subjective   Alex Torres is a 37 y.o. male who presents today for follow up     This provider is located at [facility] at [address] [place] [address]. The provider identified himself as well as his credentials.   The Patient is at home using his phone because problems with video connection. The patient's condition being diagnosed/treated is appropriate for telemedicine. The patient gave consent to be seen remotely, and when consent is given they understand that the consent allows for patient identifiable information to be sent to a third party as needed.   They may refuse to be seen remotely at any time. The electronic data is encrypted and password protected, and the patient has been advised of the potential risks to privacy not withstanding such measures      Chief Complaint: Bipolar disorder/anxiety    History of Present Illness: Patient presented today for follow-up and reports that since last visit, he has had some worsening depressive symptoms.  He states that for the past 2 weeks he has had notably worsening depressive symptoms.  Patient also endorses that he has not had prazosin in 1 to 2 months due to pharmacy being short supplied.  His worsening sleep and increased nightmares with lack of restfulness may be contributing to his depressive symptoms or causing it as his depressive symptoms seem to have slowly increased and become more notable and detrimental in the past 2 weeks.  He is planning on going back to school to be a  and is optimistic about this.  We discussed treatment options and patient would like to try and minimize overall medication burden so we will leave prazosin off for now and increase his Seroquel to see if it can control both insomnia and nightmares and provide some level of mood stabilization.  Patient cautioned on the risk of an increased appetite or weight gain with increasing Seroquel.  Patient not currently having any  major medication side effects aside from some continued and residual dizziness which seems to have improved somewhat after being off of prazosin.  He denies SI/HI/AVH.    The following portions of the patient's history were reviewed and updated as appropriate: allergies, current medications, past family history, past medical history, past social history, past surgical history and problem list.      Past Medical History:  Past Medical History:   Diagnosis Date   • Anxiety    • Asthma    • Bipolar 1 disorder ([facility])    • Depression    • DM (diabetes mellitus) ([facility])    • Hypertension    • Kidney stone    • Polyneuropathy    • Positive TB test     treated w/ meds x 6 months   • PTSD (post-traumatic stress disorder)    • Sleep apnea    • Suicide attempt ([facility])    • Tattoos        Social History:  Social History     Socioeconomic History   • Marital status:    Tobacco Use   • Smoking status: Current Every Day Smoker     Packs/day: 1.00     Years: 18.00     Pack years: 18.00     Types: Cigarettes     Start date: 1999   • Smokeless tobacco: Never Used   Vaping Use   • Vaping Use: Never used   Substance and Sexual Activity   • Alcohol use: No   • Drug use: Yes     Types: Marijuana     Comment: rarely   • Sexual activity: Yes     Partners: Female       Family History:  Family History   Problem Relation Age of Onset   • Arthritis Father    • Hypertension Father    • Alcohol abuse Father    • Drug abuse Father    • Diabetes Maternal Aunt    • Diabetes Maternal Uncle    • Diabetes Maternal Grandmother    • Alzheimer's disease Maternal Grandmother    • Dementia Maternal Grandmother    • Diabetes Other    • Hypertension Mother    • Depression Mother    • ADD / ADHD Brother    • Early death Maternal Grandfather    • Liver disease Maternal Grandfather    • Alcohol abuse Maternal Grandfather    • Cirrhosis Maternal Grandfather    • No Known Problems Paternal Grandmother    • Liver disease Paternal Grandfather    • Alcohol abuse  Paternal Grandfather    • Early death Paternal Grandfather    • Cirrhosis Paternal Grandfather    • No Known Problems Brother    • Anxiety disorder Neg Hx    • Bipolar disorder Neg Hx    • OCD Neg Hx    • Paranoid behavior Neg Hx    • Schizophrenia Neg Hx    • Seizures Neg Hx    • Self-Injurious Behavior  Neg Hx    • Suicide Attempts Neg Hx    • Colon cancer Neg Hx        Past Surgical History:  Past Surgical History:   Procedure Laterality Date   • CHOLECYSTECTOMY  2002   • INGUINAL HERNIA REPAIR Right 1995   • ORIF METACARPAL FRACTURE Right 2000   • TIBIA FRACTURE SURGERY Left 1997    ORIF       Problem List:  Patient Active Problem List   Diagnosis   • Essential hypertension   • GERD (gastroesophageal reflux disease)   • Bipolar disorder, current episode mixed, moderate (HCC)   • MOLLY on CPAP   • Arthritis   • Complex regional pain syndrome type 2 of left lower extremity   • Snoring   • Excessive daytime sleepiness   • Peripheral polyneuropathy   • Pain in both lower extremities   • Varicose veins of both lower extremities with pain   • MOLLY (obstructive sleep apnea)   • Class 1 obesity due to excess calories without serious comorbidity with body mass index (BMI) of 32.0 to 32.9 in adult   • Neuromuscular respiratory weakness (HCC)   • Borderline diabetes   • Diarrhea   • Nausea and vomiting       Allergy:   Allergies   Allergen Reactions   • Gabapentin Swelling     + swelling   • Latex Rash        Current Medications:   Current Outpatient Medications   Medication Sig Dispense Refill   • amitriptyline (ELAVIL) 25 MG tablet Take 1 tablet by mouth every night at bedtime. 30 tablet 2   • DULoxetine (CYMBALTA) 60 MG capsule Take 1 capsule by mouth 2 (Two) Times a Day. 60 capsule 2   • haloperidol (HALDOL) 2 MG tablet Take 1 tablet by mouth Every Morning AND 2 tablets Every Night. 270 tablet 0   • lithium 300 MG tablet Take 0.5 tablets by mouth Every Morning AND 1 tablet Every Night. 45 tablet 2   • QUEtiapine  (SEROquel) 100 MG tablet Take 1 tablet by mouth At Night As Needed (Insomnia). 30 tablet 1   • albuterol sulfate  (90 Base) MCG/ACT inhaler Inhale 2 puffs Every 4 (Four) Hours As Needed for Wheezing or Shortness of Air. 18 g 3   • carvedilol (COREG) 6.25 MG tablet TAKE 1 TABLET BY MOUTH TWICE A DAY WITH MEALS 180 tablet 1   • omeprazole (priLOSEC) 40 MG capsule TAKE 1 CAPSULE BY MOUTH EVERY MORNING 30 MINS BEFORE BREAKFAST 30 capsule 3   • valsartan (DIOVAN) 160 MG tablet Take 1 tablet by mouth Daily. APPOINTMENT REQUIRED. 30 tablet 0     No current facility-administered medications for this visit.       Review of Symptoms:    Review of Systems   Constitutional: Positive for fatigue. Negative for activity change (improved), appetite change, chills, diaphoresis and fever.   HENT: Negative.    Eyes: Negative.    Respiratory: Negative.    Cardiovascular: Negative.    Gastrointestinal: Negative.    Endocrine: Negative.    Genitourinary: Negative for decreased libido.   Musculoskeletal: Negative.    Skin: Negative.    Allergic/Immunologic: Negative.    Neurological: Positive for dizziness.   Hematological: Negative.    Psychiatric/Behavioral: Positive for sleep disturbance, depressed mood and stress. Negative for agitation, behavioral problems, decreased concentration, dysphoric mood, hallucinations, self-injury, suicidal ideas and negative for hyperactivity. The patient is not nervous/anxious.          Physical Exam:   There were no vitals taken for this visit.  Unable to assess, telephone visit    Appearance: Unable to assess, telephone visit  Gait, Station, Strength: Unable to assess, telephone visit    Mental Status Exam:       Hygiene:   Unable to assess, telephone visit  Cooperation:  Cooperative  Eye Contact:  Unable to assess, telephone visit  Psychomotor Behavior:  Unable to assess, telephone visit  Affect:  Full range  Mood: depressed, worsening   Hopelessness: Optimistic  Speech:  Normal  Thought  Process:  Goal directed and Linear  Thought Content:  Normal  Suicidal:  None  Homicidal:  None  Hallucinations:  None  Delusion:  None  Memory:  Intact  Orientation:  Person, Place, Time and Situation  Reliability:  good  Insight:  Fair  Judgement:  Fair  Impulse Control:  Fair  Physical/Medical Issues:  No        Lab Results:   No visits with results within 1 Month(s) from this visit.   Latest known visit with results is:   Admission on 03/16/2022, Discharged on 03/16/2022   Component Date Value Ref Range Status   • Glucose 03/16/2022 101 (A) 65 - 99 mg/dL Final   • BUN 03/16/2022 13  6 - 20 mg/dL Final   • Creatinine 03/16/2022 1.00  0.76 - 1.27 mg/dL Final   • Sodium 03/16/2022 135 (A) 136 - 145 mmol/L Final   • Potassium 03/16/2022 3.4 (A) 3.5 - 5.2 mmol/L Final   • Chloride 03/16/2022 98  98 - 107 mmol/L Final   • CO2 03/16/2022 22.7  22.0 - 29.0 mmol/L Final   • Calcium 03/16/2022 8.7  8.6 - 10.5 mg/dL Final   • Total Protein 03/16/2022 6.9  6.0 - 8.5 g/dL Final   • Albumin 03/16/2022 4.30  3.50 - 5.20 g/dL Final   • ALT (SGPT) 03/16/2022 24  1 - 41 U/L Final   • AST (SGOT) 03/16/2022 15  1 - 40 U/L Final   • Alkaline Phosphatase 03/16/2022 83  39 - 117 U/L Final   • Total Bilirubin 03/16/2022 0.7  0.0 - 1.2 mg/dL Final   • Globulin 03/16/2022 2.6  gm/dL Final   • A/G Ratio 03/16/2022 1.7  g/dL Final   • BUN/Creatinine Ratio 03/16/2022 13.0  7.0 - 25.0 Final   • Anion Gap 03/16/2022 14.3  5.0 - 15.0 mmol/L Final   • eGFR 03/16/2022 98.2  >60.0 mL/min/1.73 Final    National Kidney Foundation and American Society of Nephrology (ASN) Task Force recommended calculation based on the Chronic Kidney Disease Epidemiology Collaboration (CKD-EPI) equation refit without adjustment for race.   • Lipase 03/16/2022 22  13 - 60 U/L Final   • Color, UA 03/16/2022 Dark Yellow (A) Yellow, Straw Final   • Appearance, UA 03/16/2022 Clear  Clear Final   • pH, UA 03/16/2022 5.5  5.0 - 8.0 Final   • Specific Gravity, UA 03/16/2022  >=1.030  1.005 - 1.030 Final   • Glucose, UA 03/16/2022 Negative  Negative Final   • Ketones, UA 03/16/2022 Trace (A) Negative Final   • Bilirubin, UA 03/16/2022 Small (1+) (A) Negative Final   • Blood, UA 03/16/2022 Negative  Negative Final   • Protein, UA 03/16/2022 Trace (A) Negative Final   • Leuk Esterase, UA 03/16/2022 Negative  Negative Final   • Nitrite, UA 03/16/2022 Negative  Negative Final   • Urobilinogen, UA 03/16/2022 1.0 E.U./dL  0.2 - 1.0 E.U./dL Final   • Lactate 03/16/2022 1.4  0.5 - 2.0 mmol/L Final   • WBC 03/16/2022 18.03 (A) 3.40 - 10.80 10*3/mm3 Final   • RBC 03/16/2022 4.43  4.14 - 5.80 10*6/mm3 Final   • Hemoglobin 03/16/2022 14.7  13.0 - 17.7 g/dL Final   • Hematocrit 03/16/2022 42.3  37.5 - 51.0 % Final   • MCV 03/16/2022 95.5  79.0 - 97.0 fL Final   • MCH 03/16/2022 33.2 (A) 26.6 - 33.0 pg Final   • MCHC 03/16/2022 34.8  31.5 - 35.7 g/dL Final   • RDW 03/16/2022 14.1  12.3 - 15.4 % Final   • RDW-SD 03/16/2022 49.3  37.0 - 54.0 fl Final   • MPV 03/16/2022 8.7  6.0 - 12.0 fL Final   • Platelets 03/16/2022 225  140 - 450 10*3/mm3 Final   • Neutrophil % 03/16/2022 73.3  42.7 - 76.0 % Final   • Lymphocyte % 03/16/2022 17.4 (A) 19.6 - 45.3 % Final   • Monocyte % 03/16/2022 7.0  5.0 - 12.0 % Final   • Eosinophil % 03/16/2022 1.2  0.3 - 6.2 % Final   • Basophil % 03/16/2022 0.3  0.0 - 1.5 % Final   • Immature Grans % 03/16/2022 0.8 (A) 0.0 - 0.5 % Final   • Neutrophils, Absolute 03/16/2022 13.21 (A) 1.70 - 7.00 10*3/mm3 Final   • Lymphocytes, Absolute 03/16/2022 3.14 (A) 0.70 - 3.10 10*3/mm3 Final   • Monocytes, Absolute 03/16/2022 1.26 (A) 0.10 - 0.90 10*3/mm3 Final   • Eosinophils, Absolute 03/16/2022 0.22  0.00 - 0.40 10*3/mm3 Final   • Basophils, Absolute 03/16/2022 0.05  0.00 - 0.20 10*3/mm3 Final   • Immature Grans, Absolute 03/16/2022 0.15 (A) 0.00 - 0.05 10*3/mm3 Final   • nRBC 03/16/2022 0.0  0.0 - 0.2 /100 WBC Final   • Fecal Occult Blood 03/16/2022 Negative  Negative Final        Assessment & Plan   Diagnoses and all orders for this visit:    1. Chronic Post traumatic stress disorder (PTSD) (Primary)  -     haloperidol (HALDOL) 2 MG tablet; Take 1 tablet by mouth Every Morning AND 2 tablets Every Night.  Dispense: 270 tablet; Refill: 0  -     lithium 300 MG tablet; Take 0.5 tablets by mouth Every Morning AND 1 tablet Every Night.  Dispense: 45 tablet; Refill: 2  -     DULoxetine (CYMBALTA) 60 MG capsule; Take 1 capsule by mouth 2 (Two) Times a Day.  Dispense: 60 capsule; Refill: 2  -     QUEtiapine (SEROquel) 100 MG tablet; Take 1 tablet by mouth At Night As Needed (Insomnia).  Dispense: 30 tablet; Refill: 1    2. Anxiety disorder, unspecified type  -     haloperidol (HALDOL) 2 MG tablet; Take 1 tablet by mouth Every Morning AND 2 tablets Every Night.  Dispense: 270 tablet; Refill: 0  -     lithium 300 MG tablet; Take 0.5 tablets by mouth Every Morning AND 1 tablet Every Night.  Dispense: 45 tablet; Refill: 2  -     DULoxetine (CYMBALTA) 60 MG capsule; Take 1 capsule by mouth 2 (Two) Times a Day.  Dispense: 60 capsule; Refill: 2  -     QUEtiapine (SEROquel) 100 MG tablet; Take 1 tablet by mouth At Night As Needed (Insomnia).  Dispense: 30 tablet; Refill: 1    3. Bipolar affective disorder, current episode hypomanic (HCC)  -     haloperidol (HALDOL) 2 MG tablet; Take 1 tablet by mouth Every Morning AND 2 tablets Every Night.  Dispense: 270 tablet; Refill: 0  -     lithium 300 MG tablet; Take 0.5 tablets by mouth Every Morning AND 1 tablet Every Night.  Dispense: 45 tablet; Refill: 2    4. Bipolar disorder, current episode mixed, moderate (HCC)  -     DULoxetine (CYMBALTA) 60 MG capsule; Take 1 capsule by mouth 2 (Two) Times a Day.  Dispense: 60 capsule; Refill: 2  -     QUEtiapine (SEROquel) 100 MG tablet; Take 1 tablet by mouth At Night As Needed (Insomnia).  Dispense: 30 tablet; Refill: 1    5. Insomnia due to mental condition  -     QUEtiapine (SEROquel) 100 MG tablet; Take 1 tablet  by mouth At Night As Needed (Insomnia).  Dispense: 30 tablet; Refill: 1    6. Nightmares  -     QUEtiapine (SEROquel) 100 MG tablet; Take 1 tablet by mouth At Night As Needed (Insomnia).  Dispense: 30 tablet; Refill: 1    7. Borderline personality disorder (HCC)  -     amitriptyline (ELAVIL) 25 MG tablet; Take 1 tablet by mouth every night at bedtime.  Dispense: 30 tablet; Refill: 2    8. Nicotine use disorder    9. Cannabis use disorder, mild, abuse    -Patient having worsening depressive symptoms over the past 2 weeks.  No manic symptoms noted.  Anxiety is well controlled.  Sleep is poor due to not having prazosin and having an increase in nightmares.  We discussed treatment options and patient would like to minimize overall medication so we will try to leave prazosin off for now and compensate with current medications by increasing Seroquel.  -Reviewed previous documentation  -Reviewed labs  -Continue Haldol 2 mg in the morning and 4 mg at night for mood stabilization  -Discontinue prazosin  -Continue Cymbalta 30 mg p.o. daily for mood and anxiety  -Continue lithium 300 mg in the morning and 600 mg at night for mood stabilization  -Patient again advised of need for lithium level  -Increase Seroquel 50 mg to 100 mg nightly as needed for insomnia and mood stabilization  -Continue Elavil 25 mg p.o. nightly for insomnia  -Encourage cessation of nicotine and cannabis  -Encouraged therapy and anger management  -Approximate appointment time 8:45 AM to 9 AM via telephone visit    Visit Diagnoses:    ICD-10-CM ICD-9-CM   1. Chronic Post traumatic stress disorder (PTSD)  F43.10 309.81   2. Anxiety disorder, unspecified type  F41.9 300.00   3. Bipolar affective disorder, current episode hypomanic (HCC)  F31.0 296.40   4. Bipolar disorder, current episode mixed, moderate (HCC)  F31.62 296.62   5. Insomnia due to mental condition  F51.05 300.9     327.02   6. Nightmares  F51.5 307.47   7. Borderline personality disorder (HCC)   F60.3 301.83   8. Nicotine use disorder  F17.200 305.1   9. Cannabis use disorder, mild, abuse  F12.10 305.20       TREATMENT PLAN/GOALS: Continue supportive psychotherapy efforts and medications as indicated. Treatment and medication options discussed during today patient advised he may need a lithium level in 5 to 7 days visit. Patient ackowledged and verbally consented to continue with current treatment plan and was educated on the importance of compliance with treatment and follow-up appointments.    MEDICATION ISSUES:    Discussed medication options and treatment plan of prescribed medication as well as the risks, benefits, and side effects including potential falls, possible impaired driving and metabolic adversities among others. Patient is agreeable to call the office with any worsening of symptoms or onset of side effects. Patient is agreeable to call 911 or go to the nearest ER should he/she begin having SI/HI. No medication side effects or related complaints today.     MEDS ORDERED DURING VISIT:  New Medications Ordered This Visit   Medications   • haloperidol (HALDOL) 2 MG tablet     Sig: Take 1 tablet by mouth Every Morning AND 2 tablets Every Night.     Dispense:  270 tablet     Refill:  0   • lithium 300 MG tablet     Sig: Take 0.5 tablets by mouth Every Morning AND 1 tablet Every Night.     Dispense:  45 tablet     Refill:  2     DX Code Needed  .   • DULoxetine (CYMBALTA) 60 MG capsule     Sig: Take 1 capsule by mouth 2 (Two) Times a Day.     Dispense:  60 capsule     Refill:  2   • QUEtiapine (SEROquel) 100 MG tablet     Sig: Take 1 tablet by mouth At Night As Needed (Insomnia).     Dispense:  30 tablet     Refill:  1   • amitriptyline (ELAVIL) 25 MG tablet     Sig: Take 1 tablet by mouth every night at bedtime.     Dispense:  30 tablet     Refill:  2       Return in about 8 weeks (around 11/18/2022).             This document has been electronically signed by Stefan Marion MD  September 23,  2022

## 2022-09-26 ENCOUNTER — OFFICE VISIT (OUTPATIENT)
Dept: SURGERY | Facility: CLINIC | Age: 37
End: 2022-09-26

## 2022-09-26 VITALS
HEART RATE: 92 BPM | OXYGEN SATURATION: 97 % | RESPIRATION RATE: 18 BRPM | SYSTOLIC BLOOD PRESSURE: 128 MMHG | TEMPERATURE: 98 F | DIASTOLIC BLOOD PRESSURE: 88 MMHG | BODY MASS INDEX: 29.72 KG/M2 | HEIGHT: 76 IN

## 2022-09-26 DIAGNOSIS — K61.1 PERIRECTAL ABSCESS: Primary | ICD-10-CM

## 2022-09-26 DIAGNOSIS — K60.2 ANAL FISSURE: Primary | ICD-10-CM

## 2022-09-26 DIAGNOSIS — K60.3 ANAL FISTULA: ICD-10-CM

## 2022-09-26 PROCEDURE — 99213 OFFICE O/P EST LOW 20 MIN: CPT | Performed by: SURGERY

## 2022-09-26 PROCEDURE — 46040 I&D ISCHIORCT&/PERIRCT ABSC: CPT | Performed by: SURGERY

## 2022-09-26 NOTE — PROGRESS NOTES
Patient: Alex Torres  YOB: 1985    Date: 09/26/2022    Primary Care Provider: Jose Rich DO    Chief Complaint   Patient presents with   • Abscess     perirectal       History: The patient is in the office today for evaluation of a recurrent perirectal abscess.  He states about four days ago the pain and uncomfortableness started coming back. Painful to sit, walk, urinate.  This is his third recurrence in 4 and half months.  He most likely has significant fistula needs to be addressed.  Patient had fever and chills and significant rectal pain.      The following portions of the patient's history were reviewed and updated as appropriate: allergies, current medications, past family history, past medical history, past social history, past surgical history and problem list.    Review of Systems   Constitutional: Negative for chills, fever and unexpected weight change.   HENT: Negative for trouble swallowing and voice change.    Eyes: Negative for visual disturbance.   Respiratory: Negative for apnea, cough, chest tightness, shortness of breath and wheezing.    Cardiovascular: Negative for chest pain, palpitations and leg swelling.   Gastrointestinal: Positive for rectal pain. Negative for abdominal distention, abdominal pain, anal bleeding, blood in stool, constipation, diarrhea, nausea and vomiting.   Endocrine: Negative for cold intolerance and heat intolerance.   Genitourinary: Negative for difficulty urinating, dysuria, flank pain, scrotal swelling and testicular pain.   Musculoskeletal: Negative for back pain, gait problem and joint swelling.   Skin: Positive for wound. Negative for color change and rash.   Neurological: Negative for dizziness, syncope, speech difficulty, weakness, numbness and headaches.   Hematological: Negative for adenopathy. Does not bruise/bleed easily.   Psychiatric/Behavioral: Negative for confusion. The patient is not nervous/anxious.        Vital  "Signs  Vitals:    09/26/22 1306   BP: 128/88   Pulse: 92   Resp: 18   Temp: 98 °F (36.7 °C)   TempSrc: Temporal   SpO2: 97%   Height: 193 cm (76\")       Allergies:  Allergies   Allergen Reactions   • Gabapentin Swelling     + swelling   • Latex Rash       Medications:    Current Outpatient Medications:   •  albuterol sulfate  (90 Base) MCG/ACT inhaler, Inhale 2 puffs Every 4 (Four) Hours As Needed for Wheezing or Shortness of Air., Disp: 18 g, Rfl: 3  •  amitriptyline (ELAVIL) 25 MG tablet, Take 1 tablet by mouth every night at bedtime., Disp: 30 tablet, Rfl: 2  •  carvedilol (COREG) 6.25 MG tablet, TAKE 1 TABLET BY MOUTH TWICE A DAY WITH MEALS, Disp: 180 tablet, Rfl: 1  •  DULoxetine (CYMBALTA) 60 MG capsule, Take 1 capsule by mouth 2 (Two) Times a Day., Disp: 60 capsule, Rfl: 2  •  haloperidol (HALDOL) 2 MG tablet, Take 1 tablet by mouth Every Morning AND 2 tablets Every Night., Disp: 270 tablet, Rfl: 0  •  lithium 300 MG tablet, Take 0.5 tablets by mouth Every Morning AND 1 tablet Every Night., Disp: 45 tablet, Rfl: 2  •  omeprazole (priLOSEC) 40 MG capsule, TAKE 1 CAPSULE BY MOUTH EVERY MORNING 30 MINS BEFORE BREAKFAST, Disp: 30 capsule, Rfl: 3  •  QUEtiapine (SEROquel) 100 MG tablet, Take 1 tablet by mouth At Night As Needed (Insomnia)., Disp: 30 tablet, Rfl: 1  •  valsartan (DIOVAN) 160 MG tablet, Take 1 tablet by mouth Daily. APPOINTMENT REQUIRED., Disp: 30 tablet, Rfl: 0    Physical Exam:   General Appearance:    Alert, cooperative, in no acute distress   Head:    Normocephalic, without obvious abnormality, atraumatic   Lungs:     Clear to auscultation,respirations regular, even and                  unlabored    Heart:    Regular rhythm and normal rate, normal S1 and S2, no            murmur, no gallop, no rub, no click   Abdomen:     Normal bowel sounds, no masses, no organomegaly, soft        non-tender, non-distended, no guarding, no rebound                Tenderness  Rectal- fluctuant    Abscess " at the 5 o'clock position.  Tender to palpation.  No surrounding erythema.   Extremities:   Moves all extremities well, no edema, no cyanosis, no             redness   Pulses:   Pulses palpable and equal bilaterally   Skin:   No bleeding, bruising or rash     Results Review:   I reviewed the patient's new clinical results.     Review of Systems was reviewed and confirmed as accurate as documented by the MA.         ASSESSMENT/PLAN:    1. Perirectal abscess    2. Anal fistula       Procedure: Incision and drainage complex rectal abscess.    I recommended abscess drainage to the patient. I explained the indication as well as the risks and benefits which include bleeding, further infection requiring additional procedures, non healing of the wound etc. The patient understands these and wishes to proceed.  Patient also referred to colorectal surgery in [place] for evaluation and treatment of anal fistula recurrent rectal abscess.      The patient was brought to the procedure room. Consent and time out were performed. The area was prepped and draped in the usual fashion. 1% lidocaine with epinephrine was infused locally. The abscess was then incised and drained sharply with a #11 blade. Purulent contents were evacuated and irrigated with saline and peroxide. Minimal blood loss had occurred and was well controlled with pressure. There were no complications and the patient tolerated the procedure well. Wound instructions were given.  Electronically signed by Gilda Hill MD  09/26/22

## 2022-11-27 DIAGNOSIS — F31.62 BIPOLAR DISORDER, CURRENT EPISODE MIXED, MODERATE: ICD-10-CM

## 2022-11-27 DIAGNOSIS — F51.05 INSOMNIA DUE TO MENTAL CONDITION: ICD-10-CM

## 2022-11-27 DIAGNOSIS — F41.9 ANXIETY DISORDER, UNSPECIFIED TYPE: ICD-10-CM

## 2022-11-27 DIAGNOSIS — F43.10 POST TRAUMATIC STRESS DISORDER (PTSD): Chronic | ICD-10-CM

## 2022-11-27 DIAGNOSIS — F51.5 NIGHTMARES: ICD-10-CM

## 2022-11-28 RX ORDER — QUETIAPINE FUMARATE 100 MG/1
100 TABLET, FILM COATED ORAL NIGHTLY PRN
Qty: 30 TABLET | Refills: 1 | Status: SHIPPED | OUTPATIENT
Start: 2022-11-28 | End: 2023-01-06 | Stop reason: SDUPTHER

## 2022-12-16 ENCOUNTER — OFFICE VISIT (OUTPATIENT)
Dept: SURGERY | Facility: CLINIC | Age: 37
End: 2022-12-16

## 2022-12-16 VITALS
SYSTOLIC BLOOD PRESSURE: 124 MMHG | OXYGEN SATURATION: 98 % | BODY MASS INDEX: 31.17 KG/M2 | HEIGHT: 76 IN | DIASTOLIC BLOOD PRESSURE: 80 MMHG | HEART RATE: 85 BPM | WEIGHT: 256 LBS

## 2022-12-16 DIAGNOSIS — K61.1 PERIRECTAL ABSCESS: Primary | ICD-10-CM

## 2022-12-16 PROCEDURE — 99213 OFFICE O/P EST LOW 20 MIN: CPT | Performed by: SURGERY

## 2022-12-16 PROCEDURE — 46040 I&D ISCHIORCT&/PERIRCT ABSC: CPT | Performed by: SURGERY

## 2022-12-16 RX ORDER — HYDROCODONE BITARTRATE AND ACETAMINOPHEN 7.5; 325 MG/1; MG/1
1 TABLET ORAL EVERY 6 HOURS PRN
Qty: 14 TABLET | Refills: 0 | Status: SHIPPED | OUTPATIENT
Start: 2022-12-16 | End: 2023-03-24

## 2022-12-16 NOTE — PROGRESS NOTES
Patient: Alex Torres    YOB: 1985    Date: 12/16/2022    Primary Care Provider: Jose Rich DO    Reason for Consultation: Lesion    Chief Complaint   Patient presents with   • Mass     Perirectal abscess       Subjective .     History of present illness:  I saw the patient in the office  today as a consultation for evaluation and treatment of a perirectal abscess.  Patient was seen for recurrent perirectal abscess in September.  He was referred to colorectal surgeons for possible fistula but has not had the appointment yet.   He states started having pain after wiping his bottom around 2 days ago.  Patient denies draining.  Patient states he has had a fever up to 101, intermittently, over the last 48 hours.  This is the fourth patient had an abscess.  He has an appointment with colorectal surgery for excision of anal fistula.  Appointment not till next month.    The following portions of the patient's history were reviewed and updated as appropriate: allergies, current medications, past family history, past medical history, past social history, past surgical history and problem list.    Review of Systems   Constitutional: Positive for fever. Negative for chills and unexpected weight change.   HENT: Negative for trouble swallowing and voice change.    Eyes: Negative for visual disturbance.   Respiratory: Negative for apnea, cough, chest tightness, shortness of breath and wheezing.    Cardiovascular: Negative for chest pain, palpitations and leg swelling.   Gastrointestinal: Negative for abdominal distention, abdominal pain, anal bleeding, blood in stool, constipation, diarrhea, nausea, rectal pain and vomiting.   Endocrine: Negative for cold intolerance and heat intolerance.   Genitourinary: Negative for difficulty urinating, dysuria, flank pain, scrotal swelling and testicular pain.   Musculoskeletal: Negative for back pain, gait problem and joint swelling.   Skin: Negative for color  change, rash and wound.   Neurological: Negative for dizziness, syncope, speech difficulty, weakness, numbness and headaches.   Hematological: Negative for adenopathy. Does not bruise/bleed easily.   Psychiatric/Behavioral: Negative for confusion. The patient is not nervous/anxious.        History:  Past Medical History:   Diagnosis Date   • Anxiety    • Asthma    • Bipolar 1 disorder ([facility])    • Depression    • DM (diabetes mellitus) ([facility])    • Hypertension    • Kidney stone    • Polyneuropathy    • Positive TB test     treated w/ meds x 6 months   • PTSD (post-traumatic stress disorder)    • Sleep apnea    • Suicide attempt ([facility])    • Tattoos        Past Surgical History:   Procedure Laterality Date   • CHOLECYSTECTOMY  2002   • INGUINAL HERNIA REPAIR Right 1995   • ORIF METACARPAL FRACTURE Right 2000   • TIBIA FRACTURE SURGERY Left 1997    ORIF       Family History   Problem Relation Age of Onset   • Arthritis Father    • Hypertension Father    • Alcohol abuse Father    • Drug abuse Father    • Diabetes Maternal Aunt    • Diabetes Maternal Uncle    • Diabetes Maternal Grandmother    • Alzheimer's disease Maternal Grandmother    • Dementia Maternal Grandmother    • Diabetes Other    • Hypertension Mother    • Depression Mother    • ADD / ADHD Brother    • Early death Maternal Grandfather    • Liver disease Maternal Grandfather    • Alcohol abuse Maternal Grandfather    • Cirrhosis Maternal Grandfather    • No Known Problems Paternal Grandmother    • Liver disease Paternal Grandfather    • Alcohol abuse Paternal Grandfather    • Early death Paternal Grandfather    • Cirrhosis Paternal Grandfather    • No Known Problems Brother    • Anxiety disorder Neg Hx    • Bipolar disorder Neg Hx    • OCD Neg Hx    • Paranoid behavior Neg Hx    • Schizophrenia Neg Hx    • Seizures Neg Hx    • Self-Injurious Behavior  Neg Hx    • Suicide Attempts Neg Hx    • Colon cancer Neg Hx        Social History     Tobacco Use   • Smoking  status: Every Day     Packs/day: 1.00     Years: 18.00     Pack years: 18.00     Types: Cigarettes     Start date: 1999   • Smokeless tobacco: Never   Vaping Use   • Vaping Use: Never used   Substance Use Topics   • Alcohol use: No   • Drug use: Yes     Types: Marijuana     Comment: rarely        Allergies:  Allergies   Allergen Reactions   • Gabapentin Swelling     + swelling   • Latex Rash       Medications:    Current Outpatient Medications:   •  albuterol sulfate  (90 Base) MCG/ACT inhaler, Inhale 2 puffs Every 4 (Four) Hours As Needed for Wheezing or Shortness of Air., Disp: 18 g, Rfl: 3  •  amitriptyline (ELAVIL) 25 MG tablet, Take 1 tablet by mouth every night at bedtime., Disp: 30 tablet, Rfl: 2  •  amoxicillin (AMOXIL) 875 MG tablet, 1 po bid for 10 days, Disp: 20 tablet, Rfl: 0  •  benzonatate (TESSALON) 100 MG capsule, Take 1 capsule by mouth 3 (Three) Times a Day As Needed for Cough., Disp: 30 capsule, Rfl: 0  •  carvedilol (COREG) 6.25 MG tablet, TAKE 1 TABLET BY MOUTH TWICE A DAY WITH MEALS, Disp: 180 tablet, Rfl: 1  •  DULoxetine (CYMBALTA) 60 MG capsule, Take 1 capsule by mouth 2 (Two) Times a Day., Disp: 60 capsule, Rfl: 2  •  fluticasone (Flonase) 50 MCG/ACT nasal spray, 2 sprays into the nostril(s) as directed by provider Daily. 2 puffs each nostril, Disp: 16 g, Rfl: 0  •  haloperidol (HALDOL) 2 MG tablet, Take 1 tablet by mouth Every Morning AND 2 tablets Every Night., Disp: 270 tablet, Rfl: 0  •  lithium 300 MG tablet, Take 0.5 tablets by mouth Every Morning AND 1 tablet Every Night., Disp: 45 tablet, Rfl: 2  •  omeprazole (priLOSEC) 40 MG capsule, TAKE 1 CAPSULE BY MOUTH EVERY MORNING 30 MINS BEFORE BREAKFAST, Disp: 30 capsule, Rfl: 3  •  QUEtiapine (SEROquel) 100 MG tablet, TAKE 1 TABLET BY MOUTH AT NIGHT AS NEEDED (INSOMNIA)., Disp: 30 tablet, Rfl: 1  •  valsartan (DIOVAN) 160 MG tablet, Take 1 tablet by mouth Daily. APPOINTMENT REQUIRED., Disp: 30 tablet, Rfl: 0    Objective     Vital  "Signs:   Vitals:    12/16/22 1258   BP: 124/80   Pulse: 85   SpO2: 98%   Weight: 116 kg (256 lb)   Height: 193 cm (76\")       Physical Exam:  Lungs:     Clear to auscultation,respirations regular, even and                  unlabored    Heart:    Regular rhythm and normal rate, normal S1 and S2, no            murmur      General Appearance:    Alert, cooperative, in no acute distress   Head:    Normocephalic, without obvious abnormality, atraumatic   Eyes:            Lids and lashes normal, conjunctivae and sclerae normal, no   icterus, no pallor, corneas clear.   Ears:    Ears appear intact with no abnormalities noted   Throat:   No oral lesions, no thrush, oral mucosa moist   Neck:   No adenopathy, supple, trachea midline, no thyromegaly, no   carotid bruit.   Extremities:   Moves all extremities well, no edema, no cyanosis, no             Redness.    Pulses:   Pulses palpable and equal bilaterally   Skin:   No bleeding, bruising or rash. Lesion localized position on the rectum.  Fluctuant and tender.   Lymph nodes:   No palpable adenopathy   Neurologic:   Cranial nerves 2 - 12 grossly intact, sensation intact.     Results Review:   I reviewed the patient's new clinical results.    Review of Systems was reviewed and confirmed as accurate as documented by the MA.    Assessment & Plan     1. Perirectal abscess      Procedure: Incision and drainage perirectal abscess    I recommended abscess drainage to the patient. I explained the indication as well as the risks and benefits which include bleeding, further infection requiring additional procedures, non healing of the wound etc. The patient understands these and wishes to proceed.      The patient was brought to the procedure room. Consent and time out were performed. The area was prepped and draped in the usual fashion. 1% lidocaine with epinephrine was infused locally. The abscess was then incised and drained sharply with a #11 blade. Purulent contents were " evacuated and irrigated with saline and peroxide. Minimal blood loss had occurred and was well controlled with pressure. There were no complications and the patient tolerated the procedure well. Wound instructions were given.    I did have a detailed and extensive discussion with the patient in the hospital and they understand that they need to undergo incision and drainage abscess perirectal region.. Full risks and benefits of operative versus nonoperative intervention were discussed with the patient and these include bleeding, infection and reccurrence. The patient understands, agrees, and wishes to proceed with the surgical treatment plan as mentioned above. The patient had no questions for me at the end of the discussion.  Patient scheduled to see colorectal surgeon for sensitive anal fistula.     I discussed the patients findings and my recommendations with patient and consulting provider.    Electronically signed by Gilda Hill MD  12/16/22  13:13 EST

## 2022-12-17 DIAGNOSIS — I10 ESSENTIAL HYPERTENSION: ICD-10-CM

## 2022-12-19 RX ORDER — CARVEDILOL 6.25 MG/1
6.25 TABLET ORAL 2 TIMES DAILY WITH MEALS
Qty: 30 TABLET | Refills: 0 | Status: SHIPPED | OUTPATIENT
Start: 2022-12-19

## 2022-12-30 ENCOUNTER — TELEPHONE (OUTPATIENT)
Dept: SURGERY | Facility: CLINIC | Age: 37
End: 2022-12-30

## 2022-12-30 NOTE — TELEPHONE ENCOUNTER
Patient no showed for appointment with Dr Hill. Called patient no answer,left message to call office and reschedule.. Called emergency contact Rosaline no answer,left message to have patient to call office and . Called patient for the third time no answer left message to call office

## 2023-01-03 DIAGNOSIS — F41.9 ANXIETY DISORDER, UNSPECIFIED TYPE: ICD-10-CM

## 2023-01-03 DIAGNOSIS — F31.0 BIPOLAR AFFECTIVE DISORDER, CURRENT EPISODE HYPOMANIC: ICD-10-CM

## 2023-01-03 DIAGNOSIS — F43.10 POST TRAUMATIC STRESS DISORDER (PTSD): Chronic | ICD-10-CM

## 2023-01-03 RX ORDER — LITHIUM CARBONATE 300 MG
TABLET ORAL
Qty: 45 TABLET | Refills: 2 | Status: SHIPPED | OUTPATIENT
Start: 2023-01-03 | End: 2023-01-06 | Stop reason: SDUPTHER

## 2023-01-06 ENCOUNTER — OFFICE VISIT (OUTPATIENT)
Dept: PSYCHIATRY | Facility: CLINIC | Age: 38
End: 2023-01-06
Payer: COMMERCIAL

## 2023-01-06 DIAGNOSIS — F43.10 POST TRAUMATIC STRESS DISORDER (PTSD): Chronic | ICD-10-CM

## 2023-01-06 DIAGNOSIS — F41.9 ANXIETY DISORDER, UNSPECIFIED TYPE: ICD-10-CM

## 2023-01-06 DIAGNOSIS — F51.5 NIGHTMARES: ICD-10-CM

## 2023-01-06 DIAGNOSIS — F31.62 BIPOLAR DISORDER, CURRENT EPISODE MIXED, MODERATE: ICD-10-CM

## 2023-01-06 DIAGNOSIS — F51.05 INSOMNIA DUE TO MENTAL CONDITION: ICD-10-CM

## 2023-01-06 DIAGNOSIS — F60.3 BORDERLINE PERSONALITY DISORDER: ICD-10-CM

## 2023-01-06 DIAGNOSIS — F31.0 BIPOLAR AFFECTIVE DISORDER, CURRENT EPISODE HYPOMANIC: ICD-10-CM

## 2023-01-06 PROCEDURE — 99214 OFFICE O/P EST MOD 30 MIN: CPT | Performed by: PSYCHIATRY & NEUROLOGY

## 2023-01-06 RX ORDER — LITHIUM CARBONATE 300 MG
300 TABLET ORAL NIGHTLY
Qty: 90 TABLET | Refills: 0 | Status: SHIPPED | OUTPATIENT
Start: 2023-01-06 | End: 2023-03-24 | Stop reason: SDUPTHER

## 2023-01-06 RX ORDER — AMITRIPTYLINE HYDROCHLORIDE 25 MG/1
25 TABLET, FILM COATED ORAL
Qty: 90 TABLET | Refills: 0 | Status: SHIPPED | OUTPATIENT
Start: 2023-01-06 | End: 2023-03-24 | Stop reason: SDUPTHER

## 2023-01-06 RX ORDER — HALOPERIDOL 2 MG/1
4 TABLET ORAL NIGHTLY
Qty: 180 TABLET | Refills: 0 | Status: SHIPPED | OUTPATIENT
Start: 2023-01-06 | End: 2023-03-24 | Stop reason: SDUPTHER

## 2023-01-06 RX ORDER — DULOXETIN HYDROCHLORIDE 60 MG/1
60 CAPSULE, DELAYED RELEASE ORAL NIGHTLY
Qty: 90 CAPSULE | Refills: 0 | Status: SHIPPED | OUTPATIENT
Start: 2023-01-06 | End: 2023-03-24 | Stop reason: SDUPTHER

## 2023-01-06 RX ORDER — QUETIAPINE FUMARATE 100 MG/1
100 TABLET, FILM COATED ORAL NIGHTLY PRN
Qty: 90 TABLET | Refills: 0 | Status: SHIPPED | OUTPATIENT
Start: 2023-01-06 | End: 2023-03-24 | Stop reason: SDUPTHER

## 2023-01-06 NOTE — PROGRESS NOTES
Subjective   Alex Torres is a 38 y.o. male who presents today for follow up     This provider is located at [facility] at [address] [place] [address]. The provider identified himself as well as his credentials.   The Patient is at home using his phone because problems with video connection. The patient's condition being diagnosed/treated is appropriate for telemedicine. The patient gave consent to be seen remotely, and when consent is given they understand that the consent allows for patient identifiable information to be sent to a third party as needed.   They may refuse to be seen remotely at any time. The electronic data is encrypted and password protected, and the patient has been advised of the potential risks to privacy not withstanding such measures      Chief Complaint: Bipolar disorder/anxiety    History of Present Illness: Patient presented today for follow-up.  Since last visit, he reports that he has been relatively stable.  He felt more fatigued and dizzy so he has not been taking the morning doses of his medications but has not noted any worsening depressive or manic symptoms.  He is not having any medication side effects otherwise.  He has a birthday today and feels that half of his life is over and he is struggling somewhat with that fact.  He is working at [employer] part-time and feels that it is going relatively well so far.  He denies any issues with sleep or appetite.  He denies SI/HI/AVH.    The following portions of the patient's history were reviewed and updated as appropriate: allergies, current medications, past family history, past medical history, past social history, past surgical history and problem list.      Past Medical History:  Past Medical History:   Diagnosis Date   • Anxiety    • Asthma    • Bipolar 1 disorder ([facility])    • Depression    • DM (diabetes mellitus) ([facility])    • Hypertension    • Kidney stone    • Polyneuropathy    • Positive TB  test     treated w/ meds x 6 months   • PTSD (post-traumatic stress disorder)    • Sleep apnea    • Suicide attempt ([facility])    • Tattoos        Social History:  Social History     Socioeconomic History   • Marital status:    Tobacco Use   • Smoking status: Every Day     Packs/day: 1.00     Years: 18.00     Pack years: 18.00     Types: Cigarettes     Start date: 1999   • Smokeless tobacco: Never   Vaping Use   • Vaping Use: Never used   Substance and Sexual Activity   • Alcohol use: No   • Drug use: Yes     Types: Marijuana     Comment: rarely   • Sexual activity: Yes     Partners: Female       Family History:  Family History   Problem Relation Age of Onset   • Arthritis Father    • Hypertension Father    • Alcohol abuse Father    • Drug abuse Father    • Diabetes Maternal Aunt    • Diabetes Maternal Uncle    • Diabetes Maternal Grandmother    • Alzheimer's disease Maternal Grandmother    • Dementia Maternal Grandmother    • Diabetes Other    • Hypertension Mother    • Depression Mother    • ADD / ADHD Brother    • Early death Maternal Grandfather    • Liver disease Maternal Grandfather    • Alcohol abuse Maternal Grandfather    • Cirrhosis Maternal Grandfather    • No Known Problems Paternal Grandmother    • Liver disease Paternal Grandfather    • Alcohol abuse Paternal Grandfather    • Early death Paternal Grandfather    • Cirrhosis Paternal Grandfather    • No Known Problems Brother    • Anxiety disorder Neg Hx    • Bipolar disorder Neg Hx    • OCD Neg Hx    • Paranoid behavior Neg Hx    • Schizophrenia Neg Hx    • Seizures Neg Hx    • Self-Injurious Behavior  Neg Hx    • Suicide Attempts Neg Hx    • Colon cancer Neg Hx        Past Surgical History:  Past Surgical History:   Procedure Laterality Date   • CHOLECYSTECTOMY  2002   • INGUINAL HERNIA REPAIR Right 1995   • ORIF METACARPAL FRACTURE Right 2000   • TIBIA FRACTURE SURGERY Left 1997    ORIF       Problem List:  Patient Active Problem List   Diagnosis    • Essential hypertension   • GERD (gastroesophageal reflux disease)   • Bipolar disorder, current episode mixed, moderate ([facility])   • MOLLY on CPAP   • Arthritis   • Complex regional pain syndrome type 2 of left lower extremity   • Snoring   • Excessive daytime sleepiness   • Peripheral polyneuropathy   • Pain in both lower extremities   • Varicose veins of both lower extremities with pain   • MOLLY (obstructive sleep apnea)   • Class 1 obesity due to excess calories without serious comorbidity with body mass index (BMI) of 32.0 to 32.9 in adult   • Neuromuscular respiratory weakness ([facility])   • Borderline diabetes   • Diarrhea   • Nausea and vomiting       Allergy:   Allergies   Allergen Reactions   • Gabapentin Swelling     + swelling   • Latex Rash        Current Medications:   Current Outpatient Medications   Medication Sig Dispense Refill   • albuterol sulfate  (90 Base) MCG/ACT inhaler Inhale 2 puffs Every 4 (Four) Hours As Needed for Wheezing or Shortness of Air. 18 g 3   • amitriptyline (ELAVIL) 25 MG tablet Take 1 tablet by mouth every night at bedtime. 30 tablet 2   • amoxicillin (AMOXIL) 875 MG tablet 1 po bid for 10 days 20 tablet 0   • benzonatate (TESSALON) 100 MG capsule Take 1 capsule by mouth 3 (Three) Times a Day As Needed for Cough. 30 capsule 0   • carvedilol (COREG) 6.25 MG tablet Take 1 tablet by mouth 2 (Two) Times a Day With Meals. ANNUAL DUE for refills 30 tablet 0   • DULoxetine (CYMBALTA) 60 MG capsule Take 1 capsule by mouth 2 (Two) Times a Day. 60 capsule 2   • fluticasone (Flonase) 50 MCG/ACT nasal spray 2 sprays into the nostril(s) as directed by provider Daily. 2 puffs each nostril 16 g 0   • haloperidol (HALDOL) 2 MG tablet Take 1 tablet by mouth Every Morning AND 2 tablets Every Night. 270 tablet 0   • HYDROcodone-acetaminophen (Norco) 7.5-325 MG per tablet Take 1 tablet by mouth Every 6 (Six) Hours As Needed for Severe Pain. 14 tablet 0   • lithium 300 MG tablet Take 0.5 tablets  by mouth Every Morning AND 1 tablet Every Night. 45 tablet 2   • omeprazole (priLOSEC) 40 MG capsule TAKE 1 CAPSULE BY MOUTH EVERY MORNING 30 MINS BEFORE BREAKFAST 30 capsule 3   • QUEtiapine (SEROquel) 100 MG tablet TAKE 1 TABLET BY MOUTH AT NIGHT AS NEEDED (INSOMNIA). 30 tablet 1   • valsartan (DIOVAN) 160 MG tablet Take 1 tablet by mouth Daily. APPOINTMENT REQUIRED. 30 tablet 0     No current facility-administered medications for this visit.       Review of Symptoms:    Review of Systems   Constitutional: Positive for fatigue. Negative for activity change (improved), appetite change, chills, diaphoresis and fever.   HENT: Negative.    Eyes: Negative.    Respiratory: Negative.    Cardiovascular: Negative.    Gastrointestinal: Negative.    Endocrine: Negative.    Genitourinary: Negative for decreased libido.   Musculoskeletal: Negative.    Skin: Negative.    Allergic/Immunologic: Negative.    Neurological: Positive for dizziness.   Hematological: Negative.    Psychiatric/Behavioral: Positive for sleep disturbance, depressed mood and stress. Negative for agitation, behavioral problems, decreased concentration, dysphoric mood, hallucinations, self-injury, suicidal ideas and negative for hyperactivity. The patient is not nervous/anxious.          Physical Exam:   There were no vitals taken for this visit.  Unable to assess, telephone visit    Appearance: Unable to assess, telephone visit  Gait, Station, Strength: Unable to assess, telephone visit    Mental Status Exam:       Hygiene:   Unable to assess, telephone visit  Cooperation:  Cooperative  Eye Contact:  Unable to assess, telephone visit  Psychomotor Behavior:  Unable to assess, telephone visit  Affect:  Full range  Mood: normal, mild dysphoria   Hopelessness: Optimistic  Speech:  Normal  Thought Process:  Goal directed and Linear  Thought Content:  Normal  Suicidal:  None  Homicidal:  None  Hallucinations:  None  Delusion:  None  Memory:  Intact  Orientation:   Person, Place, Time and Situation  Reliability:  good  Insight:  Fair  Judgement:  Fair  Impulse Control:  Fair  Physical/Medical Issues:  No        Lab Results:   Admission on 12/07/2022, Discharged on 12/07/2022   Component Date Value Ref Range Status   • COVID19 12/07/2022 Not Detected   Final   • Influenza A Antigen ZION 12/07/2022 Not Detected   Final   • Influenza B Antigen ZION 12/07/2022 Not Detected   Final   • Internal Control 12/07/2022 Passed   Final   • Lot Number 12/07/2022 [id number]   Final   • Expiration Date 12/07/2022 [id number]   Final       Assessment & Plan   Diagnoses and all orders for this visit:    1. Chronic Post traumatic stress disorder (PTSD)  -     haloperidol (HALDOL) 2 MG tablet; Take 2 tablets by mouth Every Night.  Dispense: 180 tablet; Refill: 0  -     lithium 300 MG tablet; Take 1 tablet by mouth Every Night.  Dispense: 90 tablet; Refill: 0  -     DULoxetine (CYMBALTA) 60 MG capsule; Take 1 capsule by mouth Every Night.  Dispense: 90 capsule; Refill: 0  -     QUEtiapine (SEROquel) 100 MG tablet; Take 1 tablet by mouth At Night As Needed (Insomnia).  Dispense: 90 tablet; Refill: 0    2. Anxiety disorder, unspecified type  -     haloperidol (HALDOL) 2 MG tablet; Take 2 tablets by mouth Every Night.  Dispense: 180 tablet; Refill: 0  -     lithium 300 MG tablet; Take 1 tablet by mouth Every Night.  Dispense: 90 tablet; Refill: 0  -     DULoxetine (CYMBALTA) 60 MG capsule; Take 1 capsule by mouth Every Night.  Dispense: 90 capsule; Refill: 0  -     QUEtiapine (SEROquel) 100 MG tablet; Take 1 tablet by mouth At Night As Needed (Insomnia).  Dispense: 90 tablet; Refill: 0    3. Bipolar affective disorder, current episode hypomanic (HCC)  -     haloperidol (HALDOL) 2 MG tablet; Take 2 tablets by mouth Every Night.  Dispense: 180 tablet; Refill: 0  -     lithium 300 MG tablet; Take 1 tablet by mouth Every Night.  Dispense: 90 tablet; Refill: 0    4. Bipolar disorder, current episode mixed,  moderate (HCC)  -     DULoxetine (CYMBALTA) 60 MG capsule; Take 1 capsule by mouth Every Night.  Dispense: 90 capsule; Refill: 0  -     QUEtiapine (SEROquel) 100 MG tablet; Take 1 tablet by mouth At Night As Needed (Insomnia).  Dispense: 90 tablet; Refill: 0    5. Insomnia due to mental condition  -     QUEtiapine (SEROquel) 100 MG tablet; Take 1 tablet by mouth At Night As Needed (Insomnia).  Dispense: 90 tablet; Refill: 0    6. Nightmares  -     QUEtiapine (SEROquel) 100 MG tablet; Take 1 tablet by mouth At Night As Needed (Insomnia).  Dispense: 90 tablet; Refill: 0    7. Borderline personality disorder (HCC)  -     amitriptyline (ELAVIL) 25 MG tablet; Take 1 tablet by mouth every night at bedtime.  Dispense: 90 tablet; Refill: 0    -Patient overall relatively stable but was having some fatigue so he reduced his morning doses of medications.  He is not having any major depressive or anxious symptoms but does have some dysphoria and anxiety.  -Reviewed previous documentation  -Reviewed labs  -Reduce Haldol to 4 mg at night for mood stabilization  -Change Cymbalta to 60 mg p.o. nightly for mood and anxiety  -Reduce lithium to 300 mg at night for mood stabilization  -Continue Seroquel 100 mg nightly as needed for insomnia and mood stabilization  -Continue Elavil 25 mg p.o. nightly for insomnia  -Encourage cessation of nicotine and cannabis  -Encouraged therapy and anger management  -Approximate appointment time 8:30 AM to 8:45 AM via telephone visit    Visit Diagnoses:    ICD-10-CM ICD-9-CM   1. Chronic Post traumatic stress disorder (PTSD)  F43.10 309.81   2. Anxiety disorder, unspecified type  F41.9 300.00   3. Bipolar affective disorder, current episode hypomanic (HCC)  F31.0 296.40   4. Bipolar disorder, current episode mixed, moderate (HCC)  F31.62 296.62   5. Insomnia due to mental condition  F51.05 300.9     327.02   6. Nightmares  F51.5 307.47   7. Borderline personality disorder (HCC)  F60.3 301.83        TREATMENT PLAN/GOALS: Continue supportive psychotherapy efforts and medications as indicated. Treatment and medication options discussed during today patient advised he may need a lithium level in 5 to 7 days visit. Patient ackowledged and verbally consented to continue with current treatment plan and was educated on the importance of compliance with treatment and follow-up appointments.    MEDICATION ISSUES:    Discussed medication options and treatment plan of prescribed medication as well as the risks, benefits, and side effects including potential falls, possible impaired driving and metabolic adversities among others. Patient is agreeable to call the office with any worsening of symptoms or onset of side effects. Patient is agreeable to call 911 or go to the nearest ER should he/she begin having SI/HI. No medication side effects or related complaints today.     MEDS ORDERED DURING VISIT:  New Medications Ordered This Visit   Medications   • haloperidol (HALDOL) 2 MG tablet     Sig: Take 2 tablets by mouth Every Night.     Dispense:  180 tablet     Refill:  0   • lithium 300 MG tablet     Sig: Take 1 tablet by mouth Every Night.     Dispense:  90 tablet     Refill:  0     DX Code Needed  .   • DULoxetine (CYMBALTA) 60 MG capsule     Sig: Take 1 capsule by mouth Every Night.     Dispense:  90 capsule     Refill:  0   • QUEtiapine (SEROquel) 100 MG tablet     Sig: Take 1 tablet by mouth At Night As Needed (Insomnia).     Dispense:  90 tablet     Refill:  0   • amitriptyline (ELAVIL) 25 MG tablet     Sig: Take 1 tablet by mouth every night at bedtime.     Dispense:  90 tablet     Refill:  0       Return in about 4 weeks (around 2/3/2023).             This document has been electronically signed by Stefan Marion MD  January 6, 2023

## 2023-03-24 ENCOUNTER — TELEMEDICINE (OUTPATIENT)
Dept: PSYCHIATRY | Facility: CLINIC | Age: 38
End: 2023-03-24
Payer: COMMERCIAL

## 2023-03-24 DIAGNOSIS — F31.62 BIPOLAR DISORDER, CURRENT EPISODE MIXED, MODERATE: ICD-10-CM

## 2023-03-24 DIAGNOSIS — F60.3 BORDERLINE PERSONALITY DISORDER: ICD-10-CM

## 2023-03-24 DIAGNOSIS — F41.9 ANXIETY DISORDER, UNSPECIFIED TYPE: ICD-10-CM

## 2023-03-24 DIAGNOSIS — F51.5 NIGHTMARES: ICD-10-CM

## 2023-03-24 DIAGNOSIS — F51.05 INSOMNIA DUE TO MENTAL CONDITION: ICD-10-CM

## 2023-03-24 DIAGNOSIS — F43.10 POST TRAUMATIC STRESS DISORDER (PTSD): Chronic | ICD-10-CM

## 2023-03-24 PROCEDURE — 1160F RVW MEDS BY RX/DR IN RCRD: CPT | Performed by: PSYCHIATRY & NEUROLOGY

## 2023-03-24 PROCEDURE — 1159F MED LIST DOCD IN RCRD: CPT | Performed by: PSYCHIATRY & NEUROLOGY

## 2023-03-24 PROCEDURE — 99214 OFFICE O/P EST MOD 30 MIN: CPT | Performed by: PSYCHIATRY & NEUROLOGY

## 2023-03-24 RX ORDER — AMITRIPTYLINE HYDROCHLORIDE 25 MG/1
25 TABLET, FILM COATED ORAL
Qty: 90 TABLET | Refills: 0 | Status: SHIPPED | OUTPATIENT
Start: 2023-03-24

## 2023-03-24 RX ORDER — LITHIUM CARBONATE 300 MG
300 TABLET ORAL NIGHTLY
Qty: 90 TABLET | Refills: 0 | Status: SHIPPED | OUTPATIENT
Start: 2023-03-24

## 2023-03-24 RX ORDER — QUETIAPINE FUMARATE 100 MG/1
100 TABLET, FILM COATED ORAL NIGHTLY PRN
Qty: 90 TABLET | Refills: 0 | Status: SHIPPED | OUTPATIENT
Start: 2023-03-24

## 2023-03-24 RX ORDER — HALOPERIDOL 2 MG/1
4 TABLET ORAL NIGHTLY
Qty: 180 TABLET | Refills: 0 | Status: SHIPPED | OUTPATIENT
Start: 2023-03-24

## 2023-03-24 RX ORDER — DULOXETIN HYDROCHLORIDE 60 MG/1
60 CAPSULE, DELAYED RELEASE ORAL DAILY
Qty: 90 CAPSULE | Refills: 0 | Status: SHIPPED | OUTPATIENT
Start: 2023-03-24

## 2023-03-24 RX ORDER — DULOXETIN HYDROCHLORIDE 30 MG/1
30 CAPSULE, DELAYED RELEASE ORAL DAILY
Qty: 90 CAPSULE | Refills: 0 | Status: SHIPPED | OUTPATIENT
Start: 2023-03-24 | End: 2024-03-23

## 2023-03-24 NOTE — PROGRESS NOTES
Subjective   Alex Torres is a 38 y.o. male who presents today for follow up     This provider is located at [facility], [address] [place] [address]. The Patient is seen remotely at home, using Epic Mychart. Patient is being seen via telehealth and stated they are in a secure environment for this session. The patient’s condition being diagnosed/treated is appropriate for telemedicine. The provider identified himself as well as his credentials.   The patient gave consent to be seen remotely, and when consent is given they understand that the consent allows for patient identifiable information to be sent to a third party as needed.   They may refuse to be seen remotely at any time. The electronic data is encrypted and password protected, and the patient has been advised of the potential risks to privacy not withstanding such measures      Chief Complaint: Bipolar disorder/anxiety    History of Present Illness: Patient presented today for follow-up.  He reports that for the last few weeks he has had increased anxiety, ruminations, and almost panic-like symptoms.  He is not having any medication side effects and there have been no major changes to life or social situation though there are stressors at home.  He denies SI/HI/AVH.  He is not having any major manic or depressive symptoms.      The following portions of the patient's history were reviewed and updated as appropriate: allergies, current medications, past family history, past medical history, past social history, past surgical history and problem list.      Past Medical History:  Past Medical History:   Diagnosis Date   • Anxiety    • Asthma    • Bipolar 1 disorder (HCC)    • Depression    • DM (diabetes mellitus) ([facility])    • Hypertension    • Kidney stone    • Polyneuropathy    • Positive TB test     treated w/ meds x 6 months   • PTSD (post-traumatic stress disorder)    • Sleep apnea    • Suicide attempt ([facility])    • Tattoos        Social  History:  Social History     Socioeconomic History   • Marital status:    Tobacco Use   • Smoking status: Every Day     Packs/day: 1.00     Years: 18.00     Pack years: 18.00     Types: Cigarettes     Start date: 1999   • Smokeless tobacco: Never   Vaping Use   • Vaping Use: Never used   Substance and Sexual Activity   • Alcohol use: No   • Drug use: Yes     Types: Marijuana     Comment: rarely   • Sexual activity: Yes     Partners: Female       Family History:  Family History   Problem Relation Age of Onset   • Arthritis Father    • Hypertension Father    • Alcohol abuse Father    • Drug abuse Father    • Diabetes Maternal Aunt    • Diabetes Maternal Uncle    • Diabetes Maternal Grandmother    • Alzheimer's disease Maternal Grandmother    • Dementia Maternal Grandmother    • Diabetes Other    • Hypertension Mother    • Depression Mother    • ADD / ADHD Brother    • Early death Maternal Grandfather    • Liver disease Maternal Grandfather    • Alcohol abuse Maternal Grandfather    • Cirrhosis Maternal Grandfather    • No Known Problems Paternal Grandmother    • Liver disease Paternal Grandfather    • Alcohol abuse Paternal Grandfather    • Early death Paternal Grandfather    • Cirrhosis Paternal Grandfather    • No Known Problems Brother    • Anxiety disorder Neg Hx    • Bipolar disorder Neg Hx    • OCD Neg Hx    • Paranoid behavior Neg Hx    • Schizophrenia Neg Hx    • Seizures Neg Hx    • Self-Injurious Behavior  Neg Hx    • Suicide Attempts Neg Hx    • Colon cancer Neg Hx        Past Surgical History:  Past Surgical History:   Procedure Laterality Date   • CHOLECYSTECTOMY  2002   • INGUINAL HERNIA REPAIR Right 1995   • ORIF METACARPAL FRACTURE Right 2000   • TIBIA FRACTURE SURGERY Left 1997    ORIF       Problem List:  Patient Active Problem List   Diagnosis   • Essential hypertension   • GERD (gastroesophageal reflux disease)   • Bipolar disorder, current episode mixed, moderate (HCC)   • MOLLY on CPAP   •  Arthritis   • Complex regional pain syndrome type 2 of left lower extremity   • Snoring   • Excessive daytime sleepiness   • Peripheral polyneuropathy   • Pain in both lower extremities   • Varicose veins of both lower extremities with pain   • MOLLY (obstructive sleep apnea)   • Class 1 obesity due to excess calories without serious comorbidity with body mass index (BMI) of 32.0 to 32.9 in adult   • Neuromuscular respiratory weakness (HCC)   • Borderline diabetes   • Diarrhea   • Nausea and vomiting       Allergy:   Allergies   Allergen Reactions   • Gabapentin Swelling     + swelling   • Latex Rash        Current Medications:   Current Outpatient Medications   Medication Sig Dispense Refill   • albuterol sulfate  (90 Base) MCG/ACT inhaler Inhale 2 puffs Every 4 (Four) Hours As Needed for Wheezing or Shortness of Air. 18 g 3   • amitriptyline (ELAVIL) 25 MG tablet Take 1 tablet by mouth every night at bedtime. 90 tablet 0   • amoxicillin (AMOXIL) 875 MG tablet 1 po bid for 10 days 20 tablet 0   • benzonatate (TESSALON) 100 MG capsule Take 1 capsule by mouth 3 (Three) Times a Day As Needed for Cough. 30 capsule 0   • carvedilol (COREG) 6.25 MG tablet Take 1 tablet by mouth 2 (Two) Times a Day With Meals. ANNUAL DUE for refills 30 tablet 0   • DULoxetine (CYMBALTA) 60 MG capsule Take 1 capsule by mouth Every Night. 90 capsule 0   • fluticasone (Flonase) 50 MCG/ACT nasal spray 2 sprays into the nostril(s) as directed by provider Daily. 2 puffs each nostril 16 g 0   • haloperidol (HALDOL) 2 MG tablet Take 2 tablets by mouth Every Night. 180 tablet 0   • HYDROcodone-acetaminophen (Norco) 7.5-325 MG per tablet Take 1 tablet by mouth Every 6 (Six) Hours As Needed for Severe Pain. 14 tablet 0   • lithium 300 MG tablet Take 1 tablet by mouth Every Night. 90 tablet 0   • omeprazole (priLOSEC) 40 MG capsule TAKE 1 CAPSULE BY MOUTH EVERY MORNING 30 MINS BEFORE BREAKFAST 30 capsule 3   • QUEtiapine (SEROquel) 100 MG tablet  Take 1 tablet by mouth At Night As Needed (Insomnia). 90 tablet 0   • valsartan (DIOVAN) 160 MG tablet Take 1 tablet by mouth Daily. APPOINTMENT REQUIRED. 30 tablet 0     No current facility-administered medications for this visit.       Review of Symptoms:    Review of Systems   Constitutional: Positive for fatigue. Negative for activity change (improved), appetite change, chills, diaphoresis and fever.   HENT: Negative.    Eyes: Negative.    Respiratory: Negative.    Cardiovascular: Negative.    Gastrointestinal: Negative.    Endocrine: Negative.    Genitourinary: Negative for decreased libido.   Musculoskeletal: Negative.    Skin: Negative.    Allergic/Immunologic: Negative.    Neurological: Negative for dizziness.   Hematological: Negative.    Psychiatric/Behavioral: Positive for dysphoric mood and stress. Negative for agitation, behavioral problems, decreased concentration, hallucinations, self-injury, sleep disturbance, suicidal ideas, negative for hyperactivity and depressed mood. The patient is nervous/anxious.          Physical Exam:   There were no vitals taken for this visit.  Video visit    Appearance: Male of stated age, NAD  Gait, Station, Strength: Video visit    Mental Status Exam:       Hygiene:   good  Cooperation:  Cooperative  Eye Contact:  Good  Psychomotor Behavior:  Appropriate  Affect:  Full range  Mood: anxious, mild dysphoria   Hopelessness: Optimistic  Speech:  Normal  Thought Process:  Goal directed and Linear  Thought Content:  Normal  Suicidal:  None  Homicidal:  None  Hallucinations:  None  Delusion:  None  Memory:  Intact  Orientation:  Person, Place, Time and Situation  Reliability:  good  Insight:  Fair  Judgement:  Fair  Impulse Control:  Fair  Physical/Medical Issues:  No        Lab Results:   No visits with results within 1 Month(s) from this visit.   Latest known visit with results is:   Admission on 12/07/2022, Discharged on 12/07/2022   Component Date Value Ref Range Status    • COVID19 12/07/2022 Not Detected   Final   • Influenza A Antigen ZION 12/07/2022 Not Detected   Final   • Influenza B Antigen ZION 12/07/2022 Not Detected   Final   • Internal Control 12/07/2022 Passed   Final   • Lot Number 12/07/2022 [id number]   Final   • Expiration Date 12/07/2022 [id number]   Final       Assessment & Plan   Diagnoses and all orders for this visit:    1. Chronic Post traumatic stress disorder (PTSD)  -     DULoxetine (CYMBALTA) 60 MG capsule; Take 1 capsule by mouth Daily. Take with 30mg capsule for total daily dose of 90mg.  Dispense: 90 capsule; Refill: 0  -     DULoxetine (Cymbalta) 30 MG capsule; Take 1 capsule by mouth Daily. Take with 60mg capsule for total daily dose of 90mg.  Dispense: 90 capsule; Refill: 0  -     haloperidol (HALDOL) 2 MG tablet; Take 2 tablets by mouth Every Night.  Dispense: 180 tablet; Refill: 0  -     lithium 300 MG tablet; Take 1 tablet by mouth Every Night.  Dispense: 90 tablet; Refill: 0  -     QUEtiapine (SEROquel) 100 MG tablet; Take 1 tablet by mouth At Night As Needed (Insomnia).  Dispense: 90 tablet; Refill: 0    2. Anxiety disorder, unspecified type  -     DULoxetine (CYMBALTA) 60 MG capsule; Take 1 capsule by mouth Daily. Take with 30mg capsule for total daily dose of 90mg.  Dispense: 90 capsule; Refill: 0  -     DULoxetine (Cymbalta) 30 MG capsule; Take 1 capsule by mouth Daily. Take with 60mg capsule for total daily dose of 90mg.  Dispense: 90 capsule; Refill: 0  -     haloperidol (HALDOL) 2 MG tablet; Take 2 tablets by mouth Every Night.  Dispense: 180 tablet; Refill: 0  -     lithium 300 MG tablet; Take 1 tablet by mouth Every Night.  Dispense: 90 tablet; Refill: 0  -     QUEtiapine (SEROquel) 100 MG tablet; Take 1 tablet by mouth At Night As Needed (Insomnia).  Dispense: 90 tablet; Refill: 0    3. Bipolar disorder, current episode mixed, moderate  -     DULoxetine (CYMBALTA) 60 MG capsule; Take 1 capsule by mouth Daily. Take with 30mg capsule for total  daily dose of 90mg.  Dispense: 90 capsule; Refill: 0  -     DULoxetine (Cymbalta) 30 MG capsule; Take 1 capsule by mouth Daily. Take with 60mg capsule for total daily dose of 90mg.  Dispense: 90 capsule; Refill: 0  -     haloperidol (HALDOL) 2 MG tablet; Take 2 tablets by mouth Every Night.  Dispense: 180 tablet; Refill: 0  -     lithium 300 MG tablet; Take 1 tablet by mouth Every Night.  Dispense: 90 tablet; Refill: 0  -     QUEtiapine (SEROquel) 100 MG tablet; Take 1 tablet by mouth At Night As Needed (Insomnia).  Dispense: 90 tablet; Refill: 0    4. Insomnia due to mental condition  -     QUEtiapine (SEROquel) 100 MG tablet; Take 1 tablet by mouth At Night As Needed (Insomnia).  Dispense: 90 tablet; Refill: 0    5. Nightmares  -     QUEtiapine (SEROquel) 100 MG tablet; Take 1 tablet by mouth At Night As Needed (Insomnia).  Dispense: 90 tablet; Refill: 0    6. Borderline personality disorder  -     amitriptyline (ELAVIL) 25 MG tablet; Take 1 tablet by mouth every night at bedtime.  Dispense: 90 tablet; Refill: 0    -Patient not having any manic or depressive symptoms but is having increased anxiety  -Reviewed previous documentation  -Reviewed labs  -Continue Haldol 4 mg at night for mood stabilization  -Increase Cymbalta to 90 mg p.o. nightly for mood and anxiety  -Continue lithium 300 mg at night for mood stabilization  -Continue Seroquel 100 mg nightly as needed for insomnia and mood stabilization  -Continue Elavil 25 mg p.o. nightly for insomnia  -Encourage cessation of nicotine and cannabis  -Encouraged therapy and anger management  -Approximate appointment time 11:45 AM to 12:08 PM via video visit    Visit Diagnoses:    ICD-10-CM ICD-9-CM   1. Chronic Post traumatic stress disorder (PTSD)  F43.10 309.81   2. Anxiety disorder, unspecified type  F41.9 300.00   3. Bipolar disorder, current episode mixed, moderate (HCC)  F31.62 296.62   4. Bipolar affective disorder, current episode hypomanic (HCC)  F31.0 296.40    5. Insomnia due to mental condition  F51.05 300.9     327.02   6. Nightmares  F51.5 307.47   7. Borderline personality disorder (HCC)  F60.3 301.83       TREATMENT PLAN/GOALS: Continue supportive psychotherapy efforts and medications as indicated. Treatment and medication options discussed during today patient advised he may need a lithium level in 5 to 7 days visit. Patient ackowledged and verbally consented to continue with current treatment plan and was educated on the importance of compliance with treatment and follow-up appointments.    MEDICATION ISSUES:    Discussed medication options and treatment plan of prescribed medication as well as the risks, benefits, and side effects including potential falls, possible impaired driving and metabolic adversities among others. Patient is agreeable to call the office with any worsening of symptoms or onset of side effects. Patient is agreeable to call 911 or go to the nearest ER should he/she begin having SI/HI. No medication side effects or related complaints today.     MEDS ORDERED DURING VISIT:  New Medications Ordered This Visit   Medications   • DULoxetine (CYMBALTA) 60 MG capsule     Sig: Take 1 capsule by mouth Daily. Take with 30mg capsule for total daily dose of 90mg.     Dispense:  90 capsule     Refill:  0   • DULoxetine (Cymbalta) 30 MG capsule     Sig: Take 1 capsule by mouth Daily. Take with 60mg capsule for total daily dose of 90mg.     Dispense:  90 capsule     Refill:  0   • haloperidol (HALDOL) 2 MG tablet     Sig: Take 2 tablets by mouth Every Night.     Dispense:  180 tablet     Refill:  0   • lithium 300 MG tablet     Sig: Take 1 tablet by mouth Every Night.     Dispense:  90 tablet     Refill:  0     DX Code Needed  .   • QUEtiapine (SEROquel) 100 MG tablet     Sig: Take 1 tablet by mouth At Night As Needed (Insomnia).     Dispense:  90 tablet     Refill:  0   • amitriptyline (ELAVIL) 25 MG tablet     Sig: Take 1 tablet by mouth every night at  bedtime.     Dispense:  90 tablet     Refill:  0       Return in about 4 weeks (around 4/21/2023).             This document has been electronically signed by Stefan Marion MD  March 24, 2023

## 2023-04-24 NOTE — PROGRESS NOTES
Patient ID: Alex Torres is a 35 y.o. male presenting to [facility] for assessment with Gabriela Hernández LCSW.     Time: 8:00    Description of current emotional/behavioral concerns: bipolar disorder     Patient adamantly and convincingly denies current suicidal or homicidal ideation or perceptual disturbance.    Significant Life Events    Has patient experienced a death / loss of relationship? no      Has patient experienced a major accident or tragic events? no      Has patient experienced any other significant life events or trauma (such as verbal, physical, sexual abuse)? no      Work History  Highest level of education obtained: 12th grade    Ever been active duty in the ? no    Patient's Occupation: unemployed.     Describe patient's current and past work experience: Patient struggles to work due to work related anxiety. Patient states he never knows what will happen when he is at work and when he pulls up he has a hard time controlling his mood.       Legal History  The patient has had legal significant legal charges for Patient has a court case currently pending due to a misdemenor assault charge. .    Interpersonal/Relational  Marital Status:   Patient's current living situation: Lives with wife and two children  Support system: significant other  Difficulty getting along with peers: yes  Difficulty making new friendships: yes  Difficulty maintaining friendships: yes  Close with family members: yes    Mental/Behavioral Health History  History of prior treatment or hospitalization: Patient has been hospitalized several times.    Are there any significant health issues (current or past): no    History of seizures: no    Family History   Problem Relation Age of Onset   • Arthritis Father    • Hypertension Father    • Alcohol abuse Father    • Drug abuse Father    • Diabetes Maternal Aunt    • Diabetes Maternal Uncle    • Diabetes Maternal  Grandmother    • Alzheimer's disease Maternal Grandmother    • Dementia Maternal Grandmother    • Diabetes Other    • Hypertension Mother    • Depression Mother    • ADD / ADHD Brother    • Early death Maternal Grandfather    • Liver disease Maternal Grandfather    • Alcohol abuse Maternal Grandfather    • No Known Problems Paternal Grandmother    • Liver disease Paternal Grandfather    • Alcohol abuse Paternal Grandfather    • Early death Paternal Grandfather    • No Known Problems Brother    • Anxiety disorder Neg Hx    • Bipolar disorder Neg Hx    • OCD Neg Hx    • Paranoid behavior Neg Hx    • Schizophrenia Neg Hx    • Seizures Neg Hx    • Self-Injurious Behavior  Neg Hx    • Suicide Attempts Neg Hx        Current Medications:   Current Outpatient Medications   Medication Sig Dispense Refill   • albuterol sulfate  (90 Base) MCG/ACT inhaler Inhale 2 puffs Every 4 (Four) Hours As Needed for Wheezing or Shortness of Air. 18 g 3   • amitriptyline (ELAVIL) 10 MG tablet TAKE 1 TABLET BY MOUTH EVERY DAY AT NIGHT 30 tablet 4   • baclofen (LIORESAL) 10 MG tablet TAKE 1 TABLET BY MOUTH 2 (TWO) TIMES A DAY AS NEEDED FOR MUSCLE SPASMS. 60 tablet 1   • carvedilol (COREG) 6.25 MG tablet TAKE 1 TABLET BY MOUTH TWICE A DAY WITH MEALS 60 tablet 5   • chlorhexidine (PERIDEX) 0.12 % solution USE AS DIRECTED. DO NOT SWALLOW     • haloperidol (HALDOL) 2 MG tablet Take 1 tablet by mouth 2 (Two) Times a Day. 60 tablet 1   • lithium 300 MG tablet Take 1 tablet by mouth 2 (Two) Times a Day. 60 tablet 1   • metFORMIN (GLUCOPHAGE) 500 MG tablet TAKE 2 TABLETS BY MOUTH 2 (TWO) TIMES A DAY WITH MEALS. 120 tablet 5   • ondansetron ODT (ZOFRAN-ODT) 4 MG disintegrating tablet Place 1 tablet on the tongue Every 6 (Six) Hours As Needed for Nausea or Vomiting. 12 tablet 0   • pantoprazole (PROTONIX) 40 MG EC tablet TAKE 1 TABLET BY MOUTH EVERY DAY 90 tablet 3   • prazosin (MINIPRESS) 1 MG capsule Take 1 capsule by mouth Every Night. 30  capsule 1   • valsartan (DIOVAN) 160 MG tablet TAKE 1 TABLET BY MOUTH EVERY DAY 30 tablet 5     No current facility-administered medications for this visit.        History of Substance Use:   Patient answered no  to experiencing two or more of the following problems related to substance use: using more than intended or over longer period than intended; difficulty quitting or cutting back use; spending a great deal of time obtaining, using, or recovering from using; craving or strong desire or urge to use;  work and/or school problems; financial problems; family problems; using in dangerous situations; physical or mental health problems; relapse; feelings of guilt or remorse about use; times when used and/or drank alone; needing to use more in order to achieve the desired effect; illness or withdrawal when stopping or cutting back use; using to relieve or avoid getting ill or developing withdrawal symptoms; and black outs and/or memory issues when using.        Substance Age Frequency Amount Method Last use   Nicotine   daily smoker      Alcohol        Marijuana  Several days a week  smoked    Benzo        Pain Pills        Cocaine        Meth        Heroin        Suboxone        Synthetics/Other:                  SUICIDE RISK ASSESSMENT/CSSRS  1. Does patient have thoughts of suicide? no  2. Does patient have intent for suicide? no  3. Does patient have a current plan for suicide? no  4. History of suicide attempts: no  5. Family history of suicide or attempts: no  6. History of violent behaviors towards others or property or thoughts of committing suicide: no  7. History of sexual aggression toward others: no  8. Access to firearms or weapons: no    Mental Status Exam:   Hygiene:   good  Cooperation:  Cooperative  Eye Contact:  Good  Psychomotor Behavior:  Appropriate  Affect:  Full range  Mood: normal  Hopelessness: Denies  Speech:  Normal  Thought Process:  Goal directed and Linear  Thought Content:   "Normal  Suicidal:  None  Homicidal:  None  Hallucinations:  None  Delusion:  None  Memory:  Intact  Orientation:  Person, Place, Time and Situation  Reliability:  good  Insight:  Good  Judgement:  Good  Impulse Control:  Good    Impression/Formulation:    VISIT DIAGNOSIS: Bipolar disorder     Patient appeared alert and oriented.  Patient is voluntarily requesting to begin outpatient therapy at [facility].  Patient is receptive to assistance with maintaining a stable lifestyle.  Patient presents with history of bipolar disorder.  Patient is agreeable to attend routine therapy sessions.  Patient expressed desire to maintain stability and participate in the therapeutic process.        Crisis Plan:  Symptoms and/or behaviors to indicate a crisis: Extreme mood changes; including uncontrollable \"highs\" or euphoria    What calming techniques or other strategies will patient use to de-esclate and stay safe: slow down, breathe, visualize calming self, think it though, listen to music, change focus, take a walk    Who is one person patient can contact to assist with de-escalation? Patient's wife    If symptoms/behaviors persist, patient will present to the nearest hospital for an assessment. Advised patient of [facility] ER 24/7 assessment services.       Plan:   Obtain release of information for current treatment team for continuity of care  Patient will adhere to medication regimen as prescribed and report any side effects. Patient will contact this office, call 911 or present to the nearest emergency room should suicidal or homicidal ideations occur.  Begin psychotherapy         This document has been electronically signed by GRAYSON Gunn, MARINA  September 15, 2020 11:08 EDT  " Render In Strict Bullet Format?: No Initiate Treatment: Econazole 1% cream apply to aa hands BID x 3 weeks Detail Level: Zone Initiate Treatment: Lidane 1% shampoo\\nStromectol 3mg tab

## 2023-05-05 ENCOUNTER — TELEMEDICINE (OUTPATIENT)
Dept: PSYCHIATRY | Facility: CLINIC | Age: 38
End: 2023-05-05
Payer: COMMERCIAL

## 2023-05-05 DIAGNOSIS — F51.05 INSOMNIA DUE TO MENTAL CONDITION: ICD-10-CM

## 2023-05-05 DIAGNOSIS — F51.5 NIGHTMARES: ICD-10-CM

## 2023-05-05 DIAGNOSIS — F43.10 POST TRAUMATIC STRESS DISORDER (PTSD): Primary | ICD-10-CM

## 2023-05-05 DIAGNOSIS — F17.200 NICOTINE USE DISORDER: ICD-10-CM

## 2023-05-05 DIAGNOSIS — F31.62 BIPOLAR DISORDER, CURRENT EPISODE MIXED, MODERATE: ICD-10-CM

## 2023-05-05 RX ORDER — ESCITALOPRAM OXALATE 10 MG/1
10 TABLET ORAL DAILY
Qty: 30 TABLET | Refills: 0 | Status: SHIPPED | OUTPATIENT
Start: 2023-05-05 | End: 2024-05-04

## 2023-05-05 NOTE — PROGRESS NOTES
Subjective   Alex Torres is a 38 y.o. male who presents today for follow up     This provider is located at [facility], [address] [place] [address]. The Patient is seen remotely at home, using Epic Mychart. Patient is being seen via telehealth and stated they are in a secure environment for this session. The patient’s condition being diagnosed/treated is appropriate for telemedicine. The provider identified himself as well as his credentials.   The patient gave consent to be seen remotely, and when consent is given they understand that the consent allows for patient identifiable information to be sent to a third party as needed.   They may refuse to be seen remotely at any time. The electronic data is encrypted and password protected, and the patient has been advised of the potential risks to privacy not withstanding such measures      Chief Complaint: Bipolar disorder/anxiety    History of Present Illness: Patient presented today for follow-up.  Since last visit, he reports that he has not been doing well.  He states that he is having some minor fluctuations but mostly feels aggravated and irritable all the time.  He says this seems to worsen when he increased the Cymbalta.  He is not having any major manic or depressive symptoms.  He denies any other medication side effects.  He denies any issues with sleep or appetite.  He denies SI/HI/AVH.      The following portions of the patient's history were reviewed and updated as appropriate: allergies, current medications, past family history, past medical history, past social history, past surgical history and problem list.      Past Medical History:  Past Medical History:   Diagnosis Date   • Anxiety    • Asthma    • Bipolar 1 disorder    • Depression    • DM (diabetes mellitus)    • Hypertension    • Kidney stone    • Polyneuropathy    • Positive TB test     treated w/ meds x 6 months   • PTSD (post-traumatic stress disorder)    • Sleep apnea    •  Suicide attempt    • Tattoos        Social History:  Social History     Socioeconomic History   • Marital status:    Tobacco Use   • Smoking status: Every Day     Packs/day: 1.00     Years: 18.00     Pack years: 18.00     Types: Cigarettes     Start date: 1999   • Smokeless tobacco: Never   Vaping Use   • Vaping Use: Never used   Substance and Sexual Activity   • Alcohol use: No   • Drug use: Yes     Types: Marijuana     Comment: rarely   • Sexual activity: Yes     Partners: Female       Family History:  Family History   Problem Relation Age of Onset   • Arthritis Father    • Hypertension Father    • Alcohol abuse Father    • Drug abuse Father    • Diabetes Maternal Aunt    • Diabetes Maternal Uncle    • Diabetes Maternal Grandmother    • Alzheimer's disease Maternal Grandmother    • Dementia Maternal Grandmother    • Diabetes Other    • Hypertension Mother    • Depression Mother    • ADD / ADHD Brother    • Early death Maternal Grandfather    • Liver disease Maternal Grandfather    • Alcohol abuse Maternal Grandfather    • Cirrhosis Maternal Grandfather    • No Known Problems Paternal Grandmother    • Liver disease Paternal Grandfather    • Alcohol abuse Paternal Grandfather    • Early death Paternal Grandfather    • Cirrhosis Paternal Grandfather    • No Known Problems Brother    • Anxiety disorder Neg Hx    • Bipolar disorder Neg Hx    • OCD Neg Hx    • Paranoid behavior Neg Hx    • Schizophrenia Neg Hx    • Seizures Neg Hx    • Self-Injurious Behavior  Neg Hx    • Suicide Attempts Neg Hx    • Colon cancer Neg Hx        Past Surgical History:  Past Surgical History:   Procedure Laterality Date   • CHOLECYSTECTOMY  2002   • INGUINAL HERNIA REPAIR Right 1995   • ORIF METACARPAL FRACTURE Right 2000   • TIBIA FRACTURE SURGERY Left 1997    ORIF       Problem List:  Patient Active Problem List   Diagnosis   • Essential hypertension   • GERD (gastroesophageal reflux disease)   • Bipolar disorder, current episode  mixed, moderate   • MOLLY on CPAP   • Arthritis   • Complex regional pain syndrome type 2 of left lower extremity   • Snoring   • Excessive daytime sleepiness   • Peripheral polyneuropathy   • Pain in both lower extremities   • Varicose veins of both lower extremities with pain   • MOLLY (obstructive sleep apnea)   • Class 1 obesity due to excess calories without serious comorbidity with body mass index (BMI) of 32.0 to 32.9 in adult   • Neuromuscular respiratory weakness   • Borderline diabetes   • Diarrhea   • Nausea and vomiting       Allergy:   Allergies   Allergen Reactions   • Gabapentin Swelling     + swelling   • Latex Rash        Current Medications:   Current Outpatient Medications   Medication Sig Dispense Refill   • amitriptyline (ELAVIL) 25 MG tablet Take 1 tablet by mouth every night at bedtime. 90 tablet 0   • carvedilol (COREG) 6.25 MG tablet Take 1 tablet by mouth 2 (Two) Times a Day With Meals. ANNUAL DUE for refills 30 tablet 0   • DULoxetine (Cymbalta) 30 MG capsule Take 1 capsule by mouth Daily. Take with 60mg capsule for total daily dose of 90mg. 90 capsule 0   • DULoxetine (CYMBALTA) 60 MG capsule Take 1 capsule by mouth Daily. Take with 30mg capsule for total daily dose of 90mg. 90 capsule 0   • haloperidol (HALDOL) 2 MG tablet Take 2 tablets by mouth Every Night. 180 tablet 0   • lithium 300 MG tablet Take 1 tablet by mouth Every Night. 90 tablet 0   • omeprazole (priLOSEC) 40 MG capsule TAKE 1 CAPSULE BY MOUTH EVERY MORNING 30 MINS BEFORE BREAKFAST 30 capsule 3   • QUEtiapine (SEROquel) 100 MG tablet Take 1 tablet by mouth At Night As Needed (Insomnia). 90 tablet 0   • valsartan (DIOVAN) 160 MG tablet Take 1 tablet by mouth Daily. APPOINTMENT REQUIRED. 30 tablet 0     No current facility-administered medications for this visit.       Review of Symptoms:    Review of Systems   Constitutional: Positive for fatigue. Negative for activity change (improved), appetite change, chills, diaphoresis and  fever.   HENT: Negative.    Eyes: Negative.    Respiratory: Negative.    Cardiovascular: Negative.    Gastrointestinal: Negative.    Endocrine: Negative.    Genitourinary: Negative for decreased libido.   Musculoskeletal: Negative.    Skin: Negative.    Allergic/Immunologic: Negative.    Neurological: Negative for dizziness.   Hematological: Negative.    Psychiatric/Behavioral: Positive for agitation, dysphoric mood and stress. Negative for behavioral problems, decreased concentration, hallucinations, self-injury, sleep disturbance, suicidal ideas, negative for hyperactivity and depressed mood. The patient is not nervous/anxious.          Physical Exam:   There were no vitals taken for this visit.  Video visit    Appearance: Male of stated age, NAD  Gait, Station, Strength: Video visit    Mental Status Exam:       Hygiene:   good  Cooperation:  Cooperative  Eye Contact:  Good  Psychomotor Behavior:  Appropriate  Affect:  Full range  Mood: irritable  Hopelessness: Optimistic  Speech:  Normal  Thought Process:  Goal directed and Linear  Thought Content:  Normal  Suicidal:  None  Homicidal:  None  Hallucinations:  None  Delusion:  None  Memory:  Intact  Orientation:  Person, Place, Time and Situation  Reliability:  good  Insight:  Fair  Judgement:  Fair  Impulse Control:  Fair  Physical/Medical Issues:  No        Lab Results:   No visits with results within 1 Month(s) from this visit.   Latest known visit with results is:   Admission on 12/07/2022, Discharged on 12/07/2022   Component Date Value Ref Range Status   • COVID19 12/07/2022 Not Detected   Final   • Influenza A Antigen ZION 12/07/2022 Not Detected   Final   • Influenza B Antigen ZION 12/07/2022 Not Detected   Final   • Internal Control 12/07/2022 Passed   Final   • Lot Number 12/07/2022 [id number]   Final   • Expiration Date 12/07/2022 [id number]   Final       Assessment & Plan   Diagnoses and all orders for this visit:    1. Chronic Post traumatic stress  disorder (PTSD) (Primary)  -     escitalopram (Lexapro) 10 MG tablet; Take 1 tablet by mouth Daily.  Dispense: 30 tablet; Refill: 0    2. Bipolar disorder, current episode mixed, moderate  -     escitalopram (Lexapro) 10 MG tablet; Take 1 tablet by mouth Daily.  Dispense: 30 tablet; Refill: 0    3. Insomnia due to mental condition    4. Nightmares    5. Nicotine use disorder    -Patient having some minor mood fluctuations but is not having any overt depressive or manic symptoms.  Anxiety is well controlled.  Patient is having some increased irritability and agitation leading to dysphoria.  -Reviewed previous documentation  -Reviewed labs  -Continue Haldol 4 mg at night for mood stabilization  -Discontinue Cymbalta by taper  -Start Lexapro 10 mg p.o. daily for mood and anxiety  -Continue lithium 300 mg at night for mood stabilization  -Continue Seroquel 100 mg nightly as needed for insomnia and mood stabilization  -Continue Elavil 25 mg p.o. nightly for insomnia  -Encourage cessation of nicotine and cannabis  -Encouraged therapy and anger management  -Approximate appointment time 8:30 AM to 8:45 AM via video visit    Visit Diagnoses:    ICD-10-CM ICD-9-CM   1. Chronic Post traumatic stress disorder (PTSD)  F43.10 309.81   2. Bipolar disorder, current episode mixed, moderate  F31.62 296.62   3. Insomnia due to mental condition  F51.05 300.9     327.02   4. Nightmares  F51.5 307.47   5. Nicotine use disorder  F17.200 305.1       TREATMENT PLAN/GOALS: Continue supportive psychotherapy efforts and medications as indicated. Treatment and medication options discussed during today patient advised he may need a lithium level in 5 to 7 days visit. Patient ackowledged and verbally consented to continue with current treatment plan and was educated on the importance of compliance with treatment and follow-up appointments.    MEDICATION ISSUES:    Discussed medication options and treatment plan of prescribed medication as well as  the risks, benefits, and side effects including potential falls, possible impaired driving and metabolic adversities among others. Patient is agreeable to call the office with any worsening of symptoms or onset of side effects. Patient is agreeable to call 911 or go to the nearest ER should he/she begin having SI/HI. No medication side effects or related complaints today.     MEDS ORDERED DURING VISIT:  New Medications Ordered This Visit   Medications   • escitalopram (Lexapro) 10 MG tablet     Sig: Take 1 tablet by mouth Daily.     Dispense:  30 tablet     Refill:  0       Return in about 4 weeks (around 6/2/2023).             This document has been electronically signed by Stefan Marion MD  May 5, 2023

## 2023-07-25 ENCOUNTER — APPOINTMENT (OUTPATIENT)
Dept: GENERAL RADIOLOGY | Facility: HOSPITAL | Age: 38
End: 2023-07-25
Payer: COMMERCIAL

## 2023-07-25 ENCOUNTER — HOSPITAL ENCOUNTER (EMERGENCY)
Facility: HOSPITAL | Age: 38
Discharge: HOME | End: 2023-07-25
Attending: STUDENT IN AN ORGANIZED HEALTH CARE EDUCATION/TRAINING PROGRAM | Admitting: STUDENT IN AN ORGANIZED HEALTH CARE EDUCATION/TRAINING PROGRAM
Payer: COMMERCIAL

## 2023-07-25 VITALS
OXYGEN SATURATION: 97 % | BODY MASS INDEX: 30.44 KG/M2 | HEIGHT: 76 IN | RESPIRATION RATE: 18 BRPM | HEART RATE: 83 BPM | SYSTOLIC BLOOD PRESSURE: 169 MMHG | TEMPERATURE: 97.8 F | WEIGHT: 250 LBS | DIASTOLIC BLOOD PRESSURE: 103 MMHG

## 2023-07-25 DIAGNOSIS — S52.571A OTHER CLOSED INTRA-ARTICULAR FRACTURE OF DISTAL END OF RIGHT RADIUS, INITIAL ENCOUNTER: Primary | ICD-10-CM

## 2023-07-25 DIAGNOSIS — S62.031A CLOSED DISPLACED FRACTURE OF PROXIMAL THIRD OF SCAPHOID BONE OF RIGHT WRIST, INITIAL ENCOUNTER: ICD-10-CM

## 2023-07-25 DIAGNOSIS — S52.611A CLOSED DISPLACED FRACTURE OF STYLOID PROCESS OF RIGHT ULNA, INITIAL ENCOUNTER: ICD-10-CM

## 2023-07-25 PROCEDURE — 99283 EMERGENCY DEPT VISIT LOW MDM: CPT

## 2023-07-25 PROCEDURE — 73110 X-RAY EXAM OF WRIST: CPT

## 2023-07-25 RX ORDER — HYDROCODONE BITARTRATE AND ACETAMINOPHEN 5; 325 MG/1; MG/1
1 TABLET ORAL ONCE
Status: COMPLETED | OUTPATIENT
Start: 2023-07-25 | End: 2023-07-25

## 2023-07-25 RX ORDER — HYDROCODONE BITARTRATE AND ACETAMINOPHEN 5; 325 MG/1; MG/1
1 TABLET ORAL EVERY 6 HOURS PRN
Qty: 12 TABLET | Refills: 0 | Status: SHIPPED | OUTPATIENT
Start: 2023-07-25

## 2023-07-25 RX ADMIN — HYDROCODONE BITARTRATE AND ACETAMINOPHEN 1 TABLET: 5; 325 TABLET ORAL at 09:07

## 2023-07-25 NOTE — ED PROVIDER NOTES
Subjective   History of Present Illness  38-year-old male presents to the ED today for complaint of falling over his daughter's toy prior to arrival to the ED today.  He fell injuring his right wrist.  He is unable to make a fist or bend his wrist.  It is obviously deformed.  He has no other injury from the fall.  No other complaints today.    Review of Systems   Constitutional: Negative.    HENT: Negative.     Eyes: Negative.    Respiratory: Negative.     Cardiovascular: Negative.    Gastrointestinal: Negative.    Endocrine: Negative.    Genitourinary: Negative.    Musculoskeletal:  Positive for joint swelling.   Skin: Negative.    Allergic/Immunologic: Negative.    Neurological: Negative.    Hematological: Negative.    Psychiatric/Behavioral: Negative.       Past Medical History:   Diagnosis Date    Anxiety     Asthma     Bipolar 1 disorder     Depression     DM (diabetes mellitus)     Hypertension     Kidney stone     Polyneuropathy     Positive TB test     treated w/ meds x 6 months    PTSD (post-traumatic stress disorder)     Sleep apnea     Suicide attempt     Tattoos        Allergies   Allergen Reactions    Gabapentin Swelling     + swelling    Latex Rash       Past Surgical History:   Procedure Laterality Date    CHOLECYSTECTOMY  2002    INGUINAL HERNIA REPAIR Right 1995    ORIF METACARPAL FRACTURE Right 2000    TIBIA FRACTURE SURGERY Left 1997    ORIF       Family History   Problem Relation Age of Onset    Arthritis Father     Hypertension Father     Alcohol abuse Father     Drug abuse Father     Diabetes Maternal Aunt     Diabetes Maternal Uncle     Diabetes Maternal Grandmother     Alzheimer's disease Maternal Grandmother     Dementia Maternal Grandmother     Diabetes Other     Hypertension Mother     Depression Mother     ADD / ADHD Brother     Early death Maternal Grandfather     Liver disease Maternal Grandfather     Alcohol abuse Maternal Grandfather     Cirrhosis Maternal Grandfather     No Known  Problems Paternal Grandmother     Liver disease Paternal Grandfather     Alcohol abuse Paternal Grandfather     Early death Paternal Grandfather     Cirrhosis Paternal Grandfather     No Known Problems Brother     Anxiety disorder Neg Hx     Bipolar disorder Neg Hx     OCD Neg Hx     Paranoid behavior Neg Hx     Schizophrenia Neg Hx     Seizures Neg Hx     Self-Injurious Behavior  Neg Hx     Suicide Attempts Neg Hx     Colon cancer Neg Hx        Social History     Socioeconomic History    Marital status:    Tobacco Use    Smoking status: Every Day     Packs/day: 1.00     Years: 18.00     Pack years: 18.00     Types: Cigarettes     Start date: 1999    Smokeless tobacco: Never   Vaping Use    Vaping Use: Never used   Substance and Sexual Activity    Alcohol use: No    Drug use: Yes     Types: Marijuana     Comment: rarely    Sexual activity: Yes     Partners: Female           Objective   Physical Exam  Vitals and nursing note reviewed. Exam conducted with a chaperone present.   Constitutional:       Appearance: Normal appearance.   HENT:      Head: Normocephalic and atraumatic.      Nose: Nose normal.      Mouth/Throat:      Mouth: Mucous membranes are moist.      Pharynx: Oropharynx is clear.   Eyes:      Extraocular Movements: Extraocular movements intact.      Conjunctiva/sclera: Conjunctivae normal.      Pupils: Pupils are equal, round, and reactive to light.   Cardiovascular:      Rate and Rhythm: Normal rate.   Pulmonary:      Effort: Pulmonary effort is normal.      Breath sounds: Normal breath sounds.   Abdominal:      General: Bowel sounds are normal.   Musculoskeletal:         General: Swelling, tenderness, deformity and signs of injury present.      Cervical back: Normal range of motion.      Comments: Right wrist injury   Skin:     General: Skin is warm and dry.      Capillary Refill: Capillary refill takes less than 2 seconds.   Neurological:      Mental Status: He is alert and oriented to person,  place, and time.   Psychiatric:         Mood and Affect: Mood normal.         Behavior: Behavior normal.       Procedures           ED Course  ED Course as of 07/25/23 1043   Tue Jul 25, 2023   1019 Talked to  concerning the multiple fractures in patient's wrist and hand.  He is okay for me to splint and have patient follow-up with hand doctor ina [URIEL]      ED Course User Index  [JK] Sara Engle APRN                                           Medical Decision Making  38-year-old male presents to the ED today with complaint of pain with movement of right wrist.  He has not able to make a fist.  He fell over toys prior to arrival to the ED.  No other injury today.  We will get a x-ray to look at the wrist.  We will also provide pain medication.    Amount and/or Complexity of Data Reviewed  Radiology: ordered.    Risk  Prescription drug management.        Final diagnoses:   Other closed intra-articular fracture of distal end of right radius, initial encounter   Closed displaced fracture of styloid process of right ulna, initial encounter   Closed displaced fracture of proximal third of scaphoid bone of right wrist, initial encounter       ED Disposition  ED Disposition       ED Disposition   Discharge    Condition   Stable    Comment   --               Gaston Kelley MD  [id number]   [facility] [address] [phone]               Medication List        New Prescriptions      HYDROcodone-acetaminophen 5-325 MG per tablet  Commonly known as: NORCO  Take 1 tablet by mouth Every 6 (Six) Hours As Needed for Moderate Pain.               Where to Get Your Medications        These medications were sent to [facility]/pharmacy #[id number] - [place], [place] - [address] - [phone]  - [phone] [address] [address]      Phone: [phone]   HYDROcodone-acetaminophen 5-325 MG per tablet            Sara Engle APRN  07/25/23 1043

## 2023-07-25 NOTE — Clinical Note
[facility] EMERGENCY DEPARTMENT  [address] [address]  Phone: [phone]    Alex Torres was seen and treated in our emergency department on 7/25/2023.  He may return to work on 07/31/2023.         Thank you for choosing [facility].    Sara Engle APRN

## 2023-07-25 NOTE — DISCHARGE INSTRUCTIONS
Elevate, ice, leave the splint in place and do not get wet until you have seen Dr. Kelley the hand surgeon.

## 2023-07-26 ENCOUNTER — TELEPHONE (OUTPATIENT)
Dept: ORTHOPEDIC SURGERY | Facility: CLINIC | Age: 38
End: 2023-07-26
Payer: COMMERCIAL

## 2023-07-26 NOTE — TELEPHONE ENCOUNTER
Left message for patient.. Provider reviewed his Xrays and recommends him to see hand sub-specialist.

## 2023-07-27 ENCOUNTER — TELEPHONE (OUTPATIENT)
Dept: ORTHOPEDIC SURGERY | Facility: CLINIC | Age: 38
End: 2023-07-27
Payer: COMMERCIAL

## 2023-07-27 NOTE — TELEPHONE ENCOUNTER
Patient contacted our office demanding to be scheduled for an appt. Office staff explained that our provider reviewed xrays and patient needed to be seen by a hand specialist. Staff transferred the call to me due to patient yelling on the phone and repeated demands to schedule an appt.  I spoke to patient and explained that he needed to schedule an appt with a hand specialist due to multiple fractures. This is beyond the scope what general orthopedics would treat. He started yelling on the phone stating he only wanted casting and our office  could do that. I tried to explain further however he proceeded to yell. I stated that his yelling was inappropriate as I was only trying to help him.  He continued to raise his voice. Therefore, I explained that I could not continue the call if he was going to yell. He disconnected the call.

## 2023-07-27 NOTE — TELEPHONE ENCOUNTER
Per patients ED note, Dr. Franco had recommended patient be seen by a hand specialist, Dr. Gaston Kelley.

## 2023-09-18 DIAGNOSIS — F43.10 POST TRAUMATIC STRESS DISORDER (PTSD): Chronic | ICD-10-CM

## 2023-09-18 DIAGNOSIS — F41.9 ANXIETY DISORDER, UNSPECIFIED TYPE: ICD-10-CM

## 2023-09-18 DIAGNOSIS — F51.5 NIGHTMARES: ICD-10-CM

## 2023-09-18 DIAGNOSIS — F51.05 INSOMNIA DUE TO MENTAL CONDITION: ICD-10-CM

## 2023-09-18 DIAGNOSIS — F31.62 BIPOLAR DISORDER, CURRENT EPISODE MIXED, MODERATE: ICD-10-CM

## 2023-09-18 RX ORDER — QUETIAPINE FUMARATE 200 MG/1
200 TABLET, FILM COATED ORAL NIGHTLY PRN
Qty: 30 TABLET | Refills: 1 | Status: SHIPPED | OUTPATIENT
Start: 2023-09-18

## 2023-10-17 DIAGNOSIS — I10 ESSENTIAL HYPERTENSION: ICD-10-CM

## 2023-10-17 RX ORDER — VALSARTAN 160 MG/1
160 TABLET ORAL DAILY
Qty: 30 TABLET | Refills: 2 | Status: SHIPPED | OUTPATIENT
Start: 2023-10-17

## 2023-11-03 ENCOUNTER — TELEMEDICINE (OUTPATIENT)
Dept: PSYCHIATRY | Facility: CLINIC | Age: 38
End: 2023-11-03
Payer: COMMERCIAL

## 2023-11-03 DIAGNOSIS — F51.5 NIGHTMARES: ICD-10-CM

## 2023-11-03 DIAGNOSIS — F43.10 POST TRAUMATIC STRESS DISORDER (PTSD): Chronic | ICD-10-CM

## 2023-11-03 DIAGNOSIS — F51.05 INSOMNIA DUE TO MENTAL CONDITION: ICD-10-CM

## 2023-11-03 DIAGNOSIS — F31.62 BIPOLAR DISORDER, CURRENT EPISODE MIXED, MODERATE: ICD-10-CM

## 2023-11-03 DIAGNOSIS — F60.3 BORDERLINE PERSONALITY DISORDER: ICD-10-CM

## 2023-11-03 DIAGNOSIS — F41.9 ANXIETY DISORDER, UNSPECIFIED TYPE: ICD-10-CM

## 2023-11-03 RX ORDER — QUETIAPINE FUMARATE 200 MG/1
200 TABLET, FILM COATED ORAL NIGHTLY PRN
Qty: 30 TABLET | Refills: 1 | Status: SHIPPED | OUTPATIENT
Start: 2023-11-03

## 2023-11-03 RX ORDER — PAROXETINE 10 MG/1
10 TABLET, FILM COATED ORAL DAILY
Qty: 30 TABLET | Refills: 2 | Status: SHIPPED | OUTPATIENT
Start: 2023-11-03 | End: 2024-11-02

## 2023-11-03 RX ORDER — LITHIUM CARBONATE 300 MG
600 TABLET ORAL
Qty: 180 TABLET | Refills: 0 | Status: SHIPPED | OUTPATIENT
Start: 2023-11-03

## 2023-11-03 RX ORDER — AMITRIPTYLINE HYDROCHLORIDE 25 MG/1
25 TABLET, FILM COATED ORAL
Qty: 90 TABLET | Refills: 0 | Status: SHIPPED | OUTPATIENT
Start: 2023-11-03

## 2023-11-03 RX ORDER — HALOPERIDOL 10 MG/1
10 TABLET ORAL
Qty: 30 TABLET | Refills: 2 | Status: SHIPPED | OUTPATIENT
Start: 2023-11-03

## 2023-11-03 NOTE — PROGRESS NOTES
Subjective   Alex Torres is a 38 y.o. male who presents today for follow up     This provider is located at [facility], [address] [place] [address]. The Patient is seen remotely at home, using Epic Mychart. Patient is being seen via telehealth and stated they are in a secure environment for this session. The patient’s condition being diagnosed/treated is appropriate for telemedicine. The provider identified himself as well as his credentials.   The patient gave consent to be seen remotely, and when consent is given they understand that the consent allows for patient identifiable information to be sent to a third party as needed.   They may refuse to be seen remotely at any time. The electronic data is encrypted and password protected, and the patient has been advised of the potential risks to privacy not withstanding such measures      Chief Complaint: Bipolar disorder/anxiety    History of Present Illness: Patient presented today for follow-up.  Since last visit, he reports he has been doing fairly well.  He is not having any major mood or anxiety symptoms and denies any manic symptoms.  He is not having any major medication side effects but as opposed to decreased libido previously, he is now having premature ejaculation.  He denies any other complaints.  He denies SI/HI/AVH.        The following portions of the patient's history were reviewed and updated as appropriate: allergies, current medications, past family history, past medical history, past social history, past surgical history and problem list.      Past Medical History:  Past Medical History:   Diagnosis Date    Anxiety     Asthma     Bipolar 1 disorder     Depression     DM (diabetes mellitus)     Hypertension     Kidney stone     Polyneuropathy     Positive TB test     treated w/ meds x 6 months    PTSD (post-traumatic stress disorder)     Sleep apnea     Suicide attempt     Tattoos        Social History:  Social History      Socioeconomic History    Marital status:    Tobacco Use    Smoking status: Every Day     Packs/day: 1.00     Years: 18.00     Additional pack years: 0.00     Total pack years: 18.00     Types: Cigarettes     Start date: 1999    Smokeless tobacco: Never   Vaping Use    Vaping Use: Never used   Substance and Sexual Activity    Alcohol use: No    Drug use: Yes     Types: Marijuana     Comment: rarely    Sexual activity: Yes     Partners: Female       Family History:  Family History   Problem Relation Age of Onset    Arthritis Father     Hypertension Father     Alcohol abuse Father     Drug abuse Father     Diabetes Maternal Aunt     Diabetes Maternal Uncle     Diabetes Maternal Grandmother     Alzheimer's disease Maternal Grandmother     Dementia Maternal Grandmother     Diabetes Other     Hypertension Mother     Depression Mother     ADD / ADHD Brother     Early death Maternal Grandfather     Liver disease Maternal Grandfather     Alcohol abuse Maternal Grandfather     Cirrhosis Maternal Grandfather     No Known Problems Paternal Grandmother     Liver disease Paternal Grandfather     Alcohol abuse Paternal Grandfather     Early death Paternal Grandfather     Cirrhosis Paternal Grandfather     No Known Problems Brother     Anxiety disorder Neg Hx     Bipolar disorder Neg Hx     OCD Neg Hx     Paranoid behavior Neg Hx     Schizophrenia Neg Hx     Seizures Neg Hx     Self-Injurious Behavior  Neg Hx     Suicide Attempts Neg Hx     Colon cancer Neg Hx        Past Surgical History:  Past Surgical History:   Procedure Laterality Date    CHOLECYSTECTOMY  2002    INGUINAL HERNIA REPAIR Right 1995    ORIF METACARPAL FRACTURE Right 2000    TIBIA FRACTURE SURGERY Left 1997    ORIF       Problem List:  Patient Active Problem List   Diagnosis    Essential hypertension    GERD (gastroesophageal reflux disease)    Bipolar disorder, current episode mixed, moderate    MOLLY on CPAP    Arthritis    Complex regional pain  syndrome type 2 of left lower extremity    Snoring    Excessive daytime sleepiness    Peripheral polyneuropathy    Pain in both lower extremities    Varicose veins of both lower extremities with pain    MOLLY (obstructive sleep apnea)    Class 1 obesity due to excess calories without serious comorbidity with body mass index (BMI) of 32.0 to 32.9 in adult    Neuromuscular respiratory weakness    Borderline diabetes    Diarrhea    Nausea and vomiting       Allergy:   Allergies   Allergen Reactions    Gabapentin Swelling     + swelling    Latex Rash        Current Medications:   Current Outpatient Medications   Medication Sig Dispense Refill    QUEtiapine (SEROquel) 200 MG tablet TAKE 1 TABLET BY MOUTH AT NIGHT AS NEEDED (INSOMNIA). 30 tablet 1    amitriptyline (ELAVIL) 25 MG tablet Take 1 tablet by mouth every night at bedtime. 90 tablet 0    carvedilol (COREG) 6.25 MG tablet Take 1 tablet by mouth 2 (Two) Times a Day With Meals. ANNUAL DUE for refills 30 tablet 2    clindamycin (CLEOCIN) 300 MG capsule Take 1 capsule by mouth 3 (Three) Times a Day. 30 capsule 0    haloperidol (HALDOL) 10 MG tablet Take 1 tablet by mouth every night at bedtime. 30 tablet 2    HYDROcodone-acetaminophen (NORCO) 5-325 MG per tablet Take 1 tablet by mouth Every 6 (Six) Hours As Needed for Moderate Pain. 12 tablet 0    ibuprofen (ADVIL,MOTRIN) 800 MG tablet 1 po q 8 h with food prn pain 30 tablet 0    lithium 300 MG tablet Take 2 tablets by mouth every night at bedtime. 180 tablet 0    omeprazole (priLOSEC) 40 MG capsule TAKE 1 CAPSULE BY MOUTH EVERY MORNING 30 MINS BEFORE BREAKFAST 30 capsule 3    valsartan (DIOVAN) 160 MG tablet TAKE 1 TABLET BY MOUTH DAILY. APPOINTMENT REQUIRED. 30 tablet 2     No current facility-administered medications for this visit.       Review of Symptoms:    Review of Systems   Constitutional:  Positive for fatigue. Negative for activity change (improved), appetite change, chills, diaphoresis and fever.   HENT:  Negative.     Eyes: Negative.    Respiratory: Negative.     Cardiovascular: Negative.    Gastrointestinal: Negative.    Endocrine: Negative.    Genitourinary:  Negative for decreased libido.   Musculoskeletal: Negative.    Skin: Negative.    Allergic/Immunologic: Negative.    Neurological:  Negative for dizziness.   Hematological: Negative.    Psychiatric/Behavioral:  Positive for agitation, dysphoric mood, sleep disturbance and stress. Negative for behavioral problems, decreased concentration, hallucinations, self-injury, suicidal ideas, negative for hyperactivity and depressed mood. The patient is not nervous/anxious.          Physical Exam:   There were no vitals taken for this visit.  Video visit    Appearance: Male of stated age, NAD  Gait, Station, Strength: Video visit    Mental Status Exam:       Hygiene:   good  Cooperation:  Cooperative  Eye Contact:  Good  Psychomotor Behavior:  Appropriate  Affect:  Full range  Mood: normal, symptomatic but baseline   Hopelessness: Optimistic  Speech:  Normal  Thought Process:  Goal directed and Linear  Thought Content:  Normal  Suicidal:  None  Homicidal:  None  Hallucinations:  None  Delusion:  None  Memory:  Intact  Orientation:  Person, Place, Time and Situation  Reliability:  good  Insight:  Fair  Judgement:  Fair  Impulse Control:  Fair  Physical/Medical Issues:  No        Lab Results:   No visits with results within 1 Month(s) from this visit.   Latest known visit with results is:   Admission on 12/07/2022, Discharged on 12/07/2022   Component Date Value Ref Range Status    COVID19 12/07/2022 Not Detected   Final    Influenza A Antigen ZION 12/07/2022 Not Detected   Final    Influenza B Antigen ZION 12/07/2022 Not Detected   Final    Internal Control 12/07/2022 Passed   Final    Lot Number 12/07/2022 [id number]   Final    Expiration Date 12/07/2022 3,[id number]   Final       Assessment & Plan   Diagnoses and all orders for this visit:    1. Chronic Post traumatic stress  disorder (PTSD)  -     haloperidol (HALDOL) 10 MG tablet; Take 1 tablet by mouth every night at bedtime.  Dispense: 30 tablet; Refill: 2  -     lithium 300 MG tablet; Take 2 tablets by mouth every night at bedtime.  Dispense: 180 tablet; Refill: 0  -     QUEtiapine (SEROquel) 200 MG tablet; Take 1 tablet by mouth At Night As Needed (Insomnia).  Dispense: 30 tablet; Refill: 1    2. Bipolar disorder, current episode mixed, moderate  -     haloperidol (HALDOL) 10 MG tablet; Take 1 tablet by mouth every night at bedtime.  Dispense: 30 tablet; Refill: 2  -     lithium 300 MG tablet; Take 2 tablets by mouth every night at bedtime.  Dispense: 180 tablet; Refill: 0  -     QUEtiapine (SEROquel) 200 MG tablet; Take 1 tablet by mouth At Night As Needed (Insomnia).  Dispense: 30 tablet; Refill: 1    3. Anxiety disorder, unspecified type  -     haloperidol (HALDOL) 10 MG tablet; Take 1 tablet by mouth every night at bedtime.  Dispense: 30 tablet; Refill: 2  -     lithium 300 MG tablet; Take 2 tablets by mouth every night at bedtime.  Dispense: 180 tablet; Refill: 0  -     QUEtiapine (SEROquel) 200 MG tablet; Take 1 tablet by mouth At Night As Needed (Insomnia).  Dispense: 30 tablet; Refill: 1    4. Nightmares  -     QUEtiapine (SEROquel) 200 MG tablet; Take 1 tablet by mouth At Night As Needed (Insomnia).  Dispense: 30 tablet; Refill: 1    5. Insomnia due to mental condition  -     QUEtiapine (SEROquel) 200 MG tablet; Take 1 tablet by mouth At Night As Needed (Insomnia).  Dispense: 30 tablet; Refill: 1    6. Borderline personality disorder  -     amitriptyline (ELAVIL) 25 MG tablet; Take 1 tablet by mouth every night at bedtime.  Dispense: 90 tablet; Refill: 0    Other orders  -     PARoxetine (Paxil) 10 MG tablet; Take 1 tablet by mouth Daily.  Dispense: 30 tablet; Refill: 2      -Patient still having some mood fluctuations with irritability, dysphoria, and anxiety but overall is much improved and feels more towards his  baseline but he is not having some premature ejaculation issues  -Reviewed previous documentation  -Reviewed labs  -Continue Haldol 10 mg at night for mood stabilization  -Continue lithium 600 mg at night for mood stabilization  -Continue Seroquel 200 mg nightly as needed for insomnia and mood stabilization  -Continue Elavil 25 mg p.o. nightly for insomnia  -Start Paxil 10 mg p.o. daily for mood, anxiety, and premature ejaculation  -Encourage cessation of nicotine and cannabis  -Encouraged therapy and anger management  -Approximate appointment time 10:30 AM to 11 AM via video visit    Visit Diagnoses:    ICD-10-CM ICD-9-CM   1. Chronic Post traumatic stress disorder (PTSD)  F43.10 309.81   2. Bipolar disorder, current episode mixed, moderate  F31.62 296.62   3. Anxiety disorder, unspecified type  F41.9 300.00   4. Nightmares  F51.5 307.47   5. Insomnia due to mental condition  F51.05 300.9     327.02   6. Borderline personality disorder  F60.3 301.83         TREATMENT PLAN/GOALS: Continue supportive psychotherapy efforts and medications as indicated. Treatment and medication options discussed during today patient advised he may need a lithium level in 5 to 7 days visit. Patient ackowledged and verbally consented to continue with current treatment plan and was educated on the importance of compliance with treatment and follow-up appointments.    MEDICATION ISSUES:    Discussed medication options and treatment plan of prescribed medication as well as the risks, benefits, and side effects including potential falls, possible impaired driving and metabolic adversities among others. Patient is agreeable to call the office with any worsening of symptoms or onset of side effects. Patient is agreeable to call 911 or go to the nearest ER should he/she begin having SI/HI. No medication side effects or related complaints today.     MEDS ORDERED DURING VISIT:  New Medications Ordered This Visit   Medications    haloperidol  (HALDOL) 10 MG tablet     Sig: Take 1 tablet by mouth every night at bedtime.     Dispense:  30 tablet     Refill:  2    lithium 300 MG tablet     Sig: Take 2 tablets by mouth every night at bedtime.     Dispense:  180 tablet     Refill:  0    QUEtiapine (SEROquel) 200 MG tablet     Sig: Take 1 tablet by mouth At Night As Needed (Insomnia).     Dispense:  30 tablet     Refill:  1    amitriptyline (ELAVIL) 25 MG tablet     Sig: Take 1 tablet by mouth every night at bedtime.     Dispense:  90 tablet     Refill:  0    PARoxetine (Paxil) 10 MG tablet     Sig: Take 1 tablet by mouth Daily.     Dispense:  30 tablet     Refill:  2       Return in about 3 months (around 2/3/2024).             This document has been electronically signed by Stefan Marion MD  November 3, 2023

## 2024-01-16 DIAGNOSIS — F51.05 INSOMNIA DUE TO MENTAL CONDITION: ICD-10-CM

## 2024-01-16 DIAGNOSIS — F31.62 BIPOLAR DISORDER, CURRENT EPISODE MIXED, MODERATE: ICD-10-CM

## 2024-01-16 DIAGNOSIS — F43.10 POST TRAUMATIC STRESS DISORDER (PTSD): Chronic | ICD-10-CM

## 2024-01-16 DIAGNOSIS — F51.5 NIGHTMARES: ICD-10-CM

## 2024-01-16 DIAGNOSIS — F41.9 ANXIETY DISORDER, UNSPECIFIED TYPE: ICD-10-CM

## 2024-01-16 RX ORDER — QUETIAPINE FUMARATE 200 MG/1
200 TABLET, FILM COATED ORAL NIGHTLY PRN
Qty: 30 TABLET | Refills: 1 | Status: SHIPPED | OUTPATIENT
Start: 2024-01-16

## 2024-02-06 DIAGNOSIS — F31.62 BIPOLAR DISORDER, CURRENT EPISODE MIXED, MODERATE: ICD-10-CM

## 2024-02-06 DIAGNOSIS — F43.10 POST TRAUMATIC STRESS DISORDER (PTSD): Chronic | ICD-10-CM

## 2024-02-06 DIAGNOSIS — F41.9 ANXIETY DISORDER, UNSPECIFIED TYPE: ICD-10-CM

## 2024-02-06 RX ORDER — HALOPERIDOL 10 MG/1
10 TABLET ORAL
Qty: 30 TABLET | Refills: 2 | Status: SHIPPED | OUTPATIENT
Start: 2024-02-06

## 2024-02-06 RX ORDER — PAROXETINE 10 MG/1
10 TABLET, FILM COATED ORAL DAILY
Qty: 30 TABLET | Refills: 2 | Status: SHIPPED | OUTPATIENT
Start: 2024-02-06

## 2024-02-16 DIAGNOSIS — F60.3 BORDERLINE PERSONALITY DISORDER: ICD-10-CM

## 2024-02-16 RX ORDER — AMITRIPTYLINE HYDROCHLORIDE 25 MG/1
25 TABLET, FILM COATED ORAL
Qty: 90 TABLET | Refills: 0 | Status: SHIPPED | OUTPATIENT
Start: 2024-02-16

## 2024-02-27 DIAGNOSIS — F31.62 BIPOLAR DISORDER, CURRENT EPISODE MIXED, MODERATE: ICD-10-CM

## 2024-02-27 DIAGNOSIS — F43.10 POST TRAUMATIC STRESS DISORDER (PTSD): Chronic | ICD-10-CM

## 2024-02-27 DIAGNOSIS — F41.9 ANXIETY DISORDER, UNSPECIFIED TYPE: ICD-10-CM

## 2024-02-27 RX ORDER — LITHIUM CARBONATE 300 MG
TABLET ORAL
Qty: 90 TABLET | Refills: 1 | Status: SHIPPED | OUTPATIENT
Start: 2024-02-27

## 2024-03-01 ENCOUNTER — TELEMEDICINE (OUTPATIENT)
Dept: PSYCHIATRY | Facility: CLINIC | Age: 39
End: 2024-03-01
Payer: COMMERCIAL

## 2024-03-01 DIAGNOSIS — F31.62 BIPOLAR DISORDER, CURRENT EPISODE MIXED, MODERATE: ICD-10-CM

## 2024-03-01 DIAGNOSIS — F51.05 INSOMNIA DUE TO MENTAL CONDITION: ICD-10-CM

## 2024-03-01 DIAGNOSIS — F41.9 ANXIETY DISORDER, UNSPECIFIED TYPE: ICD-10-CM

## 2024-03-01 DIAGNOSIS — F51.5 NIGHTMARES: ICD-10-CM

## 2024-03-01 DIAGNOSIS — F60.3 BORDERLINE PERSONALITY DISORDER: ICD-10-CM

## 2024-03-01 DIAGNOSIS — F43.10 POST TRAUMATIC STRESS DISORDER (PTSD): Chronic | ICD-10-CM

## 2024-03-01 RX ORDER — FLUVOXAMINE MALEATE 50 MG/1
50 TABLET, COATED ORAL NIGHTLY
Qty: 30 TABLET | Refills: 1 | Status: SHIPPED | OUTPATIENT
Start: 2024-03-01

## 2024-03-01 RX ORDER — QUETIAPINE FUMARATE 200 MG/1
200 TABLET, FILM COATED ORAL NIGHTLY PRN
Qty: 30 TABLET | Refills: 1 | Status: SHIPPED | OUTPATIENT
Start: 2024-03-01

## 2024-03-01 NOTE — PROGRESS NOTES
Subjective   Alex Torres is a 39 y.o. male who presents today for follow up     This provider is located at [facility], [address] [place] [address]. The Patient is seen remotely at home, using Epic Mychart. Patient is being seen via telehealth and stated they are in a secure environment for this session. The patient’s condition being diagnosed/treated is appropriate for telemedicine. The provider identified himself as well as his credentials.   The patient gave consent to be seen remotely, and when consent is given they understand that the consent allows for patient identifiable information to be sent to a third party as needed.   They may refuse to be seen remotely at any time. The electronic data is encrypted and password protected, and the patient has been advised of the potential risks to privacy not withstanding such measures      Chief Complaint: Bipolar disorder/anxiety    History of Present Illness: Patient presenting today for follow-up.  Since last visit, he reports the Paxil was not helpful.  He continues to have sexual side effects but now has difficulty achieving and maintaining an erection versus previously having premature ejaculation.  He continues to have some depression and low frustration tolerance with irritability.  He is not having mood fluctuations.  Sleep continues to be improved over baseline but he is only getting 5 to 6 hours of sleep versus 8 previously.  He is not having any other medication side effects.  He denies SI/HI/AVH.  No manic symptoms noted.    The following portions of the patient's history were reviewed and updated as appropriate: allergies, current medications, past family history, past medical history, past social history, past surgical history and problem list.      Past Medical History:  Past Medical History:   Diagnosis Date    Anxiety     Asthma     Bipolar 1 disorder     Depression     DM (diabetes mellitus)     Hypertension     Kidney stone      Polyneuropathy     Positive TB test     treated w/ meds x 6 months    PTSD (post-traumatic stress disorder)     Sleep apnea     Suicide attempt     Tattoos        Social History:  Social History     Socioeconomic History    Marital status:    Tobacco Use    Smoking status: Every Day     Packs/day: 1.00     Years: 18.00     Additional pack years: 0.00     Total pack years: 18.00     Types: Cigarettes     Start date: 1999    Smokeless tobacco: Never   Vaping Use    Vaping Use: Never used   Substance and Sexual Activity    Alcohol use: No    Drug use: Yes     Types: Marijuana     Comment: rarely    Sexual activity: Yes     Partners: Female       Family History:  Family History   Problem Relation Age of Onset    Arthritis Father     Hypertension Father     Alcohol abuse Father     Drug abuse Father     Diabetes Maternal Aunt     Diabetes Maternal Uncle     Diabetes Maternal Grandmother     Alzheimer's disease Maternal Grandmother     Dementia Maternal Grandmother     Diabetes Other     Hypertension Mother     Depression Mother     ADD / ADHD Brother     Early death Maternal Grandfather     Liver disease Maternal Grandfather     Alcohol abuse Maternal Grandfather     Cirrhosis Maternal Grandfather     No Known Problems Paternal Grandmother     Liver disease Paternal Grandfather     Alcohol abuse Paternal Grandfather     Early death Paternal Grandfather     Cirrhosis Paternal Grandfather     No Known Problems Brother     Anxiety disorder Neg Hx     Bipolar disorder Neg Hx     OCD Neg Hx     Paranoid behavior Neg Hx     Schizophrenia Neg Hx     Seizures Neg Hx     Self-Injurious Behavior  Neg Hx     Suicide Attempts Neg Hx     Colon cancer Neg Hx        Past Surgical History:  Past Surgical History:   Procedure Laterality Date    CHOLECYSTECTOMY  2002    INGUINAL HERNIA REPAIR Right 1995    ORIF METACARPAL FRACTURE Right 2000    TIBIA FRACTURE SURGERY Left 1997    ORIF       Problem List:  Patient Active Problem  List   Diagnosis    Essential hypertension    GERD (gastroesophageal reflux disease)    Bipolar disorder, current episode mixed, moderate    MOLLY on CPAP    Arthritis    Complex regional pain syndrome type 2 of left lower extremity    Snoring    Excessive daytime sleepiness    Peripheral polyneuropathy    Pain in both lower extremities    Varicose veins of both lower extremities with pain    MOLLY (obstructive sleep apnea)    Class 1 obesity due to excess calories without serious comorbidity with body mass index (BMI) of 32.0 to 32.9 in adult    Neuromuscular respiratory weakness    Borderline diabetes    Diarrhea    Nausea and vomiting       Allergy:   Allergies   Allergen Reactions    Gabapentin Swelling     + swelling    Latex Rash        Current Medications:   Current Outpatient Medications   Medication Sig Dispense Refill    amitriptyline (ELAVIL) 25 MG tablet TAKE 1 TABLET BY MOUTH EVERYDAY AT BEDTIME 90 tablet 0    carvedilol (COREG) 6.25 MG tablet Take 1 tablet by mouth 2 (Two) Times a Day With Meals. ANNUAL DUE for refills 30 tablet 2    haloperidol (HALDOL) 10 MG tablet TAKE 1 TABLET BY MOUTH EVERYDAY AT BEDTIME 30 tablet 2    lithium 300 MG tablet TAKE 1 TABLET BY MOUTH EVERY MORNING AND 2 TABLETS EVERY NIGHT 90 tablet 1    omeprazole (priLOSEC) 40 MG capsule TAKE 1 CAPSULE BY MOUTH EVERY MORNING 30 MINS BEFORE BREAKFAST 30 capsule 3    PARoxetine (PAXIL) 10 MG tablet TAKE 1 TABLET BY MOUTH EVERY DAY 30 tablet 2    QUEtiapine (SEROquel) 200 MG tablet TAKE 1 TABLET BY MOUTH AT NIGHT AS NEEDED (INSOMNIA). 30 tablet 1    valsartan (DIOVAN) 160 MG tablet TAKE 1 TABLET BY MOUTH DAILY. APPOINTMENT REQUIRED. 30 tablet 2     No current facility-administered medications for this visit.       Review of Symptoms:    Review of Systems   Constitutional:  Positive for fatigue. Negative for activity change (improved), appetite change, chills, diaphoresis and fever.   HENT: Negative.     Eyes: Negative.    Respiratory:  Negative.     Cardiovascular: Negative.    Gastrointestinal: Negative.    Endocrine: Negative.    Genitourinary:  Positive for decreased libido.   Musculoskeletal: Negative.    Skin: Negative.    Allergic/Immunologic: Negative.    Neurological:  Negative for dizziness.   Hematological: Negative.    Psychiatric/Behavioral:  Positive for agitation, dysphoric mood, sleep disturbance and stress. Negative for behavioral problems, decreased concentration, hallucinations, self-injury, suicidal ideas, negative for hyperactivity and depressed mood. The patient is not nervous/anxious.          Physical Exam:   There were no vitals taken for this visit.  Video visit    Appearance: Male of stated age, NAD  Gait, Station, Strength: Video visit    Mental Status Exam:       Hygiene:   good  Cooperation:  Cooperative  Eye Contact:  Good  Psychomotor Behavior:  Appropriate  Affect:  Full range  Mood: depressed and irritable  Hopelessness: Optimistic  Speech:  Normal  Thought Process:  Goal directed and Linear  Thought Content:  Normal  Suicidal:  None  Homicidal:  None  Hallucinations:  None  Delusion:  None  Memory:  Intact  Orientation:  Person, Place, Time and Situation  Reliability:  good  Insight:  Fair  Judgement:  Fair  Impulse Control:  Fair  Physical/Medical Issues:  No        Lab Results:   No visits with results within 1 Month(s) from this visit.   Latest known visit with results is:   Admission on 12/07/2022, Discharged on 12/07/2022   Component Date Value Ref Range Status    COVID19 12/07/2022 Not Detected   Final    Influenza A Antigen ZION 12/07/2022 Not Detected   Final    Influenza B Antigen ZION 12/07/2022 Not Detected   Final    Internal Control 12/07/2022 Passed   Final    Lot Number 12/07/2022 [id number]   Final    Expiration Date 12/07/2022 3,[id number]   Final       Assessment & Plan   Diagnoses and all orders for this visit:    1. Chronic Post traumatic stress disorder (PTSD)  -     fluvoxaMINE (LUVOX) 50 MG  tablet; Take 1 tablet by mouth Every Night.  Dispense: 30 tablet; Refill: 1  -     QUEtiapine (SEROquel) 200 MG tablet; Take 1 tablet by mouth At Night As Needed (Insomnia).  Dispense: 30 tablet; Refill: 1    2. Bipolar disorder, current episode mixed, moderate  -     fluvoxaMINE (LUVOX) 50 MG tablet; Take 1 tablet by mouth Every Night.  Dispense: 30 tablet; Refill: 1  -     QUEtiapine (SEROquel) 200 MG tablet; Take 1 tablet by mouth At Night As Needed (Insomnia).  Dispense: 30 tablet; Refill: 1    3. Anxiety disorder, unspecified type  -     fluvoxaMINE (LUVOX) 50 MG tablet; Take 1 tablet by mouth Every Night.  Dispense: 30 tablet; Refill: 1  -     QUEtiapine (SEROquel) 200 MG tablet; Take 1 tablet by mouth At Night As Needed (Insomnia).  Dispense: 30 tablet; Refill: 1    4. Nightmares  -     QUEtiapine (SEROquel) 200 MG tablet; Take 1 tablet by mouth At Night As Needed (Insomnia).  Dispense: 30 tablet; Refill: 1    5. Insomnia due to mental condition  -     QUEtiapine (SEROquel) 200 MG tablet; Take 1 tablet by mouth At Night As Needed (Insomnia).  Dispense: 30 tablet; Refill: 1    6. Borderline personality disorder        -Patient reports Paxil is not helpful and he is having worsening depressed and irritability with sexual side effects  -Reviewed previous documentation  -Reviewed labs  -Continue Haldol 10 mg at night for mood stabilization  -Continue lithium 600 mg at night for mood stabilization  -Continue Seroquel 200 mg nightly as needed for insomnia and mood stabilization  -Increase Elavil 25 mg to 50 mg p.o. nightly for insomnia  -Discontinue Paxil  -Start Luvox 50 mg p.o. nightly for mood and anxiety  -Encourage cessation of nicotine and cannabis  -Encouraged therapy and anger management  -Approximate appointment time 8 AM to 8:15 AM via video visit    Visit Diagnoses:    ICD-10-CM ICD-9-CM   1. Chronic Post traumatic stress disorder (PTSD)  F43.10 309.81   2. Bipolar disorder, current episode mixed,  moderate  F31.62 296.62   3. Anxiety disorder, unspecified type  F41.9 300.00   4. Nightmares  F51.5 307.47   5. Insomnia due to mental condition  F51.05 300.9     327.02   6. Borderline personality disorder  F60.3 301.83           TREATMENT PLAN/GOALS: Continue supportive psychotherapy efforts and medications as indicated. Treatment and medication options discussed during today patient advised he may need a lithium level in 5 to 7 days visit. Patient ackowledged and verbally consented to continue with current treatment plan and was educated on the importance of compliance with treatment and follow-up appointments.    MEDICATION ISSUES:    Discussed medication options and treatment plan of prescribed medication as well as the risks, benefits, and side effects including potential falls, possible impaired driving and metabolic adversities among others. Patient is agreeable to call the office with any worsening of symptoms or onset of side effects. Patient is agreeable to call 911 or go to the nearest ER should he/she begin having SI/HI. No medication side effects or related complaints today.     MEDS ORDERED DURING VISIT:  New Medications Ordered This Visit   Medications    fluvoxaMINE (LUVOX) 50 MG tablet     Sig: Take 1 tablet by mouth Every Night.     Dispense:  30 tablet     Refill:  1    QUEtiapine (SEROquel) 200 MG tablet     Sig: Take 1 tablet by mouth At Night As Needed (Insomnia).     Dispense:  30 tablet     Refill:  1       Return in about 4 weeks (around 3/29/2024).             This document has been electronically signed by Stefan Marion MD  March 1, 2024

## 2024-05-08 DIAGNOSIS — F43.10 POST TRAUMATIC STRESS DISORDER (PTSD): Chronic | ICD-10-CM

## 2024-05-08 DIAGNOSIS — F41.9 ANXIETY DISORDER, UNSPECIFIED TYPE: ICD-10-CM

## 2024-05-08 DIAGNOSIS — F31.62 BIPOLAR DISORDER, CURRENT EPISODE MIXED, MODERATE: ICD-10-CM

## 2024-05-08 RX ORDER — PAROXETINE 10 MG/1
10 TABLET, FILM COATED ORAL DAILY
Qty: 30 TABLET | Refills: 2 | OUTPATIENT
Start: 2024-05-08

## 2024-05-08 RX ORDER — HALOPERIDOL 10 MG/1
10 TABLET ORAL
Qty: 30 TABLET | Refills: 2 | Status: SHIPPED | OUTPATIENT
Start: 2024-05-08

## 2024-05-13 DIAGNOSIS — F51.05 INSOMNIA DUE TO MENTAL CONDITION: ICD-10-CM

## 2024-05-13 DIAGNOSIS — F51.5 NIGHTMARES: ICD-10-CM

## 2024-05-13 DIAGNOSIS — F31.62 BIPOLAR DISORDER, CURRENT EPISODE MIXED, MODERATE: ICD-10-CM

## 2024-05-13 DIAGNOSIS — F43.10 POST TRAUMATIC STRESS DISORDER (PTSD): Chronic | ICD-10-CM

## 2024-05-13 DIAGNOSIS — F41.9 ANXIETY DISORDER, UNSPECIFIED TYPE: ICD-10-CM

## 2024-05-13 RX ORDER — QUETIAPINE FUMARATE 200 MG/1
200 TABLET, FILM COATED ORAL NIGHTLY PRN
Qty: 30 TABLET | Refills: 1 | Status: SHIPPED | OUTPATIENT
Start: 2024-05-13

## 2024-06-10 ENCOUNTER — OFFICE VISIT (OUTPATIENT)
Dept: INTERNAL MEDICINE | Facility: CLINIC | Age: 39
End: 2024-06-10
Payer: COMMERCIAL

## 2024-06-10 VITALS
TEMPERATURE: 98.6 F | HEIGHT: 76 IN | BODY MASS INDEX: 31.54 KG/M2 | OXYGEN SATURATION: 100 % | HEART RATE: 80 BPM | DIASTOLIC BLOOD PRESSURE: 74 MMHG | WEIGHT: 259 LBS | SYSTOLIC BLOOD PRESSURE: 140 MMHG

## 2024-06-10 DIAGNOSIS — Z13.220 SCREENING FOR LIPID DISORDERS: ICD-10-CM

## 2024-06-10 DIAGNOSIS — I10 ESSENTIAL HYPERTENSION: ICD-10-CM

## 2024-06-10 DIAGNOSIS — Z13.29 SCREENING FOR ENDOCRINE, METABOLIC AND IMMUNITY DISORDER: ICD-10-CM

## 2024-06-10 DIAGNOSIS — E66.09 CLASS 1 OBESITY DUE TO EXCESS CALORIES WITH SERIOUS COMORBIDITY AND BODY MASS INDEX (BMI) OF 30.0 TO 30.9 IN ADULT: ICD-10-CM

## 2024-06-10 DIAGNOSIS — R73.03 BORDERLINE DIABETES: ICD-10-CM

## 2024-06-10 DIAGNOSIS — Z13.228 SCREENING FOR ENDOCRINE, METABOLIC AND IMMUNITY DISORDER: ICD-10-CM

## 2024-06-10 DIAGNOSIS — G47.33 OSA ON CPAP: ICD-10-CM

## 2024-06-10 DIAGNOSIS — F31.62 BIPOLAR DISORDER, CURRENT EPISODE MIXED, MODERATE: Primary | ICD-10-CM

## 2024-06-10 DIAGNOSIS — K21.9 GASTROESOPHAGEAL REFLUX DISEASE, UNSPECIFIED WHETHER ESOPHAGITIS PRESENT: Chronic | ICD-10-CM

## 2024-06-10 DIAGNOSIS — Z13.0 SCREENING FOR DISORDER OF BLOOD AND BLOOD-FORMING ORGANS: ICD-10-CM

## 2024-06-10 DIAGNOSIS — F52.4 PREMATURE EJACULATION: ICD-10-CM

## 2024-06-10 DIAGNOSIS — F17.200 SMOKER: ICD-10-CM

## 2024-06-10 DIAGNOSIS — Z13.0 SCREENING FOR ENDOCRINE, METABOLIC AND IMMUNITY DISORDER: ICD-10-CM

## 2024-06-10 PROCEDURE — 1125F AMNT PAIN NOTED PAIN PRSNT: CPT | Performed by: NURSE PRACTITIONER

## 2024-06-10 PROCEDURE — 99214 OFFICE O/P EST MOD 30 MIN: CPT | Performed by: NURSE PRACTITIONER

## 2024-06-10 PROCEDURE — 1160F RVW MEDS BY RX/DR IN RCRD: CPT | Performed by: NURSE PRACTITIONER

## 2024-06-10 PROCEDURE — 1159F MED LIST DOCD IN RCRD: CPT | Performed by: NURSE PRACTITIONER

## 2024-06-10 PROCEDURE — 3078F DIAST BP <80 MM HG: CPT | Performed by: NURSE PRACTITIONER

## 2024-06-10 PROCEDURE — 3077F SYST BP >= 140 MM HG: CPT | Performed by: NURSE PRACTITIONER

## 2024-06-10 RX ORDER — OMEPRAZOLE 40 MG/1
CAPSULE, DELAYED RELEASE ORAL
Qty: 90 CAPSULE | Refills: 1 | Status: SHIPPED | OUTPATIENT
Start: 2024-06-10

## 2024-06-10 RX ORDER — CARVEDILOL 6.25 MG/1
6.25 TABLET ORAL 2 TIMES DAILY WITH MEALS
Qty: 90 TABLET | Refills: 1 | Status: SHIPPED | OUTPATIENT
Start: 2024-06-10

## 2024-06-10 RX ORDER — VARENICLINE TARTRATE 0.5 (11)-1
KIT ORAL
Qty: 1 EACH | Refills: 0 | Status: SHIPPED | OUTPATIENT
Start: 2024-06-10 | End: 2024-07-08

## 2024-06-10 RX ORDER — VALSARTAN 160 MG/1
160 TABLET ORAL DAILY
Qty: 90 TABLET | Refills: 1 | Status: SHIPPED | OUTPATIENT
Start: 2024-06-10

## 2024-06-10 RX ORDER — VARENICLINE TARTRATE 1 MG/1
1 TABLET, FILM COATED ORAL 2 TIMES DAILY
Qty: 56 TABLET | Refills: 1 | Status: SHIPPED | OUTPATIENT
Start: 2024-07-08 | End: 2024-09-02

## 2024-06-10 NOTE — PROGRESS NOTES
Date: 06/10/2024    Name: Alex Torres  : 1985    Chief Complaint:   Chief Complaint   Patient presents with    Establish Care     History of Present Illness  Alex Torres is a 39-year-old male who presents to the office to establish care with me.      The patient, who was previously under the care of Dr. Rich approximately 2 years ago, expresses dissatisfaction with his previous healthcare provider. He is overdue for his annual physical examination and expresses a desire to discuss his health concerns and determine the next steps. His diet is predominantly carbohydrates-rich, with a high intake of protein, vegetables, fruits, and dairy products, and he consumes a significant amount of protein. His dairy intake is limited. He maintains an active lifestyle, working as a [employer], caring for his two daughters, and playing with them. He is currently in a relationship with his wife, having undergone tubal ligation. He reports occasional nocturnal urination and frequent urination throughout the day, but denies any hematuria. He is not currently taking any supplements, multivitamins, or herbs. His last dental and vision exams were approximately 6 months ago, and he does not use contacts or glasses. He denies any changes in vision and does not engage in regular exercise outside of work. He denies any ear-related issues, changes in voice, or hair loss. He reports feeling slightly more fatigued than usual.    The patient is attempting to quit smoking, but struggles with successful cessation. Previous attempts to quit using patches and gum have been unsuccessful. He has previously tried Chantix, which successfully reduced his smoking significantly, but he was unable to reach the quitting point. Currently, he smokes slightly under a pack of cigarettes per day, down from 1.5 packs per day. He continues to take the same medications prescribed by Dr. Marion.    The patient requires a refill of his carvedilol  and valsartan prescriptions for blood pressure management. He was previously prescribed a halved dose of carvedilol by Dr. Marion to balance his blood pressure, but has exhausted the supply. He experiences mild headaches, shakiness, and facial redness when his blood pressure is elevated, but denies any visual disturbances. He does not regularly monitor his blood pressure at home, but monitors it based on his feelings.    The patient's hemoglobin A1c was elevated in 2019, prompting Dr. Rich to initiate prediabetes treatment. He was on metformin, which he tolerated well, and lost weight from 295 pounds to 245 pounds. However, he has since regained some weight. His current weight is 259 pounds. Not currently taking metformin.     The patient underwent a cholecystectomy in 2002, with subsequent frequent diarrhea; more than 5 episodes per day. He denies any recent changes in bowel habits, mucus in his stools, or history of autoimmune disorders. He has not consulted with a gastroenterologist.  No family history of celiac disease, IBS, ulcerative colitis.    The patient experiences erectile dysfunction, characterized by premature ejaculation. He is uncertain if this is related to his low testosterone levels, his age, or medications. He was previously prescribed fluvoxamine by Dr. Marion, but discontinued it due to a different form of erectile dysfunction. He denies any other changes. The patient is sexually active approximately once a week.            History:    Past Medical History:   Diagnosis Date    Anxiety     Asthma     Bipolar 1 disorder     Depression     DM (diabetes mellitus)     Hypertension     Kidney stone     Polyneuropathy     Positive TB test     treated w/ meds x 6 months    PTSD (post-traumatic stress disorder)     Sleep apnea     Suicide attempt     Tattoos        Past Surgical History:   Procedure Laterality Date    CHOLECYSTECTOMY  2002    INGUINAL HERNIA REPAIR Right 1995    ORIF METACARPAL  FRACTURE Right 2000    TIBIA FRACTURE SURGERY Left 1997    ORIF       Family History   Problem Relation Age of Onset    Arthritis Father     Hypertension Father     Alcohol abuse Father     Drug abuse Father     Diabetes Maternal Aunt     Diabetes Maternal Uncle     Diabetes Maternal Grandmother     Alzheimer's disease Maternal Grandmother     Dementia Maternal Grandmother     Diabetes Other     Hypertension Mother     Depression Mother     ADD / ADHD Brother     Early death Maternal Grandfather     Liver disease Maternal Grandfather     Alcohol abuse Maternal Grandfather     Cirrhosis Maternal Grandfather     No Known Problems Paternal Grandmother     Liver disease Paternal Grandfather     Alcohol abuse Paternal Grandfather     Early death Paternal Grandfather     Cirrhosis Paternal Grandfather     No Known Problems Brother     Anxiety disorder Neg Hx     Bipolar disorder Neg Hx     OCD Neg Hx     Paranoid behavior Neg Hx     Schizophrenia Neg Hx     Seizures Neg Hx     Self-Injurious Behavior  Neg Hx     Suicide Attempts Neg Hx     Colon cancer Neg Hx        Social History     Socioeconomic History    Marital status:    Tobacco Use    Smoking status: Every Day     Current packs/day: 1.00     Average packs/day: 1 pack/day for 25.5 years (25.5 ttl pk-yrs)     Types: Cigarettes     Start date: 1999    Smokeless tobacco: Never   Vaping Use    Vaping status: Never Used   Substance and Sexual Activity    Alcohol use: No    Drug use: Yes     Types: Marijuana     Comment: rarely    Sexual activity: Yes     Partners: Female       Allergies   Allergen Reactions    Gabapentin Swelling     + swelling    Latex Rash         Current Outpatient Medications:     amitriptyline (ELAVIL) 25 MG tablet, TAKE 1 TABLET BY MOUTH EVERYDAY AT BEDTIME, Disp: 90 tablet, Rfl: 0    carvedilol (COREG) 6.25 MG tablet, Take 1 tablet by mouth 2 (Two) Times a Day With Meals. ANNUAL DUE for refills, Disp: 90 tablet, Rfl: 1    haloperidol  "(HALDOL) 10 MG tablet, TAKE 1 TABLET BY MOUTH EVERYDAY AT BEDTIME, Disp: 30 tablet, Rfl: 2    lithium 300 MG tablet, TAKE 1 TABLET BY MOUTH EVERY MORNING AND 2 TABLETS EVERY NIGHT, Disp: 90 tablet, Rfl: 1    omeprazole (priLOSEC) 40 MG capsule, TAKE 1 CAPSULE BY MOUTH EVERY MORNING 30 MINS BEFORE BREAKFAST, Disp: 90 capsule, Rfl: 1    QUEtiapine (SEROquel) 200 MG tablet, TAKE 1 TABLET BY MOUTH AT NIGHT AS NEEDED (INSOMNIA)., Disp: 30 tablet, Rfl: 1    valsartan (DIOVAN) 160 MG tablet, Take 1 tablet by mouth Daily., Disp: 90 tablet, Rfl: 1    [START ON 7/8/2024] varenicline (Chantix Continuing Month William) 1 MG tablet, Take 1 tablet by mouth 2 (Two) Times a Day for 56 days., Disp: 56 tablet, Rfl: 1    Varenicline Tartrate, Starter, 0.5 MG X 11 & 1 MG X 42 tablet therapy pack, Take 0.5 mg by mouth Daily for 3 days, THEN 0.5 mg 2 (Two) Times a Day for 4 days, THEN 1 mg 2 (Two) Times a Day for 21 days. Take 0.5 mg po daily x 3 days, then 0.5 mg po bid x 4 days, then 1 mg po bid, Disp: 1 each, Rfl: 0    ROS:  Review of Systems    VS:  Vitals:    06/10/24 0840   BP: 140/74   Pulse: 80   Temp: 98.6 °F (37 °C)   SpO2: 100%   Weight: 117 kg (259 lb)   Height: 193 cm (75.98\")     Body mass index is 31.54 kg/m².         PE:  Physical Exam  Constitutional:       Appearance: He is obese. He is not ill-appearing.   HENT:      Head: Normocephalic.      Right Ear: External ear normal.      Left Ear: External ear normal.   Eyes:      Conjunctiva/sclera: Conjunctivae normal.      Pupils: Pupils are equal, round, and reactive to light.   Cardiovascular:      Rate and Rhythm: Normal rate and regular rhythm.      Pulses:           Radial pulses are 2+ on the right side and 2+ on the left side.        Dorsalis pedis pulses are 2+ on the right side and 2+ on the left side.      Heart sounds: Normal heart sounds.   Pulmonary:      Effort: Pulmonary effort is normal.      Breath sounds: Normal breath sounds.   Musculoskeletal:      Cervical " back: Normal range of motion and neck supple.   Skin:     General: Skin is warm.      Capillary Refill: Capillary refill takes less than 2 seconds.   Neurological:      Mental Status: He is alert and oriented to person, place, and time.      Coordination: Coordination normal.      Gait: Gait normal.   Psychiatric:         Attention and Perception: Attention normal.         Mood and Affect: Mood and affect normal.         Speech: Speech normal.         Behavior: Behavior normal.         Assessment/Plan:       Diagnoses and all orders for this visit:    1. Bipolar disorder, current episode mixed, moderate (Primary)        - Encouraged to take part in daily physical exercise.          - Eat healthy, well balanced diet; avoid sugary foods or beverages        - Continue to abstain from alcohol        - Ensure good night's sleep by creating calm space in bedroom, avoiding screen time 1-2 hours before bed, no caffeine after 5 pm        - Continue taking medications as prescribed by psychiatrist    2. Gastroesophageal reflux disease, unspecified whether esophagitis present  -     omeprazole (priLOSEC) 40 MG capsule; TAKE 1 CAPSULE BY MOUTH EVERY MORNING 30 MINS BEFORE BREAKFAST  Dispense: 90 capsule; Refill: 1        - Avoid triggers such as spicy or greasy food, alcohol, chocolate, caffeine, carbonated beverages, tomatoes and tomato sauces, onions, smoking        - May raise HOB 30 degrees with risers or blocks, if desired        - Do not eat within 2-3 hours of lying down        - Avoid clothing, belts that constrict midsection        - Losing weight may reduce symptoms    3. Essential hypertension  -     carvedilol (COREG) 6.25 MG tablet; Take 1 tablet by mouth 2 (Two) Times a Day With Meals. ANNUAL DUE for refills  Dispense: 90 tablet; Refill: 1  -     valsartan (DIOVAN) 160 MG tablet; Take 1 tablet by mouth Daily.  Dispense: 90 tablet; Refill: 1        - Follow heart healthy diet.  Keep sodium intake < 1500 mg per day.   Avoid processed & fast foods.          - Exercise as tolerated, with a goal of 30 minutes of moderate exercise most days.         - Take medications as prescribed.    4. Borderline diabetes  -     Hemoglobin A1c  - Avoid sweets, soda, sweet tea, high fructose corn syrup, refined/processed foods.  Be physically active most days of the week.  - BMI is >= 30 and <35. (Class 1 Obesity). The following options were offered after discussion;: exercise counseling/recommendations, nutrition counseling/recommendations, and referral to a nutritionist     5. Class 1 obesity due to excess calories with serious comorbidity and body mass index (BMI) of 30.0 to 30.9 in adult  -     Vitamin D,25-Hydroxy    6. Premature ejaculation  -     Testosterone (Free & Total), LC / MS    7. Smoker  -     Varenicline Tartrate, Starter, 0.5 MG X 11 & 1 MG X 42 tablet therapy pack; Take 0.5 mg by mouth Daily for 3 days, THEN 0.5 mg 2 (Two) Times a Day for 4 days, THEN 1 mg 2 (Two) Times a Day for 21 days. Take 0.5 mg po daily x 3 days, then 0.5 mg po bid x 4 days, then 1 mg po bid  Dispense: 1 each; Refill: 0  -     varenicline (Chantix Continuing Month William) 1 MG tablet; Take 1 tablet by mouth 2 (Two) Times a Day for 56 days.  Dispense: 56 tablet; Refill: 1    8. MOLLY on CPAP  -     Ambulatory Referral to Sleep Medicine    9. Screening for endocrine, metabolic and immunity disorder  -     Comprehensive Metabolic Panel  -     T4, Free  -     TSH    10. Screening for lipid disorders  -     Lipid Panel    11. Screening for disorder of blood and blood-forming organs  -     CBC & Differential               Return in about 3 months (around 9/10/2024) for Annual.

## 2024-06-28 ENCOUNTER — TELEMEDICINE (OUTPATIENT)
Dept: PSYCHIATRY | Facility: CLINIC | Age: 39
End: 2024-06-28
Payer: COMMERCIAL

## 2024-06-28 DIAGNOSIS — F51.05 INSOMNIA DUE TO MENTAL CONDITION: ICD-10-CM

## 2024-06-28 DIAGNOSIS — F41.9 ANXIETY DISORDER, UNSPECIFIED TYPE: ICD-10-CM

## 2024-06-28 DIAGNOSIS — F51.5 NIGHTMARES: ICD-10-CM

## 2024-06-28 DIAGNOSIS — F31.62 BIPOLAR DISORDER, CURRENT EPISODE MIXED, MODERATE: Primary | ICD-10-CM

## 2024-06-28 DIAGNOSIS — F43.10 POST TRAUMATIC STRESS DISORDER (PTSD): Chronic | ICD-10-CM

## 2024-06-28 RX ORDER — LITHIUM CARBONATE 300 MG
TABLET ORAL
Qty: 90 TABLET | Refills: 1 | Status: SHIPPED | OUTPATIENT
Start: 2024-06-28

## 2024-06-28 RX ORDER — QUETIAPINE FUMARATE 200 MG/1
200 TABLET, FILM COATED ORAL NIGHTLY PRN
Qty: 30 TABLET | Refills: 1 | Status: SHIPPED | OUTPATIENT
Start: 2024-06-28

## 2024-06-28 RX ORDER — AMITRIPTYLINE HYDROCHLORIDE 50 MG/1
50 TABLET, FILM COATED ORAL NIGHTLY
Qty: 30 TABLET | Refills: 1 | Status: SHIPPED | OUTPATIENT
Start: 2024-06-28

## 2024-06-28 NOTE — PROGRESS NOTES
"Subjective   Alex Torres is a 39 y.o. male who presents today for follow up     This provider is located at [facility], [address] [place] [address]. The Patient is seen remotely at home, using Epic Mychart. Patient is being seen via telehealth and stated they are in a secure environment for this session. The patient’s condition being diagnosed/treated is appropriate for telemedicine. The provider identified himself as well as his credentials.   The patient gave consent to be seen remotely, and when consent is given they understand that the consent allows for patient identifiable information to be sent to a third party as needed.   They may refuse to be seen remotely at any time. The electronic data is encrypted and password protected, and the patient has been advised of the potential risks to privacy not withstanding such measures      Chief Complaint: Bipolar disorder/anxiety    History of Present Illness: Patient presented today for follow-up.  Since last visit, patient reports that he has had some worsening symptom burden.  He states that for a few months he has been much more irritable and aggravated and feels, \"out of control.\"  He states that he cannot control his emotions and feels irate most of the time.  He is not sleeping as well either.  He gets to sleep okay but reports that he is waking up earlier than normal which may be partially due to restless legs and pain.  He does state that Elavil did not seem to help with his restlessness at 50 mg at night but he has not had the full dose recently.  He does feel depressed due to being emotionally labile.  He is not having any medication side effects.  He denies SI/HI/AVH.    The following portions of the patient's history were reviewed and updated as appropriate: allergies, current medications, past family history, past medical history, past social history, past surgical history and problem list.      Past Medical History:  Past Medical " History:   Diagnosis Date    Anxiety     Asthma     Bipolar 1 disorder     Depression     DM (diabetes mellitus)     Hypertension     Kidney stone     Polyneuropathy     Positive TB test     treated w/ meds x 6 months    PTSD (post-traumatic stress disorder)     Sleep apnea     Suicide attempt     Tattoos        Social History:  Social History     Socioeconomic History    Marital status:    Tobacco Use    Smoking status: Every Day     Current packs/day: 1.00     Average packs/day: 1 pack/day for 25.5 years (25.5 ttl pk-yrs)     Types: Cigarettes     Start date: 1999    Smokeless tobacco: Never   Vaping Use    Vaping status: Never Used   Substance and Sexual Activity    Alcohol use: No    Drug use: Yes     Types: Marijuana     Comment: rarely    Sexual activity: Yes     Partners: Female       Family History:  Family History   Problem Relation Age of Onset    Arthritis Father     Hypertension Father     Alcohol abuse Father     Drug abuse Father     Diabetes Maternal Aunt     Diabetes Maternal Uncle     Diabetes Maternal Grandmother     Alzheimer's disease Maternal Grandmother     Dementia Maternal Grandmother     Diabetes Other     Hypertension Mother     Depression Mother     ADD / ADHD Brother     Early death Maternal Grandfather     Liver disease Maternal Grandfather     Alcohol abuse Maternal Grandfather     Cirrhosis Maternal Grandfather     No Known Problems Paternal Grandmother     Liver disease Paternal Grandfather     Alcohol abuse Paternal Grandfather     Early death Paternal Grandfather     Cirrhosis Paternal Grandfather     No Known Problems Brother     Anxiety disorder Neg Hx     Bipolar disorder Neg Hx     OCD Neg Hx     Paranoid behavior Neg Hx     Schizophrenia Neg Hx     Seizures Neg Hx     Self-Injurious Behavior  Neg Hx     Suicide Attempts Neg Hx     Colon cancer Neg Hx        Past Surgical History:  Past Surgical History:   Procedure Laterality Date    CHOLECYSTECTOMY  2002    INGUINAL  HERNIA REPAIR Right 1995    ORIF METACARPAL FRACTURE Right 2000    TIBIA FRACTURE SURGERY Left 1997    ORIF       Problem List:  Patient Active Problem List   Diagnosis    Essential hypertension    GERD (gastroesophageal reflux disease)    Bipolar disorder, current episode mixed, moderate    MOLLY on CPAP    Arthritis    Complex regional pain syndrome type 2 of left lower extremity    Snoring    Excessive daytime sleepiness    Peripheral polyneuropathy    Pain in both lower extremities    Varicose veins of both lower extremities with pain    MOLLY (obstructive sleep apnea)    Class 1 obesity due to excess calories without serious comorbidity with body mass index (BMI) of 32.0 to 32.9 in adult    Neuromuscular respiratory weakness    Borderline diabetes    Diarrhea    Nausea and vomiting       Allergy:   Allergies   Allergen Reactions    Gabapentin Swelling     + swelling    Latex Rash        Current Medications:   Current Outpatient Medications   Medication Sig Dispense Refill    amitriptyline (ELAVIL) 25 MG tablet TAKE 1 TABLET BY MOUTH EVERYDAY AT BEDTIME 90 tablet 0    carvedilol (COREG) 6.25 MG tablet Take 1 tablet by mouth 2 (Two) Times a Day With Meals. ANNUAL DUE for refills 90 tablet 1    haloperidol (HALDOL) 10 MG tablet TAKE 1 TABLET BY MOUTH EVERYDAY AT BEDTIME 30 tablet 2    lithium 300 MG tablet TAKE 1 TABLET BY MOUTH EVERY MORNING AND 2 TABLETS EVERY NIGHT 90 tablet 1    omeprazole (priLOSEC) 40 MG capsule TAKE 1 CAPSULE BY MOUTH EVERY MORNING 30 MINS BEFORE BREAKFAST 90 capsule 1    QUEtiapine (SEROquel) 200 MG tablet TAKE 1 TABLET BY MOUTH AT NIGHT AS NEEDED (INSOMNIA). 30 tablet 1    valsartan (DIOVAN) 160 MG tablet Take 1 tablet by mouth Daily. 90 tablet 1    [START ON 7/8/2024] varenicline (Chantix Continuing Month Pak) 1 MG tablet Take 1 tablet by mouth 2 (Two) Times a Day for 56 days. 56 tablet 1    Varenicline Tartrate, Starter, 0.5 MG X 11 & 1 MG X 42 tablet therapy pack Take 0.5 mg by mouth Daily  for 3 days, THEN 0.5 mg 2 (Two) Times a Day for 4 days, THEN 1 mg 2 (Two) Times a Day for 21 days. Take 0.5 mg po daily x 3 days, then 0.5 mg po bid x 4 days, then 1 mg po bid 1 each 0     No current facility-administered medications for this visit.       Review of Symptoms:    Review of Systems   Constitutional:  Positive for fatigue. Negative for activity change (improved), appetite change, chills, diaphoresis and fever.   HENT: Negative.     Eyes: Negative.    Respiratory: Negative.     Cardiovascular: Negative.    Gastrointestinal: Negative.    Endocrine: Negative.    Genitourinary:  Positive for decreased libido.   Musculoskeletal: Negative.    Skin: Negative.    Allergic/Immunologic: Negative.    Neurological:  Negative for dizziness.   Hematological: Negative.    Psychiatric/Behavioral:  Positive for agitation, dysphoric mood, sleep disturbance and stress. Negative for behavioral problems, decreased concentration, hallucinations, self-injury, suicidal ideas, negative for hyperactivity and depressed mood. The patient is not nervous/anxious.          Physical Exam:   There were no vitals taken for this visit.  Video visit    Appearance: Male of stated age, NAD  Gait, Station, Strength: Video visit    Mental Status Exam:       Hygiene:   good  Cooperation:  Cooperative  Eye Contact:  Good  Psychomotor Behavior:  Appropriate  Affect:  Full range  Mood: depressed and irritable mildly worse   Hopelessness: Optimistic  Speech:  Normal  Thought Process:  Goal directed and Linear  Thought Content:  Normal  Suicidal:  None  Homicidal:  None  Hallucinations:  None  Delusion:  None  Memory:  Intact  Orientation:  Person, Place, Time and Situation  Reliability:  good  Insight:  Fair  Judgement:  Fair  Impulse Control:  Fair  Physical/Medical Issues:  No        Lab Results:   No visits with results within 1 Month(s) from this visit.   Latest known visit with results is:   Admission on 12/07/2022, Discharged on 12/07/2022    Component Date Value Ref Range Status    COVID19 12/07/2022 Not Detected   Final    Influenza A Antigen ZION 12/07/2022 Not Detected   Final    Influenza B Antigen ZION 12/07/2022 Not Detected   Final    Internal Control 12/07/2022 Passed   Final    Lot Number 12/07/2022 [id number]   Final    Expiration Date 12/07/2022 [id number]   Final       Assessment & Plan   Diagnoses and all orders for this visit:    1. Bipolar disorder, current episode mixed, moderate (Primary)  -     Cariprazine HCl (Vraylar) 3 MG capsule capsule; Take 1 capsule by mouth Daily.  Dispense: 30 capsule; Refill: 0  -     lithium 300 MG tablet; TAKE 1 TABLET BY MOUTH EVERY MORNING AND 2 TABLETS EVERY NIGHT  Dispense: 90 tablet; Refill: 1  -     QUEtiapine (SEROquel) 200 MG tablet; Take 1 tablet by mouth At Night As Needed (Insomnia).  Dispense: 30 tablet; Refill: 1    2. Chronic Post traumatic stress disorder (PTSD)  -     Cariprazine HCl (Vraylar) 3 MG capsule capsule; Take 1 capsule by mouth Daily.  Dispense: 30 capsule; Refill: 0  -     lithium 300 MG tablet; TAKE 1 TABLET BY MOUTH EVERY MORNING AND 2 TABLETS EVERY NIGHT  Dispense: 90 tablet; Refill: 1  -     QUEtiapine (SEROquel) 200 MG tablet; Take 1 tablet by mouth At Night As Needed (Insomnia).  Dispense: 30 tablet; Refill: 1    3. Anxiety disorder, unspecified type  -     Cariprazine HCl (Vraylar) 3 MG capsule capsule; Take 1 capsule by mouth Daily.  Dispense: 30 capsule; Refill: 0  -     lithium 300 MG tablet; TAKE 1 TABLET BY MOUTH EVERY MORNING AND 2 TABLETS EVERY NIGHT  Dispense: 90 tablet; Refill: 1  -     QUEtiapine (SEROquel) 200 MG tablet; Take 1 tablet by mouth At Night As Needed (Insomnia).  Dispense: 30 tablet; Refill: 1    4. Nightmares  -     amitriptyline (ELAVIL) 50 MG tablet; Take 1 tablet by mouth Every Night.  Dispense: 30 tablet; Refill: 1  -     QUEtiapine (SEROquel) 200 MG tablet; Take 1 tablet by mouth At Night As Needed (Insomnia).  Dispense: 30 tablet; Refill:  1    5. Insomnia due to mental condition  -     amitriptyline (ELAVIL) 50 MG tablet; Take 1 tablet by mouth Every Night.  Dispense: 30 tablet; Refill: 1  -     QUEtiapine (SEROquel) 200 MG tablet; Take 1 tablet by mouth At Night As Needed (Insomnia).  Dispense: 30 tablet; Refill: 1          -Patient still having irritability and dysphoria with worsening irritation and difficulty with this particular emotion.  He also has some difficulty with sleep  -Reviewed previous documentation  -Reviewed labs  -Discontinue Haldol 10 mg at night for mood stabilization  -Continue lithium 600 mg at night for mood stabilization  -Continue Seroquel 200 mg nightly as needed for insomnia and mood stabilization  -Start Vraylar 3 mg p.o. daily for PTSD and bipolar disorder  -Continue Elavil 50 mg p.o. nightly for insomnia  -Luvox has been discontinued  -Encourage cessation of nicotine and cannabis  -Encouraged therapy and anger management  -Approximate appointment time 8:40 AM to 9 AM via video visit    Visit Diagnoses:    ICD-10-CM ICD-9-CM   1. Bipolar disorder, current episode mixed, moderate  F31.62 296.62   2. Chronic Post traumatic stress disorder (PTSD)  F43.10 309.81   3. Anxiety disorder, unspecified type  F41.9 300.00   4. Nightmares  F51.5 307.47   5. Insomnia due to mental condition  F51.05 300.9     327.02             TREATMENT PLAN/GOALS: Continue supportive psychotherapy efforts and medications as indicated. Treatment and medication options discussed during today patient advised he may need a lithium level in 5 to 7 days visit. Patient ackowledged and verbally consented to continue with current treatment plan and was educated on the importance of compliance with treatment and follow-up appointments.    MEDICATION ISSUES:    Discussed medication options and treatment plan of prescribed medication as well as the risks, benefits, and side effects including potential falls, possible impaired driving and metabolic adversities  among others. Patient is agreeable to call the office with any worsening of symptoms or onset of side effects. Patient is agreeable to call 911 or go to the nearest ER should he/she begin having SI/HI. No medication side effects or related complaints today.     MEDS ORDERED DURING VISIT:  New Medications Ordered This Visit   Medications    Cariprazine HCl (Vraylar) 3 MG capsule capsule     Sig: Take 1 capsule by mouth Daily.     Dispense:  30 capsule     Refill:  0    lithium 300 MG tablet     Sig: TAKE 1 TABLET BY MOUTH EVERY MORNING AND 2 TABLETS EVERY NIGHT     Dispense:  90 tablet     Refill:  1    amitriptyline (ELAVIL) 50 MG tablet     Sig: Take 1 tablet by mouth Every Night.     Dispense:  30 tablet     Refill:  1    QUEtiapine (SEROquel) 200 MG tablet     Sig: Take 1 tablet by mouth At Night As Needed (Insomnia).     Dispense:  30 tablet     Refill:  1       Return in about 4 weeks (around 7/26/2024).             This document has been electronically signed by Stefan Marion MD  June 28, 2024

## 2024-06-29 ENCOUNTER — LAB (OUTPATIENT)
Dept: LAB | Facility: HOSPITAL | Age: 39
End: 2024-06-29
Payer: COMMERCIAL

## 2024-06-29 LAB
25(OH)D3 SERPL-MCNC: 33.8 NG/ML (ref 30–100)
ALBUMIN SERPL-MCNC: 4.7 G/DL (ref 3.5–5.2)
ALBUMIN/GLOB SERPL: 1.7 G/DL
ALP SERPL-CCNC: 100 U/L (ref 39–117)
ALT SERPL W P-5'-P-CCNC: 23 U/L (ref 1–41)
ANION GAP SERPL CALCULATED.3IONS-SCNC: 12.1 MMOL/L (ref 5–15)
AST SERPL-CCNC: 16 U/L (ref 1–40)
BASOPHILS # BLD AUTO: 0.07 10*3/MM3 (ref 0–0.2)
BASOPHILS NFR BLD AUTO: 0.8 % (ref 0–1.5)
BILIRUB SERPL-MCNC: 0.4 MG/DL (ref 0–1.2)
BUN SERPL-MCNC: 11 MG/DL (ref 6–20)
BUN/CREAT SERPL: 8.3 (ref 7–25)
CALCIUM SPEC-SCNC: 9.9 MG/DL (ref 8.6–10.5)
CHLORIDE SERPL-SCNC: 104 MMOL/L (ref 98–107)
CHOLEST SERPL-MCNC: 183 MG/DL (ref 0–200)
CO2 SERPL-SCNC: 21.9 MMOL/L (ref 22–29)
CREAT SERPL-MCNC: 1.33 MG/DL (ref 0.76–1.27)
DEPRECATED RDW RBC AUTO: 45.1 FL (ref 37–54)
EGFRCR SERPLBLD CKD-EPI 2021: 68.9 ML/MIN/1.73
EOSINOPHIL # BLD AUTO: 0.18 10*3/MM3 (ref 0–0.4)
EOSINOPHIL NFR BLD AUTO: 2 % (ref 0.3–6.2)
ERYTHROCYTE [DISTWIDTH] IN BLOOD BY AUTOMATED COUNT: 13.1 % (ref 12.3–15.4)
GLOBULIN UR ELPH-MCNC: 2.7 GM/DL
GLUCOSE SERPL-MCNC: 96 MG/DL (ref 65–99)
HBA1C MFR BLD: 5.3 % (ref 4.8–5.6)
HCT VFR BLD AUTO: 50 % (ref 37.5–51)
HDLC SERPL-MCNC: 27 MG/DL (ref 40–60)
HGB BLD-MCNC: 16 G/DL (ref 13–17.7)
IMM GRANULOCYTES # BLD AUTO: 0.17 10*3/MM3 (ref 0–0.05)
IMM GRANULOCYTES NFR BLD AUTO: 1.9 % (ref 0–0.5)
LDLC SERPL CALC-MCNC: 101 MG/DL (ref 0–100)
LDLC/HDLC SERPL: 3.39 {RATIO}
LYMPHOCYTES # BLD AUTO: 2.12 10*3/MM3 (ref 0.7–3.1)
LYMPHOCYTES NFR BLD AUTO: 23.1 % (ref 19.6–45.3)
MCH RBC QN AUTO: 30.2 PG (ref 26.6–33)
MCHC RBC AUTO-ENTMCNC: 32 G/DL (ref 31.5–35.7)
MCV RBC AUTO: 94.3 FL (ref 79–97)
MONOCYTES # BLD AUTO: 0.63 10*3/MM3 (ref 0.1–0.9)
MONOCYTES NFR BLD AUTO: 6.9 % (ref 5–12)
NEUTROPHILS NFR BLD AUTO: 5.99 10*3/MM3 (ref 1.7–7)
NEUTROPHILS NFR BLD AUTO: 65.3 % (ref 42.7–76)
NRBC BLD AUTO-RTO: 0 /100 WBC (ref 0–0.2)
PLATELET # BLD AUTO: 224 10*3/MM3 (ref 140–450)
PMV BLD AUTO: 8.9 FL (ref 6–12)
POTASSIUM SERPL-SCNC: 4.9 MMOL/L (ref 3.5–5.2)
PROT SERPL-MCNC: 7.4 G/DL (ref 6–8.5)
RBC # BLD AUTO: 5.3 10*6/MM3 (ref 4.14–5.8)
SODIUM SERPL-SCNC: 138 MMOL/L (ref 136–145)
T4 FREE SERPL-MCNC: 1 NG/DL (ref 0.92–1.68)
TRIGL SERPL-MCNC: 322 MG/DL (ref 0–150)
TSH SERPL DL<=0.05 MIU/L-ACNC: 3.32 UIU/ML (ref 0.27–4.2)
VLDLC SERPL-MCNC: 55 MG/DL (ref 5–40)
WBC NRBC COR # BLD AUTO: 9.16 10*3/MM3 (ref 3.4–10.8)

## 2024-06-29 PROCEDURE — 80050 GENERAL HEALTH PANEL: CPT | Performed by: NURSE PRACTITIONER

## 2024-06-29 PROCEDURE — 84402 ASSAY OF FREE TESTOSTERONE: CPT | Performed by: NURSE PRACTITIONER

## 2024-06-29 PROCEDURE — 83036 HEMOGLOBIN GLYCOSYLATED A1C: CPT | Performed by: NURSE PRACTITIONER

## 2024-06-29 PROCEDURE — 82306 VITAMIN D 25 HYDROXY: CPT | Performed by: NURSE PRACTITIONER

## 2024-06-29 PROCEDURE — 36415 COLL VENOUS BLD VENIPUNCTURE: CPT | Performed by: NURSE PRACTITIONER

## 2024-06-29 PROCEDURE — 84403 ASSAY OF TOTAL TESTOSTERONE: CPT | Performed by: NURSE PRACTITIONER

## 2024-06-29 PROCEDURE — 80061 LIPID PANEL: CPT | Performed by: NURSE PRACTITIONER

## 2024-06-29 PROCEDURE — 84439 ASSAY OF FREE THYROXINE: CPT | Performed by: NURSE PRACTITIONER

## 2024-07-01 NOTE — PROGRESS NOTES
Triglycerides, LDL, VLDL cholesterol is elevated.  HDL (good) cholesterol is very low.  Calculated risk of having heart attack or stroke in the next 10 years is 10.8%.  When this risk is over 7.5% evidence-based practice guidelines indicate.  If he agrees we will send Crestor 10 mg to be taken at bedtime.  Medication is associated with fewer side effects than other statins.  Recommend low carb heart healthy diet such as the Mediterranean diet and daily physical activity to reduce the risk of heart disease.  High triglycerides are associated with pancreatitis.  Consuming a low-carb, heart healthy diet and losing weight will reduce that risk.  Avoid alcohol, high-fat foods.  Creatinine slightly elevated.  May have been dehydrated when labs were drawn.  Remainder of liver and kidney tests are normal.  Immature granulocytes elevated,  typically associated with inflammation.  This has been elevated for over 5 years.  Will order a peripheral smear so white blood cells can be seen through microscope if he agrees.    Vitamin D on the low side of normal.  Recommend taking an OTC vitamin D3 supplement, 2000 IU daily.  Take with calcium to enhance absorption.  Sit with face and forearms exposed to the sun to enhance absorption of vitamin D through the skin.  Hemoglobin A1c, thyroid tests, remainder of labs are normal.

## 2024-07-04 LAB
TESTOST FREE SERPL-MCNC: 8.4 PG/ML (ref 6.8–21.5)
TESTOST SERPL-MCNC: 484 NG/DL (ref 264–916)

## 2024-08-08 DIAGNOSIS — F17.200 SMOKER: ICD-10-CM

## 2024-08-08 RX ORDER — VARENICLINE TARTRATE 0.5 (11)-1
KIT ORAL
Qty: 53 EACH | Refills: 0 | Status: SHIPPED | OUTPATIENT
Start: 2024-08-08

## 2024-08-12 ENCOUNTER — TELEPHONE (OUTPATIENT)
Dept: PSYCHIATRY | Facility: CLINIC | Age: 39
End: 2024-08-12
Payer: COMMERCIAL

## 2024-08-12 NOTE — TELEPHONE ENCOUNTER
Alex came into the office with disability form needing it to be filled out,I scanned blank form in his chart he said he needs it back by next week

## 2024-08-15 NOTE — TELEPHONE ENCOUNTER
Left pt a vm informing him that Dr. Marion isn't comfortable filling out his disability paperwork, if he needs anything else he can call us back  Thank you

## 2024-08-15 NOTE — TELEPHONE ENCOUNTER
Jason called back and said that he needs you to fill this out, he has been with you the longest and wont be able to get his benefits without it

## 2024-08-22 DIAGNOSIS — F51.05 INSOMNIA DUE TO MENTAL CONDITION: ICD-10-CM

## 2024-08-22 DIAGNOSIS — F51.5 NIGHTMARES: ICD-10-CM

## 2024-08-22 RX ORDER — AMITRIPTYLINE HYDROCHLORIDE 50 MG/1
50 TABLET, FILM COATED ORAL
Qty: 30 TABLET | Refills: 1 | Status: SHIPPED | OUTPATIENT
Start: 2024-08-22

## 2024-09-06 DIAGNOSIS — I10 ESSENTIAL HYPERTENSION: ICD-10-CM

## 2024-09-06 RX ORDER — CARVEDILOL 6.25 MG/1
6.25 TABLET ORAL 2 TIMES DAILY WITH MEALS
Qty: 90 TABLET | Refills: 1 | Status: SHIPPED | OUTPATIENT
Start: 2024-09-06

## 2024-09-21 DIAGNOSIS — F41.9 ANXIETY DISORDER, UNSPECIFIED TYPE: ICD-10-CM

## 2024-09-21 DIAGNOSIS — F51.05 INSOMNIA DUE TO MENTAL CONDITION: ICD-10-CM

## 2024-09-21 DIAGNOSIS — F31.62 BIPOLAR DISORDER, CURRENT EPISODE MIXED, MODERATE: ICD-10-CM

## 2024-09-21 DIAGNOSIS — F43.10 POST TRAUMATIC STRESS DISORDER (PTSD): Chronic | ICD-10-CM

## 2024-09-21 DIAGNOSIS — F51.5 NIGHTMARES: ICD-10-CM

## 2024-09-23 RX ORDER — QUETIAPINE FUMARATE 200 MG/1
200 TABLET, FILM COATED ORAL NIGHTLY PRN
Qty: 30 TABLET | Refills: 1 | Status: SHIPPED | OUTPATIENT
Start: 2024-09-23

## 2024-09-23 RX ORDER — LITHIUM CARBONATE 300 MG
TABLET ORAL
Qty: 90 TABLET | Refills: 1 | Status: SHIPPED | OUTPATIENT
Start: 2024-09-23

## 2024-10-16 PROBLEM — R05.1 ACUTE COUGH: Status: ACTIVE | Noted: 2024-10-16

## 2024-10-23 DIAGNOSIS — F51.05 INSOMNIA DUE TO MENTAL CONDITION: ICD-10-CM

## 2024-10-23 DIAGNOSIS — F51.5 NIGHTMARES: ICD-10-CM

## 2024-10-23 NOTE — TELEPHONE ENCOUNTER
[facility] request a refill Patient needs a follow up appointment has not been seen since 06/28/2024. Rx Refill Note  Requested Prescriptions     Pending Prescriptions Disp Refills    amitriptyline (ELAVIL) 50 MG tablet 30 tablet 1     Sig: Take 1 tablet by mouth every night at bedtime.      Last office visit with prescribing clinician: 1/6/2023   Last telemedicine visit with prescribing clinician: 6/28/2024   Next office visit with prescribing clinician: Visit date not found                         Would you like a call back once the refill request has been completed: [] Yes [] No    If the office needs to give you a call back, can they leave a voicemail: [] Yes [] No    Nancy Crystal CMA  10/23/24, 13:05 EDT

## 2024-10-23 NOTE — TELEPHONE ENCOUNTER
Patient called back, and confirmed that he is still wanting to see Dr. Marion. He has a follow up scheduled.

## 2024-10-23 NOTE — TELEPHONE ENCOUNTER
Called pt to ask if he was still wanting to see Dr. Marion for his meds and he ws unreachable. He did have an appointment scheduled with Shalini Amanda and no showed.

## 2024-10-24 RX ORDER — AMITRIPTYLINE HYDROCHLORIDE 50 MG/1
50 TABLET ORAL
Qty: 30 TABLET | Refills: 1 | Status: SHIPPED | OUTPATIENT
Start: 2024-10-24

## 2024-11-01 ENCOUNTER — TELEMEDICINE (OUTPATIENT)
Dept: PSYCHIATRY | Facility: CLINIC | Age: 39
End: 2024-11-01
Payer: COMMERCIAL

## 2024-11-01 DIAGNOSIS — F51.5 NIGHTMARES: ICD-10-CM

## 2024-11-01 DIAGNOSIS — F43.10 POST TRAUMATIC STRESS DISORDER (PTSD): Chronic | ICD-10-CM

## 2024-11-01 DIAGNOSIS — F31.62 BIPOLAR DISORDER, CURRENT EPISODE MIXED, MODERATE: ICD-10-CM

## 2024-11-01 DIAGNOSIS — F51.05 INSOMNIA DUE TO MENTAL CONDITION: ICD-10-CM

## 2024-11-01 DIAGNOSIS — F41.9 ANXIETY DISORDER, UNSPECIFIED TYPE: ICD-10-CM

## 2024-11-01 RX ORDER — QUETIAPINE FUMARATE 200 MG/1
200 TABLET, FILM COATED ORAL NIGHTLY PRN
Qty: 30 TABLET | Refills: 1 | Status: SHIPPED | OUTPATIENT
Start: 2024-11-01

## 2024-11-01 RX ORDER — LITHIUM CARBONATE 300 MG
TABLET ORAL
Qty: 90 TABLET | Refills: 1 | Status: SHIPPED | OUTPATIENT
Start: 2024-11-01

## 2024-11-01 NOTE — PROGRESS NOTES
"Subjective   Alex Torres is a 39 y.o. male who presents today for follow up     This provider is located at [facility], [address] [place] [address]. The Patient is seen remotely at home, using Epic Mychart. Patient is being seen via telehealth and stated they are in a secure environment for this session. The patient’s condition being diagnosed/treated is appropriate for telemedicine. The provider identified himself as well as his credentials.   The patient gave consent to be seen remotely, and when consent is given they understand that the consent allows for patient identifiable information to be sent to a third party as needed.   They may refuse to be seen remotely at any time. The electronic data is encrypted and password protected, and the patient has been advised of the potential risks to privacy not withstanding such measures      Chief Complaint: Bipolar disorder/anxiety    History of Present Illness: Patient presented today for follow-up.  He reports that since last visit, \"I have been alright.\"  He states that things have generally been okay and he is managing without any major depressive or manic symptoms.  Sleep and appetite are stable.  Anxiety is low.  He still has some irritability and mood fluctuations.  He denies SI/HI/AVH.      The following portions of the patient's history were reviewed and updated as appropriate: allergies, current medications, past family history, past medical history, past social history, past surgical history and problem list.      Past Medical History:  Past Medical History:   Diagnosis Date    Anxiety     Asthma     Bipolar 1 disorder     Depression     DM (diabetes mellitus)     Hypertension     Kidney stone     Polyneuropathy     Positive TB test     treated w/ meds x 6 months    PTSD (post-traumatic stress disorder)     Sleep apnea     Suicide attempt     Tattoos        Social History:  Social History     Socioeconomic History    Marital status: "    Tobacco Use    Smoking status: Every Day     Current packs/day: 1.00     Average packs/day: 1 pack/day for 25.8 years (25.8 ttl pk-yrs)     Types: Cigarettes     Start date: 1999    Smokeless tobacco: Never   Vaping Use    Vaping status: Never Used   Substance and Sexual Activity    Alcohol use: No    Drug use: Yes     Types: Marijuana     Comment: rarely    Sexual activity: Yes     Partners: Female       Family History:  Family History   Problem Relation Age of Onset    Arthritis Father     Hypertension Father     Alcohol abuse Father     Drug abuse Father     Diabetes Maternal Aunt     Diabetes Maternal Uncle     Diabetes Maternal Grandmother     Alzheimer's disease Maternal Grandmother     Dementia Maternal Grandmother     Diabetes Other     Hypertension Mother     Depression Mother     ADD / ADHD Brother     Early death Maternal Grandfather     Liver disease Maternal Grandfather     Alcohol abuse Maternal Grandfather     Cirrhosis Maternal Grandfather     No Known Problems Paternal Grandmother     Liver disease Paternal Grandfather     Alcohol abuse Paternal Grandfather     Early death Paternal Grandfather     Cirrhosis Paternal Grandfather     No Known Problems Brother     Anxiety disorder Neg Hx     Bipolar disorder Neg Hx     OCD Neg Hx     Paranoid behavior Neg Hx     Schizophrenia Neg Hx     Seizures Neg Hx     Self-Injurious Behavior  Neg Hx     Suicide Attempts Neg Hx     Colon cancer Neg Hx        Past Surgical History:  Past Surgical History:   Procedure Laterality Date    CHOLECYSTECTOMY  2002    INGUINAL HERNIA REPAIR Right 1995    ORIF METACARPAL FRACTURE Right 2000    TIBIA FRACTURE SURGERY Left 1997    ORIF       Problem List:  Patient Active Problem List   Diagnosis    Essential hypertension    GERD (gastroesophageal reflux disease)    Bipolar disorder, current episode mixed, moderate    MOLLY on CPAP    Arthritis    Complex regional pain syndrome type 2 of left lower extremity    Snoring     Excessive daytime sleepiness    Peripheral polyneuropathy    Pain in both lower extremities    Varicose veins of both lower extremities with pain    MOLLY (obstructive sleep apnea)    Class 1 obesity due to excess calories without serious comorbidity with body mass index (BMI) of 32.0 to 32.9 in adult    Neuromuscular respiratory weakness    Borderline diabetes    Diarrhea    Nausea and vomiting    Acute cough       Allergy:   Allergies   Allergen Reactions    Gabapentin Swelling     + swelling    Latex Rash        Current Medications:   Current Outpatient Medications   Medication Sig Dispense Refill    amitriptyline (ELAVIL) 50 MG tablet Take 1 tablet by mouth every night at bedtime. 30 tablet 1    Cariprazine HCl (Vraylar) 3 MG capsule capsule Take 1 capsule by mouth Daily. 30 capsule 0    carvedilol (COREG) 6.25 MG tablet TAKE 1 TABLET BY MOUTH 2 (TWO) TIMES A DAY WITH MEALS. ANNUAL DUE FOR REFILLS 90 tablet 1    fluticasone (FLONASE) 50 MCG/ACT nasal spray Administer 2 sprays into the nostril(s) as directed by provider Daily. 16 g 0    lithium 300 MG tablet TAKE 1 TABLET BY MOUTH EVERY MORNING AND 2 TABLETS EVERY NIGHT 90 tablet 1    loratadine (CLARITIN) 10 MG tablet Take 1 tablet by mouth Daily. 30 tablet 0    omeprazole (priLOSEC) 40 MG capsule TAKE 1 CAPSULE BY MOUTH EVERY MORNING 30 MINS BEFORE BREAKFAST 90 capsule 1    QUEtiapine (SEROquel) 200 MG tablet TAKE 1 TABLET BY MOUTH AT NIGHT AS NEEDED (INSOMNIA). 30 tablet 1    valsartan (DIOVAN) 160 MG tablet Take 1 tablet by mouth Daily. 90 tablet 1    Varenicline Tartrate, Starter, 0.5 MG X 11 & 1 MG X 42 tablet therapy pack TAKE 0.5 MG BY MOUTH DAILY FOR 3 DAYS, THEN 0.5 MG 2 (TWO) TIMES A DAY FOR 4 DAYS, THEN 1 MG 2 (TWO) TIMES A DAY FOR 21 DAYS 53 each 0     No current facility-administered medications for this visit.       Review of Symptoms:    Review of Systems   Constitutional:  Positive for fatigue. Negative for activity change (improved), appetite  change, chills, diaphoresis and fever.   HENT: Negative.     Eyes: Negative.    Respiratory: Negative.     Cardiovascular: Negative.    Gastrointestinal: Negative.    Endocrine: Negative.    Genitourinary:  Positive for decreased libido.   Musculoskeletal: Negative.    Skin: Negative.    Allergic/Immunologic: Negative.    Neurological:  Negative for dizziness.   Hematological: Negative.    Psychiatric/Behavioral:  Positive for agitation, dysphoric mood, sleep disturbance and stress. Negative for behavioral problems, decreased concentration, hallucinations, self-injury, suicidal ideas, negative for hyperactivity and depressed mood. The patient is not nervous/anxious.          Physical Exam:   There were no vitals taken for this visit.  Video visit    Appearance: Male of stated age, NAD  Gait, Station, Strength: Video visit    Mental Status Exam:       Hygiene:   good  Cooperation:  Cooperative  Eye Contact:  Good  Psychomotor Behavior:  Appropriate  Affect:  Full range  Mood: irritable and dysphoric  some improvement, still fluctuating   Hopelessness: Optimistic  Speech:  Normal  Thought Process:  Goal directed and Linear  Thought Content:  Normal  Suicidal:  None  Homicidal:  None  Hallucinations:  None  Delusion:  None  Memory:  Intact  Orientation:  Person, Place, Time and Situation  Reliability:  good  Insight:  Fair  Judgement:  Fair  Impulse Control:  Fair  Physical/Medical Issues:  No        Lab Results:   Admission on 10/16/2024, Discharged on 10/16/2024   Component Date Value Ref Range Status    SARS Antigen 10/16/2024 Not Detected  Not Detected, Presumptive Negative Final    Influenza A Antigen ZION 10/16/2024 Not Detected  Not Detected Final    Influenza B Antigen ZION 10/16/2024 Not Detected  Not Detected Final    Internal Control 10/16/2024 Passed  Passed Final    Lot Number 10/16/2024 [id number]   Final    Expiration Date 10/16/2024 09/04/2025   Final       Assessment & Plan   Diagnoses and all orders for  this visit:    1. Chronic Post traumatic stress disorder (PTSD)  -     Cariprazine HCl (Vraylar) 3 MG capsule capsule; Take 1 capsule by mouth Daily.  Dispense: 30 capsule; Refill: 2  -     lithium 300 MG tablet; TAKE 1 TABLET BY MOUTH EVERY MORNING AND 2 TABLETS EVERY NIGHT  Dispense: 90 tablet; Refill: 1  -     QUEtiapine (SEROquel) 200 MG tablet; Take 1 tablet by mouth At Night As Needed (Insomnia).  Dispense: 30 tablet; Refill: 1    2. Bipolar disorder, current episode mixed, moderate  -     Cariprazine HCl (Vraylar) 3 MG capsule capsule; Take 1 capsule by mouth Daily.  Dispense: 30 capsule; Refill: 2  -     lithium 300 MG tablet; TAKE 1 TABLET BY MOUTH EVERY MORNING AND 2 TABLETS EVERY NIGHT  Dispense: 90 tablet; Refill: 1  -     QUEtiapine (SEROquel) 200 MG tablet; Take 1 tablet by mouth At Night As Needed (Insomnia).  Dispense: 30 tablet; Refill: 1    3. Anxiety disorder, unspecified type  -     Cariprazine HCl (Vraylar) 3 MG capsule capsule; Take 1 capsule by mouth Daily.  Dispense: 30 capsule; Refill: 2  -     lithium 300 MG tablet; TAKE 1 TABLET BY MOUTH EVERY MORNING AND 2 TABLETS EVERY NIGHT  Dispense: 90 tablet; Refill: 1  -     QUEtiapine (SEROquel) 200 MG tablet; Take 1 tablet by mouth At Night As Needed (Insomnia).  Dispense: 30 tablet; Refill: 1    4. Nightmares  -     QUEtiapine (SEROquel) 200 MG tablet; Take 1 tablet by mouth At Night As Needed (Insomnia).  Dispense: 30 tablet; Refill: 1    5. Insomnia due to mental condition  -     QUEtiapine (SEROquel) 200 MG tablet; Take 1 tablet by mouth At Night As Needed (Insomnia).  Dispense: 30 tablet; Refill: 1      -The patient overall showing some improvement in symptoms have become more manageable but he still has some significant mood fluctuations.  -Reviewed previous documentation  -Reviewed labs  -Increase lithium to 300 mg in the morning and 600 mg at night for mood stabilization  -Continue Seroquel 200 mg nightly as needed for insomnia and mood  stabilization  -Continue Vraylar 3 mg p.o. daily for PTSD and bipolar disorder  -Continue Elavil 50 mg p.o. nightly for insomnia  -Luvox has been discontinued  -Encourage cessation of nicotine and cannabis  -Encouraged therapy and anger management  -Approximate appointment time 8:45 AM to 9 AM via video visit    Visit Diagnoses:    ICD-10-CM ICD-9-CM   1. Chronic Post traumatic stress disorder (PTSD)  F43.10 309.81   2. Bipolar disorder, current episode mixed, moderate  F31.62 296.62   3. Anxiety disorder, unspecified type  F41.9 300.00   4. Nightmares  F51.5 307.47   5. Insomnia due to mental condition  F51.05 300.9     327.02         TREATMENT PLAN/GOALS: Continue supportive psychotherapy efforts and medications as indicated. Treatment and medication options discussed during today patient advised he may need a lithium level in 5 to 7 days visit. Patient ackowledged and verbally consented to continue with current treatment plan and was educated on the importance of compliance with treatment and follow-up appointments.    MEDICATION ISSUES:    Discussed medication options and treatment plan of prescribed medication as well as the risks, benefits, and side effects including potential falls, possible impaired driving and metabolic adversities among others. Patient is agreeable to call the office with any worsening of symptoms or onset of side effects. Patient is agreeable to call 911 or go to the nearest ER should he/she begin having SI/HI. No medication side effects or related complaints today.     MEDS ORDERED DURING VISIT:  New Medications Ordered This Visit   Medications    Cariprazine HCl (Vraylar) 3 MG capsule capsule     Sig: Take 1 capsule by mouth Daily.     Dispense:  30 capsule     Refill:  2    lithium 300 MG tablet     Sig: TAKE 1 TABLET BY MOUTH EVERY MORNING AND 2 TABLETS EVERY NIGHT     Dispense:  90 tablet     Refill:  1    QUEtiapine (SEROquel) 200 MG tablet     Sig: Take 1 tablet by mouth At Night  As Needed (Insomnia).     Dispense:  30 tablet     Refill:  1       Return in about 4 weeks (around 11/29/2024).             This document has been electronically signed by Stefan Marion MD  November 1, 2024

## 2024-12-12 DIAGNOSIS — I10 ESSENTIAL HYPERTENSION: ICD-10-CM

## 2024-12-13 RX ORDER — CARVEDILOL 6.25 MG/1
6.25 TABLET ORAL 2 TIMES DAILY WITH MEALS
Qty: 90 TABLET | Refills: 3 | Status: SHIPPED | OUTPATIENT
Start: 2024-12-13

## 2024-12-14 DIAGNOSIS — F51.5 NIGHTMARES: ICD-10-CM

## 2024-12-14 DIAGNOSIS — F51.05 INSOMNIA DUE TO MENTAL CONDITION: ICD-10-CM

## 2024-12-16 RX ORDER — AMITRIPTYLINE HYDROCHLORIDE 50 MG/1
50 TABLET ORAL
Qty: 30 TABLET | Refills: 1 | Status: SHIPPED | OUTPATIENT
Start: 2024-12-16

## 2024-12-18 DIAGNOSIS — I10 ESSENTIAL HYPERTENSION: ICD-10-CM

## 2024-12-18 RX ORDER — VALSARTAN 160 MG/1
160 TABLET ORAL DAILY
Qty: 90 TABLET | Refills: 1 | Status: SHIPPED | OUTPATIENT
Start: 2024-12-18

## 2024-12-20 ENCOUNTER — TELEMEDICINE (OUTPATIENT)
Dept: PSYCHIATRY | Facility: CLINIC | Age: 39
End: 2024-12-20
Payer: COMMERCIAL

## 2024-12-20 DIAGNOSIS — F41.9 ANXIETY DISORDER, UNSPECIFIED TYPE: ICD-10-CM

## 2024-12-20 DIAGNOSIS — F31.62 BIPOLAR DISORDER, CURRENT EPISODE MIXED, MODERATE: ICD-10-CM

## 2024-12-20 DIAGNOSIS — F43.10 POST TRAUMATIC STRESS DISORDER (PTSD): Chronic | ICD-10-CM

## 2024-12-20 DIAGNOSIS — F51.5 NIGHTMARES: ICD-10-CM

## 2024-12-20 DIAGNOSIS — F51.05 INSOMNIA DUE TO MENTAL CONDITION: ICD-10-CM

## 2024-12-20 RX ORDER — LITHIUM CARBONATE 300 MG
TABLET ORAL
Qty: 90 TABLET | Refills: 1 | Status: SHIPPED | OUTPATIENT
Start: 2024-12-20

## 2024-12-20 RX ORDER — QUETIAPINE FUMARATE 150 MG/1
150 TABLET, FILM COATED ORAL NIGHTLY PRN
Qty: 30 TABLET | Refills: 2 | Status: SHIPPED | OUTPATIENT
Start: 2024-12-20

## 2024-12-20 NOTE — PROGRESS NOTES
Subjective   Alex Torres is a 39 y.o. male who presents today for follow up     This provider is located at [facility], [address] [place] [address]. The Patient is seen remotely at home, using Epic Mychart. Patient is being seen via telehealth and stated they are in a secure environment for this session. The patient’s condition being diagnosed/treated is appropriate for telemedicine. The provider identified himself as well as his credentials.   The patient gave consent to be seen remotely, and when consent is given they understand that the consent allows for patient identifiable information to be sent to a third party as needed.   They may refuse to be seen remotely at any time. The electronic data is encrypted and password protected, and the patient has been advised of the potential risks to privacy not withstanding such measures      Chief Complaint: Bipolar disorder/anxiety    History of Present Illness: Patient presented today for follow-up.  Since last visit, patient reports that he has been doing substantially better.  He feels that the Vraylar is working very well and he notes that he feels more even down and mellow than he has in several years to the point that he has been able to maintain a job for about 2 months so far.  He is not having any medication side effects aside from some grogginess in the morning which has been more difficult lately as he gets home late from work and has to get up early to get the kids off to school.  We will reduce Seroquel somewhat since we have added a mood stabilizer to see if we can maintain the sleep benefits and mitigate the morning grogginess.  He denies SI/HI/AVH.    The following portions of the patient's history were reviewed and updated as appropriate: allergies, current medications, past family history, past medical history, past social history, past surgical history and problem list.      Past Medical History:  Past Medical History:   Diagnosis  Date    Anxiety     Asthma     Bipolar 1 disorder     Depression     DM (diabetes mellitus)     Hypertension     Kidney stone     Polyneuropathy     Positive TB test     treated w/ meds x 6 months    PTSD (post-traumatic stress disorder)     Sleep apnea     Suicide attempt     Tattoos        Social History:  Social History     Socioeconomic History    Marital status:    Tobacco Use    Smoking status: Every Day     Current packs/day: 1.00     Average packs/day: 1 pack/day for 26.0 years (26.0 ttl pk-yrs)     Types: Cigarettes     Start date: 1999    Smokeless tobacco: Never   Vaping Use    Vaping status: Never Used   Substance and Sexual Activity    Alcohol use: No    Drug use: Yes     Types: Marijuana     Comment: rarely    Sexual activity: Yes     Partners: Female       Family History:  Family History   Problem Relation Age of Onset    Arthritis Father     Hypertension Father     Alcohol abuse Father     Drug abuse Father     Diabetes Maternal Aunt     Diabetes Maternal Uncle     Diabetes Maternal Grandmother     Alzheimer's disease Maternal Grandmother     Dementia Maternal Grandmother     Diabetes Other     Hypertension Mother     Depression Mother     ADD / ADHD Brother     Early death Maternal Grandfather     Liver disease Maternal Grandfather     Alcohol abuse Maternal Grandfather     Cirrhosis Maternal Grandfather     No Known Problems Paternal Grandmother     Liver disease Paternal Grandfather     Alcohol abuse Paternal Grandfather     Early death Paternal Grandfather     Cirrhosis Paternal Grandfather     No Known Problems Brother     Anxiety disorder Neg Hx     Bipolar disorder Neg Hx     OCD Neg Hx     Paranoid behavior Neg Hx     Schizophrenia Neg Hx     Seizures Neg Hx     Self-Injurious Behavior  Neg Hx     Suicide Attempts Neg Hx     Colon cancer Neg Hx        Past Surgical History:  Past Surgical History:   Procedure Laterality Date    CHOLECYSTECTOMY  2002    INGUINAL HERNIA REPAIR Right  1995    ORIF METACARPAL FRACTURE Right 2000    TIBIA FRACTURE SURGERY Left 1997    ORIF       Problem List:  Patient Active Problem List   Diagnosis    Essential hypertension    GERD (gastroesophageal reflux disease)    Bipolar disorder, current episode mixed, moderate    MOLLY on CPAP    Arthritis    Complex regional pain syndrome type 2 of left lower extremity    Snoring    Excessive daytime sleepiness    Peripheral polyneuropathy    Pain in both lower extremities    Varicose veins of both lower extremities with pain    MOLLY (obstructive sleep apnea)    Class 1 obesity due to excess calories without serious comorbidity with body mass index (BMI) of 32.0 to 32.9 in adult    Neuromuscular respiratory weakness    Borderline diabetes    Diarrhea    Nausea and vomiting    Acute cough       Allergy:   Allergies   Allergen Reactions    Gabapentin Swelling     + swelling    Latex Rash        Current Medications:   Current Outpatient Medications   Medication Sig Dispense Refill    amitriptyline (ELAVIL) 50 MG tablet TAKE 1 TABLET BY MOUTH EVERYDAY AT BEDTIME 30 tablet 1    Cariprazine HCl (Vraylar) 3 MG capsule capsule Take 1 capsule by mouth Daily. 30 capsule 2    carvedilol (COREG) 6.25 MG tablet TAKE 1 TABLET BY MOUTH 2 (TWO) TIMES A DAY WITH MEALS. ANNUAL DUE FOR REFILLS 90 tablet 3    lithium 300 MG tablet TAKE 1 TABLET BY MOUTH EVERY MORNING AND 2 TABLETS EVERY NIGHT 90 tablet 1    omeprazole (priLOSEC) 40 MG capsule TAKE 1 CAPSULE BY MOUTH EVERY MORNING 30 MINS BEFORE BREAKFAST 90 capsule 1    ondansetron ODT (ZOFRAN-ODT) 8 MG disintegrating tablet Place 1 tablet on the tongue Every 8 (Eight) Hours As Needed for Nausea or Vomiting. 21 tablet 0    QUEtiapine (SEROquel) 200 MG tablet Take 1 tablet by mouth At Night As Needed (Insomnia). 30 tablet 1    valsartan (DIOVAN) 160 MG tablet TAKE 1 TABLET BY MOUTH EVERY DAY 90 tablet 1     No current facility-administered medications for this visit.       Review of Symptoms:     Review of Systems   Constitutional:  Positive for fatigue. Negative for activity change (improved), appetite change, chills, diaphoresis and fever.   HENT: Negative.     Eyes: Negative.    Respiratory: Negative.     Cardiovascular: Negative.    Gastrointestinal: Negative.    Endocrine: Negative.    Genitourinary:  Positive for decreased libido.   Musculoskeletal: Negative.    Skin: Negative.    Allergic/Immunologic: Negative.    Neurological:  Negative for dizziness.   Hematological: Negative.    Psychiatric/Behavioral:  Positive for agitation, dysphoric mood, sleep disturbance and stress. Negative for behavioral problems, decreased concentration, hallucinations, self-injury, suicidal ideas, negative for hyperactivity and depressed mood. The patient is not nervous/anxious.          Physical Exam:   There were no vitals taken for this visit.  Video visit    Appearance: Male of stated age, NAD  Gait, Station, Strength: Video visit    Mental Status Exam:       Hygiene:   good  Cooperation:  Cooperative  Eye Contact:  Good  Psychomotor Behavior:  Appropriate  Affect:  Full range  Mood: normal improved  Hopelessness: Optimistic  Speech:  Normal  Thought Process:  Goal directed and Linear  Thought Content:  Normal  Suicidal:  None  Homicidal:  None  Hallucinations:  None  Delusion:  None  Memory:  Intact  Orientation:  Person, Place, Time and Situation  Reliability:  good  Insight:  Fair  Judgement:  Fair  Impulse Control:  Fair  Physical/Medical Issues:  No        Lab Results:   Admission on 12/12/2024, Discharged on 12/12/2024   Component Date Value Ref Range Status    SARS Antigen 12/12/2024 Not Detected  Not Detected, Presumptive Negative Final    Influenza A Antigen ZION 12/12/2024 Not Detected  Not Detected Final    Influenza B Antigen ZION 12/12/2024 Not Detected  Not Detected Final    Internal Control 12/12/2024 Passed  Passed Final    Lot Number 12/12/2024 [id number]   Corrected    Expiration Date 12/12/2024  9-4-2025   Corrected   Admission on 12/02/2024, Discharged on 12/02/2024   Component Date Value Ref Range Status    SARS Antigen 12/02/2024 Not Detected  Not Detected, Presumptive Negative Final    Influenza A Antigen ZION 12/02/2024 Not Detected  Not Detected Final    Influenza B Antigen ZION 12/02/2024 Not Detected  Not Detected Final    Internal Control 12/02/2024 Passed  Passed Final    Lot Number 12/02/2024 [id number]   Final    Expiration Date 12/02/2024 9/4/25   Final    Rapid Strep A Screen 12/02/2024 Negative   Final    Internal Control 12/02/2024 Passed   Final    Lot Number 12/02/2024 [id number]   Final    Expiration Date 12/02/2024 01/03/2027   Final       Assessment & Plan   Diagnoses and all orders for this visit:    1. Chronic Post traumatic stress disorder (PTSD)  -     lithium 300 MG tablet; TAKE 1 TABLET BY MOUTH EVERY MORNING AND 2 TABLETS EVERY NIGHT  Dispense: 90 tablet; Refill: 1  -     QUEtiapine 150 MG tablet; Take 150 mg by mouth At Night As Needed (Insomnia).  Dispense: 30 tablet; Refill: 2    2. Bipolar disorder, current episode mixed, moderate  -     lithium 300 MG tablet; TAKE 1 TABLET BY MOUTH EVERY MORNING AND 2 TABLETS EVERY NIGHT  Dispense: 90 tablet; Refill: 1  -     QUEtiapine 150 MG tablet; Take 150 mg by mouth At Night As Needed (Insomnia).  Dispense: 30 tablet; Refill: 2    3. Anxiety disorder, unspecified type  -     lithium 300 MG tablet; TAKE 1 TABLET BY MOUTH EVERY MORNING AND 2 TABLETS EVERY NIGHT  Dispense: 90 tablet; Refill: 1  -     QUEtiapine 150 MG tablet; Take 150 mg by mouth At Night As Needed (Insomnia).  Dispense: 30 tablet; Refill: 2    4. Nightmares  -     QUEtiapine 150 MG tablet; Take 150 mg by mouth At Night As Needed (Insomnia).  Dispense: 30 tablet; Refill: 2    5. Insomnia due to mental condition  -     QUEtiapine 150 MG tablet; Take 150 mg by mouth At Night As Needed (Insomnia).  Dispense: 30 tablet; Refill: 2      -Patient doing much better overall aside from  some morning grogginess  -Reviewed previous documentation  -Reviewed labs  -Continue lithium 300 mg in the morning and 600 mg at night for mood stabilization  -Reduce Seroquel 200 mg to 150 mg nightly as needed for insomnia and mood stabilization  -Continue Vraylar 3 mg p.o. daily for PTSD and bipolar disorder  -Continue Elavil 50 mg p.o. nightly for insomnia  -Encourage cessation of nicotine and cannabis  -Encouraged therapy and anger management  -Approximate appointment time 11:45 AM to 12 PM via video visit    Visit Diagnoses:    ICD-10-CM ICD-9-CM   1. Chronic Post traumatic stress disorder (PTSD)  F43.10 309.81   2. Bipolar disorder, current episode mixed, moderate  F31.62 296.62   3. Anxiety disorder, unspecified type  F41.9 300.00   4. Nightmares  F51.5 307.47   5. Insomnia due to mental condition  F51.05 300.9     327.02           TREATMENT PLAN/GOALS: Continue supportive psychotherapy efforts and medications as indicated. Treatment and medication options discussed during today patient advised he may need a lithium level in 5 to 7 days visit. Patient ackowledged and verbally consented to continue with current treatment plan and was educated on the importance of compliance with treatment and follow-up appointments.    MEDICATION ISSUES:    Discussed medication options and treatment plan of prescribed medication as well as the risks, benefits, and side effects including potential falls, possible impaired driving and metabolic adversities among others. Patient is agreeable to call the office with any worsening of symptoms or onset of side effects. Patient is agreeable to call 911 or go to the nearest ER should he/she begin having SI/HI. No medication side effects or related complaints today.     MEDS ORDERED DURING VISIT:  New Medications Ordered This Visit   Medications    lithium 300 MG tablet     Sig: TAKE 1 TABLET BY MOUTH EVERY MORNING AND 2 TABLETS EVERY NIGHT     Dispense:  90 tablet     Refill:  1     QUEtiapine 150 MG tablet     Sig: Take 150 mg by mouth At Night As Needed (Insomnia).     Dispense:  30 tablet     Refill:  2       Return in about 3 months (around 3/20/2025).             This document has been electronically signed by Stefan Marion MD  December 20, 2024

## 2025-01-03 ENCOUNTER — APPOINTMENT (OUTPATIENT)
Dept: CT IMAGING | Facility: HOSPITAL | Age: 40
DRG: 251 | End: 2025-01-03
Payer: COMMERCIAL

## 2025-01-03 ENCOUNTER — APPOINTMENT (OUTPATIENT)
Dept: GENERAL RADIOLOGY | Facility: HOSPITAL | Age: 40
DRG: 251 | End: 2025-01-03
Payer: COMMERCIAL

## 2025-01-03 ENCOUNTER — HOSPITAL ENCOUNTER (INPATIENT)
Facility: HOSPITAL | Age: 40
LOS: 1 days | Discharge: OTHER INSTITUTION | DRG: 251 | End: 2025-01-03
Attending: STUDENT IN AN ORGANIZED HEALTH CARE EDUCATION/TRAINING PROGRAM | Admitting: INTERNAL MEDICINE
Payer: COMMERCIAL

## 2025-01-03 VITALS
TEMPERATURE: 97.5 F | SYSTOLIC BLOOD PRESSURE: 130 MMHG | HEART RATE: 68 BPM | HEIGHT: 76 IN | RESPIRATION RATE: 16 BRPM | OXYGEN SATURATION: 98 % | DIASTOLIC BLOOD PRESSURE: 92 MMHG | BODY MASS INDEX: 31.66 KG/M2 | WEIGHT: 260 LBS

## 2025-01-03 DIAGNOSIS — I21.21 STEMI INVOLVING LEFT CIRCUMFLEX CORONARY ARTERY: Primary | ICD-10-CM

## 2025-01-03 DIAGNOSIS — R79.89 TROPONIN LEVEL ELEVATED: ICD-10-CM

## 2025-01-03 DIAGNOSIS — I10 PRIMARY HYPERTENSION: ICD-10-CM

## 2025-01-03 DIAGNOSIS — R07.9 ACUTE CHEST PAIN: ICD-10-CM

## 2025-01-03 DIAGNOSIS — I47.29 NONSUSTAINED VENTRICULAR TACHYCARDIA: ICD-10-CM

## 2025-01-03 DIAGNOSIS — I21.19 INFERIOR MI: ICD-10-CM

## 2025-01-03 PROBLEM — I21.4 NSTEMI (NON-ST ELEVATED MYOCARDIAL INFARCTION): Status: ACTIVE | Noted: 2025-01-03

## 2025-01-03 LAB
ACT BLD: 316 SECONDS (ref 82–152)
ALBUMIN SERPL-MCNC: 4.5 G/DL (ref 3.5–5.2)
ALBUMIN/GLOB SERPL: 1.6 G/DL
ALP SERPL-CCNC: 85 U/L (ref 39–117)
ALT SERPL W P-5'-P-CCNC: 38 U/L (ref 1–41)
AMPHET+METHAMPHET UR QL: NEGATIVE
AMPHETAMINES UR QL: NEGATIVE
ANION GAP SERPL CALCULATED.3IONS-SCNC: 11 MMOL/L (ref 5–15)
APTT PPP: 30.2 SECONDS (ref 70–100)
AST SERPL-CCNC: 25 U/L (ref 1–40)
BARBITURATES UR QL SCN: NEGATIVE
BASOPHILS # BLD AUTO: 0.05 10*3/MM3 (ref 0–0.2)
BASOPHILS NFR BLD AUTO: 0.4 % (ref 0–1.5)
BENZODIAZ UR QL SCN: NEGATIVE
BILIRUB SERPL-MCNC: 0.5 MG/DL (ref 0–1.2)
BUN SERPL-MCNC: 15 MG/DL (ref 6–20)
BUN/CREAT SERPL: 13 (ref 7–25)
BUPRENORPHINE SERPL-MCNC: NEGATIVE NG/ML
CALCIUM SPEC-SCNC: 9.8 MG/DL (ref 8.6–10.5)
CANNABINOIDS SERPL QL: POSITIVE
CHLORIDE SERPL-SCNC: 100 MMOL/L (ref 98–107)
CO2 SERPL-SCNC: 26 MMOL/L (ref 22–29)
COCAINE UR QL: NEGATIVE
CREAT SERPL-MCNC: 1.15 MG/DL (ref 0.76–1.27)
DEPRECATED RDW RBC AUTO: 48.5 FL (ref 37–54)
EGFRCR SERPLBLD CKD-EPI 2021: 82 ML/MIN/1.73
EOSINOPHIL # BLD AUTO: 0.2 10*3/MM3 (ref 0–0.4)
EOSINOPHIL NFR BLD AUTO: 1.7 % (ref 0.3–6.2)
ERYTHROCYTE [DISTWIDTH] IN BLOOD BY AUTOMATED COUNT: 14 % (ref 12.3–15.4)
FENTANYL UR-MCNC: NEGATIVE NG/ML
FLUAV RNA RESP QL NAA+PROBE: NOT DETECTED
FLUBV RNA RESP QL NAA+PROBE: NOT DETECTED
GEN 5 1HR TROPONIN T REFLEX: 55 NG/L
GLOBULIN UR ELPH-MCNC: 2.8 GM/DL
GLUCOSE SERPL-MCNC: 102 MG/DL (ref 65–99)
HCT VFR BLD AUTO: 47.7 % (ref 37.5–51)
HGB BLD-MCNC: 15.6 G/DL (ref 13–17.7)
HOLD SPECIMEN: NORMAL
HOLD SPECIMEN: NORMAL
IMM GRANULOCYTES # BLD AUTO: 0.14 10*3/MM3 (ref 0–0.05)
IMM GRANULOCYTES NFR BLD AUTO: 1.2 % (ref 0–0.5)
INR PPP: 0.98 (ref 0.9–1.1)
LYMPHOCYTES # BLD AUTO: 2.93 10*3/MM3 (ref 0.7–3.1)
LYMPHOCYTES NFR BLD AUTO: 24.8 % (ref 19.6–45.3)
MCH RBC QN AUTO: 30.8 PG (ref 26.6–33)
MCHC RBC AUTO-ENTMCNC: 32.7 G/DL (ref 31.5–35.7)
MCV RBC AUTO: 94.1 FL (ref 79–97)
METHADONE UR QL SCN: NEGATIVE
MONOCYTES # BLD AUTO: 0.65 10*3/MM3 (ref 0.1–0.9)
MONOCYTES NFR BLD AUTO: 5.5 % (ref 5–12)
NEUTROPHILS NFR BLD AUTO: 66.4 % (ref 42.7–76)
NEUTROPHILS NFR BLD AUTO: 7.83 10*3/MM3 (ref 1.7–7)
NRBC BLD AUTO-RTO: 0 /100 WBC (ref 0–0.2)
OPIATES UR QL: NEGATIVE
OXYCODONE UR QL SCN: NEGATIVE
PCP UR QL SCN: NEGATIVE
PLATELET # BLD AUTO: 192 10*3/MM3 (ref 140–450)
PMV BLD AUTO: 8.4 FL (ref 6–12)
POTASSIUM SERPL-SCNC: 4.6 MMOL/L (ref 3.5–5.2)
PROT SERPL-MCNC: 7.3 G/DL (ref 6–8.5)
PROTHROMBIN TIME: 13.4 SECONDS (ref 12.3–15.1)
RBC # BLD AUTO: 5.07 10*6/MM3 (ref 4.14–5.8)
SARS-COV-2 RNA RESP QL NAA+PROBE: NOT DETECTED
SODIUM SERPL-SCNC: 137 MMOL/L (ref 136–145)
TRICYCLICS UR QL SCN: POSITIVE
TROPONIN T % DELTA: 129 %
TROPONIN T NUMERIC DELTA: 31 NG/L
TROPONIN T SERPL HS-MCNC: 24 NG/L
UFH PPP CHRO-ACNC: 0.1 IU/ML (ref 0.3–0.7)
WBC NRBC COR # BLD AUTO: 11.8 10*3/MM3 (ref 3.4–10.8)
WHOLE BLOOD HOLD COAG: NORMAL
WHOLE BLOOD HOLD SPECIMEN: NORMAL

## 2025-01-03 PROCEDURE — C1769 GUIDE WIRE: HCPCS | Performed by: INTERNAL MEDICINE

## 2025-01-03 PROCEDURE — 02703ZZ DILATION OF CORONARY ARTERY, ONE ARTERY, PERCUTANEOUS APPROACH: ICD-10-PCS | Performed by: INTERNAL MEDICINE

## 2025-01-03 PROCEDURE — 85347 COAGULATION TIME ACTIVATED: CPT

## 2025-01-03 PROCEDURE — 85025 COMPLETE CBC W/AUTO DIFF WBC: CPT | Performed by: STUDENT IN AN ORGANIZED HEALTH CARE EDUCATION/TRAINING PROGRAM

## 2025-01-03 PROCEDURE — 25810000003 SODIUM CHLORIDE 0.9 % SOLUTION: Performed by: INTERNAL MEDICINE

## 2025-01-03 PROCEDURE — 71045 X-RAY EXAM CHEST 1 VIEW: CPT

## 2025-01-03 PROCEDURE — 85730 THROMBOPLASTIN TIME PARTIAL: CPT | Performed by: STUDENT IN AN ORGANIZED HEALTH CARE EDUCATION/TRAINING PROGRAM

## 2025-01-03 PROCEDURE — 3E033PZ INTRODUCTION OF PLATELET INHIBITOR INTO PERIPHERAL VEIN, PERCUTANEOUS APPROACH: ICD-10-PCS | Performed by: INTERNAL MEDICINE

## 2025-01-03 PROCEDURE — 99236 HOSP IP/OBS SAME DATE HI 85: CPT | Performed by: INTERNAL MEDICINE

## 2025-01-03 PROCEDURE — 87636 SARSCOV2 & INF A&B AMP PRB: CPT | Performed by: FAMILY MEDICINE

## 2025-01-03 PROCEDURE — 25510000001 IOPAMIDOL PER 1 ML: Performed by: INTERNAL MEDICINE

## 2025-01-03 PROCEDURE — 93454 CORONARY ARTERY ANGIO S&I: CPT | Performed by: INTERNAL MEDICINE

## 2025-01-03 PROCEDURE — 93005 ELECTROCARDIOGRAM TRACING: CPT | Performed by: STUDENT IN AN ORGANIZED HEALTH CARE EDUCATION/TRAINING PROGRAM

## 2025-01-03 PROCEDURE — 25010000002 LIDOCAINE 1 % SOLUTION: Performed by: INTERNAL MEDICINE

## 2025-01-03 PROCEDURE — 80053 COMPREHEN METABOLIC PANEL: CPT | Performed by: STUDENT IN AN ORGANIZED HEALTH CARE EDUCATION/TRAINING PROGRAM

## 2025-01-03 PROCEDURE — 25010000002 HEPARIN (PORCINE) PER 1000 UNITS: Performed by: INTERNAL MEDICINE

## 2025-01-03 PROCEDURE — C1894 INTRO/SHEATH, NON-LASER: HCPCS | Performed by: INTERNAL MEDICINE

## 2025-01-03 PROCEDURE — 25510000001 IOPAMIDOL 61 % SOLUTION: Performed by: STUDENT IN AN ORGANIZED HEALTH CARE EDUCATION/TRAINING PROGRAM

## 2025-01-03 PROCEDURE — 25010000002 EPTIFIBATIDE PER 5 MG: Performed by: INTERNAL MEDICINE

## 2025-01-03 PROCEDURE — 85520 HEPARIN ASSAY: CPT | Performed by: STUDENT IN AN ORGANIZED HEALTH CARE EDUCATION/TRAINING PROGRAM

## 2025-01-03 PROCEDURE — C1725 CATH, TRANSLUMIN NON-LASER: HCPCS | Performed by: INTERNAL MEDICINE

## 2025-01-03 PROCEDURE — 25010000002 MIDAZOLAM PER 1MG: Performed by: INTERNAL MEDICINE

## 2025-01-03 PROCEDURE — 4A023N7 MEASUREMENT OF CARDIAC SAMPLING AND PRESSURE, LEFT HEART, PERCUTANEOUS APPROACH: ICD-10-PCS | Performed by: INTERNAL MEDICINE

## 2025-01-03 PROCEDURE — 85610 PROTHROMBIN TIME: CPT | Performed by: STUDENT IN AN ORGANIZED HEALTH CARE EDUCATION/TRAINING PROGRAM

## 2025-01-03 PROCEDURE — 36415 COLL VENOUS BLD VENIPUNCTURE: CPT

## 2025-01-03 PROCEDURE — 71275 CT ANGIOGRAPHY CHEST: CPT

## 2025-01-03 PROCEDURE — B2111ZZ FLUOROSCOPY OF MULTIPLE CORONARY ARTERIES USING LOW OSMOLAR CONTRAST: ICD-10-PCS | Performed by: INTERNAL MEDICINE

## 2025-01-03 PROCEDURE — 92941 PRQ TRLML REVSC TOT OCCL AMI: CPT | Performed by: INTERNAL MEDICINE

## 2025-01-03 PROCEDURE — C1887 CATHETER, GUIDING: HCPCS | Performed by: INTERNAL MEDICINE

## 2025-01-03 PROCEDURE — 80307 DRUG TEST PRSMV CHEM ANLYZR: CPT | Performed by: FAMILY MEDICINE

## 2025-01-03 PROCEDURE — 25010000002 FENTANYL CITRATE (PF) 50 MCG/ML SOLUTION: Performed by: INTERNAL MEDICINE

## 2025-01-03 PROCEDURE — 99285 EMERGENCY DEPT VISIT HI MDM: CPT | Performed by: STUDENT IN AN ORGANIZED HEALTH CARE EDUCATION/TRAINING PROGRAM

## 2025-01-03 PROCEDURE — 84484 ASSAY OF TROPONIN QUANT: CPT | Performed by: STUDENT IN AN ORGANIZED HEALTH CARE EDUCATION/TRAINING PROGRAM

## 2025-01-03 RX ORDER — EPTIFIBATIDE 0.75 MG/ML
INJECTION, SOLUTION INTRAVENOUS
Status: COMPLETED | OUTPATIENT
Start: 2025-01-03 | End: 2025-01-03

## 2025-01-03 RX ORDER — DIPHENHYDRAMINE HYDROCHLORIDE 50 MG/ML
25 INJECTION INTRAMUSCULAR; INTRAVENOUS EVERY 6 HOURS PRN
Status: CANCELLED | OUTPATIENT
Start: 2025-01-03

## 2025-01-03 RX ORDER — ASPIRIN 325 MG
325 TABLET ORAL ONCE
Status: COMPLETED | OUTPATIENT
Start: 2025-01-03 | End: 2025-01-03

## 2025-01-03 RX ORDER — HEPARIN SODIUM 1000 [USP'U]/ML
4000 INJECTION, SOLUTION INTRAVENOUS; SUBCUTANEOUS AS NEEDED
Status: DISCONTINUED | OUTPATIENT
Start: 2025-01-03 | End: 2025-01-03 | Stop reason: ALTCHOICE

## 2025-01-03 RX ORDER — HEPARIN SODIUM 1000 [USP'U]/ML
INJECTION, SOLUTION INTRAVENOUS; SUBCUTANEOUS
Status: DISCONTINUED | OUTPATIENT
Start: 2025-01-03 | End: 2025-01-03 | Stop reason: HOSPADM

## 2025-01-03 RX ORDER — ALPRAZOLAM 0.25 MG/1
0.25 TABLET ORAL 3 TIMES DAILY PRN
Status: CANCELLED | OUTPATIENT
Start: 2025-01-03 | End: 2025-01-13

## 2025-01-03 RX ORDER — ONDANSETRON 2 MG/ML
4 INJECTION INTRAMUSCULAR; INTRAVENOUS EVERY 6 HOURS PRN
Status: CANCELLED | OUTPATIENT
Start: 2025-01-03

## 2025-01-03 RX ORDER — VALSARTAN 80 MG/1
160 TABLET ORAL DAILY
Status: DISCONTINUED | OUTPATIENT
Start: 2025-01-03 | End: 2025-01-03 | Stop reason: HOSPADM

## 2025-01-03 RX ORDER — ASPIRIN 325 MG
325 TABLET, DELAYED RELEASE (ENTERIC COATED) ORAL DAILY
Status: CANCELLED | OUTPATIENT
Start: 2025-01-03

## 2025-01-03 RX ORDER — ACETAMINOPHEN 325 MG/1
650 TABLET ORAL EVERY 4 HOURS PRN
Status: CANCELLED | OUTPATIENT
Start: 2025-01-03

## 2025-01-03 RX ORDER — LIDOCAINE HYDROCHLORIDE 10 MG/ML
INJECTION, SOLUTION INFILTRATION; PERINEURAL
Status: DISCONTINUED | OUTPATIENT
Start: 2025-01-03 | End: 2025-01-03 | Stop reason: HOSPADM

## 2025-01-03 RX ORDER — VERAPAMIL HYDROCHLORIDE 2.5 MG/ML
INJECTION, SOLUTION INTRAVENOUS
Status: DISCONTINUED | OUTPATIENT
Start: 2025-01-03 | End: 2025-01-03 | Stop reason: HOSPADM

## 2025-01-03 RX ORDER — HEPARIN SODIUM 1000 [USP'U]/ML
2000 INJECTION, SOLUTION INTRAVENOUS; SUBCUTANEOUS AS NEEDED
Status: DISCONTINUED | OUTPATIENT
Start: 2025-01-03 | End: 2025-01-03 | Stop reason: ALTCHOICE

## 2025-01-03 RX ORDER — NITROGLYCERIN 0.4 MG/1
0.4 TABLET SUBLINGUAL
Status: DISCONTINUED | OUTPATIENT
Start: 2025-01-03 | End: 2025-01-03 | Stop reason: HOSPADM

## 2025-01-03 RX ORDER — FENTANYL CITRATE 50 UG/ML
INJECTION, SOLUTION INTRAMUSCULAR; INTRAVENOUS
Status: DISCONTINUED | OUTPATIENT
Start: 2025-01-03 | End: 2025-01-03 | Stop reason: HOSPADM

## 2025-01-03 RX ORDER — SODIUM CHLORIDE 9 MG/ML
100 INJECTION, SOLUTION INTRAVENOUS CONTINUOUS
Status: CANCELLED | OUTPATIENT
Start: 2025-01-03 | End: 2025-01-03

## 2025-01-03 RX ORDER — HEPARIN SODIUM 1000 [USP'U]/ML
4000 INJECTION, SOLUTION INTRAVENOUS; SUBCUTANEOUS ONCE
Status: DISCONTINUED | OUTPATIENT
Start: 2025-01-03 | End: 2025-01-03 | Stop reason: HOSPADM

## 2025-01-03 RX ORDER — NALOXONE HCL 0.4 MG/ML
0.4 VIAL (ML) INJECTION
Status: CANCELLED | OUTPATIENT
Start: 2025-01-03

## 2025-01-03 RX ORDER — MIDAZOLAM HYDROCHLORIDE 2 MG/2ML
INJECTION, SOLUTION INTRAMUSCULAR; INTRAVENOUS
Status: DISCONTINUED | OUTPATIENT
Start: 2025-01-03 | End: 2025-01-03 | Stop reason: HOSPADM

## 2025-01-03 RX ORDER — SODIUM CHLORIDE 0.9 % (FLUSH) 0.9 %
10 SYRINGE (ML) INJECTION AS NEEDED
Status: DISCONTINUED | OUTPATIENT
Start: 2025-01-03 | End: 2025-01-03 | Stop reason: HOSPADM

## 2025-01-03 RX ORDER — MORPHINE SULFATE 2 MG/ML
4 INJECTION, SOLUTION INTRAMUSCULAR; INTRAVENOUS
Status: CANCELLED | OUTPATIENT
Start: 2025-01-03

## 2025-01-03 RX ORDER — HYDROCODONE BITARTRATE AND ACETAMINOPHEN 5; 325 MG/1; MG/1
1 TABLET ORAL EVERY 4 HOURS PRN
Status: CANCELLED | OUTPATIENT
Start: 2025-01-03 | End: 2025-01-13

## 2025-01-03 RX ORDER — ONDANSETRON 4 MG/1
4 TABLET, ORALLY DISINTEGRATING ORAL EVERY 6 HOURS PRN
Status: CANCELLED | OUTPATIENT
Start: 2025-01-03

## 2025-01-03 RX ORDER — TEMAZEPAM 15 MG/1
15 CAPSULE ORAL NIGHTLY PRN
Status: CANCELLED | OUTPATIENT
Start: 2025-01-03 | End: 2025-01-13

## 2025-01-03 RX ORDER — EPTIFIBATIDE 0.75 MG/ML
2 INJECTION, SOLUTION INTRAVENOUS CONTINUOUS
Status: CANCELLED | OUTPATIENT
Start: 2025-01-03 | End: 2025-01-03

## 2025-01-03 RX ORDER — HEPARIN SODIUM 10000 [USP'U]/100ML
8.4 INJECTION, SOLUTION INTRAVENOUS
Status: DISCONTINUED | OUTPATIENT
Start: 2025-01-03 | End: 2025-01-03 | Stop reason: HOSPADM

## 2025-01-03 RX ORDER — SODIUM CHLORIDE 9 MG/ML
INJECTION, SOLUTION INTRAVENOUS
Status: COMPLETED | OUTPATIENT
Start: 2025-01-03 | End: 2025-01-03

## 2025-01-03 RX ORDER — CARVEDILOL 12.5 MG/1
6.25 TABLET ORAL 2 TIMES DAILY WITH MEALS
Status: DISCONTINUED | OUTPATIENT
Start: 2025-01-03 | End: 2025-01-03 | Stop reason: HOSPADM

## 2025-01-03 RX ORDER — ATORVASTATIN CALCIUM 40 MG/1
80 TABLET, FILM COATED ORAL NIGHTLY
Status: CANCELLED | OUTPATIENT
Start: 2025-01-03

## 2025-01-03 RX ORDER — LITHIUM CARBONATE 150 MG/1
300 CAPSULE ORAL DAILY
Status: DISCONTINUED | OUTPATIENT
Start: 2025-01-03 | End: 2025-01-03 | Stop reason: HOSPADM

## 2025-01-03 RX ORDER — IOPAMIDOL 755 MG/ML
INJECTION, SOLUTION INTRAVASCULAR
Status: DISCONTINUED | OUTPATIENT
Start: 2025-01-03 | End: 2025-01-03 | Stop reason: HOSPADM

## 2025-01-03 RX ORDER — IOPAMIDOL 612 MG/ML
100 INJECTION, SOLUTION INTRAVASCULAR
Status: COMPLETED | OUTPATIENT
Start: 2025-01-03 | End: 2025-01-03

## 2025-01-03 RX ORDER — NITROGLYCERIN 0.4 MG/1
0.4 TABLET SUBLINGUAL
Status: CANCELLED | OUTPATIENT
Start: 2025-01-03

## 2025-01-03 RX ADMIN — ASPIRIN 325 MG: 325 TABLET ORAL at 01:45

## 2025-01-03 RX ADMIN — NITROGLYCERIN 0.4 MG: 0.4 TABLET SUBLINGUAL at 03:25

## 2025-01-03 RX ADMIN — IOPAMIDOL 100 ML: 612 INJECTION, SOLUTION INTRAVENOUS at 01:42

## 2025-01-03 RX ADMIN — NITROGLYCERIN 0.4 MG: 0.4 TABLET SUBLINGUAL at 01:45

## 2025-01-03 NOTE — H&P
Patient Care Team:  Jacqueline Kent APRN as PCP - General (Family Medicine)  Gilda Hill MD as Consulting Physician (General Surgery)    Chief complaint : Chest pain    Subjective     Patient is a 39 y.o. male presents with acute onset of chest discomfort at 11:30 PM.  The patient described a diffuse anterior pressure sensation with left arm aching pain.  He waited approximately 4 hours before presenting to the emergency room.  In the emergency room his initial twelve-lead electrocardiogram showed no abnormalities.  However, the patient's chest discomfort intensified prompting repeat EKG showing ST segment elevation in the inferior and lateral leads.  The STEMI protocol was activated.  He has no prior history of myocardial infarction or coronary revascularization.  He has a history of hypertension which is treated.  He has untreated dyslipidemia and type 2 diabetes mellitus.  He has severe obesity.  He smokes 1 pack cigarettes per day.  He has a history of bipolar 1 disorder taking a combination of Elavil, Vraylar, Seroquel and lithium.    Review of Systems   Pertinent items are noted in HPI, all other systems reviewed and negative    History  Past Medical History:   Diagnosis Date    Anxiety     Asthma     Bipolar 1 disorder     Depression     DM (diabetes mellitus)     Hypertension     Kidney stone     Polyneuropathy     Positive TB test     treated w/ meds x 6 months    PTSD (post-traumatic stress disorder)     Sleep apnea     Suicide attempt     Tattoos      Past Surgical History:   Procedure Laterality Date    CHOLECYSTECTOMY  2002    INGUINAL HERNIA REPAIR Right 1995    ORIF METACARPAL FRACTURE Right 2000    TIBIA FRACTURE SURGERY Left 1997    ORIF     Family History   Problem Relation Age of Onset    Arthritis Father     Hypertension Father     Alcohol abuse Father     Drug abuse Father     Diabetes Maternal Aunt     Diabetes Maternal Uncle     Diabetes Maternal Grandmother     Alzheimer's  disease Maternal Grandmother     Dementia Maternal Grandmother     Diabetes Other     Hypertension Mother     Depression Mother     ADD / ADHD Brother     Early death Maternal Grandfather     Liver disease Maternal Grandfather     Alcohol abuse Maternal Grandfather     Cirrhosis Maternal Grandfather     No Known Problems Paternal Grandmother     Liver disease Paternal Grandfather     Alcohol abuse Paternal Grandfather     Early death Paternal Grandfather     Cirrhosis Paternal Grandfather     No Known Problems Brother     Anxiety disorder Neg Hx     Bipolar disorder Neg Hx     OCD Neg Hx     Paranoid behavior Neg Hx     Schizophrenia Neg Hx     Seizures Neg Hx     Self-Injurious Behavior  Neg Hx     Suicide Attempts Neg Hx     Colon cancer Neg Hx      Social History     Tobacco Use    Smoking status: Every Day     Current packs/day: 1.00     Average packs/day: 1 pack/day for 26.0 years (26.0 ttl pk-yrs)     Types: Cigarettes     Start date: 1999    Smokeless tobacco: Never   Vaping Use    Vaping status: Never Used   Substance Use Topics    Alcohol use: No    Drug use: Yes     Types: Marijuana     Comment: rarely     E-cigarette/Vaping    E-cigarette/Vaping Use Never User      E-cigarette/Vaping Substances    Nicotine No      Medications Prior to Admission   Medication Sig Dispense Refill Last Dose/Taking    amitriptyline (ELAVIL) 50 MG tablet TAKE 1 TABLET BY MOUTH EVERYDAY AT BEDTIME 30 tablet 1     Cariprazine HCl (Vraylar) 3 MG capsule capsule Take 1 capsule by mouth Daily. 30 capsule 2     carvedilol (COREG) 6.25 MG tablet TAKE 1 TABLET BY MOUTH 2 (TWO) TIMES A DAY WITH MEALS. ANNUAL DUE FOR REFILLS 90 tablet 3     lithium 300 MG tablet TAKE 1 TABLET BY MOUTH EVERY MORNING AND 2 TABLETS EVERY NIGHT 90 tablet 1     omeprazole (priLOSEC) 40 MG capsule TAKE 1 CAPSULE BY MOUTH EVERY MORNING 30 MINS BEFORE BREAKFAST 90 capsule 1     ondansetron ODT (ZOFRAN-ODT) 8 MG disintegrating tablet Place 1 tablet on the tongue  Every 8 (Eight) Hours As Needed for Nausea or Vomiting. 21 tablet 0     QUEtiapine 150 MG tablet Take 150 mg by mouth At Night As Needed (Insomnia). 30 tablet 2     valsartan (DIOVAN) 160 MG tablet TAKE 1 TABLET BY MOUTH EVERY DAY 90 tablet 1      Allergies:  Gabapentin and Latex    Objective     Vital Signs  Temp:  [97.5 °F (36.4 °C)] 97.5 °F (36.4 °C)  Heart Rate:  [64-85] 71  Resp:  [18-20] 20  BP: (123-181)/() 147/103    Physical Exam:      General Appearance:  Alert, cooperative, in no acute distress   Head:  Normocephalic, without obvious abnormality, atraumatic   Eyes:  Lids and lashes normal, conjunctivae and sclerae normal, no icterus, no pallor, corneas clear, PERRLA   Ears:  Ears appear intact with no abnormalities noted   Throat:  No oral lesions, no thrush, oral mucosa moist   Neck:  No adenopathy, supple, trachea midline, no thyromegaly, no carotid bruit, no JVD   Back:  No kyphosis present, no scoliosis present, no skin lesions, erythema or scars, no tenderness to percussion or palpation, range of motion normal   Lungs:  Clear to auscultation, respirations regular, even and unlabored    Heart:  Regular rhythm and normal rate, normal S1 and S2, no murmur, no gallop, no rub, no click   Chest Wall:  No abnormalities observed   Abdomen:  Normal bowel sounds, no masses, no organomegaly, soft non-tender, non-distended, no guarding, no rebound tenderness   Rectal:  Deferred   Extremities:  Moves all extremities well, no edema, no cyanosis, no redness   Pulses:  Pulses palpable and equal bilaterally   Skin:  No bleeding, bruising or rash   Lymph nodes:  No palpable adenopathy   Neurologic:  Cranial nerves 2 - 12 grossly intact, sensation intact, DTR present and equal bilaterally                                                                             Results Review:    I reviewed the patient's new clinical results.    Assessment & Plan       NSTEMI (non-ST elevated myocardial infarction)    STEMI  involving left circumflex coronary artery      Proceed with emergency invasive coronary angiography from an anticipated radial approach with interventional standby.  Further recommendations will be predicated on the results of his catheterization findings.  Patient has been counseled regarding the essential need to discontinue cigarette smoking.    I discussed the patients findings and my recommendations with patient.     Cody Marion MD  01/03/25  04:57 EST

## 2025-01-03 NOTE — PAYOR COMM NOTE
"To:  [facility]  From: Karely Young RN  Phone: [phone]  Fax: [phone]  NPI: [id number]  TIN: [id number]  Member ID: [id number]   MRN: [id number]    Alex iL (39 y.o. Male)       Date of Birth   1985    Social Security Number       Address   [address] [address]    Home Phone   [phone]    MRN   [id number]       Anabaptist   None    Marital Status                               Admission Date   1/3/25    Admission Type   Emergency    Admitting Provider   Cody Marion MD    Attending Provider       Department, Room/Bed   [facility] CATH LAB, Pool/CATH       Discharge Date   1/3/2025    Discharge Disposition   Transfer to Another Facility    Discharge Destination                                 Attending Provider: (none)   Allergies: Gabapentin, Latex    Isolation: None   Infection: None   Code Status: Not on file    Ht: 193 cm (76\")   Wt: 118 kg (260 lb)    Admission Cmt: None   Principal Problem: STEMI involving left circumflex coronary artery [I21.21]                   Active Insurance as of 1/3/2025       Primary Coverage       Payor Plan Insurance Group Employer/Plan Group    [employer] [place] [employer]        Payor Plan Address Payor Plan Phone Number Payor Plan Fax Number Effective Dates    [address] [address] [phone]  3/1/2017 - None Entered    [facility] [id number]         Subscriber Name Subscriber Birth Date Member ID       ALEX LI 1985 [id number]                     Emergency Contacts        (Rel.) Home Phone Work Phone Mobile Phone    XIMENASHAWN RIVERA (Spouse) [phone] -- [phone]                 History & Physical        BreedingCody MD at 01/03/25 0459                Patient Care Team:  Jacqueline Kent APRN as PCP - General (Family Medicine)  Gilda Hill MD as Consulting Physician (General Surgery)    Chief complaint : Chest pain    Subjective    Patient is a 39 y.o. " male presents with acute onset of chest discomfort at 11:30 PM.  The patient described a diffuse anterior pressure sensation with left arm aching pain.  He waited approximately 4 hours before presenting to the emergency room.  In the emergency room his initial twelve-lead electrocardiogram showed no abnormalities.  However, the patient's chest discomfort intensified prompting repeat EKG showing ST segment elevation in the inferior and lateral leads.  The STEMI protocol was activated.  He has no prior history of myocardial infarction or coronary revascularization.  He has a history of hypertension which is treated.  He has untreated dyslipidemia and type 2 diabetes mellitus.  He has severe obesity.  He smokes 1 pack cigarettes per day.  He has a history of bipolar 1 disorder taking a combination of Elavil, Vraylar, Seroquel and lithium.    Review of Systems   Pertinent items are noted in HPI, all other systems reviewed and negative    History  Past Medical History:   Diagnosis Date    Anxiety     Asthma     Bipolar 1 disorder     Depression     DM (diabetes mellitus)     Hypertension     Kidney stone     Polyneuropathy     Positive TB test     treated w/ meds x 6 months    PTSD (post-traumatic stress disorder)     Sleep apnea     Suicide attempt     Tattoos      Past Surgical History:   Procedure Laterality Date    CHOLECYSTECTOMY  2002    INGUINAL HERNIA REPAIR Right 1995    ORIF METACARPAL FRACTURE Right 2000    TIBIA FRACTURE SURGERY Left 1997    ORIF     Family History   Problem Relation Age of Onset    Arthritis Father     Hypertension Father     Alcohol abuse Father     Drug abuse Father     Diabetes Maternal Aunt     Diabetes Maternal Uncle     Diabetes Maternal Grandmother     Alzheimer's disease Maternal Grandmother     Dementia Maternal Grandmother     Diabetes Other     Hypertension Mother     Depression Mother     ADD / ADHD Brother     Early death Maternal Grandfather     Liver disease Maternal  Grandfather     Alcohol abuse Maternal Grandfather     Cirrhosis Maternal Grandfather     No Known Problems Paternal Grandmother     Liver disease Paternal Grandfather     Alcohol abuse Paternal Grandfather     Early death Paternal Grandfather     Cirrhosis Paternal Grandfather     No Known Problems Brother     Anxiety disorder Neg Hx     Bipolar disorder Neg Hx     OCD Neg Hx     Paranoid behavior Neg Hx     Schizophrenia Neg Hx     Seizures Neg Hx     Self-Injurious Behavior  Neg Hx     Suicide Attempts Neg Hx     Colon cancer Neg Hx      Social History     Tobacco Use    Smoking status: Every Day     Current packs/day: 1.00     Average packs/day: 1 pack/day for 26.0 years (26.0 ttl pk-yrs)     Types: Cigarettes     Start date: 1999    Smokeless tobacco: Never   Vaping Use    Vaping status: Never Used   Substance Use Topics    Alcohol use: No    Drug use: Yes     Types: Marijuana     Comment: rarely     E-cigarette/Vaping    E-cigarette/Vaping Use Never User      E-cigarette/Vaping Substances    Nicotine No      Medications Prior to Admission   Medication Sig Dispense Refill Last Dose/Taking    amitriptyline (ELAVIL) 50 MG tablet TAKE 1 TABLET BY MOUTH EVERYDAY AT BEDTIME 30 tablet 1     Cariprazine HCl (Vraylar) 3 MG capsule capsule Take 1 capsule by mouth Daily. 30 capsule 2     carvedilol (COREG) 6.25 MG tablet TAKE 1 TABLET BY MOUTH 2 (TWO) TIMES A DAY WITH MEALS. ANNUAL DUE FOR REFILLS 90 tablet 3     lithium 300 MG tablet TAKE 1 TABLET BY MOUTH EVERY MORNING AND 2 TABLETS EVERY NIGHT 90 tablet 1     omeprazole (priLOSEC) 40 MG capsule TAKE 1 CAPSULE BY MOUTH EVERY MORNING 30 MINS BEFORE BREAKFAST 90 capsule 1     ondansetron ODT (ZOFRAN-ODT) 8 MG disintegrating tablet Place 1 tablet on the tongue Every 8 (Eight) Hours As Needed for Nausea or Vomiting. 21 tablet 0     QUEtiapine 150 MG tablet Take 150 mg by mouth At Night As Needed (Insomnia). 30 tablet 2     valsartan (DIOVAN) 160 MG tablet TAKE 1 TABLET BY  MOUTH EVERY DAY 90 tablet 1      Allergies:  Gabapentin and Latex    Objective    Vital Signs  Temp:  [97.5 °F (36.4 °C)] 97.5 °F (36.4 °C)  Heart Rate:  [64-85] 71  Resp:  [18-20] 20  BP: (123-181)/() 147/103    Physical Exam:      General Appearance:  Alert, cooperative, in no acute distress   Head:  Normocephalic, without obvious abnormality, atraumatic   Eyes:  Lids and lashes normal, conjunctivae and sclerae normal, no icterus, no pallor, corneas clear, PERRLA   Ears:  Ears appear intact with no abnormalities noted   Throat:  No oral lesions, no thrush, oral mucosa moist   Neck:  No adenopathy, supple, trachea midline, no thyromegaly, no carotid bruit, no JVD   Back:  No kyphosis present, no scoliosis present, no skin lesions, erythema or scars, no tenderness to percussion or palpation, range of motion normal   Lungs:  Clear to auscultation, respirations regular, even and unlabored    Heart:  Regular rhythm and normal rate, normal S1 and S2, no murmur, no gallop, no rub, no click   Chest Wall:  No abnormalities observed   Abdomen:  Normal bowel sounds, no masses, no organomegaly, soft non-tender, non-distended, no guarding, no rebound tenderness   Rectal:  Deferred   Extremities:  Moves all extremities well, no edema, no cyanosis, no redness   Pulses:  Pulses palpable and equal bilaterally   Skin:  No bleeding, bruising or rash   Lymph nodes:  No palpable adenopathy   Neurologic:  Cranial nerves 2 - 12 grossly intact, sensation intact, DTR present and equal bilaterally                                                                             Results Review:    I reviewed the patient's new clinical results.    Assessment & Plan      NSTEMI (non-ST elevated myocardial infarction)    STEMI involving left circumflex coronary artery      Proceed with emergency invasive coronary angiography from an anticipated radial approach with interventional standby.  Further recommendations will be predicated on the  results of his catheterization findings.  Patient has been counseled regarding the essential need to discontinue cigarette smoking.    I discussed the patients findings and my recommendations with patient.     Cody Marion MD  25  04:57 EST          Electronically signed by Cody Marion MD at 25 0501          Emergency Department Notes        Lo Mejia, RN at 25 0350          Heparin drip brought from pharmacy. Unable to pull bolus dose of heparin d/t it being previously issued. House supervisor at bedside to transport pt to cath lab @ 0340. Informed house that pt had not had bolus and that heparin infusion was not started as it had just been received from pharmacy. Per H.S. heparin can be given in cath lab. Pt then transported to cath lab in stable condition. Heparin drip was given to cath lab staff and they were informed that pt had not received bolus. Care handed off at this time.     Electronically signed by Lo Mejia RN at 25 0417       Mary Bustamante RN at 25 0324          House Supervisor called to activate cath lab per Marlon QUIÑONES    Electronically signed by Mary Bustamante, RN at 25 0324       Lo Mejia RN at 25 0320          Noted pt to have runs of V tach. Upon entering pt room, noted pt to be sitting up in bed, alert. Pt reports having a sudden return of CP and arm numbness. Dr Mason called to come to room.     Electronically signed by Lo Mejia RN at 25 040       Jose Mason MD at 25 0112                  [facility] EMERGENCY DEPARTMENT  Emergency Department Encounter  Emergency Medicine Physician Note       Pt Name: Alex Torres  MRN: [id number]  Pt :   1985  Room Number:    Date of encounter:  1/3/2025  PCP: Jacqueline Kent APRN  ED Provider: Jose Mason MD    Historian: Patient      HPI:  Chief Complaint: Chest pain        Context: Alex KAPLAN  Melissa is a 39 y.o. male who presents to the ED c/o chest pain.  Patient states around 1.5 to 2 hours ago he had sudden onset of left-sided chest pain.  Chest pain radiates in the left side of his arm.  States it feels like a pressure sensation.  Has never had something like this before.  Also reports mild headache and mild nausea.  Denies missing any doses of his home medications, took them around 7 or 8 PM.      PAST MEDICAL HISTORY  Past Medical History:   Diagnosis Date    Anxiety     Asthma     Bipolar 1 disorder     Depression     DM (diabetes mellitus)     Hypertension     Kidney stone     Polyneuropathy     Positive TB test     treated w/ meds x 6 months    PTSD (post-traumatic stress disorder)     Sleep apnea     Suicide attempt     Tattoos          PAST SURGICAL HISTORY  Past Surgical History:   Procedure Laterality Date    CHOLECYSTECTOMY  2002    INGUINAL HERNIA REPAIR Right 1995    ORIF METACARPAL FRACTURE Right 2000    TIBIA FRACTURE SURGERY Left 1997    ORIF         FAMILY HISTORY  Family History   Problem Relation Age of Onset    Arthritis Father     Hypertension Father     Alcohol abuse Father     Drug abuse Father     Diabetes Maternal Aunt     Diabetes Maternal Uncle     Diabetes Maternal Grandmother     Alzheimer's disease Maternal Grandmother     Dementia Maternal Grandmother     Diabetes Other     Hypertension Mother     Depression Mother     ADD / ADHD Brother     Early death Maternal Grandfather     Liver disease Maternal Grandfather     Alcohol abuse Maternal Grandfather     Cirrhosis Maternal Grandfather     No Known Problems Paternal Grandmother     Liver disease Paternal Grandfather     Alcohol abuse Paternal Grandfather     Early death Paternal Grandfather     Cirrhosis Paternal Grandfather     No Known Problems Brother     Anxiety disorder Neg Hx     Bipolar disorder Neg Hx     OCD Neg Hx     Paranoid behavior Neg Hx     Schizophrenia Neg Hx     Seizures Neg Hx     Self-Injurious  Behavior  Neg Hx     Suicide Attempts Neg Hx     Colon cancer Neg Hx          SOCIAL HISTORY  Social History     Socioeconomic History    Marital status:    Tobacco Use    Smoking status: Every Day     Current packs/day: 1.00     Average packs/day: 1 pack/day for 26.0 years (26.0 ttl pk-yrs)     Types: Cigarettes     Start date: 1999    Smokeless tobacco: Never   Vaping Use    Vaping status: Never Used   Substance and Sexual Activity    Alcohol use: No    Drug use: Yes     Types: Marijuana     Comment: rarely    Sexual activity: Yes     Partners: Female         ALLERGIES  Gabapentin and Latex        REVIEW OF SYSTEMS  Review of Systems     All systems reviewed and negative except for those discussed in HPI.       PHYSICAL EXAM    I have reviewed the triage vital signs and nursing notes.    ED Triage Vitals [01/03/25 0048]   Temp Heart Rate Resp BP SpO2   97.5 °F (36.4 °C) 85 18 (!) 172/111 100 %      Temp src Heart Rate Source Patient Position BP Location FiO2 (%)   Oral Monitor Sitting Left arm --       Physical Exam    General:  Awake, alert, overweight, no acute distress  HEENT: Atraumatic, normocephalic, EOMI, PERRLA, mucous membranes moist  NECK:  Supple, atraumatic  Cardiovascular:  Regular rate, regular rhythm, no murmurs, rubs, or gallops.  Extremities well perfused   Respiratory:  Regular rate, clear lungs to auscultation bilaterally.  No rhonchi, rales, wheezing  Abdominal:  Soft, nondistended, nontender.  No guarding or rebound.  No palpable masses  Extremity:  No visible bony abnormalities in all 4 extremities.  Full range of motion of all extremities.  Skin:  Warm and dry.  No rashes  Neuro:  AAOx3, GCS 15. Cranial nerves 2-12 grossly intact.  No focal strength or sensation deficits.  Psych:  Mood and affect appropriate.        LAB RESULTS  Recent Results (from the past 24 hours)   Comprehensive Metabolic Panel    Collection Time: 01/03/25  1:12 AM    Specimen: Blood   Result Value Ref Range     Glucose 102 (H) 65 - 99 mg/dL    BUN 15 6 - 20 mg/dL    Creatinine 1.15 0.76 - 1.27 mg/dL    Sodium 137 136 - 145 mmol/L    Potassium 4.6 3.5 - 5.2 mmol/L    Chloride 100 98 - 107 mmol/L    CO2 26.0 22.0 - 29.0 mmol/L    Calcium 9.8 8.6 - 10.5 mg/dL    Total Protein 7.3 6.0 - 8.5 g/dL    Albumin 4.5 3.5 - 5.2 g/dL    ALT (SGPT) 38 1 - 41 U/L    AST (SGOT) 25 1 - 40 U/L    Alkaline Phosphatase 85 39 - 117 U/L    Total Bilirubin 0.5 0.0 - 1.2 mg/dL    Globulin 2.8 gm/dL    A/G Ratio 1.6 g/dL    BUN/Creatinine Ratio 13.0 7.0 - 25.0    Anion Gap 11.0 5.0 - 15.0 mmol/L    eGFR 82.0 >60.0 mL/min/1.73   High Sensitivity Troponin T    Collection Time: 01/03/25  1:12 AM    Specimen: Blood   Result Value Ref Range    HS Troponin T 24 (H) <22 ng/L   Green Top (Gel)    Collection Time: 01/03/25  1:12 AM   Result Value Ref Range    Extra Tube Hold for add-ons.    Lavender Top    Collection Time: 01/03/25  1:12 AM   Result Value Ref Range    Extra Tube hold for add-on    Gold Top - SST    Collection Time: 01/03/25  1:12 AM   Result Value Ref Range    Extra Tube Hold for add-ons.    Light Blue Top    Collection Time: 01/03/25  1:12 AM   Result Value Ref Range    Extra Tube Hold for add-ons.    CBC Auto Differential    Collection Time: 01/03/25  1:12 AM    Specimen: Blood   Result Value Ref Range    WBC 11.80 (H) 3.40 - 10.80 10*3/mm3    RBC 5.07 4.14 - 5.80 10*6/mm3    Hemoglobin 15.6 13.0 - 17.7 g/dL    Hematocrit 47.7 37.5 - 51.0 %    MCV 94.1 79.0 - 97.0 fL    MCH 30.8 26.6 - 33.0 pg    MCHC 32.7 31.5 - 35.7 g/dL    RDW 14.0 12.3 - 15.4 %    RDW-SD 48.5 37.0 - 54.0 fl    MPV 8.4 6.0 - 12.0 fL    Platelets 192 140 - 450 10*3/mm3    Neutrophil % 66.4 42.7 - 76.0 %    Lymphocyte % 24.8 19.6 - 45.3 %    Monocyte % 5.5 5.0 - 12.0 %    Eosinophil % 1.7 0.3 - 6.2 %    Basophil % 0.4 0.0 - 1.5 %    Immature Grans % 1.2 (H) 0.0 - 0.5 %    Neutrophils, Absolute 7.83 (H) 1.70 - 7.00 10*3/mm3    Lymphocytes, Absolute 2.93 0.70 - 3.10  10*3/mm3    Monocytes, Absolute 0.65 0.10 - 0.90 10*3/mm3    Eosinophils, Absolute 0.20 0.00 - 0.40 10*3/mm3    Basophils, Absolute 0.05 0.00 - 0.20 10*3/mm3    Immature Grans, Absolute 0.14 (H) 0.00 - 0.05 10*3/mm3    nRBC 0.0 0.0 - 0.2 /100 WBC   High Sensitivity Troponin T 1Hr    Collection Time: 01/03/25  2:23 AM    Specimen: Blood   Result Value Ref Range    HS Troponin T 55 (C) <22 ng/L    Troponin T Numeric Delta 31 ng/L    Troponin T % Delta 129 (C) Abnormal if >/= 20% %       If labs were ordered, I independently evaluated the results and considered them in treating the patient.        RADIOLOGY  CT Angiogram Chest    Result Date: 1/3/2025  FINAL REPORT TECHNIQUE: null CLINICAL HISTORY: Hypertension and left-sided chest pain COMPARISON: null FINDINGS: Exam: CTA Chest with IV contrast. Procedure: Contrast was administered. Coronal and sagittal MIP reformats were performed Comparison: None Clinical history: Hypertension and left-sided chest pain Findings: No pulmonary emboli. No thoracic aortic aneurysm or dissection. Atherosclerotic calcifications are seen at the aorta and coronary arteries. Calcified prevascular and left hilar lymph nodes No pericardial fluid collection. No pneumothorax. No pleural fluid collection. No pneumonia Mild paraseptal emphysema at the lung apices. Visualized liver and spleen do not demonstrate any acute process . Prior cholecystectomy. No thoracic spine compression fractures     Impression: 1. No pulmonary emboli. 2. No thoracic aortic aneurysm or dissection 3. No pneumonia Authenticated and Electronically Signed by Lorena Null MD on 01/03/2025 02:18:55 AM     I ordered and independently evaluated the above noted radiographic studies.     See radiologist's dictation for official interpretation.        PROCEDURES    Procedures    ECG 12 Lead Chest Pain   Final Result      ECG 12 Lead ED Triage Standing Order; Chest Pain   Final Result          MEDICATIONS GIVEN IN  ER    Medications   sodium chloride 0.9 % flush 10 mL (has no administration in time range)   nitroglycerin (NITROSTAT) SL tablet 0.4 mg (0.4 mg Sublingual Given 1/3/25 0325)   heparin (porcine) injection 4,000 Units (has no administration in time range)   heparin 21566 units/250 ml (100 units/ml) in D5W (has no administration in time range)   heparin (porcine) injection 4,000 Units (has no administration in time range)   heparin (porcine) injection 2,000 Units (has no administration in time range)   Pharmacy to Dose Heparin (has no administration in time range)   aspirin tablet 325 mg (325 mg Oral Given 1/3/25 0145)   iopamidol (ISOVUE-300) 61 % injection 100 mL (100 mL Intravenous Given 1/3/25 0142)         MEDICAL DECISION MAKING, PROGRESS, and CONSULTS    All labs, if obtained, have been independently reviewed by me.  All radiology studies, if obtained, have been evaluated by me and the radiologist dictating the report.  All EKG's, if obtained, have been independently viewed and interpreted by me.      Discussion below represents my analysis of pertinent findings related to patient's condition, differential diagnosis, treatment plan and final disposition.    Alex Torres is a 39 y.o. male who presents to the ED c/o chest pain.  Hypertensive on arrival with blood pressure 172/111.  Differential includes but is not limited to angina, myocardial infarction, hypertensive urgency, arrhythmia, pneumothorax, pulmonary edema, dissection.  Given 324 mg oral aspirin.  Extensive labs ordered along with EKG.      EKG personally interpreted showing normal sinus rhythm with rate of 70 with no evidence of acute ischemic changes/STEMI along with no critical interval abnormalities.    Patient given sublingual nitroglycerin with significant improvement of blood pressure.  Also had improvement of pain.    Labs show mild leukocytosis of 11.8.  Mildly elevated initial troponin of 24.     Chest x-ray obtained which was  personally interpreted showing evidence of possible vascular congestion but no large acute cardiopulmonary abnormality.    CTA chest negative for dissection or acute cardiopulmonary abnormality.     Repeat 1 hour troponin obtained and was 55.    Heart score of 4.  Due to high risk chest pain with positive delta troponin change, contacted hospitalist who is agreeable with plan for admission for further treatment.  Extensively discussed all results with patient who is agreeable with plan.     Prior to admission, patient had worsening of chest pain and EKG was repeated.  This EKG also personally interpreted shows new ST elevation along leads II, III and aVF concerning for STEMI.  Patient also had approximately 14 beat run of V. tach that self converted back to normal sinus rhythm.  Patient placed on defibrillator pads.  Contacted interventional cardiologist and activated Cath Lab.        HEART Score: 4                      Orders placed during this visit:  Orders Placed This Encounter   Procedures    COVID PRE-OP / PRE-PROCEDURE SCREENING ORDER (NO ISOLATION) - Swab, Nasopharynx    COVID-19 and FLU A/B PCR, 1 HR TAT - Swab, Nasopharynx    XR Chest 1 View    CT Angiogram Chest    [facility] Draw    Comprehensive Metabolic Panel    High Sensitivity Troponin T    CBC Auto Differential    High Sensitivity Troponin T 1Hr    Urine Drug Screen - Urine, Clean Catch    Heparin Anti-Xa    Protime-INR    aPTT    NPO Diet NPO Type: Strict NPO    Undress & Gown    Continuous Pulse Oximetry    Notify Provider Platelet Count Less Than 52193    Stop Infusion & Notify Provider if Bleeding Occurs    Oxygen Therapy- Nasal Cannula; Titrate 1-6 LPM Per SpO2; 90 - 95%    ECG 12 Lead ED Triage Standing Order; Chest Pain    ECG 12 Lead Chest Pain    Insert Peripheral IV    CBC & Differential    Green Top (Gel)    Lavender Top    Gold Top - SST    Light Blue Top    CBC & Differential                     Shared Decision Making:  After my  consideration of clinical presentation and any laboratory/radiology studies obtained, I discussed the findings with the patient/patient representative who is in agreement with the treatment plan and the final disposition.   Risks and benefits of discharge and/or observation/admission were discussed.      AS OF 03:30 EST VITALS:    BP - (!) 181/116  HR - 66  TEMP - 97.5 °F (36.4 °C) (Oral)  O2 SATS - 93%                  DIAGNOSIS  Final diagnoses:   Acute chest pain   Primary hypertension   Troponin level elevated   Inferior MI   Nonsustained ventricular tachycardia         DISPOSITION  Admit      Please note that portions of this document were completed with voice recognition software.        Jose Mason MD  01/03/25 0711      Electronically signed by Jose Mason MD at 01/03/25 0711       Vital Signs (last day) before discharge       Date/Time Temp Temp src Pulse Resp BP Patient Position SpO2    01/03/25 0513 -- -- 68 16 130/92 -- --    01/03/25 0328 -- -- 71 -- 147/103 -- 98    01/03/25 0325 -- -- 66 -- 181/116 -- --    01/03/25 0318 -- -- 70 -- 174/114 -- 98    01/03/25 0305 -- -- 71 20 138/81 -- 93    01/03/25 0300 -- -- 74 -- 131/81 -- 93    01/03/25 0255 -- -- 71 -- 126/85 -- 92    01/03/25 0250 -- -- 75 -- 135/91 -- 94    01/03/25 0245 -- -- 66 -- 150/92 -- 96    01/03/25 0240 -- -- 67 -- 140/91 -- 93    01/03/25 0235 -- -- 64 -- 134/90 -- 92    01/03/25 0230 -- -- 67 -- 136/87 -- 92    01/03/25 0225 -- -- 66 -- 145/93 -- 93    01/03/25 0220 -- -- 71 -- 128/84 -- 91    01/03/25 0215 -- -- 75 -- 123/75 -- 92    01/03/25 0210 -- -- 78 -- 131/78 -- 91    01/03/25 0205 -- -- 73 -- 128/77 -- 92    01/03/25 0200 -- -- 75 -- 125/80 -- 92    01/03/25 0156 -- -- 83 -- 128/82 -- 94    01/03/25 0151 -- -- 81 -- 127/79 -- 94    01/03/25 0145 -- -- 72 -- 148/84 -- --    01/03/25 0132 -- -- 74 -- 138/89 -- 97    01/03/25 0048 97.5 (36.4) Oral 85 18 172/111 Sitting 100          No current facility-administered  medications for this encounter.     Current Outpatient Medications   Medication Sig Dispense Refill    amitriptyline (ELAVIL) 50 MG tablet TAKE 1 TABLET BY MOUTH EVERYDAY AT BEDTIME 30 tablet 1    Cariprazine HCl (Vraylar) 3 MG capsule capsule Take 1 capsule by mouth Daily. 30 capsule 2    carvedilol (COREG) 6.25 MG tablet TAKE 1 TABLET BY MOUTH 2 (TWO) TIMES A DAY WITH MEALS. ANNUAL DUE FOR REFILLS 90 tablet 3    lithium 300 MG tablet TAKE 1 TABLET BY MOUTH EVERY MORNING AND 2 TABLETS EVERY NIGHT 90 tablet 1    omeprazole (priLOSEC) 40 MG capsule TAKE 1 CAPSULE BY MOUTH EVERY MORNING 30 MINS BEFORE BREAKFAST 90 capsule 1    ondansetron ODT (ZOFRAN-ODT) 8 MG disintegrating tablet Place 1 tablet on the tongue Every 8 (Eight) Hours As Needed for Nausea or Vomiting. 21 tablet 0    QUEtiapine 150 MG tablet Take 150 mg by mouth At Night As Needed (Insomnia). 30 tablet 2    valsartan (DIOVAN) 160 MG tablet TAKE 1 TABLET BY MOUTH EVERY DAY 90 tablet 1     Lab Results (last 24 hours)       Procedure Component Value Units Date/Time    POC Activated Clotting Time [[id number]]  (Abnormal) Collected: 01/03/25 0524    Specimen: Blood Updated: 01/03/25 0524     Activated Clotting Time  316.0000 Seconds      Comment: Serial Number: [id number]:  [id number]       Fentanyl, Urine - Urine, Clean Catch [[id number]]  (Normal) Collected: 01/03/25 0341    Specimen: Urine, Clean Catch Updated: 01/03/25 0404     Fentanyl, Urine Negative    Narrative:      Negative Threshold:      Fentanyl 5 ng/mL     The normal value for the drug tested is negative. This report includes final unconfirmed screening results to be used for medical treatment purposes only. Unconfirmed results must not be used for non-medical purposes such as employment or legal testing. Clinical consideration should be applied to any drug of abuse test, particularly when unconfirmed results are used.           Heparin Anti-Xa [[id number]]  (Abnormal) Collected: 01/03/25 8713     Specimen: Blood from Cannula Updated: 01/03/25 0404     Heparin Anti-Xa (UFH) 0.10 IU/ml     COVID PRE-OP / PRE-PROCEDURE SCREENING ORDER (NO ISOLATION) - Swab, Nasopharynx [[id number]]  (Normal) Collected: 01/03/25 0331    Specimen: Swab from Nasopharynx Updated: 01/03/25 0402    Narrative:      The following orders were created for panel order COVID PRE-OP / PRE-PROCEDURE SCREENING ORDER (NO ISOLATION) - Swab, Nasopharynx.  Procedure                               Abnormality         Status                     ---------                               -----------         ------                     COVID-19 and FLU A/B PCR...[[id number]]  Normal              Final result                 Please view results for these tests on the individual orders.    COVID-19 and FLU A/B PCR, 1 HR TAT - Swab, Nasopharynx [[id number]]  (Normal) Collected: 01/03/25 0331    Specimen: Swab from Nasopharynx Updated: 01/03/25 0402     COVID19 Not Detected     Influenza A PCR Not Detected     Influenza B PCR Not Detected    Narrative:      Fact sheet for providers: https://www.fda.gov/media/507569/download    Fact sheet for patients: https://www.fda.gov/media/046835/download    Test performed by PCR.    Urine Drug Screen - Urine, Clean Catch [[id number]]  (Abnormal) Collected: 01/03/25 0341    Specimen: Urine, Clean Catch Updated: 01/03/25 0400     THC, Screen, Urine Positive     Phencyclidine (PCP), Urine Negative     Cocaine Screen, Urine Negative     Methamphetamine, Ur Negative     Opiate Screen Negative     Amphetamine Screen, Urine Negative     Benzodiazepine Screen, Urine Negative     Tricyclic Antidepressants Screen Positive     Methadone Screen, Urine Negative     Barbiturates Screen, Urine Negative     Oxycodone Screen, Urine Negative     Buprenorphine, Screen, Urine Negative    Narrative:      Cutoff For Drugs Screened:    Amphetamines               500 ng/ml  Barbiturates               200 ng/ml  Benzodiazepines            150  ng/ml  Cocaine                    150 ng/ml  Methadone                  200 ng/ml  Opiates                    100 ng/ml  Phencyclidine               25 ng/ml  THC                         50 ng/ml  Methamphetamine            500 ng/ml  Tricyclic Antidepressants  300 ng/ml  Oxycodone                  100 ng/ml  Buprenorphine               10 ng/ml    The normal value for all drugs tested is negative. This report includes unconfirmed screening results, with the cutoff values listed, to be used for medical treatment purposes only.  Unconfirmed results must not be used for non-medical purposes such as employment or legal testing.  Clinical consideration should be applied to any drug of abuse test, particularly when unconfirmed results are used.      Protime-INR [[id number]]  (Normal) Collected: 01/03/25 0112    Specimen: Blood Updated: 01/03/25 0348     Protime 13.4 Seconds      INR 0.98    Narrative:      Suggested INR therapeutic range for stable oral anticoagulant therapy:    Low Intensity therapy:   1.5-2.0  Moderate Intensity therapy:   2.0-3.0  High Intensity therapy:   2.5-4.0    aPTT [[id number]]  (Abnormal) Collected: 01/03/25 0112    Specimen: Blood Updated: 01/03/25 0348     PTT 30.2 seconds     High Sensitivity Troponin T 1Hr [[id number]]  (Abnormal) Collected: 01/03/25 0223    Specimen: Blood Updated: 01/03/25 0310     HS Troponin T 55 ng/L      Troponin T Numeric Delta 31 ng/L      Troponin T % Delta 129 %     Narrative:      High Sensitive Troponin T Reference Range:  <14.0 ng/L- Negative Female for AMI  <22.0 ng/L- Negative Male for AMI  >=14 - Abnormal Female indicating possible myocardial injury.  >=22 - Abnormal Male indicating possible myocardial injury.   Clinicians would have to utilize clinical acumen, EKG, Troponin, and serial changes to determine if it is an Acute Myocardial Infarction or myocardial injury due to an underlying chronic condition.         High Sensitivity Troponin T [[id number]]   (Abnormal) Collected: 01/03/25 0112    Specimen: Blood Updated: 01/03/25 0200     HS Troponin T 24 ng/L     Narrative:      High Sensitive Troponin T Reference Range:  <14.0 ng/L- Negative Female for AMI  <22.0 ng/L- Negative Male for AMI  >=14 - Abnormal Female indicating possible myocardial injury.  >=22 - Abnormal Male indicating possible myocardial injury.   Clinicians would have to utilize clinical acumen, EKG, Troponin, and serial changes to determine if it is an Acute Myocardial Infarction or myocardial injury due to an underlying chronic condition.         Comprehensive Metabolic Panel [[id number]]  (Abnormal) Collected: 01/03/25 0112    Specimen: Blood Updated: 01/03/25 0146     Glucose 102 mg/dL      BUN 15 mg/dL      Creatinine 1.15 mg/dL      Sodium 137 mmol/L      Potassium 4.6 mmol/L      Comment: Slight hemolysis detected by analyzer. Result may be falsely elevated.        Chloride 100 mmol/L      CO2 26.0 mmol/L      Calcium 9.8 mg/dL      Total Protein 7.3 g/dL      Albumin 4.5 g/dL      ALT (SGPT) 38 U/L      AST (SGOT) 25 U/L      Alkaline Phosphatase 85 U/L      Total Bilirubin 0.5 mg/dL      Globulin 2.8 gm/dL      A/G Ratio 1.6 g/dL      BUN/Creatinine Ratio 13.0     Anion Gap 11.0 mmol/L      eGFR 82.0 mL/min/1.73     Narrative:      GFR Categories in Chronic Kidney Disease (CKD)      GFR Category          GFR (mL/min/1.73)    Interpretation  G1                     90 or greater         Normal or high (1)  G2                      60-89                Mild decrease (1)  G3a                   45-59                Mild to moderate decrease  G3b                   30-44                Moderate to severe decrease  G4                    15-29                Severe decrease  G5                    14 or less           Kidney failure          (1)In the absence of evidence of kidney disease, neither GFR category G1 or G2 fulfill the criteria for CKD.    eGFR calculation 2021 CKD-EPI creatinine equation,  which does not include race as a factor    [place] Draw [[id number]] Collected: 01/03/25 0112    Specimen: Blood Updated: 01/03/25 0130    Narrative:      The following orders were created for panel order [place] Draw.  Procedure                               Abnormality         Status                     ---------                               -----------         ------                     Green Top (Gel)[[id number]]                                  Final result               Lavender Top[[id number]]                                     Final result               Gold Top - SST[[id number]]                                   Final result               Light Blue Top[[id number]]                                   Final result                 Please view results for these tests on the individual orders.    Green Top (Gel) [[id number]] Collected: 01/03/25 0112    Specimen: Blood Updated: 01/03/25 0130     Extra Tube Hold for add-ons.     Comment: Auto resulted.       Lavender Top [[id number]] Collected: 01/03/25 0112    Specimen: Blood Updated: 01/03/25 0130     Extra Tube hold for add-on     Comment: Auto resulted       Gold Top - SST [[id number]] Collected: 01/03/25 0112    Specimen: Blood Updated: 01/03/25 0130     Extra Tube Hold for add-ons.     Comment: Auto resulted.       Light Blue Top [[id number]] Collected: 01/03/25 0112    Specimen: Blood Updated: 01/03/25 0130     Extra Tube Hold for add-ons.     Comment: Auto resulted       CBC & Differential [[id number]]  (Abnormal) Collected: 01/03/25 0112    Specimen: Blood Updated: 01/03/25 0125    Narrative:      The following orders were created for panel order CBC & Differential.  Procedure                               Abnormality         Status                     ---------                               -----------         ------                     CBC Auto Differential[[id number]]        Abnormal            Final result                 Please view results for these tests on  the individual orders.    CBC Auto Differential [[id number]]  (Abnormal) Collected: 01/03/25 0112    Specimen: Blood Updated: 01/03/25 0125     WBC 11.80 10*3/mm3      RBC 5.07 10*6/mm3      Hemoglobin 15.6 g/dL      Hematocrit 47.7 %      MCV 94.1 fL      MCH 30.8 pg      MCHC 32.7 g/dL      RDW 14.0 %      RDW-SD 48.5 fl      MPV 8.4 fL      Platelets 192 10*3/mm3      Neutrophil % 66.4 %      Lymphocyte % 24.8 %      Monocyte % 5.5 %      Eosinophil % 1.7 %      Basophil % 0.4 %      Immature Grans % 1.2 %      Neutrophils, Absolute 7.83 10*3/mm3      Lymphocytes, Absolute 2.93 10*3/mm3      Monocytes, Absolute 0.65 10*3/mm3      Eosinophils, Absolute 0.20 10*3/mm3      Basophils, Absolute 0.05 10*3/mm3      Immature Grans, Absolute 0.14 10*3/mm3      nRBC 0.0 /100 WBC           Imaging Results (Last 24 Hours)       Procedure Component Value Units Date/Time    CT Angiogram Chest [[id number]] Collected: 01/03/25 0218     Updated: 01/03/25 0219    Narrative:      FINAL REPORT    TECHNIQUE:  null    CLINICAL HISTORY:  Hypertension and left-sided chest pain    COMPARISON:  null    FINDINGS:  Exam: CTA Chest with IV contrast.    Procedure: Contrast was administered. Coronal and sagittal MIP reformats were performed    Comparison: None    Clinical history: Hypertension and left-sided chest pain    Findings:    No pulmonary emboli.    No thoracic aortic aneurysm or dissection.    Atherosclerotic calcifications are seen at the aorta and coronary arteries.    Calcified prevascular and left hilar lymph nodes    No pericardial fluid collection.    No pneumothorax.    No pleural fluid collection.    No pneumonia    Mild paraseptal emphysema at the lung apices.    Visualized liver and spleen do not demonstrate any acute process .    Prior cholecystectomy.    No thoracic spine compression fractures      Impression:      Impression:    1. No pulmonary emboli.    2. No thoracic aortic aneurysm or dissection    3. No  pneumonia    Authenticated and Electronically Signed by Lorena Null MD  on 01/03/2025 02:18:55 AM          ECG/EMG Results (last 24 hours)       Procedure Component Value Units Date/Time    ECG 12 Lead ED Triage Standing Order; Chest Pain [[id number]] Resulted: 01/03/25 0107     Updated: 01/03/25 0107    ECG 12 Lead Chest Pain [[id number]] Resulted: 01/03/25 0325     Updated: 01/03/25 0326          Orders (last 24 hrs)        Start     Ordered    01/04/25 0600  CBC & Differential  Every Third Day,   Status:  Canceled      Comments: Discontinue After Heparin Stopped      01/03/25 0330    01/03/25 0900  lithium carbonate capsule 300 mg  Daily,   Status:  Discontinued         01/03/25 0455    01/03/25 0900  valsartan (DIOVAN) tablet 160 mg  Daily,   Status:  Discontinued         01/03/25 0455 01/03/25 0800  carvedilol (COREG) tablet 6.25 mg  2 Times Daily With Meals,   Status:  Discontinued         01/03/25 0455    01/03/25 0624  POC Activated Clotting Time  PROCEDURE ONCE,   Status:  Canceled         01/03/25 0524    01/03/25 0525  POC Activated Clotting Time  PROCEDURE ONCE         01/03/25 0524    01/03/25 0506  Reason For Not Ordering P2Y12 at Discharge  Once         01/03/25 0506    01/03/25 0503  Discharge patient  Once        Comments: Transfer by ambulance to [facility] for CABG.  Dr. Ronn Marina accepting.    01/03/25 0505    01/03/25 0456  eptifibatide (INTEGRILIN) bolus from bottle  Code / Trauma / Sedation Medication,   Status:  Discontinued         01/03/25 0456    01/03/25 0452  Inpatient Admission  Once         01/03/25 0455    01/03/25 0444  eptifibatide (INTEGRILIN) 75 mg in 100 mL solution  Code / Trauma / Sedation Continuous Med         01/03/25 0444    01/03/25 0444  eptifibatide (INTEGRILIN) bolus from bottle  Code / Trauma / Sedation Medication,   Status:  Discontinued         01/03/25 0444    01/03/25 0441  iopamidol (ISOVUE-370) 76 % injection  Code / Trauma / Sedation  Medication,   Status:  Discontinued         01/03/25 0441    01/03/25 0421  heparin (porcine) injection  Code / Trauma / Sedation Medication,   Status:  Discontinued         01/03/25 0421    01/03/25 0421  heparin (porcine) injection  Code / Trauma / Sedation Medication,   Status:  Discontinued         01/03/25 0421    01/03/25 0415  heparin (porcine) injection  Code / Trauma / Sedation Medication,   Status:  Discontinued         01/03/25 0415    01/03/25 0415  nitroglycerin 100 mcg/mL in D5W syringe  Code / Trauma / Sedation Medication,   Status:  Discontinued         01/03/25 0415    01/03/25 0415  verapamil (ISOPTIN) injection  Code / Trauma / Sedation Medication,   Status:  Discontinued         01/03/25 0415    01/03/25 0413  lidocaine (XYLOCAINE) 1 % injection  Code / Trauma / Sedation Medication,   Status:  Discontinued         01/03/25 0413    01/03/25 0411  sodium chloride 0.9 % infusion  Code / Trauma / Sedation Continuous Med         01/03/25 0411    01/03/25 0408  fentaNYL citrate (PF) (SUBLIMAZE) injection  Code / Trauma / Sedation Medication,   Status:  Discontinued         01/03/25 0408    01/03/25 0408  Midazolam HCl (PF) (VERSED) injection  Code / Trauma / Sedation Medication,   Status:  Discontinued         01/03/25 0408    01/03/25 0355  Cardiac Catheterization/Vascular Study  Once         01/03/25 0354    01/03/25 0346  heparin (porcine) injection 4,000 Units  Once,   Status:  Discontinued         01/03/25 0330    01/03/25 0346  heparin 04780 units/250 ml (100 units/ml) in D5W  Titrated,   Status:  Discontinued         01/03/25 0330    01/03/25 0344  Fentanyl, Urine - Urine, Clean Catch  Once         01/03/25 0343    01/03/25 0331  Initiate & Follow Heparin Protocol  Continuous,   Status:  Canceled         01/03/25 0330    01/03/25 0331  Notify Provider Platelet Count Less Than [id number]  Continuous,   Status:  Canceled        Comments: Open Order Report to View Parameters Requiring Provider  Notification    01/03/25 0330    01/03/25 0331  Stop Infusion & Notify Provider if Bleeding Occurs  Continuous,   Status:  Canceled        Comments: Open Order Report to View Parameters Requiring Provider Notification    01/03/25 0330    01/03/25 0331  Heparin Anti-Xa  STAT        Comments: Prior to Initial Heparin Bolus      01/03/25 0330    01/03/25 0331  Protime-INR  STAT        Comments: Prior to Initial Heparin Bolus      01/03/25 0330    01/03/25 0331  aPTT  STAT        Comments: Prior to Initial Heparin Bolus      01/03/25 0330    01/03/25 0330  heparin (porcine) injection 4,000 Units  As Needed,   Status:  Discontinued         01/03/25 0330    01/03/25 0330  heparin (porcine) injection 2,000 Units  As Needed,   Status:  Discontinued         01/03/25 0330 01/03/25 0330  Pharmacy to Dose Heparin  Continuous PRN,   Status:  Discontinued         01/03/25 0330    01/03/25 0323  COVID PRE-OP / PRE-PROCEDURE SCREENING ORDER (NO ISOLATION) - Swab, Nasopharynx  Once         01/03/25 0322    01/03/25 0323  COVID-19 and FLU A/B PCR, 1 HR TAT - Swab, Nasopharynx  PROCEDURE ONCE         01/03/25 0322    01/03/25 0322  Initiate Observation Status  Once,   Status:  Canceled         01/03/25 0322    01/03/25 0321  Urine Drug Screen - Urine, Clean Catch  Once         01/03/25 0320    01/03/25 0314  ECG 12 Lead Chest Pain  Once         01/03/25 0314    01/03/25 0212  High Sensitivity Troponin T 1Hr  PROCEDURE ONCE         01/03/25 0200    01/03/25 0158  iopamidol (ISOVUE-300) 61 % injection 100 mL  Once in Imaging         01/03/25 0142    01/03/25 0144  aspirin tablet 325 mg  Once         01/03/25 0128    01/03/25 0132  CT Angiogram Chest  1 Time Imaging         01/03/25 0131    01/03/25 0128  nitroglycerin (NITROSTAT) SL tablet 0.4 mg  Every 5 Minutes PRN,   Status:  Discontinued         01/03/25 0128    01/03/25 0106  NPO Diet NPO Type: Strict NPO  Diet Effective Now,   Status:  Canceled         01/03/25 0105    01/03/25  0106  Undress & Gown  Once         01/03/25 0105    01/03/25 0106  Cardiac Monitoring  Continuous,   Status:  Canceled        Comments: Follow Standing Orders As Outlined in Process Instructions (Open Order Report to View Full Instructions)    01/03/25 0105    01/03/25 0106  Continuous Pulse Oximetry  Continuous         01/03/25 0105    01/03/25 0106  ECG 12 Lead ED Triage Standing Order; Chest Pain  Once         01/03/25 0105    01/03/25 0106  XR Chest 1 View  1 Time Imaging         01/03/25 0105    01/03/25 0106  Insert Peripheral IV  Once,   Status:  Canceled         01/03/25 0105    01/03/25 0106  [place] Draw  Once         01/03/25 0105    01/03/25 0106  CBC & Differential  Once         01/03/25 0105 01/03/25 0106  Comprehensive Metabolic Panel  Once         01/03/25 0105    01/03/25 0106  High Sensitivity Troponin T  Once         01/03/25 0105    01/03/25 0106  Green Top (Gel)  PROCEDURE ONCE         01/03/25 0105    01/03/25 0106  Lavender Top  PROCEDURE ONCE         01/03/25 0105    01/03/25 0106  Gold Top - SST  PROCEDURE ONCE         01/03/25 0105    01/03/25 0106  Light Blue Top  PROCEDURE ONCE         01/03/25 0105    01/03/25 0106  CBC Auto Differential  PROCEDURE ONCE         01/03/25 0105 01/03/25 0105  Oxygen Therapy- Nasal Cannula; Titrate 1-6 LPM Per SpO2; 90 - 95%  Continuous PRN,   Status:  Canceled       01/03/25 0105    01/03/25 0105  sodium chloride 0.9 % flush 10 mL  As Needed,   Status:  Discontinued         01/03/25 0105    01/03/25 0000  Ambulatory Referral to Cardiac Rehab         01/03/25 0505    Signed and Held  Code Status and Medical Interventions: CPR (Attempt to Resuscitate); Full Support  Continuous,   Status:  Canceled         Signed and Held    Signed and Held  Vital Signs  As Needed,   Status:  Canceled         Signed and Held    Signed and Held  Continuous Cardiac Monitoring  Continuous,   Status:  Canceled        Comments: Follow Standing Orders As Outlined in Process  Instructions (Open Order Report to View Full Instructions)    Signed and Held    Signed and Held  nitroglycerin (NITROSTAT) SL tablet 0.4 mg  Every 5 Minutes PRN,   Status:  Canceled         Signed and Held    Signed and Held  Maintain IV Access  Continuous,   Status:  Canceled         Signed and Held    Signed and Held  Telemetry - Place Orders & Notify Provider of Results When Patient Experiences Acute Chest Pain, Dysrhythmia or Respiratory Distress  Continuous,   Status:  Canceled        Comments: Open Order Report to View Parameters Requiring Provider Notification    Signed and Held    Signed and Held  May Be Off Telemetry for Tests  Continuous,   Status:  Canceled         Signed and Held    Signed and Held  Change site dressing  As Needed,   Status:  Canceled         Signed and Held    Signed and Held  Encourage fluids  Until Discontinued,   Status:  Canceled         Signed and Held    Signed and Held  Strict intake and output  Every 4 Hours,   Status:  Canceled       Signed and Held    Signed and Held  Oxygen Therapy- Nasal Cannula; Titrate 1-6 LPM Per SpO2; 90 - 95%  Continuous,   Status:  Canceled         Signed and Held    Signed and Held  Continuous Pulse Oximetry  Continuous,   Status:  Canceled         Signed and Held    Signed and Held  Incentive Spirometry  Every 2 Hours While Awake,   Status:  Canceled       Signed and Held    Signed and Held  ECG 12 Lead Chest Pain  STAT,   Status:  Canceled         Signed and Held    Signed and Held  ECG 12 Lead Chest Pain  Once,   Status:  Canceled         Signed and Held    Signed and Held  CBC (No Diff)  Morning Draw,   Status:  Canceled         Signed and Held    Signed and Held  Basic Metabolic Panel  Morning Draw,   Status:  Canceled         Signed and Held    Signed and Held  Hemoglobin A1c  Morning Draw,   Status:  Canceled         Signed and Held    Signed and Held  Lipid Panel  Morning Draw,   Status:  Canceled        Comments: Fasting      Signed and Held  "   Signed and Held  Notify MD if platelet count is less than 100,000, is less than 1/2 baseline, or if Hgb drops by more than 3mg/dl.  Until Discontinued,   Status:  Canceled         Signed and Held    Signed and Held  Notify MD of hypotension (SBP less than 95), bleeding, or dysrythmia and follow Sheath Removal Policy if needed.  Until Discontinued,   Status:  Canceled         Signed and Held    Signed and Held  Cardiac Rehab Evaluation and Enrollment  Once,   Status:  Canceled        Provider:  (Not yet assigned)    Signed and Held    Signed and Held  sodium chloride 0.9 % infusion  Continuous,   Status:  Canceled         Signed and Held    Signed and Held  atorvastatin (LIPITOR) tablet 80 mg  Nightly,   Status:  Canceled         Signed and Held    Signed and Held  Verify Discontinuation of enoxaparin (LOVENOX) and / or heparin  Once,   Status:  Canceled         Signed and Held    Signed and Held  acetaminophen (TYLENOL) tablet 650 mg  Every 4 Hours PRN,   Status:  Canceled         Signed and Held    Signed and Held  HYDROcodone-acetaminophen (NORCO) 5-325 MG per tablet 1 tablet  Every 4 Hours PRN,   Status:  Canceled         Signed and Held    Signed and Held  morphine injection 4 mg  Every 1 Hour PRN,   Status:  Canceled        Placed in \"And\" Linked Group    Signed and Held    Signed and Held  naloxone (NARCAN) injection 0.4 mg  Every 5 Minutes PRN,   Status:  Canceled        Placed in \"And\" Linked Group    Signed and Held    Signed and Held  ALPRAZolam (XANAX) tablet 0.25 mg  3 Times Daily PRN,   Status:  Canceled         Signed and Held    Signed and Held  temazepam (RESTORIL) capsule 15 mg  Nightly PRN,   Status:  Canceled         Signed and Held    Signed and Held  ondansetron ODT (ZOFRAN-ODT) disintegrating tablet 4 mg  Every 6 Hours PRN,   Status:  Canceled        Placed in \"Or\" Linked Group    Signed and Held    Signed and Held  ondansetron (ZOFRAN) injection 4 mg  Every 6 Hours PRN,   Status:  Canceled  " "      Placed in \"Or\" Linked Group    Signed and Held    Signed and Held  diphenhydrAMINE (BENADRYL) injection 25 mg  Every 6 Hours PRN,   Status:  Canceled         Signed and Held    Signed and Held  Discontinue Radial TR Band Per Removal Protocol  Per Order Details,   Status:  Canceled        Comments: If Bleeding Occurs Re-Inflate 2 mL & Then Continue With 2 mL Every 15 Minute Deflations. Gently Roll Band Off Insertion Site After Completely Deflated.    Signed and Held    Signed and Held  Keep Affected Arm Straight & Elevated  Continuous,   Status:  Canceled         Signed and Held    Signed and Held  Activity As Tolerated  Until Discontinued,   Status:  Canceled         Signed and Held    Signed and Held  NPO Diet NPO Type: Strict NPO  Diet Effective Now,   Status:  Canceled         Signed and Held    Signed and Held  eptifibatide (INTEGRILIN) 75 mg in 100 mL solution  Continuous,   Status:  Canceled         Signed and Held    Signed and Held  aspirin EC tablet 325 mg  Daily,   Status:  Canceled         Signed and Held                  Physician Progress Notes (most recent note)    No notes of this type exist for this encounter.       Consult Notes (most recent note)    No notes of this type exist for this encounter.          Discharge Summary        Cody Marion MD at 01/03/25 0506          [facility]-Cardiology Discharge Summary    Date of Discharge:  1/3/2025    Discharge Diagnosis:   Acute inferior-lateral ST elevation myocardial infarction  Coronary artery disease.  Native heart.  Native vessel.  ACS presentation  Essential hypertension  Dyslipidemia  Type 2 diabetes mellitus  Tobacco abuse  Obesity    Presenting Problem/History of Present Illness  NSTEMI (non-ST elevated myocardial infarction) [I21.4]  STEMI involving left circumflex coronary artery [I21.21]        Hospital Course  Patient is a 39 y.o. male presented with abrupt onset of chest discomfort at 11 PM.  The patient presented to the emergency room " where his initial twelve-lead electrocardiogram was normal.  The patient's chest discomfort intensified and repeat EKG showed inferior and lateral ST elevation.  The STEMI protocol was activated.  Emergency coronary angiography was performed showing thrombotic occlusion of the proximal circumflex.  The patient underwent balloon angioplasty of the infarct-related artery.  The mid LAD had a 75% stenosis.  The ostial and proximal ramus branch had a 90-95% stenosis.  The mid right coronary artery had a long 75% stenosis with chronic total occlusion of the distal posterolateral left ventricular branch just after the takeoff of the AV node artery.  The patient was started on a double Integrilin bolus and drip protocol which will continue until 6 hours prior to surgical coronary revascularization.  The patient has been accepted for transfer to [facility] by Dr. Ronn Marina by ambulance.  His chest discomfort and EKG changes resolved after balloon angioplasty.  Oral antiplatelet therapy other than aspirin was not started due to anticipated CABG.  The patient has been counseled regarding the essential need to discontinue cigarette smoking.  The patient will be referred for outpatient cardiac rehab following CABG.    Procedures Performed  Procedure(s):  Bilateral selective coronary angiography  Balloon angioplasty x 2-LCx       Consults:   Consults       No orders found from 12/5/2024 to 1/4/2025.            Pertinent Test Results: Refer to cardiac catheterization report        Condition on Discharge: Stable for transport by ambulance      Vital Signs  Temp:  [97.5 °F (36.4 °C)] 97.5 °F (36.4 °C)  Heart Rate:  [64-85] 71  Resp:  [18-20] 20  BP: (123-181)/() 147/103    Physical Exam:     General Appearance:  Alert, cooperative, in no acute distress   Head:  Normocephalic, without obvious abnormality, atraumatic   Eyes:  Lids and lashes normal, conjunctivae and sclerae normal, no icterus, no pallor,  corneas clear, PERRLA   Ears:  Ears appear intact with no abnormalities noted   Throat:  No oral lesions, no thrush, oral mucosa moist   Neck:  No adenopathy, supple, trachea midline, no thyromegaly, no carotid bruit, no JVD   Back:  No kyphosis present, no scoliosis present, no skin lesions, erythema or scars, no tenderness to percussion or palpation, range of motion normal   Lungs:  Clear to auscultation, respirations regular, even and unlabored    Heart:  Regular rhythm and normal rate, normal S1 and S2, no murmur, no gallop, no rub, no click   Chest Wall:  No abnormalities observed   Abdomen:  Normal bowel sounds, no masses, no organomegaly, soft non-tender, non-distended, no guarding, no rebound tenderness   Rectal:  Deferred   Extremities:  Moves all extremities well, no edema, no cyanosis, no redness   Pulses:  Pulses palpable and equal bilaterally   Skin:  No bleeding, bruising or rash   Lymph nodes:  No palpable adenopathy   Neurologic:  Cranial nerves 2 - 12 grossly intact, sensation intact, DTR present and equal bilaterally                                                                               Discharge Disposition  Skilled Nursing Facility (DC - External)    Discharge Medications     Discharge Medications        Continue These Medications        Instructions Start Date   amitriptyline 50 MG tablet  Commonly known as: ELAVIL   50 mg, Oral, Every Night at Bedtime      Cariprazine HCl 3 MG capsule capsule  Commonly known as: Vraylar   3 mg, Oral, Daily      carvedilol 6.25 MG tablet  Commonly known as: COREG   6.25 mg, Oral, 2 Times Daily With Meals, ANNUAL DUE for refills      lithium 300 MG tablet   TAKE 1 TABLET BY MOUTH EVERY MORNING AND 2 TABLETS EVERY NIGHT      omeprazole 40 MG capsule  Commonly known as: priLOSEC   TAKE 1 CAPSULE BY MOUTH EVERY MORNING 30 MINS BEFORE BREAKFAST      ondansetron ODT 8 MG disintegrating tablet  Commonly known as: ZOFRAN-ODT   8 mg, Translingual, Every 8 Hours  PRN      QUEtiapine Fumarate 150 MG tablet   150 mg, Oral, Nightly PRN      valsartan 160 MG tablet  Commonly known as: DIOVAN   160 mg, Oral, Daily               Discharge Diet: NPO    Activity at Discharge: Routine post myocardial infarction restrictions    Follow-up Appointments  Outpatient cardiology follow-up within 1 month of discharge with Dr. Marion at the [facility] office location  Future Appointments   Date Time Provider Department Center   2025  8:00 AM Jacqueline Kent APRN [facility]   3/21/2025  9:30 AM Stefan Marion MD [facility]     Additional Instructions for the Follow-ups that You Need to Schedule       Ambulatory Referral to Cardiac Rehab   As directed              Test Results Pending at Discharge: None       Cody Marion MD  25  05:06 EST    Time: Discharge 20 min          Electronically signed by Cody Marion MD at 25 0511       [facility] CATH LAB  [address] [place] [id number]  [phone]              Cardiac Catheterization/Vascular Study    Accession Number: [id number]   Date of Study: 1/3/25   Ordering Provider: Cody Marion MD   Clinical Indications: STEMI        Performing Physician  Performing Staff   Primary: Cody Marion MD    Scrub Person: Kenn Ortez   Invasive Nurse: Vaishnavi Man RN   Documenter: Laury Farley ARRT          Procedures    Left Heart Cath       Patient Information    Patient Name  Alex Torres MRN  [id number] Legal Sex  Male  (Age)  1985 (39 y.o.)     Race Ethnicity Encounter Category   White or  Not  or  Emergency        [facility] PACS Images     Show images for Cardiac Catheterization/Vascular Study         Printable Vessel Diagram    Printable Vessel Diagram       Physicians    Panel Physicians Referring Physician Case Authorizing Physician   Cody Marion MD (Primary)             Pre Procedure  Diagnosis    STEMI         Conclusion    Proximal LCx thrombosis  Severe three-vessel coronary artery disease  Successful PCI infarct-related artery as a bridge to surgical coronary revascularization             Recommendations         Optimize medical therapy.        Aspirin therapy indefinitely.        Surgical consult for revascularization.       Procedure Narrative    Recommendations:  CABG  Emphasis on secondary risk factor modification  IV Integrilin until 6 hours prior to CABG  Indefinite aspirin therapy  High intensity statin based cholesterol-lowering therapy  Counseling regarding essential need to discontinue cigarette smoking  Outpatient referral to cardiac rehab following CABG    Impression:  Proximal LCx coronary thrombosis  Severe three-vessel coronary artery disease    Indication:  Acute inferior ST elevation myocardial infarction    ----------------------------------------------------------------------------------------------------------------------    Clinical History: This is a 39-year-old male patient who presented to the emergency room after developing chest discomfort around 1130 last p.m.  His initial twelve-lead electrocardiogram showed no abnormalities but the patient's chest discomfort intensified and repeat twelve-lead electrocardiogram showed ST elevation in the inferior leads.    Procedures performed:  Bilateral selective coronary angiography  Balloon angioplasty x 2-LCx     Access:   5\6 Slov[place] Slender arterial hemostasis sheath placed via right radial artery; arterial sheath removed in the cardiac catheterization laboratory with application of a transradial band for patent hemostasis.      Procedure narrative:  The procedure was explained in detail to the patient, including risks benefits and alternatives.  The patient expressed understanding and a desire to proceed. Clive's testing demonstrated excellent collateral circulation to both hands.  The patient was brought to the cardiac  catheterization laboratory where the right wrist was prepped and draped in usual sterile fashion.  One percent lidocaine was infiltrated into the skin overlying the right radial artery.  Access was obtained from the right radial artery utilizing the micropuncture technique and a 5\6 Croat[place] Slender hemostasis sheath was placed without difficulty.  The standard antispasm cocktail as well as 4000 units of unfractionated heparin was administered.  Retrograde catheterization was performed using a 6 Croatian JR4 diagnostic catheter to engage  the right coronary artery and a 6 Croatian Tiger diagnostic catheter to engage the left main coronary artery.  Hand injected angiograms was performed.  Contrast ventriculogram was not performed.  For the details of the interventional procedure, refer to the dictation below..     Estimated Blood Loss:  Negligible  Total Contrast:  110 mL.  Fluoro Time:  3.8 minutes  Radiation Dose:  823 mGy (air kerma)    Angiographic Findings:  Coronary circulation is right dominant  Left main: Left main coronary artery trifurcates into the left anterior descending ramus intermedius and circumflex coronary arteries.  Left main coronary artery has no significant disease.  LAD: Left anterior descending gives rise to the diagonal branches as well as multiple septal perforators before terminating as an apical recurrent branch.  The mid LAD has a 75% stenosis.  LCX: The ramus intermedius branch has a long ostial and proximal 90-95% stenosis.  The proximal circumflex is thrombotically occluded.  RCA: The right coronary artery is dominant for the posterior circulation.  The proximal and mid right coronary artery has a long 75% stenosis.  The distal posterolateral left ventricular branch is chronically occluded after the takeoff of the AV node artery.    Intervention Summary    Lesion #1   Location:    Proximal LCx  Stenosis pre-PCI:   100%  Stenosis post-PCI:  Less than 50%  CLARISA flow pre-PCI:  0  CLARISA flow  post-PCI:  3  ACC AHA class lesion: C    Guide:   6 Citizen of [place] CLS 3.0  Anticoagulation:  Heparin    The patient was given additional 4000 units of unfractionated heparin followed by 4000 units of unfractionated heparin yielding an ACT of 315.  Double Integrilin bolus and drip protocol was started.  After reengaging the left coronary artery with the guiding catheter, the occlusion of the circumflex was crossed with a Run-through guidewire.  Balloon angioplasty was performed utilizing a 2.5 x 20 mm TREK balloon dilated to 8-16 james for 30 seconds in 2 locations.  Stenting was not performed as the patient will require CABG.    Complications: None apparent    Risks, benefits and alternatives were discussed with the patient and/or family. Plan is for minimal sedation. Under my direct supervision, intravenous minimal sedation sedation was administered during the course of this procedure with continuous monitoring of hemodynamic parameters and level of consciousness by an independent trained observer. Less than 20 mL of estimated blood loss during the case. No specimen was collected during the case.       Time Under Physician Observation    Name Panel Role Time Period   Cody Marion MD Panel 1 Primary       Total Sedation Time    Moderate sedation event time was not documented.                     Vessel Access    A 6 fr sheath was successfully inserted with ultrasound guidance into the right radial artery. Sheath insertion not delayed.           Sheath Disposition    Hemostasis started on the right radial artery. R-Band was used in achieving hemostasis. Radial compression device applied to vessel. Hemostasis achieved successfully. Closure device additional comment: 12 mL air in TR Band

## 2025-01-03 NOTE — Clinical Note
Level of Care: Telemetry [5]   Diagnosis: NSTEMI (non-ST elevated myocardial infarction) [058220]   Admitting Physician: CATALINA HUANG [[id number]]   Attending Physician: CATALINA HUANG [[id number]]

## 2025-01-03 NOTE — Clinical Note
Allergies reviewed.  Staff has reviewed the patient's labs.  Labs have been reviewed and show abnormalities. Physician is aware of labs.

## 2025-01-03 NOTE — Clinical Note
First balloon inflation max pressure = 16 james. First balloon inflation duration = 30 seconds. Second inflation of balloon - Max pressure = 8 james. 2nd Inflation of balloon - Duration = 15 seconds.

## 2025-01-03 NOTE — Clinical Note
Hemostasis started on the right radial artery. R-Band was used in achieving hemostasis. Radial compression device applied to vessel. Hemostasis achieved successfully. Closure device additional comment: 12 mL air in TR Band

## 2025-01-03 NOTE — ED NOTES
Heparin drip brought from pharmacy. Unable to pull bolus dose of heparin d/t it being previously issued. House supervisor at bedside to transport pt to cath lab @ 6600. Informed house that pt had not had bolus and that heparin infusion was not started as it had just been received from pharmacy. Per H.S. heparin can be given in cath lab. Pt then transported to cath lab in stable condition. Heparin drip was given to cath lab staff and they were informed that pt had not received bolus. Care handed off at this time.

## 2025-01-03 NOTE — ED NOTES
Noted pt to have runs of V tach. Upon entering pt room, noted pt to be sitting up in bed, alert. Pt reports having a sudden return of CP and arm numbness. Dr Mason called to come to room.

## 2025-01-03 NOTE — ED PROVIDER NOTES
[facility] EMERGENCY DEPARTMENT  Emergency Department Encounter  Emergency Medicine Physician Note       Pt Name: Alex Torres  MRN: [id number]  Pt :   1985  Room Number:    Date of encounter:  1/3/2025  PCP: Jacqueline Kent APRN  ED Provider: Jose Mason MD    Historian: Patient      HPI:  Chief Complaint: Chest pain        Context: Alex Torres is a 39 y.o. male who presents to the ED c/o chest pain.  Patient states around 1.5 to 2 hours ago he had sudden onset of left-sided chest pain.  Chest pain radiates in the left side of his arm.  States it feels like a pressure sensation.  Has never had something like this before.  Also reports mild headache and mild nausea.  Denies missing any doses of his home medications, took them around 7 or 8 PM.      PAST MEDICAL HISTORY  Past Medical History:   Diagnosis Date    Anxiety     Asthma     Bipolar 1 disorder     Depression     DM (diabetes mellitus)     Hypertension     Kidney stone     Polyneuropathy     Positive TB test     treated w/ meds x 6 months    PTSD (post-traumatic stress disorder)     Sleep apnea     Suicide attempt     Tattoos          PAST SURGICAL HISTORY  Past Surgical History:   Procedure Laterality Date    CHOLECYSTECTOMY      INGUINAL HERNIA REPAIR Right     ORIF METACARPAL FRACTURE Right     TIBIA FRACTURE SURGERY Left     ORIF         FAMILY HISTORY  Family History   Problem Relation Age of Onset    Arthritis Father     Hypertension Father     Alcohol abuse Father     Drug abuse Father     Diabetes Maternal Aunt     Diabetes Maternal Uncle     Diabetes Maternal Grandmother     Alzheimer's disease Maternal Grandmother     Dementia Maternal Grandmother     Diabetes Other     Hypertension Mother     Depression Mother     ADD / ADHD Brother     Early death Maternal Grandfather     Liver disease Maternal Grandfather     Alcohol abuse Maternal Grandfather     Cirrhosis Maternal  Grandfather     No Known Problems Paternal Grandmother     Liver disease Paternal Grandfather     Alcohol abuse Paternal Grandfather     Early death Paternal Grandfather     Cirrhosis Paternal Grandfather     No Known Problems Brother     Anxiety disorder Neg Hx     Bipolar disorder Neg Hx     OCD Neg Hx     Paranoid behavior Neg Hx     Schizophrenia Neg Hx     Seizures Neg Hx     Self-Injurious Behavior  Neg Hx     Suicide Attempts Neg Hx     Colon cancer Neg Hx          SOCIAL HISTORY  Social History     Socioeconomic History    Marital status:    Tobacco Use    Smoking status: Every Day     Current packs/day: 1.00     Average packs/day: 1 pack/day for 26.0 years (26.0 ttl pk-yrs)     Types: Cigarettes     Start date: 1999    Smokeless tobacco: Never   Vaping Use    Vaping status: Never Used   Substance and Sexual Activity    Alcohol use: No    Drug use: Yes     Types: Marijuana     Comment: rarely    Sexual activity: Yes     Partners: Female         ALLERGIES  Gabapentin and Latex        REVIEW OF SYSTEMS  Review of Systems     All systems reviewed and negative except for those discussed in HPI.       PHYSICAL EXAM    I have reviewed the triage vital signs and nursing notes.    ED Triage Vitals [01/03/25 0048]   Temp Heart Rate Resp BP SpO2   97.5 °F (36.4 °C) 85 18 (!) 172/111 100 %      Temp src Heart Rate Source Patient Position BP Location FiO2 (%)   Oral Monitor Sitting Left arm --       Physical Exam    General:  Awake, alert, overweight, no acute distress  HEENT: Atraumatic, normocephalic, EOMI, PERRLA, mucous membranes moist  NECK:  Supple, atraumatic  Cardiovascular:  Regular rate, regular rhythm, no murmurs, rubs, or gallops.  Extremities well perfused   Respiratory:  Regular rate, clear lungs to auscultation bilaterally.  No rhonchi, rales, wheezing  Abdominal:  Soft, nondistended, nontender.  No guarding or rebound.  No palpable masses  Extremity:  No visible bony abnormalities in all 4  extremities.  Full range of motion of all extremities.  Skin:  Warm and dry.  No rashes  Neuro:  AAOx3, GCS 15. Cranial nerves 2-12 grossly intact.  No focal strength or sensation deficits.  Psych:  Mood and affect appropriate.        LAB RESULTS  Recent Results (from the past 24 hours)   Comprehensive Metabolic Panel    Collection Time: 01/03/25  1:12 AM    Specimen: Blood   Result Value Ref Range    Glucose 102 (H) 65 - 99 mg/dL    BUN 15 6 - 20 mg/dL    Creatinine 1.15 0.76 - 1.27 mg/dL    Sodium 137 136 - 145 mmol/L    Potassium 4.6 3.5 - 5.2 mmol/L    Chloride 100 98 - 107 mmol/L    CO2 26.0 22.0 - 29.0 mmol/L    Calcium 9.8 8.6 - 10.5 mg/dL    Total Protein 7.3 6.0 - 8.5 g/dL    Albumin 4.5 3.5 - 5.2 g/dL    ALT (SGPT) 38 1 - 41 U/L    AST (SGOT) 25 1 - 40 U/L    Alkaline Phosphatase 85 39 - 117 U/L    Total Bilirubin 0.5 0.0 - 1.2 mg/dL    Globulin 2.8 gm/dL    A/G Ratio 1.6 g/dL    BUN/Creatinine Ratio 13.0 7.0 - 25.0    Anion Gap 11.0 5.0 - 15.0 mmol/L    eGFR 82.0 >60.0 mL/min/1.73   High Sensitivity Troponin T    Collection Time: 01/03/25  1:12 AM    Specimen: Blood   Result Value Ref Range    HS Troponin T 24 (H) <22 ng/L   Green Top (Gel)    Collection Time: 01/03/25  1:12 AM   Result Value Ref Range    Extra Tube Hold for add-ons.    Lavender Top    Collection Time: 01/03/25  1:12 AM   Result Value Ref Range    Extra Tube hold for add-on    Gold Top - SST    Collection Time: 01/03/25  1:12 AM   Result Value Ref Range    Extra Tube Hold for add-ons.    Light Blue Top    Collection Time: 01/03/25  1:12 AM   Result Value Ref Range    Extra Tube Hold for add-ons.    CBC Auto Differential    Collection Time: 01/03/25  1:12 AM    Specimen: Blood   Result Value Ref Range    WBC 11.80 (H) 3.40 - 10.80 10*3/mm3    RBC 5.07 4.14 - 5.80 10*6/mm3    Hemoglobin 15.6 13.0 - 17.7 g/dL    Hematocrit 47.7 37.5 - 51.0 %    MCV 94.1 79.0 - 97.0 fL    MCH 30.8 26.6 - 33.0 pg    MCHC 32.7 31.5 - 35.7 g/dL    RDW 14.0 12.3  - 15.4 %    RDW-SD 48.5 37.0 - 54.0 fl    MPV 8.4 6.0 - 12.0 fL    Platelets 192 140 - 450 10*3/mm3    Neutrophil % 66.4 42.7 - 76.0 %    Lymphocyte % 24.8 19.6 - 45.3 %    Monocyte % 5.5 5.0 - 12.0 %    Eosinophil % 1.7 0.3 - 6.2 %    Basophil % 0.4 0.0 - 1.5 %    Immature Grans % 1.2 (H) 0.0 - 0.5 %    Neutrophils, Absolute 7.83 (H) 1.70 - 7.00 10*3/mm3    Lymphocytes, Absolute 2.93 0.70 - 3.10 10*3/mm3    Monocytes, Absolute 0.65 0.10 - 0.90 10*3/mm3    Eosinophils, Absolute 0.20 0.00 - 0.40 10*3/mm3    Basophils, Absolute 0.05 0.00 - 0.20 10*3/mm3    Immature Grans, Absolute 0.14 (H) 0.00 - 0.05 10*3/mm3    nRBC 0.0 0.0 - 0.2 /100 WBC   High Sensitivity Troponin T 1Hr    Collection Time: 01/03/25  2:23 AM    Specimen: Blood   Result Value Ref Range    HS Troponin T 55 (C) <22 ng/L    Troponin T Numeric Delta 31 ng/L    Troponin T % Delta 129 (C) Abnormal if >/= 20% %       If labs were ordered, I independently evaluated the results and considered them in treating the patient.        RADIOLOGY  CT Angiogram Chest    Result Date: 1/3/2025  FINAL REPORT TECHNIQUE: null CLINICAL HISTORY: Hypertension and left-sided chest pain COMPARISON: null FINDINGS: Exam: CTA Chest with IV contrast. Procedure: Contrast was administered. Coronal and sagittal MIP reformats were performed Comparison: None Clinical history: Hypertension and left-sided chest pain Findings: No pulmonary emboli. No thoracic aortic aneurysm or dissection. Atherosclerotic calcifications are seen at the aorta and coronary arteries. Calcified prevascular and left hilar lymph nodes No pericardial fluid collection. No pneumothorax. No pleural fluid collection. No pneumonia Mild paraseptal emphysema at the lung apices. Visualized liver and spleen do not demonstrate any acute process . Prior cholecystectomy. No thoracic spine compression fractures     Impression: 1. No pulmonary emboli. 2. No thoracic aortic aneurysm or dissection 3. No pneumonia Authenticated  and Electronically Signed by Lorena Null MD on 01/03/2025 02:18:55 AM     I ordered and independently evaluated the above noted radiographic studies.     See radiologist's dictation for official interpretation.        PROCEDURES    Procedures    ECG 12 Lead Chest Pain   Final Result      ECG 12 Lead ED Triage Standing Order; Chest Pain   Final Result          MEDICATIONS GIVEN IN ER    Medications   sodium chloride 0.9 % flush 10 mL (has no administration in time range)   nitroglycerin (NITROSTAT) SL tablet 0.4 mg (0.4 mg Sublingual Given 1/3/25 0325)   heparin (porcine) injection 4,000 Units (has no administration in time range)   heparin 96608 units/250 ml (100 units/ml) in D5W (has no administration in time range)   heparin (porcine) injection 4,000 Units (has no administration in time range)   heparin (porcine) injection 2,000 Units (has no administration in time range)   Pharmacy to Dose Heparin (has no administration in time range)   aspirin tablet 325 mg (325 mg Oral Given 1/3/25 0145)   iopamidol (ISOVUE-300) 61 % injection 100 mL (100 mL Intravenous Given 1/3/25 0142)         MEDICAL DECISION MAKING, PROGRESS, and CONSULTS    All labs, if obtained, have been independently reviewed by me.  All radiology studies, if obtained, have been evaluated by me and the radiologist dictating the report.  All EKG's, if obtained, have been independently viewed and interpreted by me.      Discussion below represents my analysis of pertinent findings related to patient's condition, differential diagnosis, treatment plan and final disposition.    Alex Torres is a 39 y.o. male who presents to the ED c/o chest pain.  Hypertensive on arrival with blood pressure 172/111.  Differential includes but is not limited to angina, myocardial infarction, hypertensive urgency, arrhythmia, pneumothorax, pulmonary edema, dissection.  Given 324 mg oral aspirin.  Extensive labs ordered along with EKG.      EKG personally  interpreted showing normal sinus rhythm with rate of 70 with no evidence of acute ischemic changes/STEMI along with no critical interval abnormalities.    Patient given sublingual nitroglycerin with significant improvement of blood pressure.  Also had improvement of pain.    Labs show mild leukocytosis of 11.8.  Mildly elevated initial troponin of 24.     Chest x-ray obtained which was personally interpreted showing evidence of possible vascular congestion but no large acute cardiopulmonary abnormality.    CTA chest negative for dissection or acute cardiopulmonary abnormality.     Repeat 1 hour troponin obtained and was 55.    Heart score of 4.  Due to high risk chest pain with positive delta troponin change, contacted hospitalist who is agreeable with plan for admission for further treatment.  Extensively discussed all results with patient who is agreeable with plan.     Prior to admission, patient had worsening of chest pain and EKG was repeated.  This EKG also personally interpreted shows new ST elevation along leads II, III and aVF concerning for STEMI.  Patient also had approximately 14 beat run of V. tach that self converted back to normal sinus rhythm.  Patient placed on defibrillator pads.  Contacted interventional cardiologist and activated Cath Lab.        HEART Score: 4                      Orders placed during this visit:  Orders Placed This Encounter   Procedures    COVID PRE-OP / PRE-PROCEDURE SCREENING ORDER (NO ISOLATION) - Swab, Nasopharynx    COVID-19 and FLU A/B PCR, 1 HR TAT - Swab, Nasopharynx    XR Chest 1 View    CT Angiogram Chest    [place] Draw    Comprehensive Metabolic Panel    High Sensitivity Troponin T    CBC Auto Differential    High Sensitivity Troponin T 1Hr    Urine Drug Screen - Urine, Clean Catch    Heparin Anti-Xa    Protime-INR    aPTT    NPO Diet NPO Type: Strict NPO    Undress & Gown    Continuous Pulse Oximetry    Notify Provider Platelet Count Less Than 64338    Stop  Infusion & Notify Provider if Bleeding Occurs    Oxygen Therapy- Nasal Cannula; Titrate 1-6 LPM Per SpO2; 90 - 95%    ECG 12 Lead ED Triage Standing Order; Chest Pain    ECG 12 Lead Chest Pain    Insert Peripheral IV    CBC & Differential    Green Top (Gel)    Lavender Top    Gold Top - SST    Light Blue Top    CBC & Differential                     Shared Decision Making:  After my consideration of clinical presentation and any laboratory/radiology studies obtained, I discussed the findings with the patient/patient representative who is in agreement with the treatment plan and the final disposition.   Risks and benefits of discharge and/or observation/admission were discussed.      AS OF 03:30 EST VITALS:    BP - (!) 181/116  HR - 66  TEMP - 97.5 °F (36.4 °C) (Oral)  O2 SATS - 93%                  DIAGNOSIS  Final diagnoses:   Acute chest pain   Primary hypertension   Troponin level elevated   Inferior MI   Nonsustained ventricular tachycardia         DISPOSITION  Admit      Please note that portions of this document were completed with voice recognition software.        Jose Mason MD  01/03/25 0711

## 2025-01-09 ENCOUNTER — READMISSION MANAGEMENT (OUTPATIENT)
Dept: CALL CENTER | Facility: HOSPITAL | Age: 40
End: 2025-01-09
Payer: COMMERCIAL

## 2025-01-10 ENCOUNTER — TRANSITIONAL CARE MANAGEMENT TELEPHONE ENCOUNTER (OUTPATIENT)
Dept: CALL CENTER | Facility: HOSPITAL | Age: 40
End: 2025-01-10
Payer: COMMERCIAL

## 2025-01-10 ENCOUNTER — TELEPHONE (OUTPATIENT)
Dept: INTERNAL MEDICINE | Facility: CLINIC | Age: 40
End: 2025-01-10
Payer: COMMERCIAL

## 2025-01-10 NOTE — TELEPHONE ENCOUNTER
Caller: Alex Torres    Relationship to patient: Self    Best call back number: [phone]     Chief complaint: HOSPITAL DISCUS S TREATMENT    Type of visit: HOSPITAL F/U  D/C 01.09.25, [facility],  [facility]    Requested date: SOONER THAN 01.21.25     If rescheduling, when is the original appointment: 01.21.25     Additional notes:

## 2025-01-10 NOTE — TELEPHONE ENCOUNTER
Spoke to patient and told him we did not have any places to put him earlier than the appointment with [facility], and it was important that he see Cardiology for follow up, and this appointment was in the 14 day period for hosp follow up, he asked again if we can get him in earlier that Geno was his PCP and told him we do not have anything next week for [facility] follow up, he then hung up on me, appointments are still scheduled for pt if he chooses to come to one

## 2025-01-10 NOTE — OUTREACH NOTE
Call Center TCM Note      Flowsheet Row Responses   [facility], [place] patient discharged from? Non-BH  [[facility]]   Does the patient have one of the following disease processes/diagnoses(primary or secondary)? Other   TCM attempt successful? Yes   Call start time 1545   Revoked Reason Patient/Family Refused  [Patient was hoping to obtain sooner appt with his PCP Jacqueline Kent. He has spoken to office, and was not able to do so. Patient reports he will followup with a new PCP. Declines [facility] PCP hotline number.]   Call end time 1547   Discharge diagnosis NSTEMI, CAD, CABG x 3 using right leg vein harvest   Call end time 1547            Joshua Gracia RN    1/10/2025, 15:49 EST

## 2025-01-10 NOTE — OUTREACH NOTE
Prep Survey      Flowsheet Row Responses   [facility] patient discharged from? Non-BH   Is LACE score < 7 ? Non-BH Discharge   Eligibility [facility]   Date of Admission 01/03/25   Date of Discharge 01/09/25   Discharge Disposition Home or Self Care   Discharge diagnosis NSTEMI, CAD, CABG x 3 using right leg vein harvest   Does the patient have one of the following disease processes/diagnoses(primary or secondary)? Cardiothoracic surgery   Does the patient have Home health ordered? No   Prep survey completed? Yes            Darcy Hodges Registered Nurse

## 2025-01-14 ENCOUNTER — HOSPITAL ENCOUNTER (EMERGENCY)
Facility: HOSPITAL | Age: 40
Discharge: HOME | End: 2025-01-14
Attending: STUDENT IN AN ORGANIZED HEALTH CARE EDUCATION/TRAINING PROGRAM | Admitting: STUDENT IN AN ORGANIZED HEALTH CARE EDUCATION/TRAINING PROGRAM
Payer: COMMERCIAL

## 2025-01-14 ENCOUNTER — APPOINTMENT (OUTPATIENT)
Dept: GENERAL RADIOLOGY | Facility: HOSPITAL | Age: 40
End: 2025-01-14
Payer: COMMERCIAL

## 2025-01-14 VITALS
TEMPERATURE: 98.9 F | HEART RATE: 100 BPM | DIASTOLIC BLOOD PRESSURE: 71 MMHG | BODY MASS INDEX: 31.66 KG/M2 | SYSTOLIC BLOOD PRESSURE: 124 MMHG | OXYGEN SATURATION: 95 % | HEIGHT: 76 IN | RESPIRATION RATE: 18 BRPM | WEIGHT: 260 LBS

## 2025-01-14 DIAGNOSIS — J18.9 PNEUMONIA DUE TO INFECTIOUS ORGANISM, UNSPECIFIED LATERALITY, UNSPECIFIED PART OF LUNG: Primary | ICD-10-CM

## 2025-01-14 LAB
ALBUMIN SERPL-MCNC: 3.6 G/DL (ref 3.5–5.2)
ALBUMIN/GLOB SERPL: 1.3 G/DL
ALP SERPL-CCNC: 87 U/L (ref 39–117)
ALT SERPL W P-5'-P-CCNC: 41 U/L (ref 1–41)
ANION GAP SERPL CALCULATED.3IONS-SCNC: 11.6 MMOL/L (ref 5–15)
AST SERPL-CCNC: 19 U/L (ref 1–40)
B PARAPERT DNA SPEC QL NAA+PROBE: NOT DETECTED
B PERT DNA SPEC QL NAA+PROBE: NOT DETECTED
BASOPHILS # BLD AUTO: 0.05 10*3/MM3 (ref 0–0.2)
BASOPHILS NFR BLD AUTO: 0.4 % (ref 0–1.5)
BILIRUB SERPL-MCNC: 0.7 MG/DL (ref 0–1.2)
BUN SERPL-MCNC: 16 MG/DL (ref 6–20)
BUN/CREAT SERPL: 14.5 (ref 7–25)
C PNEUM DNA NPH QL NAA+NON-PROBE: NOT DETECTED
CALCIUM SPEC-SCNC: 8.3 MG/DL (ref 8.6–10.5)
CHLORIDE SERPL-SCNC: 99 MMOL/L (ref 98–107)
CO2 SERPL-SCNC: 22.4 MMOL/L (ref 22–29)
CREAT SERPL-MCNC: 1.1 MG/DL (ref 0.76–1.27)
D-LACTATE SERPL-SCNC: 2 MMOL/L (ref 0.5–2)
DEPRECATED RDW RBC AUTO: 49.8 FL (ref 37–54)
EGFRCR SERPLBLD CKD-EPI 2021: 86 ML/MIN/1.73
EOSINOPHIL # BLD AUTO: 0.38 10*3/MM3 (ref 0–0.4)
EOSINOPHIL NFR BLD AUTO: 2.8 % (ref 0.3–6.2)
ERYTHROCYTE [DISTWIDTH] IN BLOOD BY AUTOMATED COUNT: 14.4 % (ref 12.3–15.4)
FLUAV SUBTYP SPEC NAA+PROBE: NOT DETECTED
FLUBV RNA ISLT QL NAA+PROBE: NOT DETECTED
GLOBULIN UR ELPH-MCNC: 2.8 GM/DL
GLUCOSE SERPL-MCNC: 152 MG/DL (ref 65–99)
HADV DNA SPEC NAA+PROBE: NOT DETECTED
HCOV 229E RNA SPEC QL NAA+PROBE: NOT DETECTED
HCOV HKU1 RNA SPEC QL NAA+PROBE: NOT DETECTED
HCOV NL63 RNA SPEC QL NAA+PROBE: NOT DETECTED
HCOV OC43 RNA SPEC QL NAA+PROBE: NOT DETECTED
HCT VFR BLD AUTO: 34 % (ref 37.5–51)
HGB BLD-MCNC: 10.9 G/DL (ref 13–17.7)
HMPV RNA NPH QL NAA+NON-PROBE: NOT DETECTED
HOLD SPECIMEN: NORMAL
HOLD SPECIMEN: NORMAL
HPIV1 RNA ISLT QL NAA+PROBE: NOT DETECTED
HPIV2 RNA SPEC QL NAA+PROBE: NOT DETECTED
HPIV3 RNA NPH QL NAA+PROBE: NOT DETECTED
HPIV4 P GENE NPH QL NAA+PROBE: NOT DETECTED
IMM GRANULOCYTES # BLD AUTO: 0.62 10*3/MM3 (ref 0–0.05)
IMM GRANULOCYTES NFR BLD AUTO: 4.6 % (ref 0–0.5)
LYMPHOCYTES # BLD AUTO: 1.22 10*3/MM3 (ref 0.7–3.1)
LYMPHOCYTES NFR BLD AUTO: 9.1 % (ref 19.6–45.3)
M PNEUMO IGG SER IA-ACNC: NOT DETECTED
MCH RBC QN AUTO: 30.2 PG (ref 26.6–33)
MCHC RBC AUTO-ENTMCNC: 32.1 G/DL (ref 31.5–35.7)
MCV RBC AUTO: 94.2 FL (ref 79–97)
MONOCYTES # BLD AUTO: 0.67 10*3/MM3 (ref 0.1–0.9)
MONOCYTES NFR BLD AUTO: 5 % (ref 5–12)
NEUTROPHILS NFR BLD AUTO: 10.53 10*3/MM3 (ref 1.7–7)
NEUTROPHILS NFR BLD AUTO: 78.1 % (ref 42.7–76)
NRBC BLD AUTO-RTO: 0 /100 WBC (ref 0–0.2)
PLATELET # BLD AUTO: 274 10*3/MM3 (ref 140–450)
PMV BLD AUTO: 8.9 FL (ref 6–12)
POTASSIUM SERPL-SCNC: 4.4 MMOL/L (ref 3.5–5.2)
PROCALCITONIN SERPL-MCNC: 0.99 NG/ML (ref 0–0.25)
PROT SERPL-MCNC: 6.4 G/DL (ref 6–8.5)
RBC # BLD AUTO: 3.61 10*6/MM3 (ref 4.14–5.8)
RHINOVIRUS RNA SPEC NAA+PROBE: NOT DETECTED
RSV RNA NPH QL NAA+NON-PROBE: NOT DETECTED
SARS-COV-2 RNA NPH QL NAA+NON-PROBE: NOT DETECTED
SODIUM SERPL-SCNC: 133 MMOL/L (ref 136–145)
WBC NRBC COR # BLD AUTO: 13.47 10*3/MM3 (ref 3.4–10.8)
WHOLE BLOOD HOLD COAG: NORMAL
WHOLE BLOOD HOLD SPECIMEN: NORMAL

## 2025-01-14 PROCEDURE — 0202U NFCT DS 22 TRGT SARS-COV-2: CPT | Performed by: NURSE PRACTITIONER

## 2025-01-14 PROCEDURE — 99284 EMERGENCY DEPT VISIT MOD MDM: CPT | Performed by: STUDENT IN AN ORGANIZED HEALTH CARE EDUCATION/TRAINING PROGRAM

## 2025-01-14 PROCEDURE — 87040 BLOOD CULTURE FOR BACTERIA: CPT | Performed by: NURSE PRACTITIONER

## 2025-01-14 PROCEDURE — 85025 COMPLETE CBC W/AUTO DIFF WBC: CPT | Performed by: NURSE PRACTITIONER

## 2025-01-14 PROCEDURE — 71045 X-RAY EXAM CHEST 1 VIEW: CPT

## 2025-01-14 PROCEDURE — 80053 COMPREHEN METABOLIC PANEL: CPT | Performed by: NURSE PRACTITIONER

## 2025-01-14 PROCEDURE — 84145 PROCALCITONIN (PCT): CPT | Performed by: NURSE PRACTITIONER

## 2025-01-14 PROCEDURE — 93005 ELECTROCARDIOGRAM TRACING: CPT | Performed by: STUDENT IN AN ORGANIZED HEALTH CARE EDUCATION/TRAINING PROGRAM

## 2025-01-14 PROCEDURE — 83605 ASSAY OF LACTIC ACID: CPT | Performed by: NURSE PRACTITIONER

## 2025-01-14 RX ORDER — AZITHROMYCIN 250 MG/1
250 TABLET, FILM COATED ORAL DAILY
Qty: 4 TABLET | Refills: 0 | Status: SHIPPED | OUTPATIENT
Start: 2025-01-14

## 2025-01-14 RX ORDER — SODIUM CHLORIDE 0.9 % (FLUSH) 0.9 %
10 SYRINGE (ML) INJECTION AS NEEDED
Status: DISCONTINUED | OUTPATIENT
Start: 2025-01-14 | End: 2025-01-14 | Stop reason: HOSPADM

## 2025-01-14 RX ORDER — AZITHROMYCIN 250 MG/1
500 TABLET, FILM COATED ORAL ONCE
Status: COMPLETED | OUTPATIENT
Start: 2025-01-14 | End: 2025-01-14

## 2025-01-14 RX ORDER — POLYETHYLENE GLYCOL 3350 17 G/17G
17 POWDER, FOR SOLUTION ORAL DAILY
Qty: 7 PACKET | Refills: 0 | Status: SHIPPED | OUTPATIENT
Start: 2025-01-14

## 2025-01-14 RX ADMIN — AZITHROMYCIN DIHYDRATE 500 MG: 250 TABLET ORAL at 11:26

## 2025-01-14 RX ADMIN — AMOXICILLIN AND CLAVULANATE POTASSIUM 1 TABLET: 875; 125 TABLET, FILM COATED ORAL at 11:26

## 2025-01-14 NOTE — ED PROVIDER NOTES
Subjective:  History of Present Illness:    Patient is a 40-year-old male with history of T2DM, hypertension and CAD (status post three-vessel bypass 2025).  Patient reports to the ER for ongoing fevers over the last 3 days.  Also reports constipation.  Reports that he is passing gas.  Reports last bowel movement was 5 days ago.  Reports some cough and congestion over the last 3 days.  Denies abdominal pain.  Reports that he has been on home narcotic.  Denies OTC medication home remedy.  Denies alleviating or exacerbating factors.    Nurses Notes reviewed and agree, including vitals, allergies, social history and prior medical history.     REVIEW OF SYSTEMS: All systems reviewed and not pertinent unless noted.  Review of Systems   Constitutional:  Positive for fever.   Gastrointestinal:  Positive for constipation.   All other systems reviewed and are negative.      Past Medical History:   Diagnosis Date    Anxiety     Asthma     Bipolar 1 disorder     Depression     DM (diabetes mellitus)     Hypertension     Kidney stone     Polyneuropathy     Positive TB test     treated w/ meds x 6 months    PTSD (post-traumatic stress disorder)     Sleep apnea     Suicide attempt     Tattoos        Allergies:    Gabapentin and Latex      Past Surgical History:   Procedure Laterality Date    CARDIAC CATHETERIZATION N/A 1/3/2025    Procedure: Left Heart Cath;  Surgeon: Cody Marion MD;  Location: [facility] CATH INVASIVE LOCATION;  Service: Cardiovascular;  Laterality: N/A;    CHOLECYSTECTOMY  2002    INGUINAL HERNIA REPAIR Right 1995    ORIF METACARPAL FRACTURE Right 2000    TIBIA FRACTURE SURGERY Left 1997    ORIF         Social History     Socioeconomic History    Marital status:    Tobacco Use    Smoking status: Every Day     Current packs/day: 1.00     Average packs/day: 1 pack/day for 26.0 years (26.0 ttl pk-yrs)     Types: Cigarettes     Start date: 1999    Smokeless tobacco: Never   Vaping Use    Vaping status:  "Never Used   Substance and Sexual Activity    Alcohol use: No    Drug use: Yes     Types: Marijuana     Comment: rarely    Sexual activity: Yes     Partners: Female         Family History   Problem Relation Age of Onset    Arthritis Father     Hypertension Father     Alcohol abuse Father     Drug abuse Father     Diabetes Maternal Aunt     Diabetes Maternal Uncle     Diabetes Maternal Grandmother     Alzheimer's disease Maternal Grandmother     Dementia Maternal Grandmother     Diabetes Other     Hypertension Mother     Depression Mother     ADD / ADHD Brother     Early death Maternal Grandfather     Liver disease Maternal Grandfather     Alcohol abuse Maternal Grandfather     Cirrhosis Maternal Grandfather     No Known Problems Paternal Grandmother     Liver disease Paternal Grandfather     Alcohol abuse Paternal Grandfather     Early death Paternal Grandfather     Cirrhosis Paternal Grandfather     No Known Problems Brother     Anxiety disorder Neg Hx     Bipolar disorder Neg Hx     OCD Neg Hx     Paranoid behavior Neg Hx     Schizophrenia Neg Hx     Seizures Neg Hx     Self-Injurious Behavior  Neg Hx     Suicide Attempts Neg Hx     Colon cancer Neg Hx        Objective  Physical Exam:  /71 (BP Location: Left arm, Patient Position: Sitting)   Pulse 100   Temp 98.9 °F (37.2 °C) (Oral)   Resp 18   Ht 193 cm (76\")   Wt 118 kg (260 lb)   SpO2 95%   BMI 31.65 kg/m²      Physical Exam  Constitutional:       Appearance: Normal appearance. He is normal weight.   HENT:      Head: Normocephalic and atraumatic.      Nose: Nose normal.      Mouth/Throat:      Mouth: Mucous membranes are moist.   Eyes:      Extraocular Movements: Extraocular movements intact.      Conjunctiva/sclera: Conjunctivae normal.      Pupils: Pupils are equal, round, and reactive to light.   Cardiovascular:      Rate and Rhythm: Normal rate and regular rhythm.   Pulmonary:      Effort: Pulmonary effort is normal.      Breath sounds: Normal " breath sounds.   Abdominal:      General: Abdomen is flat. Bowel sounds are normal.      Palpations: Abdomen is soft.   Musculoskeletal:         General: Normal range of motion.      Cervical back: Normal range of motion and neck supple.   Skin:     General: Skin is warm and dry.      Capillary Refill: Capillary refill takes less than 2 seconds.      Comments: Surgical incision site on chest as well as donor sites are without erythema.  There is no drainage.  Does not appear to be infected.   Neurological:      General: No focal deficit present.      Mental Status: He is alert and oriented to person, place, and time. Mental status is at baseline.   Psychiatric:         Mood and Affect: Mood normal.         Behavior: Behavior normal.         Thought Content: Thought content normal.         Judgment: Judgment normal.         Procedures    ED Course:    ED Course as of 01/14/25 1200   Tue Jan 14, 2025   0818 I have interviewed and examined the patient FACE-TO-FACE.  I reviewed the mid-level provider(s) note and agree with the clinical impression, plan, and disposition unless otherwise stated in the MDM below.    ATTENDING ATTESTATION  HPI: 40-year-old male presents with low-grade fever for the past several days.  Postop day 10 from CABG at [facility].  Reports associated constipation.  Last bowel movement was 5 days ago.  Currently on Norco 5 for postop pain.  Has been treating with Colace.  No chest pain, shortness of breath, abdominal pain, or any other associated symptoms.    EXAM:   General: Alert.  Nontoxic appearance.  No acute distress.  Head: Normocephalic.  Atraumatic.  Eyes: No scleral icterus.  ENT: Moist mucous membranes.  Cardiovascular: Regular rate and rhythm.  No murmurs.  No rubs.  2+ distal pulses bilaterally.  Respiratory: Equal breath sounds bilaterally. No wheezing. No rales.  No rhonchi.  GI: Abdomen is soft.  Nondistended.  Nontender to palpation.  No rebound.  No guarding.  No CVA  tenderness.  MSK: Moves all 4 extremities.  Neurologic: Oriented x 3.  No focal deficits.  Skin: Sternotomy surgical incision present. No surrounding erythema, induration or drainage. No edema. No pallor. No cyanosis. + ecchymosis right thigh surrounding graft harvest sites.  Psych: Normal mood.  Pleasant affect.       [JS]   0819 ECG 12 Lead Other; POST OP OPEN HEART 10 DAYS Ago  EKG per my interpretation sinus tachycardia, rate 105, normal axis, no ST segment elevation or depression, normal T waves, normal QRS QTC intervals.   [JS]      ED Course User Index  [JS] Dru Mendoza DO       Lab Results (last 24 hours)       Procedure Component Value Units Date/Time    CBC Auto Differential [[id number]]  (Abnormal) Collected: 01/14/25 0806    Specimen: Blood Updated: 01/14/25 0818     WBC 13.47 10*3/mm3      RBC 3.61 10*6/mm3      Hemoglobin 10.9 g/dL      Hematocrit 34.0 %      MCV 94.2 fL      MCH 30.2 pg      MCHC 32.1 g/dL      RDW 14.4 %      RDW-SD 49.8 fl      MPV 8.9 fL      Platelets 274 10*3/mm3      Neutrophil % 78.1 %      Lymphocyte % 9.1 %      Monocyte % 5.0 %      Eosinophil % 2.8 %      Basophil % 0.4 %      Immature Grans % 4.6 %      Neutrophils, Absolute 10.53 10*3/mm3      Lymphocytes, Absolute 1.22 10*3/mm3      Monocytes, Absolute 0.67 10*3/mm3      Eosinophils, Absolute 0.38 10*3/mm3      Basophils, Absolute 0.05 10*3/mm3      Immature Grans, Absolute 0.62 10*3/mm3      nRBC 0.0 /100 WBC     Comprehensive Metabolic Panel [[id number]]  (Abnormal) Collected: 01/14/25 0806    Specimen: Blood Updated: 01/14/25 0844     Glucose 152 mg/dL      BUN 16 mg/dL      Creatinine 1.10 mg/dL      Sodium 133 mmol/L      Potassium 4.4 mmol/L      Chloride 99 mmol/L      CO2 22.4 mmol/L      Calcium 8.3 mg/dL      Total Protein 6.4 g/dL      Albumin 3.6 g/dL      ALT (SGPT) 41 U/L      AST (SGOT) 19 U/L      Alkaline Phosphatase 87 U/L      Total Bilirubin 0.7 mg/dL      Globulin 2.8 gm/dL      A/G Ratio 1.3 g/dL  "     BUN/Creatinine Ratio 14.5     Anion Gap 11.6 mmol/L      eGFR 86.0 mL/min/1.73     Narrative:      GFR Categories in Chronic Kidney Disease (CKD)      GFR Category          GFR (mL/min/1.73)    Interpretation  G1                     90 or greater         Normal or high (1)  G2                      60-89                Mild decrease (1)  G3a                   45-59                Mild to moderate decrease  G3b                   30-44                Moderate to severe decrease  G4                    15-29                Severe decrease  G5                    14 or less           Kidney failure          (1)In the absence of evidence of kidney disease, neither GFR category G1 or G2 fulfill the criteria for CKD.    eGFR calculation 2021 CKD-EPI creatinine equation, which does not include race as a factor    Procalcitonin [[id number]]  (Abnormal) Collected: 01/14/25 0806    Specimen: Blood Updated: 01/14/25 0901     Procalcitonin 0.99 ng/mL     Narrative:      As a Marker for Sepsis (Non-Neonates):    1. <0.5 ng/mL represents a low risk of severe sepsis and/or septic shock.  2. >2 ng/mL represents a high risk of severe sepsis and/or septic shock.    As a Marker for Lower Respiratory Tract Infections that require antibiotic therapy:    PCT on Admission    Antibiotic Therapy       6-12 Hrs later    >0.5                Strongly Recommended  >0.25 - <0.5        Recommended   0.1 - 0.25          Discouraged              Remeasure/reassess PCT  <0.1                Strongly Discouraged     Remeasure/reassess PCT    As 28 day mortality risk marker: \"Change in Procalcitonin Result\" (>80% or <=80%) if Day 0 (or Day 1) and Day 4 values are available. Refer to http://www.[employer]-pct-calculator.com    Change in PCT <=80%  A decrease of PCT levels below or equal to 80% defines a positive change in PCT test result representing a higher risk for 28-day all-cause mortality of patients diagnosed with severe sepsis for septic " shock.    Change in PCT >80%  A decrease of PCT levels of more than 80% defines a negative change in PCT result representing a lower risk for 28-day all-cause mortality of patients diagnosed with severe sepsis or septic shock.       Lactic Acid, Plasma [[id number]]  (Normal) Collected: 01/14/25 0813    Specimen: Blood Updated: 01/14/25 0835     Lactate 2.0 mmol/L     Respiratory Panel PCR w/COVID-19(SARS-CoV-2) [facility] In-House, NP Swab in UTM/VTM, 2 HR TAT - Swab, Nasopharynx [[id number]]  (Normal) Collected: 01/14/25 0828    Specimen: Swab from Nasopharynx Updated: 01/14/25 1004     ADENOVIRUS, PCR Not Detected     Coronavirus 229E Not Detected     Coronavirus HKU1 Not Detected     Coronavirus NL63 Not Detected     Coronavirus OC43 Not Detected     COVID19 Not Detected     Human Metapneumovirus Not Detected     Human Rhinovirus/Enterovirus Not Detected     Influenza A PCR Not Detected     Influenza B PCR Not Detected     Parainfluenza Virus 1 Not Detected     Parainfluenza Virus 2 Not Detected     Parainfluenza Virus 3 Not Detected     Parainfluenza Virus 4 Not Detected     RSV, PCR Not Detected     Bordetella pertussis pcr Not Detected     Bordetella parapertussis PCR Not Detected     Chlamydophila pneumoniae PCR Not Detected     Mycoplasma pneumo by PCR Not Detected    Narrative:      In the setting of a positive respiratory panel with a viral infection PLUS a negative procalcitonin without other underlying concern for bacterial infection, consider observing off antibiotics or discontinuation of antibiotics and continue supportive care. If the respiratory panel is positive for atypical bacterial infection (Bordetella pertussis, Chlamydophila pneumoniae, or Mycoplasma pneumoniae), consider antibiotic de-escalation to target atypical bacterial infection.    Blood Culture - Blood, Arm, Left [[id number]] Collected: 01/14/25 0830    Specimen: Blood from Arm, Left Updated: 01/14/25 0857    Blood Culture -  Blood, Arm, Right [[id number]] Collected: 01/14/25 0836    Specimen: Blood from Arm, Right Updated: 01/14/25 0857             XR Chest 1 View    Result Date: 1/14/2025  PROCEDURE: XR CHEST 1 VW-    HISTORY: Rule out pneumonia  COMPARISON: January 3, 2025.  FINDINGS: The heart is normal in size. There is new bilateral perihilar atelectasis. There is evidence of old calcified granulomatous disease. New medial left lung airspace disease is suggestive of pneumonia. The patient is status post median sternotomy for CABG. There is no pneumothorax. There are no acute osseous abnormalities.      Impression: New medial left basilar airspace disease is suggestive of pneumonia. Continued follow-up is recommended.        Images were reviewed, interpreted, and dictated by Dr. Iraida Mcnair MD Transcribed by Lorena Cohn PA-C.  This report was signed and finalized on 1/14/2025 10:05 AM by Iraida Mcnair MD.          MDM      Initial impression of presenting illness: Patient is a 40-year-old male with history of T2DM, hypertension and CAD (status post three-vessel bypass 2025).  Patient reports to the ER for ongoing fevers over the last 3 days.  Also reports constipation.  Reports that he is passing gas.  Reports last bowel movement was 5 days ago.  Reports some cough and congestion over the last 3 days.  Denies abdominal pain.  Reports that he has been on home narcotic.  Denies OTC medication home remedy.  Denies alleviating or exacerbating factors.    DDX: includes but is not limited to: Postsurgical infection, pneumonia, viral infection or other    Patient arrives stable with vitals interpreted by myself.     Pertinent features from physical exam: Surgical incision is midline to chest and 3 adjacent incisions lower chest wall and donor sites to right leg are without erythema or drainage.  Lung sounds are clear bilaterally throughout.  Abdomen soft nontender.  Bowel sounds are normal.  Heart sounds are normal.    Initial  diagnostic plan: CBC, CMP, procalcitonin, lactic acid, blood culture x 2, urinalysis, EKG, chest x-ray    Results from initial plan were reviewed and interpreted by me revealing respiratory panel was negative.  Procalcitonin mildly elevated.  CMP with elevation in glucose at 152.  Sodium 133.  Calcium was 8.3.  CBC with mild elevation in white blood cell count.  Lactic acid was negative.  EKG sinus tachycardia 105.  Chest x-ray with the following impression new medial left basilar airspace disease is suggestive of pneumonia.    Diagnostic information from other sources: Chart review    Interventions / Re-evaluation: Vital signs stable throughout encounter    Results/clinical rationale were discussed with patient.  Patient declines transfer to [facility] at this time.  Patient reports that he wants to go home and do oral antibiotics.    Consultations/Discussion of results with other physicians: N/A    Disposition plan: Patient is hemodynamically stable nontoxic-appearing appropriate discharge.  Reiterated strict return precautions.  Patient reports understanding.  Will send home with azithromycin and Augmentin.  Patient to follow-up with PCP and CT specialist within the week.  Follow-up ER for new or symptoms.  -----        Final diagnoses:   Pneumonia due to infectious organism, unspecified laterality, unspecified part of lung          Otto Medrano, APRN  01/14/25 1200

## 2025-01-14 NOTE — ED NOTES
Att I called [facility] for transfer of this pt to them per our provider, we are currently awaiting a call back at this time.

## 2025-01-14 NOTE — DISCHARGE INSTRUCTIONS
Please follow-up with Dr. Palomares within the week.  Follow-up with PCP within the week.  Follow with ER for any new or worsening symptoms

## 2025-01-16 DIAGNOSIS — F41.9 ANXIETY DISORDER, UNSPECIFIED TYPE: ICD-10-CM

## 2025-01-16 DIAGNOSIS — F43.10 POST TRAUMATIC STRESS DISORDER (PTSD): Chronic | ICD-10-CM

## 2025-01-16 DIAGNOSIS — F31.62 BIPOLAR DISORDER, CURRENT EPISODE MIXED, MODERATE: ICD-10-CM

## 2025-01-16 RX ORDER — CARIPRAZINE 3 MG/1
3 CAPSULE, GELATIN COATED ORAL DAILY
Qty: 30 CAPSULE | Refills: 2 | Status: SHIPPED | OUTPATIENT
Start: 2025-01-16

## 2025-01-16 NOTE — TELEPHONE ENCOUNTER
Rx Refill Note  Requested Prescriptions     Pending Prescriptions Disp Refills    Vraylar 3 MG capsule capsule [Pharmacy Med Name: VRAYLAR 3 MG CAPSULE] 30 capsule 2     Sig: TAKE 1 CAPSULE BY MOUTH EVERY DAY      Last office visit with prescribing clinician: Visit date not found   Last telemedicine visit with prescribing clinician: 12/20/2024   Next office visit with prescribing clinician: 3/28/2025                         Would you like a call back once the refill request has been completed: [] Yes [] No    If the office needs to give you a call back, can they leave a voicemail: [] Yes [] No    Nancy Crystal CMA  01/16/25, 09:20 EST

## 2025-01-19 LAB
BACTERIA SPEC AEROBE CULT: NORMAL
BACTERIA SPEC AEROBE CULT: NORMAL

## 2025-01-29 DIAGNOSIS — F17.200 SMOKER: ICD-10-CM

## 2025-01-29 DIAGNOSIS — F51.5 NIGHTMARES: ICD-10-CM

## 2025-01-29 DIAGNOSIS — F51.05 INSOMNIA DUE TO MENTAL CONDITION: ICD-10-CM

## 2025-01-29 RX ORDER — AMITRIPTYLINE HYDROCHLORIDE 50 MG/1
50 TABLET ORAL
Qty: 30 TABLET | Refills: 1 | Status: SHIPPED | OUTPATIENT
Start: 2025-01-29

## 2025-01-29 RX ORDER — PAROXETINE 10 MG/1
10 TABLET, FILM COATED ORAL DAILY
Qty: 30 TABLET | Refills: 2 | OUTPATIENT
Start: 2025-01-29

## 2025-01-29 RX ORDER — VARENICLINE TARTRATE 0.5 (11)-1
KIT ORAL
Qty: 53 EACH | Refills: 0 | OUTPATIENT
Start: 2025-01-29

## 2025-01-30 ENCOUNTER — HOSPITAL ENCOUNTER (EMERGENCY)
Facility: HOSPITAL | Age: 40
Discharge: HOME | End: 2025-01-30
Attending: STUDENT IN AN ORGANIZED HEALTH CARE EDUCATION/TRAINING PROGRAM
Payer: COMMERCIAL

## 2025-01-30 ENCOUNTER — APPOINTMENT (OUTPATIENT)
Dept: CT IMAGING | Facility: HOSPITAL | Age: 40
End: 2025-01-30
Payer: COMMERCIAL

## 2025-01-30 ENCOUNTER — APPOINTMENT (OUTPATIENT)
Dept: ULTRASOUND IMAGING | Facility: HOSPITAL | Age: 40
End: 2025-01-30
Payer: COMMERCIAL

## 2025-01-30 ENCOUNTER — APPOINTMENT (OUTPATIENT)
Dept: GENERAL RADIOLOGY | Facility: HOSPITAL | Age: 40
End: 2025-01-30
Payer: COMMERCIAL

## 2025-01-30 VITALS
RESPIRATION RATE: 18 BRPM | WEIGHT: 262 LBS | BODY MASS INDEX: 31.9 KG/M2 | TEMPERATURE: 97.9 F | SYSTOLIC BLOOD PRESSURE: 114 MMHG | OXYGEN SATURATION: 96 % | DIASTOLIC BLOOD PRESSURE: 67 MMHG | HEIGHT: 76 IN | HEART RATE: 71 BPM

## 2025-01-30 DIAGNOSIS — R00.2 HEART PALPITATIONS: ICD-10-CM

## 2025-01-30 DIAGNOSIS — Z95.1 HX OF CABG: ICD-10-CM

## 2025-01-30 DIAGNOSIS — J18.9 PNEUMONIA OF LEFT LOWER LOBE DUE TO INFECTIOUS ORGANISM: Primary | ICD-10-CM

## 2025-01-30 DIAGNOSIS — J90 PLEURAL EFFUSION ON LEFT: ICD-10-CM

## 2025-01-30 LAB
ALBUMIN SERPL-MCNC: 4.2 G/DL (ref 3.5–5.2)
ALBUMIN/GLOB SERPL: 1.6 G/DL
ALP SERPL-CCNC: 137 U/L (ref 39–117)
ALT SERPL W P-5'-P-CCNC: 70 U/L (ref 1–41)
ANION GAP SERPL CALCULATED.3IONS-SCNC: 10.9 MMOL/L (ref 5–15)
AST SERPL-CCNC: 21 U/L (ref 1–40)
BASOPHILS # BLD AUTO: 0.09 10*3/MM3 (ref 0–0.2)
BASOPHILS NFR BLD AUTO: 1.1 % (ref 0–1.5)
BILIRUB SERPL-MCNC: 0.4 MG/DL (ref 0–1.2)
BUN SERPL-MCNC: 12 MG/DL (ref 6–20)
BUN/CREAT SERPL: 11 (ref 7–25)
CALCIUM SPEC-SCNC: 9 MG/DL (ref 8.6–10.5)
CHLORIDE SERPL-SCNC: 106 MMOL/L (ref 98–107)
CO2 SERPL-SCNC: 24.1 MMOL/L (ref 22–29)
CREAT SERPL-MCNC: 1.09 MG/DL (ref 0.76–1.27)
DEPRECATED RDW RBC AUTO: 54.8 FL (ref 37–54)
EGFRCR SERPLBLD CKD-EPI 2021: 86.9 ML/MIN/1.73
EOSINOPHIL # BLD AUTO: 1.16 10*3/MM3 (ref 0–0.4)
EOSINOPHIL NFR BLD AUTO: 14 % (ref 0.3–6.2)
ERYTHROCYTE [DISTWIDTH] IN BLOOD BY AUTOMATED COUNT: 15.7 % (ref 12.3–15.4)
GEN 5 1HR TROPONIN T REFLEX: 26 NG/L
GLOBULIN UR ELPH-MCNC: 2.7 GM/DL
GLUCOSE SERPL-MCNC: 144 MG/DL (ref 65–99)
HCT VFR BLD AUTO: 39.4 % (ref 37.5–51)
HGB BLD-MCNC: 12.3 G/DL (ref 13–17.7)
HOLD SPECIMEN: NORMAL
HOLD SPECIMEN: NORMAL
IMM GRANULOCYTES # BLD AUTO: 0.1 10*3/MM3 (ref 0–0.05)
IMM GRANULOCYTES NFR BLD AUTO: 1.2 % (ref 0–0.5)
LYMPHOCYTES # BLD AUTO: 2.26 10*3/MM3 (ref 0.7–3.1)
LYMPHOCYTES NFR BLD AUTO: 27.2 % (ref 19.6–45.3)
MCH RBC QN AUTO: 29.6 PG (ref 26.6–33)
MCHC RBC AUTO-ENTMCNC: 31.2 G/DL (ref 31.5–35.7)
MCV RBC AUTO: 94.7 FL (ref 79–97)
MONOCYTES # BLD AUTO: 0.41 10*3/MM3 (ref 0.1–0.9)
MONOCYTES NFR BLD AUTO: 4.9 % (ref 5–12)
NEUTROPHILS NFR BLD AUTO: 4.29 10*3/MM3 (ref 1.7–7)
NEUTROPHILS NFR BLD AUTO: 51.6 % (ref 42.7–76)
NRBC BLD AUTO-RTO: 0 /100 WBC (ref 0–0.2)
NT-PROBNP SERPL-MCNC: 182 PG/ML (ref 0–450)
PLATELET # BLD AUTO: 257 10*3/MM3 (ref 140–450)
PMV BLD AUTO: 9 FL (ref 6–12)
POTASSIUM SERPL-SCNC: 4.5 MMOL/L (ref 3.5–5.2)
PROT SERPL-MCNC: 6.9 G/DL (ref 6–8.5)
RBC # BLD AUTO: 4.16 10*6/MM3 (ref 4.14–5.8)
SODIUM SERPL-SCNC: 141 MMOL/L (ref 136–145)
TROPONIN T % DELTA: 0
TROPONIN T NUMERIC DELTA: 0 NG/L
TROPONIN T SERPL HS-MCNC: 26 NG/L
WBC NRBC COR # BLD AUTO: 8.31 10*3/MM3 (ref 3.4–10.8)
WHOLE BLOOD HOLD COAG: NORMAL
WHOLE BLOOD HOLD SPECIMEN: NORMAL

## 2025-01-30 PROCEDURE — 83880 ASSAY OF NATRIURETIC PEPTIDE: CPT | Performed by: STUDENT IN AN ORGANIZED HEALTH CARE EDUCATION/TRAINING PROGRAM

## 2025-01-30 PROCEDURE — 85025 COMPLETE CBC W/AUTO DIFF WBC: CPT

## 2025-01-30 PROCEDURE — 36415 COLL VENOUS BLD VENIPUNCTURE: CPT

## 2025-01-30 PROCEDURE — 71045 X-RAY EXAM CHEST 1 VIEW: CPT

## 2025-01-30 PROCEDURE — 93005 ELECTROCARDIOGRAM TRACING: CPT

## 2025-01-30 PROCEDURE — 93005 ELECTROCARDIOGRAM TRACING: CPT | Performed by: STUDENT IN AN ORGANIZED HEALTH CARE EDUCATION/TRAINING PROGRAM

## 2025-01-30 PROCEDURE — 71260 CT THORAX DX C+: CPT

## 2025-01-30 PROCEDURE — 80053 COMPREHEN METABOLIC PANEL: CPT | Performed by: STUDENT IN AN ORGANIZED HEALTH CARE EDUCATION/TRAINING PROGRAM

## 2025-01-30 PROCEDURE — 99285 EMERGENCY DEPT VISIT HI MDM: CPT | Performed by: STUDENT IN AN ORGANIZED HEALTH CARE EDUCATION/TRAINING PROGRAM

## 2025-01-30 PROCEDURE — 93971 EXTREMITY STUDY: CPT

## 2025-01-30 PROCEDURE — 25510000001 IOPAMIDOL 61 % SOLUTION: Performed by: STUDENT IN AN ORGANIZED HEALTH CARE EDUCATION/TRAINING PROGRAM

## 2025-01-30 PROCEDURE — 84484 ASSAY OF TROPONIN QUANT: CPT | Performed by: STUDENT IN AN ORGANIZED HEALTH CARE EDUCATION/TRAINING PROGRAM

## 2025-01-30 RX ORDER — IOPAMIDOL 612 MG/ML
100 INJECTION, SOLUTION INTRAVASCULAR
Status: COMPLETED | OUTPATIENT
Start: 2025-01-30 | End: 2025-01-30

## 2025-01-30 RX ORDER — ASPIRIN 81 MG/1
81 TABLET, CHEWABLE ORAL DAILY
COMMUNITY

## 2025-01-30 RX ORDER — SODIUM CHLORIDE 0.9 % (FLUSH) 0.9 %
10 SYRINGE (ML) INJECTION AS NEEDED
Status: DISCONTINUED | OUTPATIENT
Start: 2025-01-30 | End: 2025-01-30 | Stop reason: HOSPADM

## 2025-01-30 RX ORDER — LEVOFLOXACIN 750 MG/1
750 TABLET, FILM COATED ORAL DAILY
Qty: 5 TABLET | Refills: 0 | Status: SHIPPED | OUTPATIENT
Start: 2025-01-30 | End: 2025-02-04

## 2025-01-30 RX ORDER — ASPIRIN 325 MG
325 TABLET ORAL ONCE
Status: DISCONTINUED | OUTPATIENT
Start: 2025-01-30 | End: 2025-01-30

## 2025-01-30 RX ORDER — CLOPIDOGREL BISULFATE 75 MG/1
75 TABLET ORAL DAILY
COMMUNITY

## 2025-01-30 RX ADMIN — IOPAMIDOL 100 ML: 612 INJECTION, SOLUTION INTRAVENOUS at 08:17

## 2025-01-30 NOTE — DISCHARGE INSTRUCTIONS
Instructions from Dr. Mendoza:    It was a privilege being your ER physician today.    Please follow-up in clinic as we discussed.    Please return to the ER if you experience any worsening symptoms or for any other concerns.

## 2025-01-30 NOTE — Clinical Note
[facility] EMERGENCY DEPARTMENT  [address] [address]  Phone: [phone]    Alex Torres was seen and treated in our emergency department on 1/30/2025.  He may return to work on 02/01/2025.         Thank you for choosing [facility].    Dru Mendoza,

## 2025-01-30 NOTE — ED PROVIDER NOTES
"     [facility]  Emergency Department Encounter  Emergency Medicine Physician Note       Pt Name: Alex Torres  MRN: [id number]  Pt :   1985  Room Number:    Date of encounter:  2025  PCP: Jacqueline Kent APRN  ED Physician: Dru Mendoza,     HPI:  Alex Torres is a 40 y.o. male who presents to the ED with chief complaint of HEART PALPITATIONS.  Patient reports that approximately 1 to 2 hours prior to arrival he woke up with \"fast heart rate\".  States this felt like a \"pounding in his chest\".  Afterwards states that he felt hard to catch his breath.  Symptoms have all subsided since arrival to the ED. He denies fever, chills, chest pain, abdominal pain, back pain, problems with urination or bowel movements.  Does report pain in the right leg in the location of previous graft site incisions. No swelling, drainage, or redness. Patient is post op CABG x 3 on 25 with Dr. Marina at [facility].    PAST MEDICAL HISTORY  Past Medical History:   Diagnosis Date    Anxiety     Asthma     Bipolar 1 disorder     Depression     DM (diabetes mellitus)     Hypertension     Kidney stone     Polyneuropathy     Positive TB test     treated w/ meds x 6 months    PTSD (post-traumatic stress disorder)     Sleep apnea     Suicide attempt     Tattoos      Current Outpatient Medications   Medication Instructions    amitriptyline (ELAVIL) 50 mg, Oral, Every Night at Bedtime    aspirin 81 mg, Oral, Daily    azithromycin (ZITHROMAX) 250 mg, Oral, Daily    carvedilol (COREG) 6.25 mg, Oral, 2 Times Daily With Meals, ANNUAL DUE for refills    clopidogrel (PLAVIX) 75 mg, Oral, Daily    levoFLOXacin (LEVAQUIN) 750 mg, Oral, Daily    lithium 300 MG tablet TAKE 1 TABLET BY MOUTH EVERY MORNING AND 2 TABLETS EVERY NIGHT    omeprazole (priLOSEC) 40 MG capsule TAKE 1 CAPSULE BY MOUTH EVERY MORNING 30 MINS BEFORE BREAKFAST    ondansetron ODT (ZOFRAN-ODT) 8 mg, Translingual, Every 8 Hours " PRN    polyethylene glycol (MIRALAX) 17 g, Oral, Daily    QUEtiapine Fumarate 150 mg, Oral, Nightly PRN    valsartan (DIOVAN) 160 mg, Oral, Daily    Vraylar 3 mg, Oral, Daily      PAST SURGICAL HISTORY  Past Surgical History:   Procedure Laterality Date    CARDIAC CATHETERIZATION N/A 01/03/2025    Procedure: Left Heart Cath;  Surgeon: Cody Marion MD;  Location: [facility] CATH INVASIVE LOCATION;  Service: Cardiovascular;  Laterality: N/A;    CARDIAC SURGERY      CABG x 3 vessels    CHOLECYSTECTOMY  2002    INGUINAL HERNIA REPAIR Right 1995    ORIF METACARPAL FRACTURE Right 2000    TIBIA FRACTURE SURGERY Left 1997    ORIF       FAMILY HISTORY  Family History   Problem Relation Age of Onset    Arthritis Father     Hypertension Father     Alcohol abuse Father     Drug abuse Father     Diabetes Maternal Aunt     Diabetes Maternal Uncle     Diabetes Maternal Grandmother     Alzheimer's disease Maternal Grandmother     Dementia Maternal Grandmother     Diabetes Other     Hypertension Mother     Depression Mother     ADD / ADHD Brother     Early death Maternal Grandfather     Liver disease Maternal Grandfather     Alcohol abuse Maternal Grandfather     Cirrhosis Maternal Grandfather     No Known Problems Paternal Grandmother     Liver disease Paternal Grandfather     Alcohol abuse Paternal Grandfather     Early death Paternal Grandfather     Cirrhosis Paternal Grandfather     No Known Problems Brother     Anxiety disorder Neg Hx     Bipolar disorder Neg Hx     OCD Neg Hx     Paranoid behavior Neg Hx     Schizophrenia Neg Hx     Seizures Neg Hx     Self-Injurious Behavior  Neg Hx     Suicide Attempts Neg Hx     Colon cancer Neg Hx        SOCIAL HISTORY  Social History     Socioeconomic History    Marital status:    Tobacco Use    Smoking status: Former     Current packs/day: 1.00     Average packs/day: 1 pack/day for 26.1 years (26.1 ttl pk-yrs)     Types: Cigarettes     Start date: 1999    Smokeless tobacco:  Never   Vaping Use    Vaping status: Never Used   Substance and Sexual Activity    Alcohol use: No    Drug use: Yes     Types: Marijuana     Comment: rarely    Sexual activity: Yes     Partners: Female     ALLERGIES  Gabapentin and Latex    REVIEW OF SYSTEMS  All systems reviewed and negative except for those discussed in HPI.     PHYSICAL EXAM  ED Triage Vitals [01/30/25 0546]   Temp Heart Rate Resp BP SpO2   97.9 °F (36.6 °C) 76 20 127/83 98 %      Temp src Heart Rate Source Patient Position BP Location FiO2 (%)   Oral Monitor Sitting Left arm --     I have reviewed the triage vital signs and nursing notes.    General: Alert.  Nontoxic appearance.  No acute distress.  Head: Normocephalic.  Atraumatic.  Eyes: No scleral icterus.  ENT: Moist mucous membranes.  Cardiovascular: Regular rate and rhythm.  No murmurs.  No rubs.  2+ distal pulses bilaterally.  Respiratory: Equal breath sounds bilaterally. No wheezing. No rales.  No rhonchi.  GI: Abdomen is soft.  Nondistended.  Nontender to palpation.  No rebound.  No guarding.  No CVA tenderness.  MSK: Moves all 4 extremities.  Neurologic: Oriented x 3.  No focal deficits.  Skin: Sternotomy incision present. Two graft site incisions present medial right leg. No induration. No drainage. No edema. No erythema. No pallor. No cyanosis.  Psych: Normal mood and affect.    LAB RESULTS  Recent Results (from the past 24 hours)   Comprehensive Metabolic Panel    Collection Time: 01/30/25  5:57 AM    Specimen: Blood   Result Value Ref Range    Glucose 144 (H) 65 - 99 mg/dL    BUN 12 6 - 20 mg/dL    Creatinine 1.09 0.76 - 1.27 mg/dL    Sodium 141 136 - 145 mmol/L    Potassium 4.5 3.5 - 5.2 mmol/L    Chloride 106 98 - 107 mmol/L    CO2 24.1 22.0 - 29.0 mmol/L    Calcium 9.0 8.6 - 10.5 mg/dL    Total Protein 6.9 6.0 - 8.5 g/dL    Albumin 4.2 3.5 - 5.2 g/dL    ALT (SGPT) 70 (H) 1 - 41 U/L    AST (SGOT) 21 1 - 40 U/L    Alkaline Phosphatase 137 (H) 39 - 117 U/L    Total Bilirubin 0.4  0.0 - 1.2 mg/dL    Globulin 2.7 gm/dL    A/G Ratio 1.6 g/dL    BUN/Creatinine Ratio 11.0 7.0 - 25.0    Anion Gap 10.9 5.0 - 15.0 mmol/L    eGFR 86.9 >60.0 mL/min/1.73   High Sensitivity Troponin T    Collection Time: 01/30/25  5:57 AM    Specimen: Blood   Result Value Ref Range    HS Troponin T 26 (H) <22 ng/L   Green Top (Gel)    Collection Time: 01/30/25  5:57 AM   Result Value Ref Range    Extra Tube Hold for add-ons.    Lavender Top    Collection Time: 01/30/25  5:57 AM   Result Value Ref Range    Extra Tube hold for add-on    Gold Top - SST    Collection Time: 01/30/25  5:57 AM   Result Value Ref Range    Extra Tube Hold for add-ons.    Light Blue Top    Collection Time: 01/30/25  5:57 AM   Result Value Ref Range    Extra Tube Hold for add-ons.    CBC Auto Differential    Collection Time: 01/30/25  5:57 AM    Specimen: Blood   Result Value Ref Range    WBC 8.31 3.40 - 10.80 10*3/mm3    RBC 4.16 4.14 - 5.80 10*6/mm3    Hemoglobin 12.3 (L) 13.0 - 17.7 g/dL    Hematocrit 39.4 37.5 - 51.0 %    MCV 94.7 79.0 - 97.0 fL    MCH 29.6 26.6 - 33.0 pg    MCHC 31.2 (L) 31.5 - 35.7 g/dL    RDW 15.7 (H) 12.3 - 15.4 %    RDW-SD 54.8 (H) 37.0 - 54.0 fl    MPV 9.0 6.0 - 12.0 fL    Platelets 257 140 - 450 10*3/mm3    Neutrophil % 51.6 42.7 - 76.0 %    Lymphocyte % 27.2 19.6 - 45.3 %    Monocyte % 4.9 (L) 5.0 - 12.0 %    Eosinophil % 14.0 (H) 0.3 - 6.2 %    Basophil % 1.1 0.0 - 1.5 %    Immature Grans % 1.2 (H) 0.0 - 0.5 %    Neutrophils, Absolute 4.29 1.70 - 7.00 10*3/mm3    Lymphocytes, Absolute 2.26 0.70 - 3.10 10*3/mm3    Monocytes, Absolute 0.41 0.10 - 0.90 10*3/mm3    Eosinophils, Absolute 1.16 (H) 0.00 - 0.40 10*3/mm3    Basophils, Absolute 0.09 0.00 - 0.20 10*3/mm3    Immature Grans, Absolute 0.10 (H) 0.00 - 0.05 10*3/mm3    nRBC 0.0 0.0 - 0.2 /100 WBC   BNP    Collection Time: 01/30/25  5:57 AM    Specimen: Blood   Result Value Ref Range    proBNP 182.0 0.0 - 450.0 pg/mL   High Sensitivity Troponin T 1Hr    Collection  Time: 01/30/25  7:02 AM    Specimen: Blood   Result Value Ref Range    HS Troponin T 26 (H) <22 ng/L    Troponin T Numeric Delta 0 ng/L    Troponin T % Delta 0 Abnormal if >/= 20%       RADIOLOGY  CT Chest With Contrast Diagnostic    Result Date: 1/30/2025  PROCEDURE: CT CHEST W CONTRAST DIAGNOSTIC-  HISTORY: Left lower lobe infiltrate, SOA, s/p CABG  COMPARISON: 1/3/2025.  PROCEDURE: Multiple axial CT images were obtained from the thoracic inlet through the upper abdomen following the administration of intravenous contrast.  FINDINGS: Soft tissue windows demonstrate no adenopathy within the chest. The heart is normal in size. The aorta is normal in caliber.there is increased attenuation anterior to the mediastinum. Status post median sternotomy. Coronary bypass markers are present. There is mild infiltration of the overlying fat. Findings consistent with recent bypass. There is evidence of old calcified granulomatous disease. There is a trace pericardial effusion. There is a very small right and moderate left pleural effusion. There is patchy airspace disease with air bronchograms in the left lower lobe consistent with pneumonia. There is minimal posterior right lower lobe airspace disease. There is minimal groundglass opacity in the posterior left upper lobe. The visualized upper abdomen is unremarkable. Bone windows reveal no acute osseous abnormalities.      Left lower lobe pneumonia with mild involvement of the right lower lobe and left upper lobe as described.  Recent CABG.  Moderate left pleural effusion.    This study was performed with techniques to keep radiation doses as low as reasonably achievable (ALARA). Individualized dose reduction techniques using automated exposure control or adjustment of mA and/or kV according to the patient size were employed.    Images were reviewed, interpreted, and dictated by Dr. Iraida Mcnair MD Transcribed by Agustín Salmon PA-C.  This report was signed and finalized on  1/30/2025 8:42 AM by Iraida Mcnair MD.      US Venous Doppler Lower Extremity Right (duplex)    Result Date: 1/30/2025  RIGHT LOWER EXTREMITY VENOUS DUPLEX DOPPLER EXAMINATION  HISTORY: Right Lower extremity swelling.  COMPARISON:   None  PROCEDURE: Multiple transverse and longitudinal scans were performed of the femoropopliteal deep venous system, with augmentation and compression maneuvers.  FINDINGS: Proper phasic flow was noted in the visualized deep venous system. No intraluminal increased echogenicity is noted to suggest thrombus. There is proper compression and augmentation of the venous structures. No abnormal venous collaterals are seen.      No evidence of right lower extremity deep venous thrombosis.    This report was signed and finalized on 1/30/2025 7:39 AM by Iraida Mcnair MD.      XR Chest 1 View    Result Date: 1/30/2025  PROCEDURE: XR CHEST 1 VW-  HISTORY: Chest Pain Triage Protocol, right-sided chest pain, shortness of breath and open heart surgery 4 weeks ago.  COMPARISON: January 14, 2025..  FINDINGS: The heart is normal in size. There are bilateral perihilar linear densities, most consistent with atelectasis. There is worsened left basilar airspace disease with new small left effusion, consider worsening pneumonia.. The mediastinum is unremarkable. There is no pneumothorax.  There are no acute osseous abnormalities. The patient is status post sternotomy for CABG.      Worsened left basilar airspace disease with small effusion, possible pneumonia. Recommend follow-up..      This report was signed and finalized on 1/30/2025 7:14 AM by Iraida Mcnair MD.       PROCEDURES  Procedures    RISK STRATIFICATION    MEDICAL DECISION MAKING  40 y.o. male with past medical history listed above who presents with heart palpitations, shortness of breath, and pain to right leg.    Vital signs within normal limits. Pulse ox 98% on room air.    Based on clinical presentation and physical exam, differential diagnosis  includes, but is not limited to, cardiac arrhythmia, A-fib, SVT, metabolic derangement. In regards to leg pain, differential includes DVT, dermatitis, saphenous neuropathy, seroma. No clinical evidence of cellulitis, abscess, distal neurovascular compromise.    At least 3 different tests have been ordered on this patient.    Medications administered in ED:  Medications   sodium chloride 0.9 % flush 10 mL (has no administration in time range)   iopamidol (ISOVUE-300) 61 % injection 100 mL (100 mL Intravenous Given 1/30/25 0817)     Please see ED course below for my interpretation of the ED workup.  ED Course as of 01/30/25 0915   Thu Jan 30, 2025   0612 ECG 12 Lead ED Triage Standing Order; Chest Pain  EKG per my interpretation normal sinus rhythm, rate 69, normal axis, no STEMI, normal QRS QTC intervals.   [JS]   0911 I reviewed the labs listed above. Clinically unremarkable.    Notable findings are highlighted below.    Old laboratory data was reviewed from the medical records and compared to today's results.   [JS]   0912 Hemoglobin(!): 12.3 [JS]   0912 ALT (SGPT)(!): 70 [JS]   0912 HS Troponin T(!): 26 [JS]   0912 HS Troponin T(!): 26 [JS]   0912 Troponin T Numeric Delta: 0 [JS]   0912 XR Chest 1 View  I have independently reviewed and interpreted the chest x-ray.  My interpretation is left basilar infiltrate.    [JS]   0912 CT Chest With Contrast Diagnostic  I have independently reviewed and interpreted the CT chest.  My interpretation is left lower lobe pneumonia.    Radiologist notes the following: Left lower lobe pneumonia with mild involvement of the right lower lobe and left upper lobe as described.  Recent CABG.  Moderate left pleural effusion.     [JS]      ED Course User Index  [JS] Dru Mendoza DO     SDM - declined admit    On re-evaluation, patient resting comfortably. Vital signs remained stable on room air.  Patient was ambulatory in the ED with steady gait.  Able to tolerate oral intake  appropriately.    I discussed the findings of the ED workup with the patient and his wife at bedside. Given continued pneumonia despite outpatient antibiotics I recommended medical admission for IV antibiotics. Patient adamantly declines admission. Wants to try another oral antibiotic outpatient. Patient was informed of the indication, benefits, alternative diagnostic options, and risks including, but not limited to missed and/or delayed diagnosis, therefore leading to failure to treat. The patient is clinically not intoxicated, free from distracting pain, appears to have intact insight, judgment and reason and in my medical opinion has the capacity to make medical decisions.    I recommended outpatient follow-up with PCP/pulmonology/CT surgery.  Patient was deemed medically stable for discharge with close outpatient follow-up and strict ED return precautions. Patient's wife also agreeable with plan and disposition.    Home medications were reviewed.  Prescriptions for discharge: Levaquin    Chronic conditions affecting care: CAD s/p CABG    Social determinants of health impacting treatment or disposition: None    REPEAT VITAL SIGNS  AS OF 09:15 EST VITALS:  BP - 114/67  HR - 71  TEMP - 97.9 °F (36.6 °C) (Oral)  O2 SATS - 96%    DIAGNOSIS  Final diagnoses:   Pneumonia of left lower lobe due to infectious organism   Pleural effusion on left   Heart palpitations   Hx of CABG     DISPOSITION  ED Disposition       ED Disposition   Discharge    Condition   Stable    Comment   --               PATIENT REFERRALS  [facility]  [address]  [facility] [id number] [phone]        Josias Granados MD  [address]  [facility] [address]  [phone]      PCP. 48 hours.    Ronn Marina MD  [address] [facility]  [address] [place], [place] [id number] [phone]      CT Surgery. 1st available appointment.            Please  note that portions of this document were completed with voice recognition software.        Dru Mendoza DO  01/31/25 0631

## 2025-02-03 ENCOUNTER — TRANSCRIBE ORDERS (OUTPATIENT)
Dept: CARDIAC REHAB | Facility: HOSPITAL | Age: 40
End: 2025-02-03
Payer: COMMERCIAL

## 2025-02-03 DIAGNOSIS — Z95.1 STATUS POST CORONARY ARTERY BYPASS GRAFT: Primary | ICD-10-CM

## 2025-02-05 NOTE — ASSESSMENT & PLAN NOTE
-Currently waiting for new water chamber to resume usage  -Compliant prior to this and plans to restart ASAP

## 2025-02-05 NOTE — PROGRESS NOTES
[facility] Cardiology    Office Consult     Alex Torres  [id number]  02/06/2025    Referred By: No ref. provider found    Chief Complaint   Patient presents with    Follow-up       Subjective     History of Present Illness:   Alex Torres is a 40 y.o. male who presents to the Cardiology Clinic for follow up after a recent CABG x3 1/2025.  He presented to [facility] as a STEMI with abrupt onset of chest discomfort at 11 PM 1/3/2025. The patient presented to the emergency room where his initial EKG was normal. The patient's chest discomfort intensified and repeat EKG showed inferior and lateral ST elevation.  Emergency coronary angiography was performed showing thrombotic occlusion of the proximal circumflex. The patient underwent balloon angioplasty of the infarct-related artery. The mid LAD had a 75% stenosis. The ostial and proximal ramus branch had a 90-95% stenosis. The mid right coronary artery had a long 75% stenosis with chronic total occlusion of the distal posterolateral left ventricular branch just after the takeoff of the AV node artery. Patient with past health history significant for hypertension, type 2 diabetes mellitus, hyperlipidemia, and obesity.  He was subsequently transferred to [facility] and had a CABG x3 with Dr. Marina.  Since discharge from the hospital he had developed pneumonia and was seen at [facility] ED in [place] and was treated with 3 rounds of antibiotics.  He was not previously established with a cardiologist prior to his STEMI.    Patient states that since his CABG he has been progressively improving.  He did have a pneumonia with a couple rounds of antibiotics and is recovering well.   States he has been able to go to [facility] and walk around without difficulty.    Checks BP at home and states it is in 110-120s on his check.  Notes he forgot to take Metoprolol this morning.  Has noted some tightness at his incision line and some  back pain.  Plans to start cardiac rehab 2/18/2025.  Patient asks about going back to job PT.  States he is going stir crazy.  He says he has had some mild swelling at his leg donor site with negative doppler by Dr. Marina's office.  States he moves around his house all the time.  States he quit cigarettes and is not vaping.  States he smokes about 1G daily of marijuana to stimulate his appetite.  He denies any chest pain or shortness of breath.         Review of Systems   Constitutional:  Negative for activity change and fatigue.   Respiratory:  Negative for chest tightness and shortness of breath.    Cardiovascular:  Negative for chest pain, palpitations and leg swelling.   Neurological:  Negative for dizziness.   All other systems reviewed and are negative.       Past Medical History:   Diagnosis Date    Anxiety     Asthma     Bipolar 1 disorder     Depression     DM (diabetes mellitus)     Hypertension     Kidney stone     Polyneuropathy     Positive TB test     treated w/ meds x 6 months    PTSD (post-traumatic stress disorder)     Sleep apnea     Suicide attempt     Tattoos        Past Surgical History:   Procedure Laterality Date    CARDIAC CATHETERIZATION N/A 01/03/2025    Procedure: Left Heart Cath;  Surgeon: Cody Marion MD;  Location: [facility] CATH INVASIVE LOCATION;  Service: Cardiovascular;  Laterality: N/A;    CARDIAC SURGERY      CABG x 3 vessels    CHOLECYSTECTOMY  2002    INGUINAL HERNIA REPAIR Right 1995    ORIF METACARPAL FRACTURE Right 2000    TIBIA FRACTURE SURGERY Left 1997    ORIF       Family History   Problem Relation Age of Onset    Arthritis Father     Hypertension Father     Alcohol abuse Father     Drug abuse Father     Diabetes Maternal Aunt     Diabetes Maternal Uncle     Diabetes Maternal Grandmother     Alzheimer's disease Maternal Grandmother     Dementia Maternal Grandmother     Diabetes Other     Hypertension Mother     Depression Mother     ADD / ADHD Brother     Early  death Maternal Grandfather     Liver disease Maternal Grandfather     Alcohol abuse Maternal Grandfather     Cirrhosis Maternal Grandfather     No Known Problems Paternal Grandmother     Liver disease Paternal Grandfather     Alcohol abuse Paternal Grandfather     Early death Paternal Grandfather     Cirrhosis Paternal Grandfather     No Known Problems Brother     Anxiety disorder Neg Hx     Bipolar disorder Neg Hx     OCD Neg Hx     Paranoid behavior Neg Hx     Schizophrenia Neg Hx     Seizures Neg Hx     Self-Injurious Behavior  Neg Hx     Suicide Attempts Neg Hx     Colon cancer Neg Hx        Social History     Socioeconomic History    Marital status:    Tobacco Use    Smoking status: Former     Current packs/day: 1.00     Average packs/day: 1 pack/day for 26.1 years (26.1 ttl pk-yrs)     Types: Cigarettes     Start date: 1999    Smokeless tobacco: Never   Vaping Use    Vaping status: Never Used   Substance and Sexual Activity    Alcohol use: No    Drug use: Yes     Types: Marijuana     Comment: rarely    Sexual activity: Yes     Partners: Female         Current Outpatient Medications:     amitriptyline (ELAVIL) 50 MG tablet, TAKE 1 TABLET BY MOUTH EVERYDAY AT BEDTIME, Disp: 30 tablet, Rfl: 1    aspirin 81 MG chewable tablet, Chew 1 tablet Daily., Disp: , Rfl:     atorvastatin (LIPITOR) 40 MG tablet, Take 1 tablet by mouth Daily., Disp: , Rfl:     clopidogrel (PLAVIX) 75 MG tablet, Take 1 tablet by mouth Daily., Disp: , Rfl:     lithium 300 MG tablet, TAKE 1 TABLET BY MOUTH EVERY MORNING AND 2 TABLETS EVERY NIGHT (Patient taking differently: Take 1 tablet by mouth Every Night. Taking two tablets at bedtime), Disp: 90 tablet, Rfl: 1    metoprolol tartrate (LOPRESSOR) 25 MG tablet, Take 1 tablet by mouth 2 (Two) Times a Day., Disp: , Rfl:     omeprazole (priLOSEC) 40 MG capsule, TAKE 1 CAPSULE BY MOUTH EVERY MORNING 30 MINS BEFORE BREAKFAST, Disp: 90 capsule, Rfl: 1    QUEtiapine 150 MG tablet, Take 150 mg  "by mouth At Night As Needed (Insomnia)., Disp: 30 tablet, Rfl: 2    valsartan (DIOVAN) 160 MG tablet, TAKE 1 TABLET BY MOUTH EVERY DAY, Disp: 90 tablet, Rfl: 1    Vraylar 3 MG capsule capsule, TAKE 1 CAPSULE BY MOUTH EVERY DAY, Disp: 30 capsule, Rfl: 2      Allergies   Allergen Reactions    Gabapentin Swelling     + swelling    Latex Rash       Objective     Vitals:    02/06/25 1038   BP: 142/82   Pulse: 83   Resp: 18   SpO2: 99%   Weight: 120 kg (263 lb 12.8 oz)   Height: 193 cm (76\")     Body mass index is 32.11 kg/m².    Physical Exam  Vitals and nursing note reviewed.   Constitutional:       General: He is not in acute distress.     Appearance: Normal appearance. He is obese. He is not ill-appearing or toxic-appearing.   HENT:      Head: Normocephalic.      Mouth/Throat:      Mouth: Mucous membranes are moist.   Eyes:      Pupils: Pupils are equal, round, and reactive to light.   Cardiovascular:      Rate and Rhythm: Normal rate and regular rhythm.      Pulses: Normal pulses.      Heart sounds: Normal heart sounds. No murmur heard.     Comments: Well healing CABG incision midline with no s/s of infection  Pulmonary:      Effort: Pulmonary effort is normal. No respiratory distress.      Breath sounds: Normal breath sounds. No wheezing, rhonchi or rales.   Musculoskeletal:      Right lower leg: No edema (trace pedal--donor site leg).      Left lower leg: No edema.   Skin:     General: Skin is warm and dry.      Capillary Refill: Capillary refill takes less than 2 seconds.   Neurological:      Mental Status: He is alert and oriented to person, place, and time. Mental status is at baseline.   Psychiatric:         Mood and Affect: Mood normal.         Behavior: Behavior normal.         Thought Content: Thought content normal.         Results Review:   I reviewed the patient's new clinical results.    Procedures    Assessment & Plan  Hospital discharge follow-up  -Follow up with Dr. Marion in 3 months for recheck   "     S/P CABG (coronary artery bypass graft)  -S/P CABG with Dr. Valdez 1/3/2025       STEMI involving left circumflex coronary artery  -Continue ASA and Plavix  -Continue Metoprolol  -Continue statin therapy  -No complaints of chest pain or angina  -Continues to move around at home  -Enrolled in cardiac rehab starting 2/18  -Would like to start back to work--Dr. Marina recommended off work for 6-8 weeks--discussed starting cardiac rehab first and assessing how he is feeling prior to returning to work  -Continue with activity as tolerated         Essential hypertension  -Blood pressure marginal today, patient has not had morning meds  -Is checking regularly at home with [id number] systolic  -Discussed to continue checking regularly  -Continue Valsartan and Metoprolol         Class 1 obesity due to excess calories without serious comorbidity with body mass index (BMI) of 32.0 to 32.9 in adult  -BMI-32  -Recommend diet and exercise for weight loss         MOLLY on CPAP  -Currently waiting for new water chamber to resume usage  -Compliant prior to this and plans to restart ASAP       Marijuana use  -States he smokes daily now to stimulate appetite  -Discussed recommendation to stop marijuana use       Former smoker  -Congratulated on smoking cessation         Preventative Cardiology:   Tobacco Cessation: N/A   Advance Care Planning: ACP discussion was held with the patient during this visit. Patient does not have an advance directive, declines further assistance.     Follow Up:   Return in about 3 months (around 5/6/2025) for Next scheduled follow up--Dr. Marion.      Thank you for allowing me to participate in the care of your patient. Please to not hesitate to contact me with additional questions or concerns.     Madai King, VARSHA

## 2025-02-05 NOTE — ASSESSMENT & PLAN NOTE
-Blood pressure marginal today, patient has not had morning meds  -Is checking regularly at home with [id number] systolic  -Discussed to continue checking regularly  -Continue Valsartan and Metoprolol

## 2025-02-05 NOTE — ASSESSMENT & PLAN NOTE
-Continue ASA and Plavix  -Continue Metoprolol  -Continue statin therapy  -No complaints of chest pain or angina  -Continues to move around at home  -Enrolled in cardiac rehab starting 2/18  -Would like to start back to work--Dr. Marina recommended off work for 6-8 weeks--discussed starting cardiac rehab first and assessing how he is feeling prior to returning to work  -Continue with activity as tolerated

## 2025-02-06 ENCOUNTER — OFFICE VISIT (OUTPATIENT)
Dept: CARDIOLOGY | Facility: CLINIC | Age: 40
End: 2025-02-06
Payer: COMMERCIAL

## 2025-02-06 VITALS
HEART RATE: 83 BPM | DIASTOLIC BLOOD PRESSURE: 82 MMHG | WEIGHT: 263.8 LBS | RESPIRATION RATE: 18 BRPM | BODY MASS INDEX: 32.12 KG/M2 | HEIGHT: 76 IN | OXYGEN SATURATION: 99 % | SYSTOLIC BLOOD PRESSURE: 142 MMHG

## 2025-02-06 DIAGNOSIS — Z95.1 S/P CABG (CORONARY ARTERY BYPASS GRAFT): Primary | ICD-10-CM

## 2025-02-06 DIAGNOSIS — E66.811 CLASS 1 OBESITY DUE TO EXCESS CALORIES WITHOUT SERIOUS COMORBIDITY WITH BODY MASS INDEX (BMI) OF 32.0 TO 32.9 IN ADULT: ICD-10-CM

## 2025-02-06 DIAGNOSIS — F12.90 MARIJUANA USE: ICD-10-CM

## 2025-02-06 DIAGNOSIS — E66.09 CLASS 1 OBESITY DUE TO EXCESS CALORIES WITHOUT SERIOUS COMORBIDITY WITH BODY MASS INDEX (BMI) OF 32.0 TO 32.9 IN ADULT: ICD-10-CM

## 2025-02-06 DIAGNOSIS — G47.33 OSA ON CPAP: ICD-10-CM

## 2025-02-06 DIAGNOSIS — Z87.891 FORMER SMOKER: ICD-10-CM

## 2025-02-06 DIAGNOSIS — Z09 HOSPITAL DISCHARGE FOLLOW-UP: ICD-10-CM

## 2025-02-06 DIAGNOSIS — I21.21 STEMI INVOLVING LEFT CIRCUMFLEX CORONARY ARTERY: ICD-10-CM

## 2025-02-06 DIAGNOSIS — I10 ESSENTIAL HYPERTENSION: ICD-10-CM

## 2025-02-06 RX ORDER — METOPROLOL TARTRATE 25 MG/1
25 TABLET, FILM COATED ORAL 2 TIMES DAILY
COMMUNITY
Start: 2025-01-09 | End: 2026-01-09

## 2025-02-06 RX ORDER — ATORVASTATIN CALCIUM 40 MG/1
40 TABLET, FILM COATED ORAL DAILY
COMMUNITY
Start: 2025-01-09

## 2025-02-07 ENCOUNTER — PATIENT ROUNDING (BHMG ONLY) (OUTPATIENT)
Dept: CARDIOLOGY | Facility: CLINIC | Age: 40
End: 2025-02-07
Payer: COMMERCIAL

## 2025-02-07 NOTE — PROGRESS NOTES
[facility] welcome email and rounding message has been sent to patient via [employer].

## 2025-02-14 DIAGNOSIS — K21.9 GASTROESOPHAGEAL REFLUX DISEASE, UNSPECIFIED WHETHER ESOPHAGITIS PRESENT: Chronic | ICD-10-CM

## 2025-02-14 RX ORDER — OMEPRAZOLE 40 MG/1
CAPSULE, DELAYED RELEASE ORAL
Qty: 90 CAPSULE | Refills: 1 | OUTPATIENT
Start: 2025-02-14

## 2025-02-18 ENCOUNTER — TREATMENT (OUTPATIENT)
Dept: CARDIAC REHAB | Facility: HOSPITAL | Age: 40
End: 2025-02-18
Payer: COMMERCIAL

## 2025-02-18 DIAGNOSIS — Z95.1 STATUS POST CORONARY ARTERY BYPASS GRAFT: ICD-10-CM

## 2025-02-18 PROCEDURE — 93798 PHYS/QHP OP CAR RHAB W/ECG: CPT

## 2025-02-18 NOTE — PROGRESS NOTES
Pt was seen today in CR for a Phase II visit. Vital signs and session notes recorded in [employer] and will be scanned into Epic by HIM.

## 2025-02-25 ENCOUNTER — HOSPITAL ENCOUNTER (OUTPATIENT)
Dept: GENERAL RADIOLOGY | Facility: HOSPITAL | Age: 40
Discharge: HOME | End: 2025-02-25
Payer: COMMERCIAL

## 2025-02-25 ENCOUNTER — LAB (OUTPATIENT)
Dept: LAB | Facility: HOSPITAL | Age: 40
End: 2025-02-25
Payer: COMMERCIAL

## 2025-02-25 ENCOUNTER — TREATMENT (OUTPATIENT)
Dept: CARDIAC REHAB | Facility: HOSPITAL | Age: 40
End: 2025-02-25
Payer: COMMERCIAL

## 2025-02-25 ENCOUNTER — TELEPHONE (OUTPATIENT)
Dept: CARDIOLOGY | Facility: CLINIC | Age: 40
End: 2025-02-25
Payer: COMMERCIAL

## 2025-02-25 DIAGNOSIS — Z95.1 S/P CABG (CORONARY ARTERY BYPASS GRAFT): ICD-10-CM

## 2025-02-25 DIAGNOSIS — Z95.1 S/P CABG (CORONARY ARTERY BYPASS GRAFT): Primary | ICD-10-CM

## 2025-02-25 DIAGNOSIS — Z95.1 STATUS POST CORONARY ARTERY BYPASS GRAFT: Primary | ICD-10-CM

## 2025-02-25 DIAGNOSIS — R07.9 ACUTE CHEST PAIN: ICD-10-CM

## 2025-02-25 LAB — NT-PROBNP SERPL-MCNC: 315.7 PG/ML (ref 0–450)

## 2025-02-25 PROCEDURE — 36415 COLL VENOUS BLD VENIPUNCTURE: CPT

## 2025-02-25 PROCEDURE — 93798 PHYS/QHP OP CAR RHAB W/ECG: CPT

## 2025-02-25 PROCEDURE — 83880 ASSAY OF NATRIURETIC PEPTIDE: CPT

## 2025-02-25 PROCEDURE — 71046 X-RAY EXAM CHEST 2 VIEWS: CPT

## 2025-02-25 NOTE — TELEPHONE ENCOUNTER
Pt called stating that he has gained around 9lbs within 1 week. Pt states he was weighed in cardiac rehab and they noted this gain and he noticed this gain steadily at home also. Pt states that he is having some tightness and tenderness in his abdomen. Pt denies SOA and CP but has a heavy feeling in his chest. Pt states he was able to go to cardiac rehab and completed todays visit without any issues but noticed swelling in his legs this morning after this around his ankles.

## 2025-02-25 NOTE — TELEPHONE ENCOUNTER
I will order a repeat CXR and BNP that he needs to get completed ASAP.  Please try to get him worked in for an echocardiogram this week.  His imaging in the ER 2/20 was all normal including a CT of chest and abdomen.  He needs to be worked in to Dr. Marion's schedule Monday, please.  He needs to go to the ER with any new, worse, or changing symptoms.    Electronically signed by VARSHA Zarate, 02/25/25, 10:52 AM EST.

## 2025-02-25 NOTE — TELEPHONE ENCOUNTER
Lashanda let me know that it takes at least three days to get a PA with [employer]. Okay to schedule him for next week?

## 2025-02-25 NOTE — PROGRESS NOTES
Pt was seen today in CR for a Phase II visit. Vital signs and session notes recorded in [employer] and will be scanned into Epic by HIM.

## 2025-02-27 ENCOUNTER — TREATMENT (OUTPATIENT)
Dept: CARDIAC REHAB | Facility: HOSPITAL | Age: 40
End: 2025-02-27
Payer: COMMERCIAL

## 2025-02-27 DIAGNOSIS — Z95.1 STATUS POST CORONARY ARTERY BYPASS GRAFT: Primary | ICD-10-CM

## 2025-02-27 PROCEDURE — 93798 PHYS/QHP OP CAR RHAB W/ECG: CPT

## 2025-02-27 NOTE — PROGRESS NOTES
Pt was seen today in CR for a Phase II visit. Vital signs and session notes recorded in [employer] and will be scanned into Epic by HIM.

## 2025-03-04 ENCOUNTER — APPOINTMENT (OUTPATIENT)
Dept: CARDIAC REHAB | Facility: HOSPITAL | Age: 40
End: 2025-03-04
Payer: COMMERCIAL

## 2025-03-04 ENCOUNTER — OFFICE VISIT (OUTPATIENT)
Dept: CARDIOLOGY | Facility: CLINIC | Age: 40
End: 2025-03-04
Payer: COMMERCIAL

## 2025-03-04 VITALS
BODY MASS INDEX: 33.73 KG/M2 | HEART RATE: 65 BPM | OXYGEN SATURATION: 97 % | SYSTOLIC BLOOD PRESSURE: 120 MMHG | HEIGHT: 76 IN | DIASTOLIC BLOOD PRESSURE: 80 MMHG | WEIGHT: 277 LBS

## 2025-03-04 DIAGNOSIS — I25.10 CORONARY ARTERY DISEASE INVOLVING NATIVE CORONARY ARTERY OF NATIVE HEART WITHOUT ANGINA PECTORIS: Primary | ICD-10-CM

## 2025-03-04 DIAGNOSIS — E78.5 DYSLIPIDEMIA: ICD-10-CM

## 2025-03-04 DIAGNOSIS — I10 PRIMARY HYPERTENSION: ICD-10-CM

## 2025-03-04 PROCEDURE — 3074F SYST BP LT 130 MM HG: CPT | Performed by: INTERNAL MEDICINE

## 2025-03-04 PROCEDURE — 1160F RVW MEDS BY RX/DR IN RCRD: CPT | Performed by: INTERNAL MEDICINE

## 2025-03-04 PROCEDURE — 3079F DIAST BP 80-89 MM HG: CPT | Performed by: INTERNAL MEDICINE

## 2025-03-04 PROCEDURE — 1159F MED LIST DOCD IN RCRD: CPT | Performed by: INTERNAL MEDICINE

## 2025-03-04 PROCEDURE — 99213 OFFICE O/P EST LOW 20 MIN: CPT | Performed by: INTERNAL MEDICINE

## 2025-03-04 RX ORDER — FUROSEMIDE 40 MG/1
40 TABLET ORAL DAILY
Qty: 90 TABLET | Refills: 3 | Status: SHIPPED | OUTPATIENT
Start: 2025-03-04

## 2025-03-04 NOTE — PROGRESS NOTES
Subjective:     Encounter Date:03/04/2025      Patient ID: Alex Torres is a 40 y.o. male.    Chief Complaint: Coronary artery disease  HPI  This is a 40-year-old male patient who presents to cardiology clinic for routine follow-up.  The patient was recently admitted for an acute lateral ST elevation myocardial infarction.  Emergency invasive coronary angiography disclosed a thrombotically occluded circumflex which was opened with PTCA.  The patient was discovered to have severe three-vessel coronary artery disease and subsequently underwent staged CABG.  He had an uneventful postoperative course.  He is currently enrolled in cardiac rehab.  The patient recently had an echocardiogram showing a normal ejection fraction with no valvular or pericardial disease.  He reports compliance with his medications with no perceived side effects.  The patient has noticed increasing weight particularly around his abdomen.  He is demonstrated to have peripheral edema with some shortness of breath.  He is progressing normally with outpatient cardiac rehab.  The following portions of the patient's history were reviewed and updated as appropriate: allergies, current medications, past family history, past medical history, past social history, past surgical history and problem  Review of Systems   Constitutional: Negative for chills, diaphoresis, fever, malaise/fatigue, weight gain and weight loss.   HENT:  Negative for ear discharge, hearing loss, hoarse voice and nosebleeds.    Eyes:  Negative for discharge, double vision, pain and photophobia.   Cardiovascular:  Positive for dyspnea on exertion and leg swelling. Negative for chest pain, claudication, cyanosis, irregular heartbeat, near-syncope, orthopnea, palpitations, paroxysmal nocturnal dyspnea and syncope.   Respiratory:  Positive for shortness of breath. Negative for cough, hemoptysis, sputum production and wheezing.    Endocrine: Negative for cold intolerance, heat  intolerance, polydipsia, polyphagia and polyuria.   Hematologic/Lymphatic: Negative for adenopathy and bleeding problem. Does not bruise/bleed easily.   Skin:  Negative for color change, flushing, itching and rash.   Musculoskeletal:  Negative for muscle cramps, muscle weakness, myalgias and stiffness.   Gastrointestinal:  Negative for abdominal pain, diarrhea, hematemesis, hematochezia, nausea and vomiting.   Genitourinary:  Negative for dysuria, frequency and nocturia.   Neurological:  Negative for focal weakness, loss of balance, numbness, paresthesias and seizures.   Psychiatric/Behavioral:  Negative for altered mental status, hallucinations and suicidal ideas.    Allergic/Immunologic: Negative for HIV exposure, hives and persistent infections.           Current Outpatient Medications:     amitriptyline (ELAVIL) 50 MG tablet, TAKE 1 TABLET BY MOUTH EVERYDAY AT BEDTIME, Disp: 30 tablet, Rfl: 1    aspirin 81 MG chewable tablet, Chew 1 tablet Daily., Disp: , Rfl:     atorvastatin (LIPITOR) 40 MG tablet, Take 1 tablet by mouth Daily., Disp: , Rfl:     clopidogrel (PLAVIX) 75 MG tablet, Take 1 tablet by mouth Daily., Disp: , Rfl:     lithium 300 MG tablet, TAKE 1 TABLET BY MOUTH EVERY MORNING AND 2 TABLETS EVERY NIGHT (Patient taking differently: Take 1 tablet by mouth Every Night. Taking two tablets at bedtime), Disp: 90 tablet, Rfl: 1    metoprolol tartrate (LOPRESSOR) 25 MG tablet, Take 1 tablet by mouth 2 (Two) Times a Day., Disp: , Rfl:     omeprazole (priLOSEC) 40 MG capsule, TAKE 1 CAPSULE BY MOUTH EVERY MORNING 30 MINS BEFORE BREAKFAST, Disp: 90 capsule, Rfl: 1    QUEtiapine 150 MG tablet, Take 150 mg by mouth At Night As Needed (Insomnia)., Disp: 30 tablet, Rfl: 2    valsartan (DIOVAN) 160 MG tablet, TAKE 1 TABLET BY MOUTH EVERY DAY, Disp: 90 tablet, Rfl: 1    Vraylar 3 MG capsule capsule, TAKE 1 CAPSULE BY MOUTH EVERY DAY, Disp: 30 capsule, Rfl: 2    furosemide (LASIX) 40 MG tablet, Take 1 tablet by mouth  "Daily., Disp: 90 tablet, Rfl: 3    Objective:   Vitals and nursing note reviewed.   Constitutional:       Appearance: Healthy appearance. Not in distress.   Neck:      Vascular: No JVR. JVD normal.   Pulmonary:      Effort: Pulmonary effort is normal.      Breath sounds: Normal breath sounds. No wheezing. No rhonchi. No rales.   Chest:      Chest wall: Not tender to palpatation.   Cardiovascular:      PMI at left midclavicular line. Normal rate. Regular rhythm. Normal S1. Normal S2.       Murmurs: There is no murmur.      No gallop.  No click. No rub.   Pulses:     Intact distal pulses.   Edema:     Peripheral edema present.  Abdominal:      General: Bowel sounds are normal.      Palpations: Abdomen is soft.      Tenderness: There is no abdominal tenderness.   Musculoskeletal: Normal range of motion.         General: No tenderness. Skin:     General: Skin is warm and dry.   Neurological:      General: No focal deficit present.      Mental Status: Alert and oriented to person, place and time.       Blood pressure 120/80, pulse 65, height 193 cm (76\"), weight 126 kg (277 lb), SpO2 97%.   Lab Review:     Assessment:       1. Coronary artery disease involving native coronary artery of native heart without angina pectoris  Angina free after multivessel CABG.    2. Dyslipidemia  Tolerating statin based cholesterol-lowering therapy without side effects.  New ACC AHA guidelines recommend treating his LDL cholesterol to less than 55 mg/dL.    3. Primary hypertension  Acceptable blood pressure control.    Procedures    Plan:     I have recommended starting Lasix 40 mg orally every morning.  The patient will have a outpatient basic metabolic panel in 1 week to assess need for potassium supplementation.  The patient has been counseled to eat foods that are high in potassium such as tomatoes, bananas and/or cantaloupes.  The patient has been advised to adopt a fluid and sodium restriction.  I have recommended a less than 2 L/day " fluid restriction and less than 2000 mg/day sodium restriction.  He has been referred to the outpatient nutritional list to be educated regarding foods and beverages necessary to meet these requirements.    The importance of diet exercise and weight management cannot be over emphasized.    Primary care provider to assess fasting lipid profile.  Again, we would recommend his LDL cholesterol be maintained at less than 55 mg/dL.  In addition to maximally tolerated dose of statin, he may also require either Nexlizet or PCSK9 inhibitor therapy to achieve this goal.

## 2025-03-05 ENCOUNTER — TREATMENT (OUTPATIENT)
Dept: CARDIAC REHAB | Facility: HOSPITAL | Age: 40
End: 2025-03-05
Payer: COMMERCIAL

## 2025-03-05 DIAGNOSIS — Z95.1 STATUS POST CORONARY ARTERY BYPASS GRAFT: Primary | ICD-10-CM

## 2025-03-05 PROCEDURE — 93798 PHYS/QHP OP CAR RHAB W/ECG: CPT

## 2025-03-05 NOTE — PROGRESS NOTES
Pt was seen today in CR for a Phase 2 visit.  Vital signs and session notes recorded in [employer] and will be scanned into Epic by HIM.

## 2025-03-06 ENCOUNTER — TREATMENT (OUTPATIENT)
Dept: CARDIAC REHAB | Facility: HOSPITAL | Age: 40
End: 2025-03-06
Payer: COMMERCIAL

## 2025-03-06 DIAGNOSIS — Z95.1 STATUS POST CORONARY ARTERY BYPASS GRAFT: Primary | ICD-10-CM

## 2025-03-06 PROCEDURE — 93798 PHYS/QHP OP CAR RHAB W/ECG: CPT

## 2025-03-06 NOTE — PROGRESS NOTES
Pt was seen today in CR for a Phase 2 visit.  Vital signs and session notes recorded in [employer] and will be scanned into Epic by HIM.

## 2025-03-11 ENCOUNTER — TREATMENT (OUTPATIENT)
Dept: CARDIAC REHAB | Facility: HOSPITAL | Age: 40
End: 2025-03-11
Payer: COMMERCIAL

## 2025-03-11 DIAGNOSIS — Z95.1 STATUS POST CORONARY ARTERY BYPASS GRAFT: Primary | ICD-10-CM

## 2025-03-11 PROCEDURE — 93798 PHYS/QHP OP CAR RHAB W/ECG: CPT

## 2025-03-11 NOTE — PROGRESS NOTES
Pt was seen today in CR for a Phase 2 visit.  Vital signs and session notes recorded in [employer] and will be scanned into Epic by HIM.

## 2025-03-13 ENCOUNTER — TELEPHONE (OUTPATIENT)
Dept: CARDIOLOGY | Facility: CLINIC | Age: 40
End: 2025-03-13
Payer: COMMERCIAL

## 2025-03-13 ENCOUNTER — TREATMENT (OUTPATIENT)
Dept: CARDIAC REHAB | Facility: HOSPITAL | Age: 40
End: 2025-03-13
Payer: COMMERCIAL

## 2025-03-13 DIAGNOSIS — Z95.1 STATUS POST CORONARY ARTERY BYPASS GRAFT: Primary | ICD-10-CM

## 2025-03-13 DIAGNOSIS — R60.9 FLUID RETENTION: Primary | ICD-10-CM

## 2025-03-13 PROCEDURE — 93798 PHYS/QHP OP CAR RHAB W/ECG: CPT

## 2025-03-13 NOTE — PROGRESS NOTES
Pt was seen today in CR for a Phase II visit. Vital signs and session notes recorded in [employer] and will be scanned into Epic by HIM.

## 2025-03-13 NOTE — TELEPHONE ENCOUNTER
PT STOPPED BY AFTER APPT IN CARDIO REHAB WHO HAD ADVISED HIM TO ASK DR LICONA IF HE SHOULD ADJUST HIS LASIX? HE IS GAINING WEIGHT CONSISTENTLY GOING FROM 269.6 ON 3/6/25  ON 3/11/25 .6 ON 3/13/25. PLEASE ADVISE. HE GAVE PHONE [phone].

## 2025-03-18 ENCOUNTER — APPOINTMENT (OUTPATIENT)
Dept: CARDIAC REHAB | Facility: HOSPITAL | Age: 40
End: 2025-03-18
Payer: COMMERCIAL

## 2025-03-20 ENCOUNTER — TREATMENT (OUTPATIENT)
Dept: CARDIAC REHAB | Facility: HOSPITAL | Age: 40
End: 2025-03-20
Payer: COMMERCIAL

## 2025-03-20 DIAGNOSIS — Z95.1 STATUS POST CORONARY ARTERY BYPASS GRAFT: Primary | ICD-10-CM

## 2025-03-20 PROCEDURE — 93798 PHYS/QHP OP CAR RHAB W/ECG: CPT

## 2025-03-20 NOTE — PROGRESS NOTES
Pt was seen today in CR for a Phase 2 visit.  Vital signs and session notes recorded in [employer] and will be scanned into Epic by HIM.

## 2025-03-24 DIAGNOSIS — F31.62 BIPOLAR DISORDER, CURRENT EPISODE MIXED, MODERATE: ICD-10-CM

## 2025-03-24 DIAGNOSIS — I10 ESSENTIAL HYPERTENSION: ICD-10-CM

## 2025-03-24 DIAGNOSIS — F43.10 POST TRAUMATIC STRESS DISORDER (PTSD): Chronic | ICD-10-CM

## 2025-03-24 DIAGNOSIS — F41.9 ANXIETY DISORDER, UNSPECIFIED TYPE: ICD-10-CM

## 2025-03-24 DIAGNOSIS — F51.5 NIGHTMARES: ICD-10-CM

## 2025-03-24 DIAGNOSIS — F51.05 INSOMNIA DUE TO MENTAL CONDITION: ICD-10-CM

## 2025-03-24 RX ORDER — VALSARTAN 160 MG/1
160 TABLET ORAL DAILY
Qty: 90 TABLET | Refills: 1 | OUTPATIENT
Start: 2025-03-24

## 2025-03-25 ENCOUNTER — TREATMENT (OUTPATIENT)
Dept: CARDIAC REHAB | Facility: HOSPITAL | Age: 40
End: 2025-03-25
Payer: COMMERCIAL

## 2025-03-25 DIAGNOSIS — Z95.1 STATUS POST CORONARY ARTERY BYPASS GRAFT: Primary | ICD-10-CM

## 2025-03-25 PROCEDURE — 93798 PHYS/QHP OP CAR RHAB W/ECG: CPT

## 2025-03-25 RX ORDER — AMITRIPTYLINE HYDROCHLORIDE 50 MG/1
50 TABLET ORAL
Qty: 30 TABLET | Refills: 1 | Status: SHIPPED | OUTPATIENT
Start: 2025-03-25

## 2025-03-25 RX ORDER — CARIPRAZINE 3 MG/1
3 CAPSULE, GELATIN COATED ORAL DAILY
Qty: 30 CAPSULE | Refills: 2 | Status: SHIPPED | OUTPATIENT
Start: 2025-03-25 | End: 2025-03-28 | Stop reason: SDUPTHER

## 2025-03-25 RX ORDER — QUETIAPINE FUMARATE 150 MG/1
1 TABLET, FILM COATED ORAL NIGHTLY PRN
Qty: 30 TABLET | Refills: 2 | Status: SHIPPED | OUTPATIENT
Start: 2025-03-25 | End: 2025-03-28 | Stop reason: SDUPTHER

## 2025-03-25 NOTE — PROGRESS NOTES
Pt was seen today in CR for a Phase II visit. Vital signs and session notes recorded in [employer] and will be scanned into Epic by HIM.

## 2025-03-28 ENCOUNTER — TELEMEDICINE (OUTPATIENT)
Dept: PSYCHIATRY | Facility: CLINIC | Age: 40
End: 2025-03-28
Payer: COMMERCIAL

## 2025-03-28 DIAGNOSIS — F43.10 POST TRAUMATIC STRESS DISORDER (PTSD): Chronic | ICD-10-CM

## 2025-03-28 DIAGNOSIS — F51.05 INSOMNIA DUE TO MENTAL CONDITION: ICD-10-CM

## 2025-03-28 DIAGNOSIS — F41.9 ANXIETY DISORDER, UNSPECIFIED TYPE: ICD-10-CM

## 2025-03-28 DIAGNOSIS — F51.5 NIGHTMARES: ICD-10-CM

## 2025-03-28 DIAGNOSIS — F31.62 BIPOLAR DISORDER, CURRENT EPISODE MIXED, MODERATE: ICD-10-CM

## 2025-03-28 PROCEDURE — 1159F MED LIST DOCD IN RCRD: CPT | Performed by: PSYCHIATRY & NEUROLOGY

## 2025-03-28 PROCEDURE — 1160F RVW MEDS BY RX/DR IN RCRD: CPT | Performed by: PSYCHIATRY & NEUROLOGY

## 2025-03-28 PROCEDURE — 99214 OFFICE O/P EST MOD 30 MIN: CPT | Performed by: PSYCHIATRY & NEUROLOGY

## 2025-03-28 RX ORDER — LITHIUM CARBONATE 300 MG
300 TABLET ORAL NIGHTLY
Qty: 30 TABLET | Refills: 0 | Status: SHIPPED | OUTPATIENT
Start: 2025-03-28

## 2025-03-28 RX ORDER — QUETIAPINE FUMARATE 150 MG/1
1 TABLET, FILM COATED ORAL NIGHTLY PRN
Qty: 30 TABLET | Refills: 2 | Status: SHIPPED | OUTPATIENT
Start: 2025-03-28

## 2025-03-28 NOTE — PROGRESS NOTES
Subjective   Alex Torres is a 40 y.o. male who presents today for follow up     This provider is located at [facility], [address] [place] [address]. The Patient is seen remotely at home, using Epic Mychart. Patient is being seen via telehealth and stated they are in a secure environment for this session. The patient’s condition being diagnosed/treated is appropriate for telemedicine. The provider identified himself as well as his credentials.   The patient gave consent to be seen remotely, and when consent is given they understand that the consent allows for patient identifiable information to be sent to a third party as needed.   They may refuse to be seen remotely at any time. The electronic data is encrypted and password protected, and the patient has been advised of the potential risks to privacy not withstanding such measures      Chief Complaint: Bipolar disorder/anxiety    History of Present Illness:   History of Present Illness  The patient is a 40-year-old male presenting for follow-up of bipolar disorder.    He reports a stable mood, although not optimal, and experiences significant pain in his sternum during coughing or sneezing. His medication regimen remains unchanged, with the only alteration being the timing of administration. He now takes all his behavioral medications at night, including 2 lithium tablets, Vraylar, and Seroquel. Upon waking, he generally feels well, although he has been experiencing some irritability, which he attributes to his recent smoking cessation. He quit smoking in 01/2025, following his heart attack, and has not resumed since. Despite being offered nicotine patches, he declined due to concerns about potential relapse. He acknowledges the difficulty of this process, particularly the intense cravings, which he believes are contributing to his irritability. He expresses satisfaction with his current psychiatric medications and does not believe any changes  are necessary. He suggests that discontinuing lithium and continuing only with Vraylar could be beneficial, as he has found Vraylar to be highly effective.    He underwent open heart surgery in 01/2025 due to a triple blockage, which was discovered following a heart attack. A clot was identified in one of his posterior coronary arteries, necessitating emergency surgery. His recovery has been satisfactory over the past 2.5 months. He has experienced rapid weight gain postoperatively, which he attributes to increased food intake following smoking cessation and boredom due to unemployment. He attempts to maintain an active lifestyle, attending cardiac rehabilitation twice weekly and accompanying his wife on grocery trips for additional exercise. He also spends time outdoors, sitting on the porch or walking up and down stairs for exercise. He estimates his recovery at approximately 85% to 90%. He is on Lasix for water retention.    He was let go from his job because of his ex-boss who does not work for that company anymore. He was not aware of the leave of absence program that his job has for people who are going through medical issues.    SOCIAL HISTORY  He quit smoking in 01/2025 after his heart attack and has not had any cigarettes since then.    MEDICATIONS  Current: Lithium, Vraylar, Seroquel, Lasix        The following portions of the patient's history were reviewed and updated as appropriate: allergies, current medications, past family history, past medical history, past social history, past surgical history and problem list.      Past Medical History:  Past Medical History:   Diagnosis Date    Anxiety     Asthma     Bipolar 1 disorder     Coronary artery disease     Depression     DM (diabetes mellitus)     Heart valve disease     Hyperlipidemia     Hypertension     Kidney stone     Myocardial infarction     Polyneuropathy     Positive TB test     treated w/ meds x 6 months    PTSD (post-traumatic stress  disorder)     Sleep apnea     Suicide attempt     Tattoos        Social History:  Social History     Socioeconomic History    Marital status:    Tobacco Use    Smoking status: Former     Current packs/day: 1.00     Average packs/day: 1 pack/day for 26.2 years (26.2 ttl pk-yrs)     Types: Cigarettes     Start date: 1999    Smokeless tobacco: Never   Vaping Use    Vaping status: Never Used   Substance and Sexual Activity    Alcohol use: No    Drug use: Yes     Types: Marijuana     Comment: rarely    Sexual activity: Yes     Partners: Female     Birth control/protection: None       Family History:  Family History   Problem Relation Age of Onset    Arthritis Father     Hypertension Father     Alcohol abuse Father     Drug abuse Father     Heart attack Father     Diabetes Maternal Aunt     Diabetes Maternal Uncle     Diabetes Maternal Grandmother     Alzheimer's disease Maternal Grandmother     Dementia Maternal Grandmother     Diabetes Other     Hypertension Mother     Depression Mother     ADD / ADHD Brother     Early death Maternal Grandfather     Liver disease Maternal Grandfather     Alcohol abuse Maternal Grandfather     Cirrhosis Maternal Grandfather     No Known Problems Paternal Grandmother     Liver disease Paternal Grandfather     Alcohol abuse Paternal Grandfather     Early death Paternal Grandfather     Cirrhosis Paternal Grandfather     No Known Problems Brother     Anxiety disorder Neg Hx     Bipolar disorder Neg Hx     OCD Neg Hx     Paranoid behavior Neg Hx     Schizophrenia Neg Hx     Seizures Neg Hx     Self-Injurious Behavior  Neg Hx     Suicide Attempts Neg Hx     Colon cancer Neg Hx        Past Surgical History:  Past Surgical History:   Procedure Laterality Date    ARTERIAL BYPASS SURGERY      CARDIAC CATHETERIZATION N/A 01/03/2025    Procedure: Left Heart Cath;  Surgeon: Cody Marion MD;  Location: [facility] CATH INVASIVE LOCATION;  Service: Cardiovascular;  Laterality: N/A;    CARDIAC  SURGERY      CABG x 3 vessels    CHOLECYSTECTOMY  2002    CORONARY ARTERY BYPASS GRAFT      INGUINAL HERNIA REPAIR Right 1995    ORIF METACARPAL FRACTURE Right 2000    TIBIA FRACTURE SURGERY Left 1997    ORIF       Problem List:  Patient Active Problem List   Diagnosis    Primary hypertension    GERD (gastroesophageal reflux disease)    Bipolar disorder, current episode mixed, moderate    MOLLY on CPAP    Arthritis    Complex regional pain syndrome type 2 of left lower extremity    Snoring    Excessive daytime sleepiness    Peripheral polyneuropathy    Pain in both lower extremities    Varicose veins of both lower extremities with pain    MOLLY (obstructive sleep apnea)    Class 1 obesity due to excess calories without serious comorbidity with body mass index (BMI) of 32.0 to 32.9 in adult    Neuromuscular respiratory weakness    Borderline diabetes    Diarrhea    Nausea and vomiting    Acute cough    NSTEMI (non-ST elevated myocardial infarction)    STEMI involving left circumflex coronary artery    Coronary artery disease involving native coronary artery of native heart without angina pectoris    Dyslipidemia       Allergy:   Allergies   Allergen Reactions    Gabapentin Swelling     + swelling    Latex Rash        Current Medications:   Current Outpatient Medications   Medication Sig Dispense Refill    amitriptyline (ELAVIL) 50 MG tablet TAKE 1 TABLET BY MOUTH EVERYDAY AT BEDTIME 30 tablet 1    aspirin 81 MG chewable tablet Chew 1 tablet Daily.      atorvastatin (LIPITOR) 40 MG tablet Take 1 tablet by mouth Daily.      clopidogrel (PLAVIX) 75 MG tablet Take 1 tablet by mouth Daily.      furosemide (LASIX) 40 MG tablet Take 1 tablet by mouth Daily. 90 tablet 3    lithium 300 MG tablet TAKE 1 TABLET BY MOUTH EVERY MORNING AND 2 TABLETS EVERY NIGHT (Patient taking differently: Take 1 tablet by mouth Every Night. Taking two tablets at bedtime) 90 tablet 1    metoprolol tartrate (LOPRESSOR) 25 MG tablet Take 1 tablet by  mouth 2 (Two) Times a Day.      omeprazole (priLOSEC) 40 MG capsule TAKE 1 CAPSULE BY MOUTH EVERY MORNING 30 MINS BEFORE BREAKFAST 90 capsule 1    QUEtiapine Fumarate 150 MG tablet TAKE 1 TABLET BY MOUTH EVERY DAY AT BEDTIME AS NEEDED FOR INSOMNIA 30 tablet 2    valsartan (DIOVAN) 160 MG tablet TAKE 1 TABLET BY MOUTH EVERY DAY 90 tablet 1    Vraylar 3 MG capsule capsule TAKE 1 CAPSULE BY MOUTH EVERY DAY 30 capsule 2     No current facility-administered medications for this visit.       Review of Symptoms:    Review of Systems   Constitutional:  Positive for activity change (improved), fatigue and unexpected weight gain. Negative for appetite change, chills, diaphoresis and fever.   HENT: Negative.     Eyes: Negative.    Respiratory: Negative.     Cardiovascular: Negative.    Gastrointestinal: Negative.    Endocrine: Negative.    Genitourinary:  Positive for decreased libido.   Musculoskeletal: Negative.    Skin: Negative.    Allergic/Immunologic: Negative.    Neurological:  Negative for dizziness.   Hematological: Negative.    Psychiatric/Behavioral:  Positive for agitation. Negative for behavioral problems, decreased concentration, dysphoric mood, hallucinations, self-injury, sleep disturbance, suicidal ideas, negative for hyperactivity, depressed mood and stress. The patient is not nervous/anxious.          Physical Exam:   There were no vitals taken for this visit.  Video visit    Appearance: Male of stated age, NAD  Gait, Station, Strength: Video visit    Mental Status Exam:       Hygiene:   good  Cooperation:  Cooperative  Eye Contact:  Good  Psychomotor Behavior:  Appropriate  Affect:  Full range  Mood: normal, some mild irritability  Hopelessness: Optimistic  Speech:  Normal  Thought Process:  Goal directed and Linear  Thought Content:  Normal  Suicidal:  None  Homicidal:  None  Hallucinations:  None  Delusion:  None  Memory:  Intact  Orientation:  Person, Place, Time and Situation  Reliability:   good  Insight:  Fair  Judgement:  Fair  Impulse Control:  Fair  Physical/Medical Issues:  No        Lab Results:   Ancillary Procedure on 03/04/2025   Component Date Value Ref Range Status    EF_3D-VOL 03/04/2025 68.0  % Final    LVIDd 03/04/2025 4.4  cm Final    LVIDs 03/04/2025 2.8  cm Final    IVSd 03/04/2025 1.11  cm Final    LVPWd 03/04/2025 1.1  cm Final    FS 03/04/2025 35.9  % Final    IVS/LVPW 03/04/2025 0.68  cm Final    ESV(cubed) 03/04/2025 22.4  ml Final    LV Sys Vol (BSA corrected) 03/04/2025 17.7  cm2 Final    EDV(cubed) 03/04/2025 85.2  ml Final    LV Diaz Vol (BSA corrected) 03/04/2025 49.1  cm2 Final    LV mass(C)d 03/04/2025 233.8  grams Final    LVOT area 03/04/2025 4.2  cm2 Final    LVOT diam 03/04/2025 2.30  cm Final    EDV(MOD-sp4) 03/04/2025 125.0  ml Final    ESV(MOD-sp4) 03/04/2025 45.0  ml Final    SV(MOD-sp4) 03/04/2025 80.0  ml Final    SVi(MOD-SP4) 03/04/2025 31.4  ml/m2 Final    SVi (LVOT) 03/04/2025 35.8  ml/m2 Final    EF(MOD-sp4) 03/04/2025 64.0  % Final    MV E max tapan 03/04/2025 83.8  cm/sec Final    MV A max tapan 03/04/2025 46.6  cm/sec Final    MV dec time 03/04/2025 0.13  sec Final    MV E/A 03/04/2025 1.80   Final    LA ESV Index (BP) 03/04/2025 13.4  ml/m2 Final    Med Peak E' Tapan 03/04/2025 7.7  cm/sec Final    Lat Peak E' Tapan 03/04/2025 15.2  cm/sec Final    Avg E/e' ratio 03/04/2025 7.32   Final    SV(LVOT) 03/04/2025 91.0  ml Final    LA dimension (2D)  03/04/2025 3.2  cm Final    LV V1 max 03/04/2025 98.2  cm/sec Final    LV V1 max PG 03/04/2025 3.9  mmHg Final    LV V1 mean PG 03/04/2025 2.00  mmHg Final    LV V1 VTI 03/04/2025 21.9  cm Final    Ao pk tapan 03/04/2025 103.0  cm/sec Final    Ao max PG 03/04/2025 4.2  mmHg Final    Ao mean PG 03/04/2025 3.0  mmHg Final    Ao V2 VTI 03/04/2025 18.3  cm Final    APARNA(I,D) 03/04/2025 5.0  cm2 Final    MV max PG 03/04/2025 3.2  mmHg Final    MV mean PG 03/04/2025 1.00  mmHg Final    MV V2 VTI 03/04/2025 23.3  cm Final     MVA(VTI) 03/04/2025 3.9  cm2 Final    MV dec slope 03/04/2025 668.0  cm/sec2 Final    RAP systole 03/04/2025 3  mmHg Final    PA V2 max 03/04/2025 81.8  cm/sec Final    Ao root diam 03/04/2025 2.7  cm Final       Assessment & Plan   Diagnoses and all orders for this visit:    1. Chronic Post traumatic stress disorder (PTSD)  -     lithium 300 MG tablet; Take 1 tablet by mouth Every Night.  Dispense: 30 tablet; Refill: 0  -     Cariprazine HCl (Vraylar) 3 MG capsule capsule; Take 1 capsule by mouth Daily.  Dispense: 30 capsule; Refill: 2  -     QUEtiapine Fumarate 150 MG tablet; Take 1 tablet by mouth At Night As Needed (insomnia).  Dispense: 30 tablet; Refill: 2    2. Bipolar disorder, current episode mixed, moderate  -     lithium 300 MG tablet; Take 1 tablet by mouth Every Night.  Dispense: 30 tablet; Refill: 0  -     Cariprazine HCl (Vraylar) 3 MG capsule capsule; Take 1 capsule by mouth Daily.  Dispense: 30 capsule; Refill: 2  -     QUEtiapine Fumarate 150 MG tablet; Take 1 tablet by mouth At Night As Needed (insomnia).  Dispense: 30 tablet; Refill: 2    3. Anxiety disorder, unspecified type  -     lithium 300 MG tablet; Take 1 tablet by mouth Every Night.  Dispense: 30 tablet; Refill: 0  -     Cariprazine HCl (Vraylar) 3 MG capsule capsule; Take 1 capsule by mouth Daily.  Dispense: 30 capsule; Refill: 2  -     QUEtiapine Fumarate 150 MG tablet; Take 1 tablet by mouth At Night As Needed (insomnia).  Dispense: 30 tablet; Refill: 2    4. Nightmares  -     QUEtiapine Fumarate 150 MG tablet; Take 1 tablet by mouth At Night As Needed (insomnia).  Dispense: 30 tablet; Refill: 2    5. Insomnia due to mental condition  -     QUEtiapine Fumarate 150 MG tablet; Take 1 tablet by mouth At Night As Needed (insomnia).  Dispense: 30 tablet; Refill: 2      -Patient overall doing fairly well from a psychiatric standpoint but did have to have open heart surgery after an MI.  -Reviewed previous documentation  -Reviewed  labs  -Reduce lithium to 300 mg nightly for mood stabilization with eventual plan to taper off and utilize Vraylar for mood stabilization  -Continue Seroquel 150 mg nightly as needed for insomnia and mood stabilization  -Continue Vraylar 3 mg p.o. daily for PTSD and bipolar disorder  -Continue Elavil 50 mg p.o. nightly for insomnia  -Encourage cessation of nicotine and cannabis  -Encouraged therapy and anger management  -Approximate appointment time 9:20 AM to 9:40 AM via video visit    Visit Diagnoses:    ICD-10-CM ICD-9-CM   1. Chronic Post traumatic stress disorder (PTSD)  F43.10 309.81   2. Bipolar disorder, current episode mixed, moderate  F31.62 296.62   3. Anxiety disorder, unspecified type  F41.9 300.00   4. Nightmares  F51.5 307.47   5. Insomnia due to mental condition  F51.05 300.9     327.02             TREATMENT PLAN/GOALS: Continue supportive psychotherapy efforts and medications as indicated. Treatment and medication options discussed during today patient advised he may need a lithium level in 5 to 7 days visit. Patient ackowledged and verbally consented to continue with current treatment plan and was educated on the importance of compliance with treatment and follow-up appointments.    MEDICATION ISSUES:    Discussed medication options and treatment plan of prescribed medication as well as the risks, benefits, and side effects including potential falls, possible impaired driving and metabolic adversities among others. Patient is agreeable to call the office with any worsening of symptoms or onset of side effects. Patient is agreeable to call 911 or go to the nearest ER should he/she begin having SI/HI. No medication side effects or related complaints today.     MEDS ORDERED DURING VISIT:  New Medications Ordered This Visit   Medications    lithium 300 MG tablet     Sig: Take 1 tablet by mouth Every Night.     Dispense:  30 tablet     Refill:  0    Cariprazine HCl (Vraylar) 3 MG capsule capsule      Sig: Take 1 capsule by mouth Daily.     Dispense:  30 capsule     Refill:  2    QUEtiapine Fumarate 150 MG tablet     Sig: Take 1 tablet by mouth At Night As Needed (insomnia).     Dispense:  30 tablet     Refill:  2     Patient or patient representative verbalized consent for the use of Ambient Listening during the visit with  Stefan Marion MD for chart documentation. 3/28/2025  09:41 EDT      Return in about 4 weeks (around 4/25/2025).             This document has been electronically signed by Stefan Marion MD  March 28, 2025

## 2025-04-25 ENCOUNTER — TELEMEDICINE (OUTPATIENT)
Dept: PSYCHIATRY | Facility: CLINIC | Age: 40
End: 2025-04-25
Payer: COMMERCIAL

## 2025-04-25 DIAGNOSIS — F31.62 BIPOLAR DISORDER, CURRENT EPISODE MIXED, MODERATE: ICD-10-CM

## 2025-04-25 DIAGNOSIS — F51.05 INSOMNIA DUE TO MENTAL CONDITION: ICD-10-CM

## 2025-04-25 DIAGNOSIS — F41.9 ANXIETY DISORDER, UNSPECIFIED TYPE: ICD-10-CM

## 2025-04-25 DIAGNOSIS — F51.5 NIGHTMARES: ICD-10-CM

## 2025-04-25 DIAGNOSIS — F43.10 POST TRAUMATIC STRESS DISORDER (PTSD): Chronic | ICD-10-CM

## 2025-04-25 PROCEDURE — 99214 OFFICE O/P EST MOD 30 MIN: CPT | Performed by: PSYCHIATRY & NEUROLOGY

## 2025-04-25 PROCEDURE — 1159F MED LIST DOCD IN RCRD: CPT | Performed by: PSYCHIATRY & NEUROLOGY

## 2025-04-25 PROCEDURE — 1160F RVW MEDS BY RX/DR IN RCRD: CPT | Performed by: PSYCHIATRY & NEUROLOGY

## 2025-04-25 RX ORDER — AMITRIPTYLINE HYDROCHLORIDE 75 MG/1
75 TABLET ORAL
Qty: 30 TABLET | Refills: 0 | Status: SHIPPED | OUTPATIENT
Start: 2025-04-25

## 2025-04-25 RX ORDER — LITHIUM CARBONATE 300 MG
300 TABLET ORAL NIGHTLY
Qty: 30 TABLET | Refills: 2 | Status: SHIPPED | OUTPATIENT
Start: 2025-04-25

## 2025-04-25 NOTE — PROGRESS NOTES
Subjective   Alex Torres is a 40 y.o. male who presents today for follow up     This provider is located at [facility], [address] [place] [address]. The Patient is seen remotely at home, using Epic Mychart. Patient is being seen via telehealth and stated they are in a secure environment for this session. The patient’s condition being diagnosed/treated is appropriate for telemedicine. The provider identified himself as well as his credentials.   The patient gave consent to be seen remotely, and when consent is given they understand that the consent allows for patient identifiable information to be sent to a third party as needed.   They may refuse to be seen remotely at any time. The electronic data is encrypted and password protected, and the patient has been advised of the potential risks to privacy not withstanding such measures      Chief Complaint: Bipolar disorder/anxiety    History of Present Illness:   History of Present Illness  The patient is a 40-year-old male presenting for a follow-up via virtual visit for bipolar disorder. He is accompanied by no one.    The chief complaint includes difficulty initiating sleep and morning grogginess. He reports an improvement in his condition, attributing it to the effectiveness of his current medication regimen, which includes lithium and Vraylar. No adjustments to his medications are deemed necessary at this time.    He experiences difficulty initiating sleep but maintains good sleep quality once asleep. However, he struggles with morning grogginess, often lying with his eyes closed due to their heaviness.    Interim History: He reports feeling a little better overall. Behavioral and medication management is stable, with lithium and Vraylar working effectively. There are no significant side effects from the medications, except for some trouble falling asleep and morning grogginess.    Social History:  -   Psychiatric History:  -   Substance Use:  -    Pertinent Negatives:  - No significant side effects from current medications  - No abnormalities found in recent CT scan    He has been experiencing weight issues, which he believes are not entirely his fault. He feels there is an underlying issue contributing to this, but his primary care physician has not addressed it. He reports a sensation of swelling in his stomach, describing it as slightly hard and painful upon palpation. He also suspects fluid retention, as Lasix has not provided significant relief. A recent CT scan performed during an emergency room visit did not reveal any abnormalities. He has an upcoming appointment with a pulmonologist next week.    MEDICATIONS  Current: Lithium, Vraylar, Elavil, Lasix        The following portions of the patient's history were reviewed and updated as appropriate: allergies, current medications, past family history, past medical history, past social history, past surgical history and problem list.      Past Medical History:  Past Medical History:   Diagnosis Date    Anxiety     Asthma     Bipolar 1 disorder     Coronary artery disease     Depression     DM (diabetes mellitus)     Heart valve disease     Hyperlipidemia     Hypertension     Kidney stone     Myocardial infarction     Polyneuropathy     Positive TB test     treated w/ meds x 6 months    PTSD (post-traumatic stress disorder)     Sleep apnea     Suicide attempt     Tattoos        Social History:  Social History     Socioeconomic History    Marital status:    Tobacco Use    Smoking status: Former     Current packs/day: 1.00     Average packs/day: 1 pack/day for 26.3 years (26.3 ttl pk-yrs)     Types: Cigarettes     Start date: 1999    Smokeless tobacco: Never   Vaping Use    Vaping status: Never Used   Substance and Sexual Activity    Alcohol use: No    Drug use: Yes     Types: Marijuana     Comment: rarely    Sexual activity: Yes     Partners: Female     Birth control/protection: None       Family  History:  Family History   Problem Relation Age of Onset    Arthritis Father     Hypertension Father     Alcohol abuse Father     Drug abuse Father     Heart attack Father     Diabetes Maternal Aunt     Diabetes Maternal Uncle     Diabetes Maternal Grandmother     Alzheimer's disease Maternal Grandmother     Dementia Maternal Grandmother     Diabetes Other     Hypertension Mother     Depression Mother     ADD / ADHD Brother     Early death Maternal Grandfather     Liver disease Maternal Grandfather     Alcohol abuse Maternal Grandfather     Cirrhosis Maternal Grandfather     No Known Problems Paternal Grandmother     Liver disease Paternal Grandfather     Alcohol abuse Paternal Grandfather     Early death Paternal Grandfather     Cirrhosis Paternal Grandfather     No Known Problems Brother     Anxiety disorder Neg Hx     Bipolar disorder Neg Hx     OCD Neg Hx     Paranoid behavior Neg Hx     Schizophrenia Neg Hx     Seizures Neg Hx     Self-Injurious Behavior  Neg Hx     Suicide Attempts Neg Hx     Colon cancer Neg Hx        Past Surgical History:  Past Surgical History:   Procedure Laterality Date    ARTERIAL BYPASS SURGERY      CARDIAC CATHETERIZATION N/A 01/03/2025    Procedure: Left Heart Cath;  Surgeon: Cody Marion MD;  Location: [facility] CATH INVASIVE LOCATION;  Service: Cardiovascular;  Laterality: N/A;    CARDIAC SURGERY      CABG x 3 vessels    CHOLECYSTECTOMY  2002    CORONARY ARTERY BYPASS GRAFT      INGUINAL HERNIA REPAIR Right 1995    ORIF METACARPAL FRACTURE Right 2000    TIBIA FRACTURE SURGERY Left 1997    ORIF       Problem List:  Patient Active Problem List   Diagnosis    Primary hypertension    GERD (gastroesophageal reflux disease)    Bipolar disorder, current episode mixed, moderate    MOLLY on CPAP    Arthritis    Complex regional pain syndrome type 2 of left lower extremity    Snoring    Excessive daytime sleepiness    Peripheral polyneuropathy    Pain in both lower extremities    Varicose  veins of both lower extremities with pain    MOLLY (obstructive sleep apnea)    Class 1 obesity due to excess calories without serious comorbidity with body mass index (BMI) of 32.0 to 32.9 in adult    Neuromuscular respiratory weakness    Borderline diabetes    Diarrhea    Nausea and vomiting    Acute cough    NSTEMI (non-ST elevated myocardial infarction)    STEMI involving left circumflex coronary artery    Coronary artery disease involving native coronary artery of native heart without angina pectoris    Dyslipidemia       Allergy:   Allergies   Allergen Reactions    Gabapentin Swelling     + swelling    Latex Rash        Current Medications:   Current Outpatient Medications   Medication Sig Dispense Refill    amitriptyline (ELAVIL) 50 MG tablet TAKE 1 TABLET BY MOUTH EVERYDAY AT BEDTIME 30 tablet 1    aspirin 81 MG chewable tablet Chew 1 tablet Daily.      atorvastatin (LIPITOR) 40 MG tablet Take 1 tablet by mouth Daily.      Cariprazine HCl (Vraylar) 3 MG capsule capsule Take 1 capsule by mouth Daily. 30 capsule 2    clopidogrel (PLAVIX) 75 MG tablet Take 1 tablet by mouth Daily.      furosemide (LASIX) 40 MG tablet Take 1 tablet by mouth Daily. 90 tablet 3    lithium 300 MG tablet Take 1 tablet by mouth Every Night. 30 tablet 0    metoprolol tartrate (LOPRESSOR) 25 MG tablet Take 1 tablet by mouth 2 (Two) Times a Day.      omeprazole (priLOSEC) 40 MG capsule TAKE 1 CAPSULE BY MOUTH EVERY MORNING 30 MINS BEFORE BREAKFAST 90 capsule 1    QUEtiapine Fumarate 150 MG tablet Take 1 tablet by mouth At Night As Needed (insomnia). 30 tablet 2    valsartan (DIOVAN) 160 MG tablet TAKE 1 TABLET BY MOUTH EVERY DAY 90 tablet 1     No current facility-administered medications for this visit.       Review of Symptoms:    Review of Systems   Constitutional:  Positive for activity change (improved), fatigue and unexpected weight gain. Negative for appetite change, chills, diaphoresis and fever.   HENT: Negative.     Eyes:  Negative.    Respiratory: Negative.     Cardiovascular: Negative.    Gastrointestinal: Negative.  Positive for abdominal distention and abdominal pain.   Endocrine: Negative.    Genitourinary:  Negative for decreased libido.   Musculoskeletal: Negative.    Skin: Negative.    Allergic/Immunologic: Negative.    Neurological:  Negative for dizziness.   Hematological: Negative.    Psychiatric/Behavioral:  Positive for sleep disturbance. Negative for agitation, behavioral problems, decreased concentration, dysphoric mood, hallucinations, self-injury, suicidal ideas, negative for hyperactivity, depressed mood and stress. The patient is not nervous/anxious.          Physical Exam:   There were no vitals taken for this visit.  Video visit    Appearance: Male of stated age, NAD  Gait, Station, Strength: Video visit    Mental Status Exam:       Hygiene:   good  Cooperation:  Cooperative  Eye Contact:  Good  Psychomotor Behavior:  Appropriate  Affect:  Full range  Mood: normal  Hopelessness: Optimistic  Speech:  Normal  Thought Process:  Goal directed and Linear  Thought Content:  Normal  Suicidal:  None  Homicidal:  None  Hallucinations:  None  Delusion:  None  Memory:  Intact  Orientation:  Person, Place, Time and Situation  Reliability:  good  Insight:  Fair  Judgement:  Fair  Impulse Control:  Fair  Physical/Medical Issues:  No        Lab Results:   No visits with results within 1 Month(s) from this visit.   Latest known visit with results is:   Ancillary Procedure on 03/04/2025   Component Date Value Ref Range Status    EF_3D-VOL 03/04/2025 68.0  % Final    LVIDd 03/04/2025 4.4  cm Final    LVIDs 03/04/2025 2.8  cm Final    IVSd 03/04/2025 1.11  cm Final    LVPWd 03/04/2025 1.1  cm Final    FS 03/04/2025 35.9  % Final    IVS/LVPW 03/04/2025 0.68  cm Final    ESV(cubed) 03/04/2025 22.4  ml Final    LV Sys Vol (BSA corrected) 03/04/2025 17.7  cm2 Final    EDV(cubed) 03/04/2025 85.2  ml Final    LV Diaz Vol (BSA corrected)  03/04/2025 49.1  cm2 Final    LV mass(C)d 03/04/2025 233.8  grams Final    LVOT area 03/04/2025 4.2  cm2 Final    LVOT diam 03/04/2025 2.30  cm Final    EDV(MOD-sp4) 03/04/2025 125.0  ml Final    ESV(MOD-sp4) 03/04/2025 45.0  ml Final    SV(MOD-sp4) 03/04/2025 80.0  ml Final    SVi(MOD-SP4) 03/04/2025 31.4  ml/m2 Final    SVi (LVOT) 03/04/2025 35.8  ml/m2 Final    EF(MOD-sp4) 03/04/2025 64.0  % Final    MV E max tapan 03/04/2025 83.8  cm/sec Final    MV A max tapan 03/04/2025 46.6  cm/sec Final    MV dec time 03/04/2025 0.13  sec Final    MV E/A 03/04/2025 1.80   Final    LA ESV Index (BP) 03/04/2025 13.4  ml/m2 Final    Med Peak E' Tapan 03/04/2025 7.7  cm/sec Final    Lat Peak E' Tapan 03/04/2025 15.2  cm/sec Final    Avg E/e' ratio 03/04/2025 7.32   Final    SV(LVOT) 03/04/2025 91.0  ml Final    LA dimension (2D)  03/04/2025 3.2  cm Final    LV V1 max 03/04/2025 98.2  cm/sec Final    LV V1 max PG 03/04/2025 3.9  mmHg Final    LV V1 mean PG 03/04/2025 2.00  mmHg Final    LV V1 VTI 03/04/2025 21.9  cm Final    Ao pk tapan 03/04/2025 103.0  cm/sec Final    Ao max PG 03/04/2025 4.2  mmHg Final    Ao mean PG 03/04/2025 3.0  mmHg Final    Ao V2 VTI 03/04/2025 18.3  cm Final    APARNA(I,D) 03/04/2025 5.0  cm2 Final    MV max PG 03/04/2025 3.2  mmHg Final    MV mean PG 03/04/2025 1.00  mmHg Final    MV V2 VTI 03/04/2025 23.3  cm Final    MVA(VTI) 03/04/2025 3.9  cm2 Final    MV dec slope 03/04/2025 668.0  cm/sec2 Final    RAP systole 03/04/2025 3  mmHg Final    PA V2 max 03/04/2025 81.8  cm/sec Final    Ao root diam 03/04/2025 2.7  cm Final       Assessment & Plan   Diagnoses and all orders for this visit:    1. Chronic Post traumatic stress disorder (PTSD)  -     lithium 300 MG tablet; Take 1 tablet by mouth Every Night.  Dispense: 30 tablet; Refill: 2    2. Bipolar disorder, current episode mixed, moderate  -     lithium 300 MG tablet; Take 1 tablet by mouth Every Night.  Dispense: 30 tablet; Refill: 2    3. Anxiety disorder,  unspecified type  -     lithium 300 MG tablet; Take 1 tablet by mouth Every Night.  Dispense: 30 tablet; Refill: 2    4. Nightmares  -     amitriptyline (ELAVIL) 75 MG tablet; Take 1 tablet by mouth every night at bedtime.  Dispense: 30 tablet; Refill: 0    5. Insomnia due to mental condition  -     amitriptyline (ELAVIL) 75 MG tablet; Take 1 tablet by mouth every night at bedtime.  Dispense: 30 tablet; Refill: 0      -Patient overall doing very well from mental health standpoint but is having some difficulty initiating sleep and some morning grogginess.  -Reviewed previous documentation  -Reviewed labs  - Continue lithium 300 mg nightly for mood stabilization.  As patient is doing well, we will leave medication as is but may consider discontinuing this in the future and utilizing Vraylar  -Continue Seroquel 150 mg nightly as needed for insomnia and mood stabilization  -Continue Vraylar 3 mg p.o. daily for PTSD and bipolar disorder  - Increase Elavil 50 mg to 75 mg p.o. nightly for insomnia  -Encourage cessation of nicotine and cannabis  -Encouraged therapy and anger management  -Approximate appointment time 8:20 AM to 8:40 AM via video visit    Visit Diagnoses:    ICD-10-CM ICD-9-CM   1. Chronic Post traumatic stress disorder (PTSD)  F43.10 309.81   2. Bipolar disorder, current episode mixed, moderate  F31.62 296.62   3. Anxiety disorder, unspecified type  F41.9 300.00   4. Nightmares  F51.5 307.47   5. Insomnia due to mental condition  F51.05 300.9     327.02         TREATMENT PLAN/GOALS: Continue supportive psychotherapy efforts and medications as indicated. Treatment and medication options discussed during today patient advised he may need a lithium level in 5 to 7 days visit. Patient ackowledged and verbally consented to continue with current treatment plan and was educated on the importance of compliance with treatment and follow-up appointments.    MEDICATION ISSUES:    Discussed medication options and  treatment plan of prescribed medication as well as the risks, benefits, and side effects including potential falls, possible impaired driving and metabolic adversities among others. Patient is agreeable to call the office with any worsening of symptoms or onset of side effects. Patient is agreeable to call 911 or go to the nearest ER should he/she begin having SI/HI. No medication side effects or related complaints today.     MEDS ORDERED DURING VISIT:  New Medications Ordered This Visit   Medications    lithium 300 MG tablet     Sig: Take 1 tablet by mouth Every Night.     Dispense:  30 tablet     Refill:  2    amitriptyline (ELAVIL) 75 MG tablet     Sig: Take 1 tablet by mouth every night at bedtime.     Dispense:  30 tablet     Refill:  0     Patient or patient representative verbalized consent for the use of Ambient Listening during the visit with  Stefan Marion MD for chart documentation. 4/25/2025  09:41 EDT      Return in about 4 weeks (around 5/23/2025).             This document has been electronically signed by Stefan Marion MD  April 25, 2025

## 2025-05-02 ENCOUNTER — PATIENT ROUNDING (BHMG ONLY) (OUTPATIENT)
Dept: PULMONOLOGY | Facility: CLINIC | Age: 40
End: 2025-05-02
Payer: COMMERCIAL

## 2025-05-02 ENCOUNTER — OFFICE VISIT (OUTPATIENT)
Dept: PULMONOLOGY | Facility: CLINIC | Age: 40
End: 2025-05-02
Payer: COMMERCIAL

## 2025-05-02 VITALS
BODY MASS INDEX: 34.46 KG/M2 | SYSTOLIC BLOOD PRESSURE: 118 MMHG | DIASTOLIC BLOOD PRESSURE: 84 MMHG | HEART RATE: 81 BPM | HEIGHT: 76 IN | RESPIRATION RATE: 18 BRPM | WEIGHT: 283 LBS | OXYGEN SATURATION: 94 %

## 2025-05-02 DIAGNOSIS — E66.811 OBESITY, CLASS I, BMI 30.0-34.9 (SEE ACTUAL BMI): ICD-10-CM

## 2025-05-02 DIAGNOSIS — J44.9 CHRONIC OBSTRUCTIVE PULMONARY DISEASE, UNSPECIFIED COPD TYPE: ICD-10-CM

## 2025-05-02 DIAGNOSIS — G47.33 OBSTRUCTIVE SLEEP APNEA: Primary | ICD-10-CM

## 2025-05-02 DIAGNOSIS — F17.210 NICOTINE DEPENDENCE, CIGARETTES, UNCOMPLICATED: ICD-10-CM

## 2025-05-02 RX ORDER — ALBUTEROL SULFATE 90 UG/1
2 INHALANT RESPIRATORY (INHALATION) EVERY 4 HOURS PRN
Qty: 18 G | Refills: 5 | Status: SHIPPED | OUTPATIENT
Start: 2025-05-02

## 2025-05-02 RX ORDER — IPRATROPIUM BROMIDE 42 UG/1
2 SPRAY, METERED NASAL 4 TIMES DAILY
COMMUNITY
Start: 2025-04-15 | End: 2026-04-15

## 2025-05-02 NOTE — PROGRESS NOTES
"  CONSULT NOTE    Requested by:   Dru Mendoza DO Long, Herbert W, MD      Chief Complaint   Patient presents with    Breathing Problem    Consult       Subjective:  Alex Torres is a 40 y.o. male.   Patient comes in today for consultation because of sleep apnea.  The patient says that  he was diagnosed with sleep apnea 8 years ago.      It appears that he has been on a PAP device since then.     he feels that the PAP device occasionally does not function properly.     Patient does report some initial improvement but now feels that he is once again feeling tired in the morning and occasionally has noticed excessive daytime sleepiness as well.      Patient is not aware of snoring through the device.     The patient describes no significant issues with his mask either.     Patient's sleep schedule was reviewed. he goes to bed around 9-11 PM and wakes up in the morning around 6 AM.     he drinks 2-3 cups/cans of caffeinated drinks per day.     Patient is under management for hypertension.      Upon questioning he is complaining of shortness of breath. Patient says that for the past few years, he has had shortness of breath. Patient has had decreased exercise capacity that has been progressive for the past few years.     he also reports having 1-2 recent episodes of \"bronchitis\", over the past 1-2 years.    Patient also notes having progressive worsening in his ability to walk up the hill or walk up a flight of stairs.     he is currently not on oxygen.     he has been smoking 2 packs per day for the past 25 years.  he is currently smoking 1/2 pack a day.    The patient was exposed to smoking in the form of his parents who smoked heavily indoors.    Family history of lung diseases POSITIVE. COPD in father      The following portions of the patient's history were reviewed and updated as appropriate: allergies, current medications, past family history, past medical history, past social history, and past " "surgical history.    Review of Systems   Constitutional:  Negative for chills, diaphoresis and fever.   HENT:  Negative for sinus pressure, sneezing and sore throat.    Respiratory:  Negative for cough, chest tightness (heaviness), shortness of breath and wheezing.    Psychiatric/Behavioral:  Negative for sleep disturbance.    All other systems reviewed and are negative.      Past Medical History:   Diagnosis Date    Anxiety     Asthma     Bipolar 1 disorder     Coronary artery disease     Depression     DM (diabetes mellitus)     Heart valve disease     Hyperlipidemia     Hypertension     Kidney stone     Myocardial infarction     Polyneuropathy     Positive TB test     treated w/ meds x 6 months    PTSD (post-traumatic stress disorder)     Sleep apnea     Suicide attempt     Tattoos        Social History     Tobacco Use    Smoking status: Former     Current packs/day: 1.00     Average packs/day: 1 pack/day for 26.3 years (26.3 ttl pk-yrs)     Types: Cigarettes     Start date: 1999    Smokeless tobacco: Never   Substance Use Topics    Alcohol use: No         Objective:  Visit Vitals  /84   Pulse 81   Resp 18   Ht 193 cm (76\") Comment: pt reported   Wt 128 kg (283 lb)   SpO2 94%   BMI 34.45 kg/m²       BMI Readings from Last 8 Encounters:   05/02/25 34.45 kg/m²   03/04/25 33.72 kg/m²   03/04/25 33.72 kg/m²   02/06/25 32.11 kg/m²   01/30/25 31.89 kg/m²   01/14/25 31.65 kg/m²   01/03/25 31.65 kg/m²   12/12/24 31.65 kg/m²       Physical Exam  Vitals reviewed.   Constitutional:       Appearance: He is well-developed.   HENT:      Head: Atraumatic.      Mouth/Throat:      Mouth: Mucous membranes are moist.      Comments: Oropharynx was crowded.  Eyes:      Pupils: Pupils are equal, round, and reactive to light.   Neck:      Thyroid: No thyromegaly.      Vascular: No JVD.      Trachea: No tracheal deviation.   Cardiovascular:      Rate and Rhythm: Normal rate and regular rhythm.   Pulmonary:      Effort: Pulmonary " effort is normal. No respiratory distress.      Breath sounds: Normal breath sounds. No wheezing.   Musculoskeletal:      Right lower leg: No edema.      Left lower leg: No edema.      Comments: Gait was normal.   Skin:     General: Skin is warm and dry.   Neurological:      Mental Status: He is alert and oriented to person, place, and time.       Assessment/Plan:  Diagnoses and all orders for this visit:    1. Obstructive sleep apnea (Primary)  -     BIPAP / CPAP Adjustment    2. Nicotine dependence, cigarettes, uncomplicated    3. Chronic obstructive pulmonary disease, unspecified COPD type  -     Complete PFT - Pre & Post Bronchodilator; Future    4. Obesity, Class I, BMI 30.0-34.9 (see actual BMI)    Other orders  -     albuterol sulfate HFA (Ventolin HFA) 108 (90 Base) MCG/ACT inhaler; Inhale 2 puffs Every 4 (Four) Hours As Needed for Wheezing or Shortness of Air.  Dispense: 18 g; Refill: 5        Return in about 4 months (around 9/2/2025) for Recheck, PFT F/U, For [place] ([place]).    DISCUSSION(if any):  Last CT scan results was reviewed in great detail with the patient.  Results for orders placed during the hospital encounter of 02/20/25    CT Chest With Contrast Diagnostic  1. No pulmonary embolism, dissection or aneurysm.   2. The patient is status post sternotomy for CABG.   3. There is no edema or infiltrate.   4. Trace left effusion with left base atelectasis.       I also reviewed his last echocardiogram and shared the results with him.   Results for orders placed in visit on 03/04/25    Adult Transthoracic Echo Complete W/ Cont if Necessary Per Protocol    Interpretation Summary    Left ventricular systolic function is normal. Calculated left ventricular 3D EF = 68% Left ventricular ejection fraction appears to be 66 - 70%.    Left ventricular wall thickness is consistent with mild concentric hypertrophy.    Left ventricular diastolic function was normal.      ===========================   ===========================    Laboratory workup also showed   Lab Results   Component Value Date    HGB 12.3 (L) 01/30/2025    HGB 10.9 (L) 01/14/2025    HGB 15.6 01/03/2025   ,   Lab Results   Component Value Date    HCT 39.4 01/30/2025    HCT 34.0 (L) 01/14/2025    HCT 47.7 01/03/2025       Lab Results   Component Value Date    EOSABS 1.16 (H) 01/30/2025    EOSABS 0.38 01/14/2025    EOSABS 0.20 01/03/2025    & Laboratory workup also showed   Lab Results   Component Value Date    CO2 24.1 01/30/2025    CO2 22.4 01/14/2025    CO2 26.0 01/03/2025   ,   Lab Results   Component Value Date    HGBA1C 5.30 06/29/2024    HGBA1C 5.3 05/07/2021    HGBA1C 4.8 10/14/2020   ,   Lab Results   Component Value Date    TSH 3.320 06/29/2024    TSH 2.280 05/17/2021    TSH 3.750 05/26/2020     ===========================  ===========================      We will try to obtain his last sleep study results from the performing facility, if not already scanned in our system.     I was able to review the results of last in-lab sleep study in detail. It was performed in Oct 2017. I informed him that the apnea hypopnea index was 13/hour.     Current DME: [employer]  Current PAP Settings: ?  Current mask: FFM    I told the patient that his symptoms are consistent with poorly controlled sleep apnea.    Patient was advised to continue using PAP for at least 4 hours every night.    It appears that some of his issues are secondary to malfunctioning PAP device.      The patient was provided with a prescription for new mask and supplies, as needed.    We will arrange NEW AutoPAP at 8-16 since his current device is more than 5 years old     If the symptoms do not improve, even after above-mentioned measures, then he may have to undergo a full night titration study.    Patient was advised to call this office with any issues.    Weight loss was also discussed and advised.    Orders as above.    I have told the patient, that in my view, shortness of  breath is probably from underlying chronic obstructive lung disease.     Patient was given education and demonstration on how to use the medicine.     Side effects, of prescribed medicines, discussed.    Patient was also instructed on compliance and adherence with instructions.     I have discussed the need to quit smoking as soon as possible.    Patient was offered modalities such as Chantix/nicotine patches/Wellbutrin to aid in smoking cessation.      Dictated utilizing Dragon dictation.    This document was electronically signed by Jennyfer Vargas MD on 05/02/25 at 11:38 EDT

## 2025-05-25 DIAGNOSIS — F51.5 NIGHTMARES: ICD-10-CM

## 2025-05-25 DIAGNOSIS — F51.05 INSOMNIA DUE TO MENTAL CONDITION: ICD-10-CM

## 2025-05-26 DIAGNOSIS — I10 ESSENTIAL HYPERTENSION: ICD-10-CM

## 2025-05-26 DIAGNOSIS — K21.9 GASTROESOPHAGEAL REFLUX DISEASE, UNSPECIFIED WHETHER ESOPHAGITIS PRESENT: Chronic | ICD-10-CM

## 2025-05-27 RX ORDER — VALSARTAN 160 MG/1
160 TABLET ORAL DAILY
Qty: 90 TABLET | Refills: 1 | OUTPATIENT
Start: 2025-05-27

## 2025-05-27 RX ORDER — AMITRIPTYLINE HYDROCHLORIDE 75 MG/1
75 TABLET ORAL
Qty: 30 TABLET | Refills: 0 | Status: SHIPPED | OUTPATIENT
Start: 2025-05-27

## 2025-05-27 RX ORDER — OMEPRAZOLE 40 MG/1
40 CAPSULE, DELAYED RELEASE ORAL
Qty: 90 CAPSULE | Refills: 1 | OUTPATIENT
Start: 2025-05-27

## 2025-06-10 ENCOUNTER — OFFICE VISIT (OUTPATIENT)
Dept: CARDIOLOGY | Facility: CLINIC | Age: 40
End: 2025-06-10
Payer: COMMERCIAL

## 2025-06-10 VITALS
WEIGHT: 293 LBS | SYSTOLIC BLOOD PRESSURE: 112 MMHG | BODY MASS INDEX: 35.68 KG/M2 | HEIGHT: 76 IN | OXYGEN SATURATION: 99 % | HEART RATE: 79 BPM | DIASTOLIC BLOOD PRESSURE: 72 MMHG

## 2025-06-10 DIAGNOSIS — I25.10 CORONARY ARTERY DISEASE INVOLVING NATIVE CORONARY ARTERY OF NATIVE HEART WITHOUT ANGINA PECTORIS: ICD-10-CM

## 2025-06-10 DIAGNOSIS — R10.9 ABDOMINAL PAIN, UNSPECIFIED ABDOMINAL LOCATION: Primary | ICD-10-CM

## 2025-06-10 DIAGNOSIS — I10 PRIMARY HYPERTENSION: ICD-10-CM

## 2025-06-10 DIAGNOSIS — E78.5 DYSLIPIDEMIA: ICD-10-CM

## 2025-06-10 DIAGNOSIS — R73.03 BORDERLINE DIABETES: ICD-10-CM

## 2025-06-10 PROCEDURE — 99213 OFFICE O/P EST LOW 20 MIN: CPT | Performed by: INTERNAL MEDICINE

## 2025-06-10 PROCEDURE — 1159F MED LIST DOCD IN RCRD: CPT | Performed by: INTERNAL MEDICINE

## 2025-06-10 PROCEDURE — 3074F SYST BP LT 130 MM HG: CPT | Performed by: INTERNAL MEDICINE

## 2025-06-10 PROCEDURE — 1160F RVW MEDS BY RX/DR IN RCRD: CPT | Performed by: INTERNAL MEDICINE

## 2025-06-10 PROCEDURE — 3078F DIAST BP <80 MM HG: CPT | Performed by: INTERNAL MEDICINE

## 2025-06-10 NOTE — PROGRESS NOTES
"    Subjective:     Encounter Date:06/10/2025      Patient ID: Alex Torres is a 40 y.o. male.    Chief Complaint: Coronary artery disease  HPI  This is a 40-year-old male patient who presents to cardiology clinic to follow-up results of outpatient testing.  The patient underwent a vasodilator nuclear stress test showing no evidence of ischemia or infarction.  The calculated ejection fraction was normal.  The patient denies having chest discomfort, but reports ongoing abdominal bloating and intermittent abdominal discomfort.  He was concerned that he was \"retaining fluid\" around his abdomen.  His primary care provider started him on Lasix 40 mg orally once per day without improvement.  He reports compliance with his medications with no perceived side effects.  He is a non-smoker.  The following portions of the patient's history were reviewed and updated as appropriate: allergies, current medications, past family history, past medical history, past social history, past surgical history and problem  Review of Systems   Constitutional: Negative for chills, diaphoresis, fever, malaise/fatigue, weight gain and weight loss.   HENT:  Negative for ear discharge, hearing loss, hoarse voice and nosebleeds.    Eyes:  Negative for discharge, double vision, pain and photophobia.   Cardiovascular:  Negative for chest pain, claudication, cyanosis, dyspnea on exertion, irregular heartbeat, leg swelling, near-syncope, orthopnea, palpitations, paroxysmal nocturnal dyspnea and syncope.   Respiratory:  Negative for cough, hemoptysis, sputum production and wheezing.    Endocrine: Negative for cold intolerance, heat intolerance, polydipsia, polyphagia and polyuria.   Hematologic/Lymphatic: Negative for adenopathy and bleeding problem. Does not bruise/bleed easily.   Skin:  Negative for color change, flushing, itching and rash.   Musculoskeletal:  Negative for muscle cramps, muscle weakness, myalgias and stiffness. "   Gastrointestinal:  Positive for bloating and abdominal pain. Negative for diarrhea, hematemesis, hematochezia, nausea and vomiting.   Genitourinary:  Negative for dysuria, frequency and nocturia.   Neurological:  Negative for focal weakness, loss of balance, numbness, paresthesias and seizures.   Psychiatric/Behavioral:  Negative for altered mental status, hallucinations and suicidal ideas.    Allergic/Immunologic: Negative for HIV exposure, hives and persistent infections.           Current Outpatient Medications:     albuterol sulfate HFA (Ventolin HFA) 108 (90 Base) MCG/ACT inhaler, Inhale 2 puffs Every 4 (Four) Hours As Needed for Wheezing or Shortness of Air., Disp: 18 g, Rfl: 5    amitriptyline (ELAVIL) 75 MG tablet, TAKE 1 TABLET BY MOUTH EVERYDAY AT BEDTIME, Disp: 30 tablet, Rfl: 0    aspirin 81 MG chewable tablet, Chew 1 tablet Daily., Disp: , Rfl:     atorvastatin (LIPITOR) 40 MG tablet, Take 1 tablet by mouth Daily., Disp: , Rfl:     Cariprazine HCl (Vraylar) 3 MG capsule capsule, Take 1 capsule by mouth Daily., Disp: 30 capsule, Rfl: 2    clopidogrel (PLAVIX) 75 MG tablet, Take 1 tablet by mouth Daily., Disp: , Rfl:     furosemide (LASIX) 40 MG tablet, Take 1 tablet by mouth Daily., Disp: 90 tablet, Rfl: 3    lithium 300 MG tablet, Take 1 tablet by mouth Every Night., Disp: 30 tablet, Rfl: 2    metoprolol tartrate (LOPRESSOR) 25 MG tablet, Take 1 tablet by mouth 2 (Two) Times a Day., Disp: , Rfl:     omeprazole (priLOSEC) 40 MG capsule, TAKE 1 CAPSULE BY MOUTH EVERY MORNING 30 MINS BEFORE BREAKFAST, Disp: 90 capsule, Rfl: 1    QUEtiapine Fumarate 150 MG tablet, Take 1 tablet by mouth At Night As Needed (insomnia)., Disp: 30 tablet, Rfl: 2    valsartan (DIOVAN) 160 MG tablet, TAKE 1 TABLET BY MOUTH EVERY DAY, Disp: 90 tablet, Rfl: 1    Objective:   Vitals and nursing note reviewed.   Constitutional:       Appearance: Healthy appearance. Not in distress.   Neck:      Vascular: No JVR. JVD normal.  "  Pulmonary:      Effort: Pulmonary effort is normal.      Breath sounds: Normal breath sounds. No wheezing. No rhonchi. No rales.   Chest:      Chest wall: Not tender to palpatation.   Cardiovascular:      PMI at left midclavicular line. Normal rate. Regular rhythm. Normal S1. Normal S2.       Murmurs: There is no murmur.      No gallop.  No click. No rub.   Pulses:     Intact distal pulses.   Edema:     Peripheral edema absent.   Abdominal:      General: Bowel sounds are normal.      Palpations: Abdomen is soft.      Tenderness: There is no abdominal tenderness.   Musculoskeletal: Normal range of motion.         General: No tenderness. Skin:     General: Skin is warm and dry.   Neurological:      General: No focal deficit present.      Mental Status: Alert and oriented to person, place and time.       Blood pressure 112/72, pulse 79, height 193 cm (76\"), weight 133 kg (293 lb), SpO2 99%.   Lab Review:     Assessment:       1. Coronary artery disease involving native coronary artery of native heart without angina pectoris      2. Dyslipidemia  Tolerating statin based cholesterol-lowering therapy without side effects.    3. Primary hypertension  Acceptable blood pressure control.    4. Abdominal pain, unspecified abdominal location  No evidence of ascites.  Noncardiac etiology.  - Ambulatory Referral to Gastroenterology    5. Borderline diabetes  Typical obesity related insulin resistance.    Procedures    Plan:     The patient has been reassured regarding the benign nature of his cardiac test results.    No changes in medications have been made at today's visit.    Per patient request, we have made a referral to gastroenterology.      "

## 2025-06-13 ENCOUNTER — TELEMEDICINE (OUTPATIENT)
Dept: PSYCHIATRY | Facility: CLINIC | Age: 40
End: 2025-06-13
Payer: COMMERCIAL

## 2025-06-13 ENCOUNTER — TELEPHONE (OUTPATIENT)
Dept: GASTROENTEROLOGY | Facility: CLINIC | Age: 40
End: 2025-06-13
Payer: COMMERCIAL

## 2025-06-13 DIAGNOSIS — I10 ESSENTIAL HYPERTENSION: ICD-10-CM

## 2025-06-13 DIAGNOSIS — F17.200 NICOTINE USE DISORDER: ICD-10-CM

## 2025-06-13 DIAGNOSIS — F41.9 ANXIETY DISORDER, UNSPECIFIED TYPE: ICD-10-CM

## 2025-06-13 DIAGNOSIS — F31.62 BIPOLAR DISORDER, CURRENT EPISODE MIXED, MODERATE: ICD-10-CM

## 2025-06-13 DIAGNOSIS — F51.05 INSOMNIA DUE TO MENTAL CONDITION: ICD-10-CM

## 2025-06-13 DIAGNOSIS — F51.5 NIGHTMARES: ICD-10-CM

## 2025-06-13 DIAGNOSIS — F43.10 POST TRAUMATIC STRESS DISORDER (PTSD): Primary | Chronic | ICD-10-CM

## 2025-06-13 PROCEDURE — 99214 OFFICE O/P EST MOD 30 MIN: CPT | Performed by: PSYCHIATRY & NEUROLOGY

## 2025-06-13 PROCEDURE — 1159F MED LIST DOCD IN RCRD: CPT | Performed by: PSYCHIATRY & NEUROLOGY

## 2025-06-13 PROCEDURE — 1160F RVW MEDS BY RX/DR IN RCRD: CPT | Performed by: PSYCHIATRY & NEUROLOGY

## 2025-06-13 RX ORDER — NICOTINE 21 MG/24HR
1 PATCH, TRANSDERMAL 24 HOURS TRANSDERMAL EVERY 24 HOURS
Qty: 28 EACH | Refills: 1 | Status: SHIPPED | OUTPATIENT
Start: 2025-06-13

## 2025-06-13 RX ORDER — AMITRIPTYLINE HYDROCHLORIDE 75 MG/1
75 TABLET ORAL
Qty: 30 TABLET | Refills: 2 | Status: SHIPPED | OUTPATIENT
Start: 2025-06-13

## 2025-06-13 RX ORDER — QUETIAPINE FUMARATE 150 MG/1
1 TABLET, FILM COATED ORAL NIGHTLY PRN
Qty: 30 TABLET | Refills: 2 | Status: SHIPPED | OUTPATIENT
Start: 2025-06-13

## 2025-06-13 RX ORDER — LITHIUM CARBONATE 300 MG
300 TABLET ORAL NIGHTLY
Qty: 30 TABLET | Refills: 2 | Status: SHIPPED | OUTPATIENT
Start: 2025-06-13

## 2025-06-13 NOTE — TELEPHONE ENCOUNTER
[facility] transferred a telephone call from patient's wife, Rosaline.  She stated her  has an appointment in November but wanted something sooner.  I informed her that we did not have any sooner openings.  She then had a verbal outburst filled with curse words and abusive language.  Before I could say anything, she hung up.  My supervisor was immediately notified.

## 2025-06-13 NOTE — TELEPHONE ENCOUNTER
Notified by staff that Patient ER contact Rosaline called the [facility] and was transferred to her. Rosaline was asking for sooner appointment and when she told her we did not have a sooner appointment this person became very upset cursing and made comment that she hoped my staff person (Jeremiah) . Jeremiah would have offered to put him on cancellation list but Rosaline hung up on her. I immediately contacted the patient on his cell phone. I told him that Rosaline called and got upset with staff and started cursing and made threat. This office will not tolerate that and we can keep his appointment or we cancel and he can go to another office. He stated he wanted to keep this appointment and be added to the cancellation list. I entered safe report also.

## 2025-06-13 NOTE — PROGRESS NOTES
Subjective   Alex Torres is a 40 y.o. male who presents today for follow up     This provider is located at [facility], [address] [place] [address]. The Patient is seen remotely at home, using Epic Mychart. Patient is being seen via telehealth and stated they are in a secure environment for this session. The patient’s condition being diagnosed/treated is appropriate for telemedicine. The provider identified himself as well as his credentials.   The patient gave consent to be seen remotely, and when consent is given they understand that the consent allows for patient identifiable information to be sent to a third party as needed.   They may refuse to be seen remotely at any time. The electronic data is encrypted and password protected, and the patient has been advised of the potential risks to privacy not withstanding such measures      Chief Complaint: Bipolar disorder/anxiety    History of Present Illness:   History of Present Illness    The patient is a 40-year-old male presenting today for a follow-up of bipolar disorder via virtual visit.    He reports no significant issues with his current medication regimen, which he believes is effectively managing his condition. He has not experienced any severe depressive episodes. His sleep pattern has improved, with an average of 7 to 8 hours per night, and he no longer experiences morning drowsiness, a side effect he previously reported.    He is currently in the process of quitting smoking, which has led to some agitation. He has previously attempted to use Chantix twice but found it ineffective. He is considering the use of nicotine patches as an alternative.    Interim History: He reports that his current medication regimen is working well, with no significant problems. He has not been overly depressed and is sleeping better with the increase in Elavil, averaging 7 to 8 hours per night without morning drowsiness.    Social History:  - Preparing to  start a new job at [employer]  - Lives in [place], location of the new job is nearby    Substance Use: He smokes cigarettes, approximately half a pack per day, and is currently trying to quit.    Pertinent Negatives: Reports no severe depressive episodes or significant side effects from medications.    SOCIAL HISTORY  He is getting ready to start a new job at [employer]. He admits to smoking roughly about half a pack a day.        The following portions of the patient's history were reviewed and updated as appropriate: allergies, current medications, past family history, past medical history, past social history, past surgical history and problem list.      Past Medical History:  Past Medical History:   Diagnosis Date    Anxiety     Asthma     Bipolar 1 disorder     Coronary artery disease     Depression     DM (diabetes mellitus)     Heart valve disease     Hyperlipidemia     Hypertension     Kidney stone     Myocardial infarction     Polyneuropathy     Positive TB test     treated w/ meds x 6 months    PTSD (post-traumatic stress disorder)     Sleep apnea     Suicide attempt     Tattoos        Social History:  Social History     Socioeconomic History    Marital status:    Tobacco Use    Smoking status: Former     Current packs/day: 1.00     Average packs/day: 1 pack/day for 26.4 years (26.4 ttl pk-yrs)     Types: Cigarettes     Start date: 1999    Smokeless tobacco: Never   Vaping Use    Vaping status: Never Used   Substance and Sexual Activity    Alcohol use: No    Drug use: Yes     Types: Marijuana     Comment: rarely    Sexual activity: Yes     Partners: Female     Birth control/protection: None       Family History:  Family History   Problem Relation Age of Onset    Arthritis Father     Hypertension Father     Alcohol abuse Father     Drug abuse Father     Heart attack Father     Diabetes Maternal Aunt     Diabetes Maternal Uncle     Diabetes Maternal Grandmother     Alzheimer's disease Maternal  Grandmother     Dementia Maternal Grandmother     Diabetes Other     Hypertension Mother     Depression Mother     ADD / ADHD Brother     Early death Maternal Grandfather     Liver disease Maternal Grandfather     Alcohol abuse Maternal Grandfather     Cirrhosis Maternal Grandfather     No Known Problems Paternal Grandmother     Liver disease Paternal Grandfather     Alcohol abuse Paternal Grandfather     Early death Paternal Grandfather     Cirrhosis Paternal Grandfather     No Known Problems Brother     Anxiety disorder Neg Hx     Bipolar disorder Neg Hx     OCD Neg Hx     Paranoid behavior Neg Hx     Schizophrenia Neg Hx     Seizures Neg Hx     Self-Injurious Behavior  Neg Hx     Suicide Attempts Neg Hx     Colon cancer Neg Hx        Past Surgical History:  Past Surgical History:   Procedure Laterality Date    ARTERIAL BYPASS SURGERY      CARDIAC CATHETERIZATION N/A 01/03/2025    Procedure: Left Heart Cath;  Surgeon: Cody Marion MD;  Location: [facility] CATH INVASIVE LOC[facility];  Service: Cardiovascular;  Laterality: N/A;    CARDIAC SURGERY      CABG x 3 vessels    CHOLECYSTECTOMY  2002    CORONARY ARTERY BYPASS GRAFT      INGUINAL HERNIA REPAIR Right 1995    ORIF METACARPAL FRACTURE Right 2000    TIBIA FRACTURE SURGERY Left 1997    ORIF       Problem List:  Patient Active Problem List   Diagnosis    Primary hypertension    GERD (gastroesophageal reflux disease)    Bipolar disorder, current episode mixed, moderate    MOLLY on CPAP    Arthritis    Complex regional pain syndrome type 2 of left lower extremity    Snoring    Excessive daytime sleepiness    Peripheral polyneuropathy    Pain in both lower extremities    Varicose veins of both lower extremities with pain    MOLLY (obstructive sleep apnea)    Class 1 obesity due to excess calories without serious comorbidity with body mass index (BMI) of 32.0 to 32.9 in adult    Neuromuscular respiratory weakness    Borderline diabetes    Diarrhea    Nausea and vomiting     Acute cough    NSTEMI (non-ST elevated myocardial infarction)    STEMI involving left circumflex coronary artery    Coronary artery disease involving native coronary artery of native heart without angina pectoris    Dyslipidemia       Allergy:   Allergies   Allergen Reactions    Gabapentin Swelling     + swelling    Latex Rash        Current Medications:   Current Outpatient Medications   Medication Sig Dispense Refill    albuterol sulfate HFA (Ventolin HFA) 108 (90 Base) MCG/ACT inhaler Inhale 2 puffs Every 4 (Four) Hours As Needed for Wheezing or Shortness of Air. 18 g 5    amitriptyline (ELAVIL) 75 MG tablet TAKE 1 TABLET BY MOUTH EVERYDAY AT BEDTIME 30 tablet 0    aspirin 81 MG chewable tablet Chew 1 tablet Daily.      atorvastatin (LIPITOR) 40 MG tablet Take 1 tablet by mouth Daily.      Cariprazine HCl (Vraylar) 3 MG capsule capsule Take 1 capsule by mouth Daily. 30 capsule 2    clopidogrel (PLAVIX) 75 MG tablet Take 1 tablet by mouth Daily.      furosemide (LASIX) 40 MG tablet Take 1 tablet by mouth Daily. 90 tablet 3    lithium 300 MG tablet Take 1 tablet by mouth Every Night. 30 tablet 2    metoprolol tartrate (LOPRESSOR) 25 MG tablet Take 1 tablet by mouth 2 (Two) Times a Day.      omeprazole (priLOSEC) 40 MG capsule TAKE 1 CAPSULE BY MOUTH EVERY MORNING 30 MINS BEFORE BREAKFAST 90 capsule 1    QUEtiapine Fumarate 150 MG tablet Take 1 tablet by mouth At Night As Needed (insomnia). 30 tablet 2    valsartan (DIOVAN) 160 MG tablet TAKE 1 TABLET BY MOUTH EVERY DAY 90 tablet 1     No current facility-administered medications for this visit.       Review of Symptoms:    Review of Systems   Constitutional:  Negative for activity change (improved), appetite change, chills, diaphoresis, fatigue, fever and unexpected weight gain.   HENT: Negative.     Eyes: Negative.    Respiratory: Negative.     Cardiovascular: Negative.    Gastrointestinal:  Negative for abdominal pain.   Endocrine: Negative.    Genitourinary:   Negative for decreased libido.   Musculoskeletal: Negative.    Skin: Negative.    Allergic/Immunologic: Negative.    Neurological:  Negative for dizziness.   Hematological: Negative.    Psychiatric/Behavioral:  Negative for agitation, behavioral problems, decreased concentration, dysphoric mood, hallucinations, self-injury, sleep disturbance, suicidal ideas, negative for hyperactivity, depressed mood and stress. The patient is not nervous/anxious.          Physical Exam:   There were no vitals taken for this visit.  Video visit    Appearance: Male of stated age, NAD  Gait, Station, Strength: Video visit    Mental Status Exam:     Mental Status exam performed 06/13/2025  and patient shows no significant changes from previous exam.     Hygiene:   good  Cooperation:  Cooperative  Eye Contact:  Good  Psychomotor Behavior:  Appropriate  Affect:  Full range  Mood: normal  Hopelessness: Optimistic  Speech:  Normal  Thought Process:  Goal directed and Linear  Thought Content:  Normal  Suicidal:  None  Homicidal:  None  Hallucinations:  None  Delusion:  None  Memory:  Intact  Orientation:  Person, Place, Time and Situation  Reliability:  good  Insight:  Fair  Judgement:  Fair  Impulse Control:  Fair  Physical/Medical Issues:  No        Lab Results:   No visits with results within 1 Month(s) from this visit.   Latest known visit with results is:   Ancillary Procedure on 03/04/2025   Component Date Value Ref Range Status    EF_3D-VOL 03/04/2025 68.0  % Final    LVIDd 03/04/2025 4.4  cm Final    LVIDs 03/04/2025 2.8  cm Final    IVSd 03/04/2025 1.11  cm Final    LVPWd 03/04/2025 1.1  cm Final    FS 03/04/2025 35.9  % Final    IVS/LVPW 03/04/2025 0.68  cm Final    ESV(cubed) 03/04/2025 22.4  ml Final    LV Sys Vol (BSA corrected) 03/04/2025 17.7  cm2 Final    EDV(cubed) 03/04/2025 85.2  ml Final    LV Diaz Vol (BSA corrected) 03/04/2025 49.1  cm2 Final    LV mass(C)d 03/04/2025 233.8  grams Final    LVOT area 03/04/2025 4.2  cm2  Final    LVOT diam 03/04/2025 2.30  cm Final    EDV(MOD-sp4) 03/04/2025 125.0  ml Final    ESV(MOD-sp4) 03/04/2025 45.0  ml Final    SV(MOD-sp4) 03/04/2025 80.0  ml Final    SVi(MOD-SP4) 03/04/2025 31.4  ml/m2 Final    SVi (LVOT) 03/04/2025 35.8  ml/m2 Final    EF(MOD-sp4) 03/04/2025 64.0  % Final    MV E max tapan 03/04/2025 83.8  cm/sec Final    MV A max tapan 03/04/2025 46.6  cm/sec Final    MV dec time 03/04/2025 0.13  sec Final    MV E/A 03/04/2025 1.80   Final    LA ESV Index (BP) 03/04/2025 13.4  ml/m2 Final    Med Peak E' Tapan 03/04/2025 7.7  cm/sec Final    Lat Peak E' Tapan 03/04/2025 15.2  cm/sec Final    Avg E/e' ratio 03/04/2025 7.32   Final    SV(LVOT) 03/04/2025 91.0  ml Final    LA dimension (2D)  03/04/2025 3.2  cm Final    LV V1 max 03/04/2025 98.2  cm/sec Final    LV V1 max PG 03/04/2025 3.9  mmHg Final    LV V1 mean PG 03/04/2025 2.00  mmHg Final    LV V1 VTI 03/04/2025 21.9  cm Final    Ao pk tapan 03/04/2025 103.0  cm/sec Final    Ao max PG 03/04/2025 4.2  mmHg Final    Ao mean PG 03/04/2025 3.0  mmHg Final    Ao V2 VTI 03/04/2025 18.3  cm Final    APARNA(I,D) 03/04/2025 5.0  cm2 Final    MV max PG 03/04/2025 3.2  mmHg Final    MV mean PG 03/04/2025 1.00  mmHg Final    MV V2 VTI 03/04/2025 23.3  cm Final    MVA(VTI) 03/04/2025 3.9  cm2 Final    MV dec slope 03/04/2025 668.0  cm/sec2 Final    RAP systole 03/04/2025 3  mmHg Final    PA V2 max 03/04/2025 81.8  cm/sec Final    Ao root diam 03/04/2025 2.7  cm Final       Assessment & Plan   Diagnoses and all orders for this visit:    1. Chronic Post traumatic stress disorder (PTSD) (Primary)  -     Cariprazine HCl (Vraylar) 3 MG capsule capsule; Take 1 capsule by mouth Daily.  Dispense: 30 capsule; Refill: 2  -     lithium 300 MG tablet; Take 1 tablet by mouth Every Night.  Dispense: 30 tablet; Refill: 2  -     QUEtiapine Fumarate 150 MG tablet; Take 1 tablet by mouth At Night As Needed (insomnia).  Dispense: 30 tablet; Refill: 2    2. Bipolar disorder, current  episode mixed, moderate  -     Cariprazine HCl (Vraylar) 3 MG capsule capsule; Take 1 capsule by mouth Daily.  Dispense: 30 capsule; Refill: 2  -     lithium 300 MG tablet; Take 1 tablet by mouth Every Night.  Dispense: 30 tablet; Refill: 2  -     QUEtiapine Fumarate 150 MG tablet; Take 1 tablet by mouth At Night As Needed (insomnia).  Dispense: 30 tablet; Refill: 2    3. Anxiety disorder, unspecified type  -     Cariprazine HCl (Vraylar) 3 MG capsule capsule; Take 1 capsule by mouth Daily.  Dispense: 30 capsule; Refill: 2  -     lithium 300 MG tablet; Take 1 tablet by mouth Every Night.  Dispense: 30 tablet; Refill: 2  -     QUEtiapine Fumarate 150 MG tablet; Take 1 tablet by mouth At Night As Needed (insomnia).  Dispense: 30 tablet; Refill: 2    4. Nightmares  -     QUEtiapine Fumarate 150 MG tablet; Take 1 tablet by mouth At Night As Needed (insomnia).  Dispense: 30 tablet; Refill: 2  -     amitriptyline (ELAVIL) 75 MG tablet; Take 1 tablet by mouth every night at bedtime.  Dispense: 30 tablet; Refill: 2    5. Insomnia due to mental condition  -     QUEtiapine Fumarate 150 MG tablet; Take 1 tablet by mouth At Night As Needed (insomnia).  Dispense: 30 tablet; Refill: 2  -     amitriptyline (ELAVIL) 75 MG tablet; Take 1 tablet by mouth every night at bedtime.  Dispense: 30 tablet; Refill: 2    6. Nicotine use disorder  -     nicotine (NICODERM CQ) 14 MG/24HR patch; Place 1 patch on the skin as directed by provider Daily.  Dispense: 28 each; Refill: 1        Patient doing well without any major manic or depressive symptoms, sleep is improved.  No major issues noted today aside from patient's desire to cease smoking.  Chantix was not helpful so we will try patches  -Reviewed previous documentation  -Reviewed labs  - Continue lithium 300 mg nightly for mood stabilization.  As patient is doing well, we will leave medication as is but may consider discontinuing this in the future and utilizing Vraylar  -Continue  Seroquel 150 mg nightly as needed for insomnia and mood stabilization  -Continue Vraylar 3 mg p.o. daily for PTSD and bipolar disorder  - Continue Elavil 75 mg p.o. nightly for insomnia  -Encourage cessation of nicotine and cannabis  -Encouraged therapy and anger management  -Approximate appointment time 11 AM to 11:25 AM via video visit    Visit Diagnoses:    ICD-10-CM ICD-9-CM   1. Chronic Post traumatic stress disorder (PTSD)  F43.10 309.81   2. Bipolar disorder, current episode mixed, moderate  F31.62 296.62   3. Anxiety disorder, unspecified type  F41.9 300.00   4. Nightmares  F51.5 307.47   5. Insomnia due to mental condition  F51.05 300.9     327.02   6. Nicotine use disorder  F17.200 305.1           TREATMENT PLAN/GOALS: Continue supportive psychotherapy efforts and medications as indicated. Treatment and medication options discussed during today patient advised he may need a lithium level in 5 to 7 days visit. Patient ackowledged and verbally consented to continue with current treatment plan and was educated on the importance of compliance with treatment and follow-up appointments.    MEDICATION ISSUES:    Discussed medication options and treatment plan of prescribed medication as well as the risks, benefits, and side effects including potential falls, possible impaired driving and metabolic adversities among others. Patient is agreeable to call the office with any worsening of symptoms or onset of side effects. Patient is agreeable to call 911 or go to the nearest ER should he/she begin having SI/HI. No medication side effects or related complaints today.     MEDS ORDERED DURING VISIT:  New Medications Ordered This Visit   Medications    Cariprazine HCl (Vraylar) 3 MG capsule capsule     Sig: Take 1 capsule by mouth Daily.     Dispense:  30 capsule     Refill:  2    lithium 300 MG tablet     Sig: Take 1 tablet by mouth Every Night.     Dispense:  30 tablet     Refill:  2    QUEtiapine Fumarate 150 MG  tablet     Sig: Take 1 tablet by mouth At Night As Needed (insomnia).     Dispense:  30 tablet     Refill:  2    amitriptyline (ELAVIL) 75 MG tablet     Sig: Take 1 tablet by mouth every night at bedtime.     Dispense:  30 tablet     Refill:  2    nicotine (NICODERM CQ) 14 MG/24HR patch     Sig: Place 1 patch on the skin as directed by provider Daily.     Dispense:  28 each     Refill:  1     Patient or patient representative verbalized consent for the use of Ambient Listening during the visit with  Stefan Marion MD for chart documentation. 6/13/2025  09:41 EDT      Return in about 3 months (around 9/13/2025).             This document has been electronically signed by Stefan Marion MD  June 13, 2025

## 2025-06-14 RX ORDER — VALSARTAN 160 MG/1
160 TABLET ORAL DAILY
Qty: 90 TABLET | Refills: 1 | OUTPATIENT
Start: 2025-06-14

## 2025-07-02 ENCOUNTER — APPOINTMENT (OUTPATIENT)
Dept: CT IMAGING | Facility: HOSPITAL | Age: 40
End: 2025-07-02
Payer: COMMERCIAL

## 2025-07-02 ENCOUNTER — HOSPITAL ENCOUNTER (EMERGENCY)
Facility: HOSPITAL | Age: 40
Discharge: HOME | End: 2025-07-02
Attending: STUDENT IN AN ORGANIZED HEALTH CARE EDUCATION/TRAINING PROGRAM | Admitting: STUDENT IN AN ORGANIZED HEALTH CARE EDUCATION/TRAINING PROGRAM
Payer: COMMERCIAL

## 2025-07-02 ENCOUNTER — APPOINTMENT (OUTPATIENT)
Dept: GENERAL RADIOLOGY | Facility: HOSPITAL | Age: 40
End: 2025-07-02
Payer: COMMERCIAL

## 2025-07-02 VITALS
WEIGHT: 285 LBS | RESPIRATION RATE: 18 BRPM | BODY MASS INDEX: 34.7 KG/M2 | OXYGEN SATURATION: 96 % | HEIGHT: 76 IN | SYSTOLIC BLOOD PRESSURE: 140 MMHG | TEMPERATURE: 98.5 F | DIASTOLIC BLOOD PRESSURE: 80 MMHG | HEART RATE: 73 BPM

## 2025-07-02 DIAGNOSIS — R07.9 CHEST PAIN, UNSPECIFIED TYPE: Primary | ICD-10-CM

## 2025-07-02 LAB
ALBUMIN SERPL-MCNC: 4.7 G/DL (ref 3.5–5.2)
ALBUMIN/GLOB SERPL: 1.7 G/DL
ALP SERPL-CCNC: 141 U/L (ref 39–117)
ALT SERPL W P-5'-P-CCNC: 75 U/L (ref 1–41)
ANION GAP SERPL CALCULATED.3IONS-SCNC: 14.8 MMOL/L (ref 5–15)
AST SERPL-CCNC: 49 U/L (ref 1–40)
BASOPHILS # BLD AUTO: 0.05 10*3/MM3 (ref 0–0.2)
BASOPHILS NFR BLD AUTO: 0.6 % (ref 0–1.5)
BILIRUB SERPL-MCNC: 0.6 MG/DL (ref 0–1.2)
BUN SERPL-MCNC: 14 MG/DL (ref 6–20)
BUN/CREAT SERPL: 13.1 (ref 7–25)
CALCIUM SPEC-SCNC: 10 MG/DL (ref 8.6–10.5)
CHLORIDE SERPL-SCNC: 98 MMOL/L (ref 98–107)
CO2 SERPL-SCNC: 24.2 MMOL/L (ref 22–29)
CREAT SERPL-MCNC: 1.07 MG/DL (ref 0.76–1.27)
CRP SERPL-MCNC: 0.53 MG/DL (ref 0–0.5)
DEPRECATED RDW RBC AUTO: 47 FL (ref 37–54)
EGFRCR SERPLBLD CKD-EPI 2021: 88.9 ML/MIN/1.73
EOSINOPHIL # BLD AUTO: 0.14 10*3/MM3 (ref 0–0.4)
EOSINOPHIL NFR BLD AUTO: 1.8 % (ref 0.3–6.2)
ERYTHROCYTE [DISTWIDTH] IN BLOOD BY AUTOMATED COUNT: 14.7 % (ref 12.3–15.4)
FLUAV SUBTYP SPEC NAA+PROBE: NOT DETECTED
FLUBV RNA NPH QL NAA+NON-PROBE: NOT DETECTED
GEN 5 1HR TROPONIN T REFLEX: 11 NG/L
GLOBULIN UR ELPH-MCNC: 2.8 GM/DL
GLUCOSE SERPL-MCNC: 345 MG/DL (ref 65–99)
HCT VFR BLD AUTO: 44.1 % (ref 37.5–51)
HGB BLD-MCNC: 14.5 G/DL (ref 13–17.7)
IMM GRANULOCYTES # BLD AUTO: 0.15 10*3/MM3 (ref 0–0.05)
IMM GRANULOCYTES NFR BLD AUTO: 1.9 % (ref 0–0.5)
LYMPHOCYTES # BLD AUTO: 1.91 10*3/MM3 (ref 0.7–3.1)
LYMPHOCYTES NFR BLD AUTO: 24.5 % (ref 19.6–45.3)
MAGNESIUM SERPL-MCNC: 2 MG/DL (ref 1.6–2.6)
MCH RBC QN AUTO: 28.5 PG (ref 26.6–33)
MCHC RBC AUTO-ENTMCNC: 32.9 G/DL (ref 31.5–35.7)
MCV RBC AUTO: 86.8 FL (ref 79–97)
MONOCYTES # BLD AUTO: 0.4 10*3/MM3 (ref 0.1–0.9)
MONOCYTES NFR BLD AUTO: 5.1 % (ref 5–12)
NEUTROPHILS NFR BLD AUTO: 5.14 10*3/MM3 (ref 1.7–7)
NEUTROPHILS NFR BLD AUTO: 66.1 % (ref 42.7–76)
NRBC BLD AUTO-RTO: 0 /100 WBC (ref 0–0.2)
NT-PROBNP SERPL-MCNC: 45.4 PG/ML (ref 0–450)
PLATELET # BLD AUTO: 188 10*3/MM3 (ref 140–450)
PMV BLD AUTO: 9.3 FL (ref 6–12)
POTASSIUM SERPL-SCNC: 4.3 MMOL/L (ref 3.5–5.2)
PROCALCITONIN SERPL-MCNC: 0.13 NG/ML (ref 0–0.25)
PROT SERPL-MCNC: 7.5 G/DL (ref 6–8.5)
RBC # BLD AUTO: 5.08 10*6/MM3 (ref 4.14–5.8)
SARS-COV-2 RNA RESP QL NAA+PROBE: NOT DETECTED
SODIUM SERPL-SCNC: 137 MMOL/L (ref 136–145)
TROPONIN T NUMERIC DELTA: -1 NG/L
TROPONIN T SERPL HS-MCNC: 12 NG/L
WBC NRBC COR # BLD AUTO: 7.79 10*3/MM3 (ref 3.4–10.8)

## 2025-07-02 PROCEDURE — 86140 C-REACTIVE PROTEIN: CPT | Performed by: STUDENT IN AN ORGANIZED HEALTH CARE EDUCATION/TRAINING PROGRAM

## 2025-07-02 PROCEDURE — 87636 SARSCOV2 & INF A&B AMP PRB: CPT | Performed by: STUDENT IN AN ORGANIZED HEALTH CARE EDUCATION/TRAINING PROGRAM

## 2025-07-02 PROCEDURE — 83735 ASSAY OF MAGNESIUM: CPT | Performed by: STUDENT IN AN ORGANIZED HEALTH CARE EDUCATION/TRAINING PROGRAM

## 2025-07-02 PROCEDURE — 25010000002 ONDANSETRON PER 1 MG: Performed by: STUDENT IN AN ORGANIZED HEALTH CARE EDUCATION/TRAINING PROGRAM

## 2025-07-02 PROCEDURE — 83880 ASSAY OF NATRIURETIC PEPTIDE: CPT | Performed by: STUDENT IN AN ORGANIZED HEALTH CARE EDUCATION/TRAINING PROGRAM

## 2025-07-02 PROCEDURE — 36415 COLL VENOUS BLD VENIPUNCTURE: CPT

## 2025-07-02 PROCEDURE — 25510000001 IOPAMIDOL 61 % SOLUTION: Performed by: STUDENT IN AN ORGANIZED HEALTH CARE EDUCATION/TRAINING PROGRAM

## 2025-07-02 PROCEDURE — 71045 X-RAY EXAM CHEST 1 VIEW: CPT

## 2025-07-02 PROCEDURE — 74175 CTA ABDOMEN W/CONTRAST: CPT

## 2025-07-02 PROCEDURE — 93005 ELECTROCARDIOGRAM TRACING: CPT | Performed by: STUDENT IN AN ORGANIZED HEALTH CARE EDUCATION/TRAINING PROGRAM

## 2025-07-02 PROCEDURE — 99285 EMERGENCY DEPT VISIT HI MDM: CPT | Performed by: STUDENT IN AN ORGANIZED HEALTH CARE EDUCATION/TRAINING PROGRAM

## 2025-07-02 PROCEDURE — 84145 PROCALCITONIN (PCT): CPT | Performed by: STUDENT IN AN ORGANIZED HEALTH CARE EDUCATION/TRAINING PROGRAM

## 2025-07-02 PROCEDURE — 84484 ASSAY OF TROPONIN QUANT: CPT | Performed by: STUDENT IN AN ORGANIZED HEALTH CARE EDUCATION/TRAINING PROGRAM

## 2025-07-02 PROCEDURE — 25810000003 SODIUM CHLORIDE 0.9 % SOLUTION: Performed by: STUDENT IN AN ORGANIZED HEALTH CARE EDUCATION/TRAINING PROGRAM

## 2025-07-02 PROCEDURE — 80053 COMPREHEN METABOLIC PANEL: CPT | Performed by: STUDENT IN AN ORGANIZED HEALTH CARE EDUCATION/TRAINING PROGRAM

## 2025-07-02 PROCEDURE — 96374 THER/PROPH/DIAG INJ IV PUSH: CPT

## 2025-07-02 PROCEDURE — 85025 COMPLETE CBC W/AUTO DIFF WBC: CPT | Performed by: STUDENT IN AN ORGANIZED HEALTH CARE EDUCATION/TRAINING PROGRAM

## 2025-07-02 RX ORDER — IOPAMIDOL 612 MG/ML
100 INJECTION, SOLUTION INTRAVASCULAR
Status: COMPLETED | OUTPATIENT
Start: 2025-07-02 | End: 2025-07-02

## 2025-07-02 RX ORDER — ONDANSETRON 4 MG/1
4 TABLET, ORALLY DISINTEGRATING ORAL EVERY 8 HOURS PRN
Qty: 20 TABLET | Refills: 0 | Status: SHIPPED | OUTPATIENT
Start: 2025-07-02

## 2025-07-02 RX ORDER — ONDANSETRON 2 MG/ML
4 INJECTION INTRAMUSCULAR; INTRAVENOUS ONCE
Status: COMPLETED | OUTPATIENT
Start: 2025-07-02 | End: 2025-07-02

## 2025-07-02 RX ADMIN — ONDANSETRON 4 MG: 2 INJECTION INTRAMUSCULAR; INTRAVENOUS at 17:37

## 2025-07-02 RX ADMIN — SODIUM CHLORIDE 1000 ML: 9 INJECTION, SOLUTION INTRAVENOUS at 17:37

## 2025-07-02 RX ADMIN — IOPAMIDOL 100 ML: 612 INJECTION, SOLUTION INTRAVENOUS at 18:26

## 2025-07-02 NOTE — ED PROVIDER NOTES
Subjective:  History of Present Illness:    Patient is a 40-year-old male with history of CAD status post CABG, bipolar disorder, depression, diabetes mellitus, hypertension, hyperlipidemia, PTSD, sleep apnea who presents today with chest pain and upper abdominal pain.  Reports chest pain which started 2 hours prior to arrival.  Reports that this radiates to his left upper extremity with tingling.  Denies any fevers.  No shortness of breath.  Also reports epigastric discomfort and nausea and vomiting prior to arrival.  Denies any dysuria.  No change in his bowel habits.  Denies any new leg swelling or leg pain.  Denies anticoagulation.      Nurses Notes reviewed and agree, including vitals, allergies, social history and prior medical history.     REVIEW OF SYSTEMS: All systems reviewed and not pertinent unless noted.  Review of Systems   Constitutional:  Positive for activity change, appetite change and fatigue. Negative for chills and fever.   HENT:  Positive for congestion. Negative for sinus pressure, sneezing and trouble swallowing.    Eyes:  Negative for discharge and itching.   Respiratory:  Positive for cough. Negative for shortness of breath.    Cardiovascular:  Positive for chest pain. Negative for palpitations.   Gastrointestinal:  Positive for abdominal pain, nausea and vomiting. Negative for abdominal distention, constipation and diarrhea.   Endocrine: Negative for cold intolerance and heat intolerance.   Genitourinary:  Negative for decreased urine volume, dysuria and urgency.   Musculoskeletal:  Negative for gait problem, neck pain and neck stiffness.   Skin:  Negative for color change and rash.   Allergic/Immunologic: Negative for immunocompromised state.   Neurological:  Negative for facial asymmetry and headaches.   Hematological:  Negative for adenopathy.   Psychiatric/Behavioral:  Negative for self-injury and suicidal ideas.        Past Medical History:   Diagnosis Date    Anxiety     Asthma      Bipolar 1 disorder     Coronary artery disease     Depression     DM (diabetes mellitus)     Heart valve disease     Hyperlipidemia     Hypertension     Kidney stone     Myocardial infarction     Polyneuropathy     Positive TB test     treated w/ meds x 6 months    PTSD (post-traumatic stress disorder)     Sleep apnea     Suicide attempt     Tattoos        Allergies:    Gabapentin and Latex      Past Surgical History:   Procedure Laterality Date    ARTERIAL BYPASS SURGERY      CARDIAC CATHETERIZATION N/A 01/03/2025    Procedure: Left Heart Cath;  Surgeon: Cody Marion MD;  Location: [facility] CATH INVASIVE LOCATION;  Service: Cardiovascular;  Laterality: N/A;    CARDIAC SURGERY      CABG x 3 vessels    CHOLECYSTECTOMY  2002    CORONARY ARTERY BYPASS GRAFT      INGUINAL HERNIA REPAIR Right 1995    ORIF METACARPAL FRACTURE Right 2000    TIBIA FRACTURE SURGERY Left 1997    ORIF         Social History     Socioeconomic History    Marital status:    Tobacco Use    Smoking status: Former     Current packs/day: 1.00     Average packs/day: 1 pack/day for 26.5 years (26.5 ttl pk-yrs)     Types: Cigarettes     Start date: 1999    Smokeless tobacco: Never   Vaping Use    Vaping status: Never Used   Substance and Sexual Activity    Alcohol use: No    Drug use: Yes     Types: Marijuana     Comment: rarely    Sexual activity: Yes     Partners: Female     Birth control/protection: None         Family History   Problem Relation Age of Onset    Arthritis Father     Hypertension Father     Alcohol abuse Father     Drug abuse Father     Heart attack Father     Diabetes Maternal Aunt     Diabetes Maternal Uncle     Diabetes Maternal Grandmother     Alzheimer's disease Maternal Grandmother     Dementia Maternal Grandmother     Diabetes Other     Hypertension Mother     Depression Mother     ADD / ADHD Brother     Early death Maternal Grandfather     Liver disease Maternal Grandfather     Alcohol abuse Maternal Grandfather      "Cirrhosis Maternal Grandfather     No Known Problems Paternal Grandmother     Liver disease Paternal Grandfather     Alcohol abuse Paternal Grandfather     Early death Paternal Grandfather     Cirrhosis Paternal Grandfather     No Known Problems Brother     Anxiety disorder Neg Hx     Bipolar disorder Neg Hx     OCD Neg Hx     Paranoid behavior Neg Hx     Schizophrenia Neg Hx     Seizures Neg Hx     Self-Injurious Behavior  Neg Hx     Suicide Attempts Neg Hx     Colon cancer Neg Hx        Objective  Physical Exam:  /80   Pulse 73   Temp 98.5 °F (36.9 °C) (Oral)   Resp 18   Ht 193 cm (76\")   Wt 129 kg (285 lb)   SpO2 96%   BMI 34.69 kg/m²      Physical Exam    Procedures    ED Course:    ED Course as of 07/02/25 2332 Wed Jul 02, 2025 1743 EKG interpreted by me, normal sinus rhythm no concerning ST changes noted, rate of 79 [JE]   1806 Chest x-ray independently interpreted by me showed no acute process [JE]   1916 CT aortogram independently interpreted by me showing no concern for dissection, no concern for PE [JE]   1932 CT Angiogram Aorta [JE]      ED Course User Index  [JE] Umang Ratliff MD       Lab Results (last 24 hours)       Procedure Component Value Units Date/Time    CBC & Differential [[id number]]  (Abnormal) Collected: 07/02/25 1720    Specimen: Blood Updated: 07/02/25 1731    Narrative:      The following orders were created for panel order CBC & Differential.  Procedure                               Abnormality         Status                     ---------                               -----------         ------                     CBC Auto Differential[[id number]]        Abnormal            Final result                 Please view results for these tests on the individual orders.    Comprehensive Metabolic Panel [[id number]]  (Abnormal) Collected: 07/02/25 1720    Specimen: Blood Updated: 07/02/25 1755     Glucose 345 mg/dL      Comment: Glucose >180, Hemoglobin A1C recommended.    "     BUN 14.0 mg/dL      Creatinine 1.07 mg/dL      Sodium 137 mmol/L      Potassium 4.3 mmol/L      Chloride 98 mmol/L      CO2 24.2 mmol/L      Calcium 10.0 mg/dL      Total Protein 7.5 g/dL      Albumin 4.7 g/dL      ALT (SGPT) 75 U/L      AST (SGOT) 49 U/L      Alkaline Phosphatase 141 U/L      Total Bilirubin 0.6 mg/dL      Globulin 2.8 gm/dL      A/G Ratio 1.7 g/dL      BUN/Creatinine Ratio 13.1     Anion Gap 14.8 mmol/L      eGFR 88.9 mL/min/1.73     Narrative:      GFR Categories in Chronic Kidney Disease (CKD)              GFR Category          GFR (mL/min/1.73)    Interpretation  G1                    90 or greater        Normal or high (1)  G2                    60-89                Mild decrease (1)  G3a                   45-59                Mild to moderate decrease  G3b                   30-44                Moderate to severe decrease  G4                    15-29                Severe decrease  G5                    14 or less           Kidney failure    (1)In the absence of evidence of kidney disease, neither GFR category G1 or G2 fulfill the criteria for CKD.    eGFR calculation 2021 CKD-EPI creatinine equation, which does not include race as a factor    High Sensitivity Troponin T [[id number]]  (Normal) Collected: 07/02/25 1720    Specimen: Blood Updated: 07/02/25 1755     HS Troponin T 12 ng/L     Narrative:      High Sensitive Troponin T Reference Range:  <14.0 ng/L- Negative Female for AMI  <22.0 ng/L- Negative Male for AMI  >=14 - Abnormal Female indicating possible myocardial injury.  >=22 - Abnormal Male indicating possible myocardial injury.   Clinicians would have to utilize clinical acumen, EKG, Troponin, and serial changes to determine if it is an Acute Myocardial Infarction or myocardial injury due to an underlying chronic condition.         BNP [[id number]]  (Normal) Collected: 07/02/25 1720    Specimen: Blood Updated: 07/02/25 1754     proBNP 45.4 pg/mL     Narrative:      This assay is  "used as an aid in the diagnosis of individuals suspected of having heart failure. It can be used as an aid in the diagnosis of acute decompensated heart failure (ADHF) in patients presenting with signs and symptoms of ADHF to the emergency department (ED). In addition, NT-proBNP of <300 pg/mL indicates ADHF is not likely.    Age Range Result Interpretation  NT-proBNP Concentration (pg/mL:      <50             Positive            >450                   Gray                 300-450                    Negative             <300    50-75           Positive            >900                  Gray                300-900                  Negative            <300      >75             Positive            >1800                  Gray                300-1800                  Negative            <300    C-reactive Protein [[id number]]  (Abnormal) Collected: 07/02/25 1720    Specimen: Blood Updated: 07/02/25 1755     C-Reactive Protein 0.53 mg/dL     Procalcitonin [[id number]]  (Normal) Collected: 07/02/25 1720    Specimen: Blood Updated: 07/02/25 1800     Procalcitonin 0.13 ng/mL     Narrative:      As a Marker for Sepsis (Non-Neonates):    1. <0.5 ng/mL represents a low risk of severe sepsis and/or septic shock.  2. >2 ng/mL represents a high risk of severe sepsis and/or septic shock.    As a Marker for Lower Respiratory Tract Infections that require antibiotic therapy:    PCT on Admission    Antibiotic Therapy       6-12 Hrs later    >0.5                Strongly Recommended  >0.25 - <0.5        Recommended   0.1 - 0.25          Discouraged              Remeasure/reassess PCT  <0.1                Strongly Discouraged     Remeasure/reassess PCT    As 28 day mortality risk marker: \"Change in Procalcitonin Result\" (>80% or <=80%) if Day 0 (or Day 1) and Day 4 values are available. Refer to http://www.[employer]-pct-calculator.com    Change in PCT <=80%  A decrease of PCT levels below or equal to 80% defines a positive change in PCT test " result representing a higher risk for 28-day all-cause mortality of patients diagnosed with severe sepsis for septic shock.    Change in PCT >80%  A decrease of PCT levels of more than 80% defines a negative change in PCT result representing a lower risk for 28-day all-cause mortality of patients diagnosed with severe sepsis or septic shock.       Magnesium [[id number]]  (Normal) Collected: 07/02/25 1720    Specimen: Blood Updated: 07/02/25 1755     Magnesium 2.0 mg/dL     CBC Auto Differential [[id number]]  (Abnormal) Collected: 07/02/25 1720    Specimen: Blood Updated: 07/02/25 1731     WBC 7.79 10*3/mm3      RBC 5.08 10*6/mm3      Hemoglobin 14.5 g/dL      Hematocrit 44.1 %      MCV 86.8 fL      MCH 28.5 pg      MCHC 32.9 g/dL      RDW 14.7 %      RDW-SD 47.0 fl      MPV 9.3 fL      Platelets 188 10*3/mm3      Neutrophil % 66.1 %      Lymphocyte % 24.5 %      Monocyte % 5.1 %      Eosinophil % 1.8 %      Basophil % 0.6 %      Immature Grans % 1.9 %      Neutrophils, Absolute 5.14 10*3/mm3      Lymphocytes, Absolute 1.91 10*3/mm3      Monocytes, Absolute 0.40 10*3/mm3      Eosinophils, Absolute 0.14 10*3/mm3      Basophils, Absolute 0.05 10*3/mm3      Immature Grans, Absolute 0.15 10*3/mm3      nRBC 0.0 /100 WBC     COVID-19 and FLU A/B PCR, 1 HR TAT - Swab, Nasopharynx [[id number]]  (Normal) Collected: 07/02/25 1738    Specimen: Swab from Nasopharynx Updated: 07/02/25 1806     COVID19 Not Detected     Influenza A PCR Not Detected     Influenza B PCR Not Detected    High Sensitivity Troponin T 1Hr [[id number]]  (Normal) Collected: 07/02/25 1832    Specimen: Blood Updated: 07/02/25 1854     HS Troponin T 11 ng/L      Troponin T Numeric Delta -1 ng/L     Narrative:      High Sensitive Troponin T Reference Range:  <14.0 ng/L- Negative Female for AMI  <22.0 ng/L- Negative Male for AMI  >=14 - Abnormal Female indicating possible myocardial injury.  >=22 - Abnormal Male indicating possible myocardial injury.   Clinicians  would have to utilize clinical acumen, EKG, Troponin, and serial changes to determine if it is an Acute Myocardial Infarction or myocardial injury due to an underlying chronic condition.                  CT Angiogram Aorta  Result Date: 7/2/2025  FINAL REPORT TECHNIQUE: null CLINICAL HISTORY: Patient with chest pain, neurologic symptoms of the left upper extremity, also COMPARISON: null FINDINGS: CT angiography of the chest and abdomen with IV contrast. CT of the chest without contrast. 3D/MIP post processing reconstructions were performed. COMPARISON: None provided. FINDINGS: No evidence of aortic dissection. Minimal calcified plaque present along the distal abdominal aorta without significant stenosis. Three arch branch vessels. Visualized portions of the great cervical arteries appear patent. Celiac trunk, SMA, renal arteries, and TIMOHTY are widely patent. Visualized portions of the common iliac arteries are patent. Visualized thyroid is unremarkable. No supraclavicular or axillary lymphadenopathy. Ascending aorta and main pulmonary artery are normal in caliber. No pericardial effusion. Status post CABG. Coronary artery atherosclerosis. Normal esophagus. Calcified left hilar lymph nodes. No pleural effusion. No consolidation. Trachea and central airways are clear. No significant bronchial wall thickening. No bronchiectasis. Hepatic steatosis. Liver is enlarged with right lobe measuring 19.6 cm. No focal hepatic lesion. Cholecystectomy. Spleen is enlarged measuring 15.3 cm in length. Splenic granulomas present. Normal pancreas. Normal adrenal glands. Symmetric renal enhancement. No hydronephrosis. Visualized portions of the appendix are unremarkable. Status post sternotomy with nonunion of the sternum. No acute fracture or suspicious bone lesion.     Impression: IMPRESSION: 1. No evidence of aortic dissection. 2. No acute intrathoracic findings. No evidence of pneumonia. 3. Hepatosplenomegaly with hepatic steatosis.  Authenticated and Electronically Signed by Joshua Winston MD on 07/02/2025 07:20:21 PM         [facility]     Amount and/or Complexity of Data Reviewed  Independent visualization of images, tracings, or specimens: yes        Initial impression of presenting illness: Chest pain, abdominal pain    DDX: includes but is not limited to: Viral URI, ACS, MI come back pneumonia, viral URI, PE, aortic dissection    Patient arrives stable with vitals interpreted by myself.     Pertinent features from physical exam: Clear to auscultation, regular rate and rhythm, no murmur, nontender to abdominal palpation.    Initial diagnostic plan: CBC, CMP, troponin, BNP, EKG, chest x-ray, CRP, Pro-Edward, magnesium, CT aortogram    Results from initial plan were reviewed and interpreted by me revealing no concern for acute cardiac process, no concern for dissection or PE per my independent interpretation of CT aortogram    Diagnostic information from other sources: Discussed with patient's wife at the bedside reviewed past medical records    Interventions / Re-evaluation: Observed emergency department for over 2 and half hours and no change in vital signs    Results/clinical rationale were discussed with patient at bedside    Consultations/Discussion of results with other physicians: Discussed negative workup in emergency department, no concern for acute cardiac process PE or aortic dissection.  Suspect viral illness etiology of patient's presentation today and have recommended follow-up patient primary care doctor to ensure resolution of symptoms.  Given strict turn precaution for any severe increase in chest pain or abdominal pain    Disposition plan: Discharge  -----        Final diagnoses:   Chest pain, unspecified type          Umang Ratliff MD  07/02/25 [id number]

## 2025-07-02 NOTE — Clinical Note
[facility] EMERGENCY DEPARTMENT  [address] [address]  Phone: [phone]    Alex Torres was seen and treated in our emergency department on 7/2/2025.  He may return to work on 07/05/2025.         Thank you for choosing [facility].    Umang Ratliff MD

## 2025-07-02 NOTE — DISCHARGE INSTRUCTIONS
You were evaluated for chest pain and abdominal pain.  We got lab work and a CT scan of your chest and abdomen that showed no concerns for any problems with your heart, no concern for any problems with aorta or blood clots in your lung or problems in your abdomen.  You are now stable for discharge.  As we discussed, your symptoms could be related to a viral illness.  We have prescribed you Zofran to help with any possible nausea and vomiting at home recommend drink plenty of fluids and following at the primary care doctor to ensure that you improve appropriately.  You are now stable for discharge.

## 2025-07-21 ENCOUNTER — PATIENT ROUNDING (BHMG ONLY) (OUTPATIENT)
Dept: URGENT CARE | Facility: CLINIC | Age: 40
End: 2025-07-21
Payer: COMMERCIAL

## 2025-08-04 DIAGNOSIS — F17.200 NICOTINE USE DISORDER: ICD-10-CM

## 2025-08-04 RX ORDER — NICOTINE 21 MG/24HR
1 PATCH, TRANSDERMAL 24 HOURS TRANSDERMAL EVERY 24 HOURS
Qty: 28 PATCH | Refills: 1 | Status: SHIPPED | OUTPATIENT
Start: 2025-08-04

## (undated) DEVICE — INFLATION DEVICE KIT: Brand: ENCORE™ 26 ADVANTAGE KIT

## (undated) DEVICE — GUIDE CATHETER: Brand: MACH1™

## (undated) DEVICE — RADIFOCUS OPTITORQUE ANGIOGRAPHIC CATHETER: Brand: OPTITORQUE

## (undated) DEVICE — TREK CORONARY DILATATION CATHETER 2.50 MM X 20 MM / RAPID-EXCHANGE: Brand: TREK

## (undated) DEVICE — GW INQWIRE FC PTFE STD J/1.5 .035 260

## (undated) DEVICE — RUNTHROUGH NS EXTRA FLOPPY PTCA GUIDEWIRE: Brand: RUNTHROUGH

## (undated) DEVICE — TR BAND RADIAL ARTERY COMPRESSION DEVICE: Brand: TR BAND

## (undated) DEVICE — CATH F6 ST JR 4 100CM: Brand: SUPERTORQUE

## (undated) DEVICE — GLIDESHEATH SLENDER STAINLESS STEEL KIT: Brand: GLIDESHEATH SLENDER

## (undated) DEVICE — DGW .035 FC J3MM 260CM TEF: Brand: EMERALD